# Patient Record
Sex: FEMALE | Race: WHITE | Employment: OTHER | URBAN - NONMETROPOLITAN AREA
[De-identification: names, ages, dates, MRNs, and addresses within clinical notes are randomized per-mention and may not be internally consistent; named-entity substitution may affect disease eponyms.]

---

## 2017-01-11 RX ORDER — HYDROCODONE BITARTRATE AND ACETAMINOPHEN 10; 325 MG/1; MG/1
TABLET ORAL
Qty: 170 TABLET | Refills: 0 | Status: SHIPPED | OUTPATIENT
Start: 2017-01-13 | End: 2017-02-01 | Stop reason: SDUPTHER

## 2017-02-01 ENCOUNTER — HOSPITAL ENCOUNTER (OUTPATIENT)
Dept: PAIN MANAGEMENT | Age: 72
Discharge: HOME | End: 2017-02-01
Payer: MEDICARE

## 2017-02-01 VITALS
OXYGEN SATURATION: 93 % | TEMPERATURE: 97.6 F | HEIGHT: 60 IN | DIASTOLIC BLOOD PRESSURE: 93 MMHG | SYSTOLIC BLOOD PRESSURE: 185 MMHG | HEART RATE: 50 BPM | BODY MASS INDEX: 24.74 KG/M2 | RESPIRATION RATE: 16 BRPM | WEIGHT: 126 LBS

## 2017-02-01 DIAGNOSIS — M70.61 TROCHANTERIC BURSITIS OF BOTH HIPS: ICD-10-CM

## 2017-02-01 DIAGNOSIS — M70.62 TROCHANTERIC BURSITIS OF BOTH HIPS: ICD-10-CM

## 2017-02-01 DIAGNOSIS — M25.551 BILATERAL HIP PAIN: ICD-10-CM

## 2017-02-01 DIAGNOSIS — M25.552 BILATERAL HIP PAIN: ICD-10-CM

## 2017-02-01 DIAGNOSIS — G89.29 CHRONIC MIDLINE LOW BACK PAIN, WITH SCIATICA PRESENCE UNSPECIFIED: Primary | ICD-10-CM

## 2017-02-01 DIAGNOSIS — M54.5 CHRONIC MIDLINE LOW BACK PAIN, WITH SCIATICA PRESENCE UNSPECIFIED: Primary | ICD-10-CM

## 2017-02-01 PROCEDURE — G0463 HOSPITAL OUTPT CLINIC VISIT: HCPCS

## 2017-02-01 PROCEDURE — 99213 OFFICE O/P EST LOW 20 MIN: CPT

## 2017-02-01 RX ORDER — HYDROCODONE BITARTRATE AND ACETAMINOPHEN 10; 325 MG/1; MG/1
TABLET ORAL
Qty: 170 TABLET | Refills: 0 | Status: SHIPPED | OUTPATIENT
Start: 2017-02-11 | End: 2017-03-07 | Stop reason: SDUPTHER

## 2017-02-01 RX ORDER — OMEPRAZOLE 40 MG/1
1 CAPSULE, DELAYED RELEASE ORAL DAILY
COMMUNITY
Start: 2016-11-09

## 2017-02-01 ASSESSMENT — PAIN DESCRIPTION - PROGRESSION: CLINICAL_PROGRESSION: GRADUALLY WORSENING

## 2017-02-01 ASSESSMENT — PAIN DESCRIPTION - PAIN TYPE: TYPE: CHRONIC PAIN

## 2017-02-01 ASSESSMENT — PAIN DESCRIPTION - DESCRIPTORS: DESCRIPTORS: ACHING;THROBBING

## 2017-02-01 ASSESSMENT — PAIN DESCRIPTION - ONSET: ONSET: ON-GOING

## 2017-02-01 ASSESSMENT — PAIN DESCRIPTION - ORIENTATION: ORIENTATION: RIGHT;LEFT;LOWER

## 2017-02-01 ASSESSMENT — PAIN DESCRIPTION - FREQUENCY: FREQUENCY: INTERMITTENT

## 2017-02-01 ASSESSMENT — PAIN SCALES - GENERAL: PAINLEVEL_OUTOF10: 10

## 2017-02-01 ASSESSMENT — ACTIVITIES OF DAILY LIVING (ADL): EFFECT OF PAIN ON DAILY ACTIVITIES: LIMITS ACTIVITIES

## 2017-02-01 ASSESSMENT — PAIN DESCRIPTION - LOCATION: LOCATION: BACK;HIP

## 2017-02-22 DIAGNOSIS — E78.2 MIXED HYPERLIPIDEMIA: Primary | ICD-10-CM

## 2017-02-22 RX ORDER — ATORVASTATIN CALCIUM 40 MG/1
TABLET, FILM COATED ORAL
Qty: 90 TABLET | Refills: 3 | Status: SHIPPED | OUTPATIENT
Start: 2017-02-22 | End: 2017-11-20 | Stop reason: ALTCHOICE

## 2017-02-23 ENCOUNTER — TELEPHONE (OUTPATIENT)
Dept: CARDIOLOGY | Age: 72
End: 2017-02-23

## 2017-02-23 DIAGNOSIS — E78.2 MIXED HYPERLIPIDEMIA: ICD-10-CM

## 2017-02-23 LAB
ALBUMIN SERPL-MCNC: 4 G/DL (ref 3.5–5.2)
ALP BLD-CCNC: 67 U/L (ref 35–104)
ALT SERPL-CCNC: 7 U/L (ref 5–33)
AST SERPL-CCNC: 16 U/L (ref 5–32)
BILIRUB SERPL-MCNC: 0.3 MG/DL (ref 0.2–1.2)
BILIRUBIN DIRECT: 0.1 MG/DL (ref 0–0.3)
BILIRUBIN, INDIRECT: 0.2 MG/DL (ref 0.1–1)
CHOLESTEROL, TOTAL: 188 MG/DL (ref 160–199)
HDLC SERPL-MCNC: 66 MG/DL (ref 65–121)
LDL CHOLESTEROL CALCULATED: 103 MG/DL
TOTAL PROTEIN: 6.4 G/DL (ref 6.6–8.7)
TRIGL SERPL-MCNC: 93 MG/DL (ref 150–199)

## 2017-03-07 RX ORDER — HYDROCODONE BITARTRATE AND ACETAMINOPHEN 10; 325 MG/1; MG/1
TABLET ORAL
Qty: 170 TABLET | Refills: 0 | Status: SHIPPED | OUTPATIENT
Start: 2017-03-14 | End: 2017-03-16 | Stop reason: SDUPTHER

## 2017-03-16 ENCOUNTER — HOSPITAL ENCOUNTER (OUTPATIENT)
Dept: PAIN MANAGEMENT | Age: 72
Discharge: HOME | End: 2017-03-16
Payer: MEDICARE

## 2017-03-16 VITALS
RESPIRATION RATE: 20 BRPM | HEART RATE: 56 BPM | BODY MASS INDEX: 24.35 KG/M2 | TEMPERATURE: 99.3 F | HEIGHT: 60 IN | SYSTOLIC BLOOD PRESSURE: 177 MMHG | OXYGEN SATURATION: 92 % | WEIGHT: 124 LBS | DIASTOLIC BLOOD PRESSURE: 80 MMHG

## 2017-03-16 DIAGNOSIS — M70.62 TROCHANTERIC BURSITIS OF BOTH HIPS: ICD-10-CM

## 2017-03-16 DIAGNOSIS — M70.61 TROCHANTERIC BURSITIS OF BOTH HIPS: ICD-10-CM

## 2017-03-16 DIAGNOSIS — M25.552 BILATERAL HIP PAIN: ICD-10-CM

## 2017-03-16 DIAGNOSIS — M25.551 BILATERAL HIP PAIN: ICD-10-CM

## 2017-03-16 PROCEDURE — 6360000002 HC RX W HCPCS

## 2017-03-16 PROCEDURE — 3209999900 FLUORO FOR SURGICAL PROCEDURES

## 2017-03-16 PROCEDURE — 99152 MOD SED SAME PHYS/QHP 5/>YRS: CPT

## 2017-03-16 PROCEDURE — 2500000003 HC RX 250 WO HCPCS

## 2017-03-16 PROCEDURE — 20610 DRAIN/INJ JOINT/BURSA W/O US: CPT

## 2017-03-16 RX ORDER — BUPIVACAINE HYDROCHLORIDE 5 MG/ML
INJECTION, SOLUTION EPIDURAL; INTRACAUDAL
Status: COMPLETED | OUTPATIENT
Start: 2017-03-16 | End: 2017-03-16

## 2017-03-16 RX ORDER — TRIAMCINOLONE ACETONIDE 40 MG/ML
INJECTION, SUSPENSION INTRA-ARTICULAR; INTRAMUSCULAR
Status: COMPLETED | OUTPATIENT
Start: 2017-03-16 | End: 2017-03-16

## 2017-03-16 RX ORDER — OXYCODONE AND ACETAMINOPHEN 10; 325 MG/1; MG/1
1 TABLET ORAL EVERY 4 HOURS PRN
Qty: 170 TABLET | Refills: 0 | Status: SHIPPED | OUTPATIENT
Start: 2017-04-13 | End: 2017-05-23 | Stop reason: ALTCHOICE

## 2017-03-16 RX ORDER — HYDROCODONE BITARTRATE AND ACETAMINOPHEN 10; 325 MG/1; MG/1
TABLET ORAL
Qty: 170 TABLET | Refills: 0 | Status: SHIPPED | OUTPATIENT
Start: 2017-04-13 | End: 2017-03-16 | Stop reason: DRUGHIGH

## 2017-03-16 RX ORDER — MIDAZOLAM HYDROCHLORIDE 1 MG/ML
INJECTION INTRAMUSCULAR; INTRAVENOUS
Status: COMPLETED | OUTPATIENT
Start: 2017-03-16 | End: 2017-03-16

## 2017-03-16 RX ADMIN — BUPIVACAINE HYDROCHLORIDE 9 ML: 5 INJECTION, SOLUTION EPIDURAL; INTRACAUDAL at 15:52

## 2017-03-16 RX ADMIN — TRIAMCINOLONE ACETONIDE 40 MG: 40 INJECTION, SUSPENSION INTRA-ARTICULAR; INTRAMUSCULAR at 15:53

## 2017-03-16 RX ADMIN — TRIAMCINOLONE ACETONIDE 20 MG: 40 INJECTION, SUSPENSION INTRA-ARTICULAR; INTRAMUSCULAR at 15:53

## 2017-03-16 RX ADMIN — MIDAZOLAM HYDROCHLORIDE 2 MG: 1 INJECTION INTRAMUSCULAR; INTRAVENOUS at 15:49

## 2017-03-16 RX ADMIN — BUPIVACAINE HYDROCHLORIDE 4.5 ML: 5 INJECTION, SOLUTION EPIDURAL; INTRACAUDAL at 15:53

## 2017-03-16 ASSESSMENT — PAIN DESCRIPTION - FREQUENCY: FREQUENCY: CONTINUOUS

## 2017-03-16 ASSESSMENT — PAIN DESCRIPTION - PAIN TYPE: TYPE: CHRONIC PAIN

## 2017-03-16 ASSESSMENT — PAIN DESCRIPTION - DESCRIPTORS: DESCRIPTORS: ACHING;THROBBING

## 2017-03-16 ASSESSMENT — PAIN DESCRIPTION - PROGRESSION: CLINICAL_PROGRESSION: GRADUALLY WORSENING

## 2017-03-16 ASSESSMENT — PAIN SCALES - GENERAL: PAINLEVEL_OUTOF10: 8

## 2017-03-16 ASSESSMENT — PAIN DESCRIPTION - LOCATION: LOCATION: BACK;HIP;SHOULDER

## 2017-03-16 ASSESSMENT — PAIN DESCRIPTION - ONSET: ONSET: ON-GOING

## 2017-03-16 ASSESSMENT — PAIN DESCRIPTION - ORIENTATION: ORIENTATION: RIGHT;LOWER

## 2017-05-09 ENCOUNTER — TELEPHONE (OUTPATIENT)
Dept: CARDIOLOGY | Age: 72
End: 2017-05-09

## 2017-05-11 RX ORDER — HYDROCODONE BITARTRATE AND ACETAMINOPHEN 10; 325 MG/1; MG/1
TABLET ORAL
Qty: 170 TABLET | Refills: 0 | Status: SHIPPED | OUTPATIENT
Start: 2017-05-13 | End: 2017-05-23 | Stop reason: SDUPTHER

## 2017-05-23 ENCOUNTER — HOSPITAL ENCOUNTER (OUTPATIENT)
Dept: PAIN MANAGEMENT | Age: 72
Discharge: HOME | End: 2017-05-23
Payer: MEDICARE

## 2017-05-23 VITALS
TEMPERATURE: 98.5 F | RESPIRATION RATE: 16 BRPM | HEART RATE: 53 BPM | BODY MASS INDEX: 24.35 KG/M2 | HEIGHT: 60 IN | OXYGEN SATURATION: 92 % | DIASTOLIC BLOOD PRESSURE: 86 MMHG | WEIGHT: 124 LBS | SYSTOLIC BLOOD PRESSURE: 170 MMHG

## 2017-05-23 DIAGNOSIS — G89.29 CHRONIC RIGHT SHOULDER PAIN: ICD-10-CM

## 2017-05-23 DIAGNOSIS — M25.511 CHRONIC RIGHT SHOULDER PAIN: ICD-10-CM

## 2017-05-23 DIAGNOSIS — M54.41 ACUTE RIGHT-SIDED LOW BACK PAIN WITH RIGHT-SIDED SCIATICA: Primary | ICD-10-CM

## 2017-05-23 DIAGNOSIS — M70.62 TROCHANTERIC BURSITIS OF BOTH HIPS: ICD-10-CM

## 2017-05-23 DIAGNOSIS — M25.552 BILATERAL HIP PAIN: ICD-10-CM

## 2017-05-23 DIAGNOSIS — M70.61 TROCHANTERIC BURSITIS OF BOTH HIPS: ICD-10-CM

## 2017-05-23 DIAGNOSIS — M25.551 BILATERAL HIP PAIN: ICD-10-CM

## 2017-05-23 PROCEDURE — 99214 OFFICE O/P EST MOD 30 MIN: CPT

## 2017-05-23 PROCEDURE — 80307 DRUG TEST PRSMV CHEM ANLYZR: CPT

## 2017-05-23 PROCEDURE — G0463 HOSPITAL OUTPT CLINIC VISIT: HCPCS

## 2017-05-23 RX ORDER — HYDROCODONE BITARTRATE AND ACETAMINOPHEN 10; 325 MG/1; MG/1
TABLET ORAL
Qty: 170 TABLET | Refills: 0 | Status: SHIPPED | OUTPATIENT
Start: 2017-06-12 | End: 2017-07-10 | Stop reason: SDUPTHER

## 2017-05-23 ASSESSMENT — PAIN SCALES - GENERAL: PAINLEVEL_OUTOF10: 9

## 2017-05-23 ASSESSMENT — PAIN DESCRIPTION - PAIN TYPE: TYPE: CHRONIC PAIN

## 2017-05-23 ASSESSMENT — ACTIVITIES OF DAILY LIVING (ADL): EFFECT OF PAIN ON DAILY ACTIVITIES: LIMITS ACTIVITIES

## 2017-05-23 ASSESSMENT — PAIN DESCRIPTION - FREQUENCY: FREQUENCY: CONTINUOUS

## 2017-05-23 ASSESSMENT — PAIN DESCRIPTION - ONSET: ONSET: ON-GOING

## 2017-05-23 ASSESSMENT — PAIN DESCRIPTION - ORIENTATION: ORIENTATION: LOWER;RIGHT

## 2017-05-23 ASSESSMENT — PAIN DESCRIPTION - PROGRESSION: CLINICAL_PROGRESSION: GRADUALLY WORSENING

## 2017-05-23 ASSESSMENT — PAIN DESCRIPTION - LOCATION: LOCATION: BACK;SHOULDER

## 2017-05-31 ENCOUNTER — HOSPITAL ENCOUNTER (OUTPATIENT)
Dept: GENERAL RADIOLOGY | Age: 72
Discharge: HOME | End: 2017-05-31
Payer: MEDICARE

## 2017-05-31 DIAGNOSIS — M25.511 CHRONIC RIGHT SHOULDER PAIN: ICD-10-CM

## 2017-05-31 DIAGNOSIS — M70.62 TROCHANTERIC BURSITIS OF BOTH HIPS: ICD-10-CM

## 2017-05-31 DIAGNOSIS — G89.29 CHRONIC RIGHT SHOULDER PAIN: ICD-10-CM

## 2017-05-31 DIAGNOSIS — M70.61 TROCHANTERIC BURSITIS OF BOTH HIPS: ICD-10-CM

## 2017-05-31 DIAGNOSIS — M54.41 ACUTE RIGHT-SIDED LOW BACK PAIN WITH RIGHT-SIDED SCIATICA: ICD-10-CM

## 2017-05-31 LAB
AMPHETAMINES, URINE: NEGATIVE NG/ML
BARBITURATES, URINE: NEGATIVE NG/ML
BENZODIAZEPINES, URINE: NEGATIVE NG/ML
CANNABINOIDS, URINE: NEGATIVE NG/ML
COCAINE METABOLITE, URINE: NEGATIVE NG/ML
CODEINE, URINE: NEGATIVE
CREATININE, URINE: 73.8 MG/DL (ref 20–300)
ETHANOL U, QUAN: NEGATIVE %
FENTANYL URINE: NEGATIVE PG/ML
HYDROCODONE, UR CONF: 2910 NG/ML
HYDROCODONE, URINE: POSITIVE
HYDROMORPHONE, URINE: NEGATIVE
MEPERIDINE, UR: NEGATIVE NG/ML
METHADONE SCREEN, URINE: NEGATIVE NG/ML
MORPHINE URINE: NEGATIVE
OPIATES, URINE: ABNORMAL NG/ML
OPIATES, URINE: POSITIVE NG/ML
OXYCODONE/OXYMORPHONE, UR: NEGATIVE NG/ML
PH, URINE: 5.9 (ref 4.5–8.9)
PHENCYCLIDINE, URINE: NEGATIVE NG/ML
PROPOXYPHENE, URINE: NEGATIVE NG/ML

## 2017-05-31 PROCEDURE — 73030 X-RAY EXAM OF SHOULDER: CPT

## 2017-05-31 PROCEDURE — 73521 X-RAY EXAM HIPS BI 2 VIEWS: CPT

## 2017-05-31 PROCEDURE — 72120 X-RAY BEND ONLY L-S SPINE: CPT

## 2017-06-13 ENCOUNTER — OFFICE VISIT (OUTPATIENT)
Dept: CARDIOLOGY | Age: 72
End: 2017-06-13
Payer: MEDICARE

## 2017-06-13 VITALS
HEART RATE: 55 BPM | SYSTOLIC BLOOD PRESSURE: 130 MMHG | WEIGHT: 124.38 LBS | HEIGHT: 60 IN | BODY MASS INDEX: 24.42 KG/M2 | DIASTOLIC BLOOD PRESSURE: 76 MMHG

## 2017-06-13 DIAGNOSIS — I42.1 HYPERTROPHIC OBSTRUCTIVE CARDIOMYOPATHY (HOCM) (HCC): ICD-10-CM

## 2017-06-13 DIAGNOSIS — I25.10 CORONARY ARTERY DISEASE INVOLVING NATIVE CORONARY ARTERY OF NATIVE HEART, ANGINA PRESENCE UNSPECIFIED: ICD-10-CM

## 2017-06-13 DIAGNOSIS — Z95.5 S/P CORONARY ARTERY STENT PLACEMENT: ICD-10-CM

## 2017-06-13 DIAGNOSIS — I10 ESSENTIAL HYPERTENSION: ICD-10-CM

## 2017-06-13 DIAGNOSIS — R07.9 CHEST PAIN, UNSPECIFIED TYPE: Primary | ICD-10-CM

## 2017-06-13 DIAGNOSIS — I42.1 IHSS (IDIOPATHIC HYPERTROPHIC SUBAORTIC STENOSIS) (HCC): ICD-10-CM

## 2017-06-13 PROCEDURE — 99214 OFFICE O/P EST MOD 30 MIN: CPT | Performed by: NURSE PRACTITIONER

## 2017-06-23 ENCOUNTER — HOSPITAL ENCOUNTER (OUTPATIENT)
Dept: NUCLEAR MEDICINE | Age: 72
Discharge: HOME | End: 2017-06-23
Payer: MEDICARE

## 2017-06-23 ENCOUNTER — HOSPITAL ENCOUNTER (OUTPATIENT)
Dept: NUCLEAR MEDICINE | Age: 72
End: 2017-06-23
Payer: MEDICARE

## 2017-06-23 ENCOUNTER — HOSPITAL ENCOUNTER (OUTPATIENT)
Dept: NON INVASIVE DIAGNOSTICS | Age: 72
Discharge: HOME | End: 2017-06-23
Payer: MEDICARE

## 2017-06-23 DIAGNOSIS — Z95.5 S/P CORONARY ARTERY STENT PLACEMENT: ICD-10-CM

## 2017-06-23 DIAGNOSIS — R07.9 CHEST PAIN, UNSPECIFIED TYPE: ICD-10-CM

## 2017-06-23 DIAGNOSIS — I25.10 CORONARY ARTERY DISEASE INVOLVING NATIVE CORONARY ARTERY OF NATIVE HEART, ANGINA PRESENCE UNSPECIFIED: ICD-10-CM

## 2017-06-23 PROCEDURE — A9500 TC99M SESTAMIBI: HCPCS | Performed by: NURSE PRACTITIONER

## 2017-06-23 PROCEDURE — 3430000000 HC RX DIAGNOSTIC RADIOPHARMACEUTICAL: Performed by: NURSE PRACTITIONER

## 2017-06-23 PROCEDURE — 6360000002 HC RX W HCPCS: Performed by: NURSE PRACTITIONER

## 2017-06-23 PROCEDURE — 78452 HT MUSCLE IMAGE SPECT MULT: CPT

## 2017-06-23 PROCEDURE — 93005 ELECTROCARDIOGRAM TRACING: CPT

## 2017-06-23 PROCEDURE — 93017 CV STRESS TEST TRACING ONLY: CPT

## 2017-06-23 RX ADMIN — TETRAKIS(2-METHOXYISOBUTYLISOCYANIDE)COPPER(I) TETRAFLUOROBORATE 10 MILLICURIE: 1 INJECTION, POWDER, LYOPHILIZED, FOR SOLUTION INTRAVENOUS at 13:24

## 2017-06-23 RX ADMIN — TETRAKIS(2-METHOXYISOBUTYLISOCYANIDE)COPPER(I) TETRAFLUOROBORATE 30 MILLICURIE: 1 INJECTION, POWDER, LYOPHILIZED, FOR SOLUTION INTRAVENOUS at 13:23

## 2017-06-23 RX ADMIN — REGADENOSON 0.4 MG: 0.08 INJECTION, SOLUTION INTRAVENOUS at 13:24

## 2017-07-10 ENCOUNTER — HOSPITAL ENCOUNTER (OUTPATIENT)
Dept: PAIN MANAGEMENT | Age: 72
Discharge: HOME | End: 2017-07-10
Payer: MEDICARE

## 2017-07-10 VITALS
SYSTOLIC BLOOD PRESSURE: 185 MMHG | HEIGHT: 60 IN | TEMPERATURE: 98.1 F | WEIGHT: 128 LBS | OXYGEN SATURATION: 97 % | BODY MASS INDEX: 25.13 KG/M2 | DIASTOLIC BLOOD PRESSURE: 87 MMHG | HEART RATE: 53 BPM | RESPIRATION RATE: 16 BRPM

## 2017-07-10 DIAGNOSIS — M70.61 TROCHANTERIC BURSITIS OF BOTH HIPS: ICD-10-CM

## 2017-07-10 DIAGNOSIS — M25.552 BILATERAL HIP PAIN: ICD-10-CM

## 2017-07-10 DIAGNOSIS — M25.551 BILATERAL HIP PAIN: ICD-10-CM

## 2017-07-10 DIAGNOSIS — M70.62 TROCHANTERIC BURSITIS OF BOTH HIPS: ICD-10-CM

## 2017-07-10 PROCEDURE — 2500000003 HC RX 250 WO HCPCS

## 2017-07-10 PROCEDURE — 3209999900 FLUORO FOR SURGICAL PROCEDURES

## 2017-07-10 PROCEDURE — 20610 DRAIN/INJ JOINT/BURSA W/O US: CPT

## 2017-07-10 PROCEDURE — 6360000002 HC RX W HCPCS

## 2017-07-10 PROCEDURE — 99152 MOD SED SAME PHYS/QHP 5/>YRS: CPT

## 2017-07-10 RX ORDER — HYDROCODONE BITARTRATE AND ACETAMINOPHEN 10; 325 MG/1; MG/1
TABLET ORAL
Qty: 170 TABLET | Refills: 0 | Status: SHIPPED | OUTPATIENT
Start: 2017-07-12 | End: 2017-08-08 | Stop reason: SDUPTHER

## 2017-07-10 RX ORDER — BUPIVACAINE HYDROCHLORIDE 5 MG/ML
INJECTION, SOLUTION EPIDURAL; INTRACAUDAL
Status: COMPLETED | OUTPATIENT
Start: 2017-07-10 | End: 2017-07-10

## 2017-07-10 RX ORDER — TRIAMCINOLONE ACETONIDE 40 MG/ML
INJECTION, SUSPENSION INTRA-ARTICULAR; INTRAMUSCULAR
Status: COMPLETED | OUTPATIENT
Start: 2017-07-10 | End: 2017-07-10

## 2017-07-10 RX ORDER — MIDAZOLAM HYDROCHLORIDE 1 MG/ML
INJECTION INTRAMUSCULAR; INTRAVENOUS
Status: COMPLETED | OUTPATIENT
Start: 2017-07-10 | End: 2017-07-10

## 2017-07-10 RX ADMIN — BUPIVACAINE HYDROCHLORIDE 4.5 ML: 5 INJECTION, SOLUTION EPIDURAL; INTRACAUDAL at 14:10

## 2017-07-10 RX ADMIN — BUPIVACAINE HYDROCHLORIDE 9 ML: 5 INJECTION, SOLUTION EPIDURAL; INTRACAUDAL at 14:07

## 2017-07-10 RX ADMIN — TRIAMCINOLONE ACETONIDE 20 MG: 40 INJECTION, SUSPENSION INTRA-ARTICULAR; INTRAMUSCULAR at 14:10

## 2017-07-10 RX ADMIN — TRIAMCINOLONE ACETONIDE 40 MG: 40 INJECTION, SUSPENSION INTRA-ARTICULAR; INTRAMUSCULAR at 14:08

## 2017-07-10 RX ADMIN — MIDAZOLAM HYDROCHLORIDE 2 MG: 1 INJECTION INTRAMUSCULAR; INTRAVENOUS at 14:01

## 2017-07-10 ASSESSMENT — PAIN DESCRIPTION - ORIENTATION: ORIENTATION: RIGHT;LEFT;LOWER

## 2017-07-10 ASSESSMENT — PAIN DESCRIPTION - PAIN TYPE: TYPE: CHRONIC PAIN

## 2017-07-10 ASSESSMENT — PAIN SCALES - GENERAL: PAINLEVEL_OUTOF10: 7

## 2017-07-10 ASSESSMENT — PAIN DESCRIPTION - PROGRESSION: CLINICAL_PROGRESSION: NOT CHANGED

## 2017-07-10 ASSESSMENT — PAIN DESCRIPTION - FREQUENCY: FREQUENCY: CONTINUOUS

## 2017-07-10 ASSESSMENT — PAIN DESCRIPTION - ONSET: ONSET: ON-GOING

## 2017-07-10 ASSESSMENT — PAIN DESCRIPTION - LOCATION: LOCATION: BACK;HIP;SHOULDER

## 2017-07-29 DIAGNOSIS — E78.00 PURE HYPERCHOLESTEROLEMIA: ICD-10-CM

## 2017-07-31 RX ORDER — ROSUVASTATIN CALCIUM 10 MG/1
TABLET, COATED ORAL
Qty: 90 TABLET | Refills: 3 | Status: SHIPPED | OUTPATIENT
Start: 2017-07-31 | End: 2018-07-26 | Stop reason: SDUPTHER

## 2017-08-08 RX ORDER — HYDROCODONE BITARTRATE AND ACETAMINOPHEN 10; 325 MG/1; MG/1
TABLET ORAL
Qty: 160 TABLET | Refills: 0 | Status: SHIPPED | OUTPATIENT
Start: 2017-08-11 | End: 2017-09-11 | Stop reason: SDUPTHER

## 2017-08-30 ENCOUNTER — TELEPHONE (OUTPATIENT)
Dept: CARDIOLOGY | Age: 72
End: 2017-08-30

## 2017-09-05 ENCOUNTER — TELEPHONE (OUTPATIENT)
Dept: CARDIOLOGY | Age: 72
End: 2017-09-05

## 2017-09-06 ENCOUNTER — TELEPHONE (OUTPATIENT)
Dept: PAIN MANAGEMENT | Age: 72
End: 2017-09-06

## 2017-09-11 RX ORDER — HYDROCODONE BITARTRATE AND ACETAMINOPHEN 10; 325 MG/1; MG/1
TABLET ORAL
Qty: 160 TABLET | Refills: 0 | Status: SHIPPED | OUTPATIENT
Start: 2017-09-11 | End: 2017-09-12 | Stop reason: SDUPTHER

## 2017-09-12 ENCOUNTER — HOSPITAL ENCOUNTER (OUTPATIENT)
Dept: PAIN MANAGEMENT | Age: 72
Discharge: HOME | End: 2017-09-12
Payer: MEDICARE

## 2017-09-12 VITALS
HEART RATE: 59 BPM | SYSTOLIC BLOOD PRESSURE: 158 MMHG | DIASTOLIC BLOOD PRESSURE: 73 MMHG | TEMPERATURE: 98 F | HEIGHT: 60 IN | RESPIRATION RATE: 18 BRPM | BODY MASS INDEX: 24.74 KG/M2 | WEIGHT: 126 LBS | OXYGEN SATURATION: 95 %

## 2017-09-12 DIAGNOSIS — G89.29 CHRONIC MIDLINE LOW BACK PAIN WITH BILATERAL SCIATICA: Primary | ICD-10-CM

## 2017-09-12 DIAGNOSIS — M70.61 TROCHANTERIC BURSITIS OF BOTH HIPS: ICD-10-CM

## 2017-09-12 DIAGNOSIS — M54.41 CHRONIC MIDLINE LOW BACK PAIN WITH BILATERAL SCIATICA: Primary | ICD-10-CM

## 2017-09-12 DIAGNOSIS — M70.62 TROCHANTERIC BURSITIS OF BOTH HIPS: ICD-10-CM

## 2017-09-12 DIAGNOSIS — M54.42 CHRONIC MIDLINE LOW BACK PAIN WITH BILATERAL SCIATICA: Primary | ICD-10-CM

## 2017-09-12 PROCEDURE — 99213 OFFICE O/P EST LOW 20 MIN: CPT

## 2017-09-12 ASSESSMENT — PAIN DESCRIPTION - ONSET: ONSET: ON-GOING

## 2017-09-12 ASSESSMENT — ACTIVITIES OF DAILY LIVING (ADL): EFFECT OF PAIN ON DAILY ACTIVITIES: LIMITS ACTIVITIES

## 2017-09-12 ASSESSMENT — PAIN DESCRIPTION - LOCATION: LOCATION: BACK;HIP;SHOULDER

## 2017-09-12 ASSESSMENT — PAIN SCALES - GENERAL: PAINLEVEL_OUTOF10: 7

## 2017-09-12 ASSESSMENT — PAIN DESCRIPTION - ORIENTATION: ORIENTATION: RIGHT;LOWER;LEFT

## 2017-09-12 ASSESSMENT — PAIN DESCRIPTION - PAIN TYPE: TYPE: CHRONIC PAIN

## 2017-09-12 ASSESSMENT — PAIN DESCRIPTION - PROGRESSION: CLINICAL_PROGRESSION: NOT CHANGED

## 2017-09-12 ASSESSMENT — PAIN DESCRIPTION - FREQUENCY: FREQUENCY: CONTINUOUS

## 2017-09-13 RX ORDER — HYDROCODONE BITARTRATE AND ACETAMINOPHEN 10; 325 MG/1; MG/1
TABLET ORAL
Qty: 160 TABLET | Refills: 0 | Status: SHIPPED | OUTPATIENT
Start: 2017-10-11 | End: 2017-11-08 | Stop reason: SDUPTHER

## 2017-09-27 ENCOUNTER — HOSPITAL ENCOUNTER (OUTPATIENT)
Dept: MRI IMAGING | Age: 72
Discharge: HOME | End: 2017-09-27
Payer: MEDICARE

## 2017-09-27 DIAGNOSIS — M54.42 CHRONIC MIDLINE LOW BACK PAIN WITH BILATERAL SCIATICA: ICD-10-CM

## 2017-09-27 DIAGNOSIS — M54.41 CHRONIC MIDLINE LOW BACK PAIN WITH BILATERAL SCIATICA: ICD-10-CM

## 2017-09-27 DIAGNOSIS — G89.29 CHRONIC MIDLINE LOW BACK PAIN WITH BILATERAL SCIATICA: ICD-10-CM

## 2017-09-27 PROCEDURE — 72148 MRI LUMBAR SPINE W/O DYE: CPT

## 2017-10-17 ENCOUNTER — HOSPITAL ENCOUNTER (OUTPATIENT)
Dept: PAIN MANAGEMENT | Age: 72
Discharge: HOME | End: 2017-10-17
Payer: MEDICARE

## 2017-10-17 VITALS
WEIGHT: 126 LBS | OXYGEN SATURATION: 98 % | TEMPERATURE: 98.2 F | RESPIRATION RATE: 16 BRPM | DIASTOLIC BLOOD PRESSURE: 78 MMHG | SYSTOLIC BLOOD PRESSURE: 161 MMHG | HEART RATE: 54 BPM | HEIGHT: 60 IN | BODY MASS INDEX: 24.74 KG/M2

## 2017-10-17 DIAGNOSIS — M47.816 LUMBAR FACET ARTHROPATHY: Chronic | ICD-10-CM

## 2017-10-17 DIAGNOSIS — M54.59 LUMBAR FACET JOINT PAIN: ICD-10-CM

## 2017-10-17 PROCEDURE — 20610 DRAIN/INJ JOINT/BURSA W/O US: CPT

## 2017-10-17 PROCEDURE — 99152 MOD SED SAME PHYS/QHP 5/>YRS: CPT

## 2017-10-17 PROCEDURE — G0260 INJ FOR SACROILIAC JT ANESTH: HCPCS

## 2017-10-17 PROCEDURE — 64493 INJ PARAVERT F JNT L/S 1 LEV: CPT

## 2017-10-17 PROCEDURE — 6360000002 HC RX W HCPCS

## 2017-10-17 PROCEDURE — 3209999900 FLUORO FOR SURGICAL PROCEDURES

## 2017-10-17 PROCEDURE — 64494 INJ PARAVERT F JNT L/S 2 LEV: CPT

## 2017-10-17 PROCEDURE — 2500000003 HC RX 250 WO HCPCS

## 2017-10-17 RX ORDER — BUPIVACAINE HYDROCHLORIDE 5 MG/ML
INJECTION, SOLUTION EPIDURAL; INTRACAUDAL
Status: COMPLETED | OUTPATIENT
Start: 2017-10-17 | End: 2017-10-17

## 2017-10-17 RX ORDER — TRIAMCINOLONE ACETONIDE 40 MG/ML
INJECTION, SUSPENSION INTRA-ARTICULAR; INTRAMUSCULAR
Status: COMPLETED | OUTPATIENT
Start: 2017-10-17 | End: 2017-10-17

## 2017-10-17 RX ORDER — MIDAZOLAM HYDROCHLORIDE 1 MG/ML
INJECTION INTRAMUSCULAR; INTRAVENOUS
Status: COMPLETED | OUTPATIENT
Start: 2017-10-17 | End: 2017-10-17

## 2017-10-17 RX ADMIN — TRIAMCINOLONE ACETONIDE 20 MG: 40 INJECTION, SUSPENSION INTRA-ARTICULAR; INTRAMUSCULAR at 14:38

## 2017-10-17 RX ADMIN — BUPIVACAINE HYDROCHLORIDE 4.5 ML: 5 INJECTION, SOLUTION EPIDURAL; INTRACAUDAL at 14:37

## 2017-10-17 RX ADMIN — BUPIVACAINE HYDROCHLORIDE 0.5 ML: 5 INJECTION, SOLUTION EPIDURAL; INTRACAUDAL at 14:35

## 2017-10-17 RX ADMIN — MIDAZOLAM HYDROCHLORIDE 2 MG: 1 INJECTION INTRAMUSCULAR; INTRAVENOUS at 14:33

## 2017-10-17 RX ADMIN — TRIAMCINOLONE ACETONIDE 2 MG: 40 INJECTION, SUSPENSION INTRA-ARTICULAR; INTRAMUSCULAR at 14:36

## 2017-10-17 RX ADMIN — BUPIVACAINE HYDROCHLORIDE 4.5 ML: 5 INJECTION, SOLUTION EPIDURAL; INTRACAUDAL at 14:39

## 2017-10-17 RX ADMIN — TRIAMCINOLONE ACETONIDE 20 MG: 40 INJECTION, SUSPENSION INTRA-ARTICULAR; INTRAMUSCULAR at 14:40

## 2017-10-17 ASSESSMENT — ACTIVITIES OF DAILY LIVING (ADL): EFFECT OF PAIN ON DAILY ACTIVITIES: LIMITS ACTIVITY

## 2017-10-17 ASSESSMENT — PAIN - FUNCTIONAL ASSESSMENT
PAIN_FUNCTIONAL_ASSESSMENT: 0-10
PAIN_FUNCTIONAL_ASSESSMENT: 0-10

## 2017-10-17 ASSESSMENT — PAIN DESCRIPTION - DESCRIPTORS: DESCRIPTORS: THROBBING;SHARP

## 2017-10-17 NOTE — PROGRESS NOTES
Patient Name: Maite Anderson  : 1945  MRN: [id number]    PRE-SEDATION ASSESSMENT    Procedure:  [unfilled]  I have examined the patient's status immediately prior to the procedure. BRIEF H&P    HPI/Changes/Indicators/Diagnosis  There are no active hospital problems to display for this patient. Medications:  Prior to Admission medications    Medication Sig Start Date End Date Taking? Authorizing Provider   diclofenac sodium (VOLTAREN) 1 % GEL Apply 4 g topically 4 times daily 10/17/17 11/16/17  Tonia Mcneil MD   HYDROcodone-acetaminophen (NORCO)  MG per tablet 1 tablet every 4-6 hours PRN for pain. May fill 10/11/17. . 10/11/17   REESE Patel   rosuvastatin (CRESTOR) 10 MG tablet TAKE 1 TABLET DAILY 17   REESE Adam   diclofenac sodium (VOLTAREN) 1 % GEL Apply 4 g topically 4 times daily as needed for Pain 17   REESE Patel   atorvastatin (LIPITOR) 40 MG tablet TAKE 1 TABLET NIGHTLY 17   REESE Adam   omeprazole (PRILOSEC) 40 MG delayed release capsule Take 1 capsule by mouth daily 16   Historical Provider, MD   metoprolol succinate (TOPROL XL) 100 MG extended release tablet Take 1 tablet by mouth 2 times daily 16   REESE Pereyra   butalbital-acetaminophen-caffeine (FIORICET, ESGIC) -65 MG per tablet  16   Historical Provider, MD   traZODone (DESYREL) 50 MG tablet Take 1 tablet by mouth nightly  Patient taking differently: Take 50 mg by mouth nightly 1/2 tablet nightly 16   Bc Quispe MD   Omega-3 Fatty Acids (OMEGA 3 500) 500 MG CAPS Take 500 mg by mouth daily    Historical Provider, MD   tiZANidine (ZANAFLEX) 4 MG tablet Take 4 mg by mouth nightly    Historical Provider, MD   nitroGLYCERIN (NITROSTAT) 0.4 MG SL tablet Place 0.4 mg under the tongue every 5 minutes as needed for Chest pain    Historical Provider, MD   aspirin 81 MG chewable tablet Take 81 mg by mouth daily    Historical Provider, MD RN

## 2017-10-17 NOTE — PROCEDURES
MD                [] Over 50% of today's appointment was given to discussion, evaluation and counseling. EXAM  AIRWAY ADEQUATE  LUNGS CLEAR AT AUSCULTATION  HEART RRR        COMMENTS:  Patient complains of low back pain. Patient does not have plans to proceed with surgery in relation to her medical history. Patient states that she had staph infection in her face 2 weeks ago that has resolved. Discussed that proceeding with the Lumbar Facets will allow for the patient to identify source of pain. Discussed that if successful with 2 series of lumbar facet injections will proceed with Radiofrequency Lesioning of the Lumbar Facets that can provide 75% relief that can lasts up to 6 months. Discussed the risk and benefits of procedure with patient. Will proceed today with Lumbar Facet injections. PLAN:  [] Will return to office in 3 month(s) for [x] Planned Procedure [] Office Visit  [] Prescriptions were given today    [] No prescriptions needed today  [] Patient is to call with any questions or concerns which may arise prior to the next office visit. [] Lumbar Facet #2  [] Radiofrequency Lesioning of the Lumbar Facets  []  []   []       [] Over 50% of today's appointment was given to discussion, evaluation and counseling.

## 2017-10-17 NOTE — PROCEDURES
/DATE: 10/17/2017    REASON FOR VISIT: Lumbar Degenerative Disc Disease, Lumbar Facet Syndrome  PROCEDURE: Lumbar Facet Injection. [] Moderate Sedation     DESCRIPTION OF PROCEDURE:    After obtaining informed consent, the patient was taken to the procedure room, positioned prone, and sterilely prepped. The fluoroscope was positioned and a 22-gauge needle was passed with fluoroscopic guidance to the median nerve branches of the L5-S1  lumbar facet on the [x] Left. The, 0.5ml of 0.5% Marcaine with 2mg of Kenalog was injected. This was repeated at L4-5  on the [x] Left . There were no complications. [x] The fluoroscope was repositioned to the  [x] Right side and the procedure was repeated identically at L5-S1, L4-5. There were no complications. DIAGNOSES:  [x] Lumbar Degenerative Disc Disease     [x] *Lumbar Facet Syndrome  [] Lumbar Spondylosis    [] Other       REASON FOR VISIT: Bilateral Trochanteric Bursitis  PROCEDURE: Steroid Joint Injection    [] Moderate Sedation      [x] Fluoroscopy Used  [] Ultrasound guidance    DESCRIPTION OF PROCEDURE:    After obtaining informed consent, 5 ml of 0.5% Marcaine and 20 mg of Kenalog was injected using sterile technique in the [x] Left [x] Right Greater trochanteric Bursa. .    [x]  In addition, 5 ml of 0.5% Marcaine and 20 mg of Kenalog was injected using sterile technique into the [] Left [x] Right Shoulder . There were no complications.     DIAGNOSES:  [x] Osteoarthritis   [] Left [x] Right [x] Bilateral  []  Ankle [] Wrist [] Knee [] Shoulder [] Hip [] Other    [x] Trochanteric Bursitis  [] Intercostal Neuralgia    [] Other       Nursing Admission Record   Current Issues / Falls / ER Visits:  No   Percentage of Pain Relief after Last Procedure:  50 %    How long lasted:  5 weeks   Radiology exams received during the last 12 months: No  MRI exams received in the past 2 years:  Yes  Physical therapy during the last 6 months: No  Labs during the last 12 months: Yes        EXAM  AIRWAY ADEQUATE  LUNGS CLEAR AT AUSCULTATION  HEART RRR        COMMENTS:  Patient complains of low back pain. Patient feels that she gained 50% relief that has lasted her 5 weeks from last injections. Patient does not have plans to proceed with surgery in relation to her medical history. Patient states that she had staph infection in her face 2 weeks ago that has resolved. Discussed that proceeding with the Lumbar Facets will allow for the patient to identify source of pain. Discussed that if successful with 2 series of lumbar facet injections will proceed with Radiofrequency Lesioning of the Lumbar Facets that can provide 75% relief that can lasts up to 6 months. Discussed the risk and benefits of procedure with patient. Will proceed today with Lumbar Facet, Bilateral Trochanteric Bursa and Right Shoulder injections. PLAN:  [] Will return to office in 3 month(s) for [x] Planned Procedure [] Office Visit  [] Prescriptions were given today    [] No prescriptions needed today  [] Patient is to call with any questions or concerns which may arise prior to the next office visit. [] Lumbar Facet #2  [] Radiofrequency Lesioning of the Lumbar Facets  []Right Shoulder  []   []       [] Over 50% of today's appointment was given to discussion, evaluation and counseling.

## 2017-11-09 RX ORDER — HYDROCODONE BITARTRATE AND ACETAMINOPHEN 10; 325 MG/1; MG/1
TABLET ORAL
Qty: 160 TABLET | Refills: 0 | Status: SHIPPED | OUTPATIENT
Start: 2017-11-10 | End: 2017-12-04 | Stop reason: SDUPTHER

## 2017-11-20 ENCOUNTER — OFFICE VISIT (OUTPATIENT)
Dept: CARDIOLOGY | Age: 72
End: 2017-11-20
Payer: MEDICARE

## 2017-11-20 VITALS
HEART RATE: 48 BPM | BODY MASS INDEX: 23.75 KG/M2 | WEIGHT: 121 LBS | DIASTOLIC BLOOD PRESSURE: 70 MMHG | HEIGHT: 60 IN | SYSTOLIC BLOOD PRESSURE: 124 MMHG

## 2017-11-20 DIAGNOSIS — R00.1 BRADYCARDIA: ICD-10-CM

## 2017-11-20 DIAGNOSIS — E78.49 OTHER HYPERLIPIDEMIA: ICD-10-CM

## 2017-11-20 DIAGNOSIS — E78.5 HYPERLIPIDEMIA, UNSPECIFIED HYPERLIPIDEMIA TYPE: ICD-10-CM

## 2017-11-20 DIAGNOSIS — I10 ESSENTIAL HYPERTENSION: ICD-10-CM

## 2017-11-20 DIAGNOSIS — R53.83 OTHER FATIGUE: ICD-10-CM

## 2017-11-20 DIAGNOSIS — I34.0 SEVERE MITRAL REGURGITATION: Primary | ICD-10-CM

## 2017-11-20 PROBLEM — R07.9 CHEST PAIN: Status: RESOLVED | Noted: 2017-06-13 | Resolved: 2017-11-20

## 2017-11-20 PROBLEM — Z95.5 S/P CORONARY ARTERY STENT PLACEMENT: Status: RESOLVED | Noted: 2017-06-13 | Resolved: 2017-11-20

## 2017-11-20 PROCEDURE — 93000 ELECTROCARDIOGRAM COMPLETE: CPT | Performed by: NURSE PRACTITIONER

## 2017-11-20 PROCEDURE — G8484 FLU IMMUNIZE NO ADMIN: HCPCS | Performed by: NURSE PRACTITIONER

## 2017-11-20 PROCEDURE — 3017F COLORECTAL CA SCREEN DOC REV: CPT | Performed by: NURSE PRACTITIONER

## 2017-11-20 PROCEDURE — 1123F ACP DISCUSS/DSCN MKR DOCD: CPT | Performed by: NURSE PRACTITIONER

## 2017-11-20 PROCEDURE — 1036F TOBACCO NON-USER: CPT | Performed by: NURSE PRACTITIONER

## 2017-11-20 PROCEDURE — 4040F PNEUMOC VAC/ADMIN/RCVD: CPT | Performed by: NURSE PRACTITIONER

## 2017-11-20 PROCEDURE — G8420 CALC BMI NORM PARAMETERS: HCPCS | Performed by: NURSE PRACTITIONER

## 2017-11-20 PROCEDURE — G8400 PT W/DXA NO RESULTS DOC: HCPCS | Performed by: NURSE PRACTITIONER

## 2017-11-20 PROCEDURE — 3014F SCREEN MAMMO DOC REV: CPT | Performed by: NURSE PRACTITIONER

## 2017-11-20 PROCEDURE — 1090F PRES/ABSN URINE INCON ASSESS: CPT | Performed by: NURSE PRACTITIONER

## 2017-11-20 PROCEDURE — G8427 DOCREV CUR MEDS BY ELIG CLIN: HCPCS | Performed by: NURSE PRACTITIONER

## 2017-11-20 PROCEDURE — 99213 OFFICE O/P EST LOW 20 MIN: CPT | Performed by: NURSE PRACTITIONER

## 2017-11-20 PROCEDURE — G8598 ASA/ANTIPLAT THER USED: HCPCS | Performed by: NURSE PRACTITIONER

## 2017-11-20 RX ORDER — METOPROLOL SUCCINATE 100 MG/1
100 TABLET, EXTENDED RELEASE ORAL 2 TIMES DAILY
Qty: 180 TABLET | Refills: 3 | Status: SHIPPED
Start: 2017-11-20 | End: 2017-12-27 | Stop reason: DRUGHIGH

## 2017-11-20 NOTE — PATIENT INSTRUCTIONS
Angina usually lasts for about 2-10 minutes. It is often relieved with rest. Angina can be triggered by:   Exercise or exertion   Emotional stress   Cold weather   A large meal   Chest pain may indicate more serious unstable angina or a heart attack if: It is unrelieved by rest or nitroglycerin   Severe angina   Angina that begins at rest (with no activity)   Angina that lasts more than 15 minutes   Accompanying symptoms may include:   Shortness of breath   Sweating   Nausea   Weakness   Immediate medical attention is needed for unstable angina. CAD in women may cause less classic chest pain. It is likely to start with shortness of breath and fatigue. Diagnosis   If you go to the emergency room with chest pain, some tests will be done right away. The tests will attempt to see if you are having angina or a heart attack. If you have a stable pattern of angina, other tests may be done to determine the severity of your disease. The doctor will ask about your symptoms and medical history. A physical exam will be done.    Tests may include:   Blood tests to look for certain substances in the blood called troponins which help the doctor determine if you are having a heart attack   Electrocardiogram (ECG, EKG) records the heart's activity by measuring electrical currents through the heart muscle, and can reveal evidence of past heart attacks, acute heart attacks, and heart rhythm problems   Echocardiogram uses high-frequency sound waves (ultrasound) to examine the size, shape, and motion of the heart, giving information about the structure and function of the heart   Exercise stress test records the heart's electrical activity during increased physical activity   Nuclear stress test the heart is observed while exercising and radioactive material highlights impaired blood flow to help locate problem areas   Coronary calcium scoring a type of x-ray called a CAT scan that uses a computer to look for the presence of blood pressure increases long-term health risks. One high reading does not mean you have hypertension. · Your blood pressure should be taken when you are relaxed. It is also important to sit for about 10 minutes before being tested. · Things that can increase your blood pressure are:  Injury, Illness, Stress, Caffeine, and some medicines (like decongestants). · High blood pressure does not usually need emergency treatment. HOME CARE  · Lifestyle and medicine changes may be needed, including:   · Weight loss. · Exercise. · Limit the use of salt. · Stop smoking. · If using decongestants or birth control pills, talk to your doctor. These medicines might make blood pressure higher. · Do not use street drugs. · Females should not drink more than 1 alcoholic drink per day. Males should not drink more than 2 alcoholic drinks per day. · Take your blood pressure medicine. You will need to take it every day. If you do not get treated, there are risks, including:   · Heart disease. · Stroke. · Kidney failure. · See your doctor as told. GET HELP RIGHT AWAY IF:  · You get a very bad headache. · You get blurred or changing vision. · You feel confused. · You feel weak, numb, or faint. · You get chest or belly (abdominal) pain. · You throw up (vomit). · You cannot breathe very well. If you have a blood pressure reading with a top number of 180 or higher, you need to see your doctor right away. This is especially true if you are having any of the problems listed above. Hyperlipidemia   (Dyslipidemia; High Triglycerides; Triglycerides, High)     Definition   Hyperlipidemia is a high level of fats in the blood. These fats, called lipids, include cholesterol and triglycerides. There are five types of hyperlipidemia. The type depends on which lipid in the blood is high.    Causes   Causes may include:   A family history of hyperlipidemia   A diet high in total fat, saturated fat, any form of cardiovascular disease (eg, coronary heart disease , peripheral vascular disease , previous heart attack , previous stroke )   · People with risk factors for cardiovascular disease, such as high blood pressure , high cholesterol , or diabetes   · Anyone who wants to lower their risk of developing cardiovascular disease   Sodium   Sodium is a mineral found in many foods. In general, most people consume much more sodium than they need. Diets high in sodium can increase blood pressure and lead to edema (water retention). On a heart-healthy diet, you should consume no more than 2,300 mg (milligrams) of sodium per dayabout the amount in one teaspoon of table salt. The foods highest in sodium include table salt (about 50% sodium), processed foods, convenience foods, and preserved foods. Cholesterol   Cholesterol is a fat-like, waxy substance in your blood. Our bodies make some cholesterol. It is also found in animal products, with the highest amounts in fatty meat, egg yolks, whole milk, cheese, shellfish, and organ meats. On a heart-healthy diet, you should limit your cholesterol intake to less than 200 mg per day. It is normal and important to have some cholesterol in your bloodstream. But too much cholesterol can cause plaque to build up within your arteries, which can eventually lead to a heart attack or stroke. The two types of cholesterol that are most commonly referred to are:   · Low-density lipoprotein (LDL) cholesterol Also known as bad cholesterol, this is the cholesterol that tends to build up along your arteries. Bad cholesterol levels are increased by eating fats that are saturated or hydrogenated. Optimal level of this cholesterol is less than 100. Over 130 starts to get risky for heart disease.    · High-density lipoprotein (HDL) cholesterol Also known as good cholesterol, this type of cholesterol actually carries cholesterol away from your arteries and may, therefore, help lower your risk of having a heart attack. You want this level to be high (ideally greater than 60). It is a risk to have a level less than 40. You can raise this good cholesterol by eating olive oil, canola oil, avocados, or nuts. Exercise raises this level, too. Fat   Fat is calorie dense and packs a lot of calories into a small amount of food. Even though fats should be limited due to their high calorie content, not all fats are bad. In fact, some fats are quite healthful. Fat can be broken down into four main types. · The good-for-you fats are:   ¨ Monounsaturated fat found in oils such as olive and canola, avocados, and nuts and natural nut butters; can decrease cholesterol levels, while keeping levels of HDL cholesterol high   ¨ Polyunsaturated fat found in oils such as safflower, sunflower, soybean, corn, and sesame; can decrease total cholesterol and LDL cholesterol   ¨ Omega-3 fatty acids particularly those found in fatty fish (such as salmon, trout, tuna, mackerel, herring, and sardines); can decrease risk of arrhythmias, decrease triglyceride levels, and slightly lower blood pressure   · The fats that you want to limit are:   ¨ Saturated fat found in animal products, many fast foods, and a few vegetables; increases total blood cholesterol, including LDL levels   § Animal fats that are saturated include: butter, lard, whole-milk dairy products, meat fat, and poultry skin   § Vegetable fats that are saturated include: hydrogenated shortening, palm oil, coconut oil, cocoa butter   ¨ Hydrogenated or trans fat found in margarine and vegetable shortening, most shelf stable snack foods, and fried foods; increases LDL and decreases HDL     It is generally recommended that you limit your total fat for the day to less than 30% of your total calories. If you follow an 1800-calorie heart healthy diet, for example, this would mean 60 grams of fat or less per day.    Saturated fat and trans fat in your diet raises your blood cholesterol the most, much more than dietary cholesterol does. For this reason, on a heart-healthy diet, less than 7% of your calories should come from saturated fat and ideally 0% from trans fat. On an 1800-calorie diet, this translates into less than 14 grams of saturated fat per day, leaving 46 grams of fat to come from mono- and polyunsaturated fats.    Food Choices on a Heart Healthy Diet   Food Category   Foods Recommended   Foods to Avoid   Grains   Breads and rolls without salted tops Most dry and cooked cereals Unsalted crackers and breadsticks Low-sodium or homemade breadcrumbs or stuffing All rice and pastas   Breads, rolls, and crackers with salted tops High-fat baked goods (eg, muffins, donuts, pastries) Quick breads, self-rising flour, and biscuit mixes Regular bread crumbs Instant hot cereals Commercially prepared rice, pasta, or stuffing mixes   Vegetables   Most fresh, frozen, and low-sodium canned vegetables Low-sodium and salt-free vegetable juices Canned vegetables if unsalted or rinsed   Regular canned vegetables and juices, including sauerkraut and pickled vegetables Frozen vegetables with sauces Commercially prepared potato and vegetable mixes   Fruits   Most fresh, frozen, and canned fruits All fruit juices   Fruits processed with salt or sodium   Milk   Nonfat or low-fat (1%) milk Nonfat or low-fat yogurt Cottage cheese, low-fat ricotta, cheeses labeled as low-fat and low-sodium   Whole milk Reduced-fat (2%) milk Malted and chocolate milk Full fat yogurt Most cheeses (unless low-fat and low salt) Buttermilk (no more than 1 cup per week)   Meats and Beans   Lean cuts of fresh or frozen beef, veal, lamb, or pork (look for the word loin) Fresh or frozen poultry without the skin Fresh or frozen fish and some shellfish Egg whites and egg substitutes (Limit whole eggs to three per week) Tofu Nuts or seeds (unsalted, dry-roasted), low-sodium peanut butter Dried peas, beans, and lentils   Any smoked,

## 2017-11-20 NOTE — PROGRESS NOTES
Dear Dr. Florentino Núñez,    Thank you for allowing me to participate in the care of Ms. Edson Espinoza. She presents today at the [address] [place] [address] in the [facility] [place]. As you know, Ms. Raúl Tom is a 71 y.o. female with history of hypertension, hyperlipidemia, hypertrophic obstructive cardiomyopathy, mitral regurgitation and CAD  who presents with the chief complaint of follow-up for CAD and mitral rgurgitation. Since last seen, Ms. Raúl Tom has noticed a lot more fatigue. She has no energy to really do anything. She starting noticing this after her Toprol XL was increased. She still continues to have sharp pains that last a second in her chest but these happen very sporadically and she may go months without having one. She had a negative Lexiscan in June. She has been compliant with her medications. She is due to have a lipids checked. She otherwise denies chest pain, SOA, FRANCO, PND, orthopnea, syncope or near syncope. She has no other complaints. Review of Systems   Constitutional: Positive for fatigue. Negative for fever, chills, diaphoresis, activity change, appetite change, fatigue and unexpected weight change. Eyes: Negative for photophobia, pain, redness and visual disturbance. Respiratory: Negative for apnea, cough, chest tightness, shortness of breath, wheezing and stridor. Cardiovascular: Sharp prickly chest pain. Negative for palpitations and leg swelling. Gastrointestinal: Negative for abdominal distention. Genitourinary: Negative for dysuria, urgency and frequency. Musculoskeletal: Negative for myalgias, arthralgias and gait problem. Skin: Negative for color change, pallor, rash and wound. Neurological: Negative for dizziness, tremors, speech difficulty, weakness and numbness. Hematological: Does not bruise/bleed easily. Psychiatric/Behavioral: Negative.         Past Medical History:   Diagnosis Date    Anxiety     Arthritis     Bilateral hip pain 2015    Chest pain     Coronary artery disease involving native coronary artery of native heart 2017    Depression     Fibromyalgia     Hyperlipidemia     Hypertension     Hypertrophic obstructive cardiomyopathy (HOCM) (HCC)     hypothyroid     IHSS (idiopathic hypertrophic subaortic stenosis) (HCC)     Mitral regurgitation     severe on cath 2015 followed at this time per     S/P coronary artery stent placement 2017    Severe mitral regurgitation 11/18/15    TIA (transient ischemic attack)        Past Surgical History:   Procedure Laterality Date    CARPAL TUNNEL RELEASE      CORONARY ANGIOPLASTY WITH STENT PLACEMENT  stent x 2    2015    EYE SURGERY      EYE SURGERY Right 2017    HERNIA REPAIR      HIATAL HERNIA REPAIR      KNEE SURGERY      bilateral    THYROIDECTOMY      TUBAL LIGATION         Family History   Problem Relation Age of Onset    Other Brother      MI,  age 32    Other Father      MI         Social History     Social History    Marital status:      Spouse name: N/A    Number of children: N/A    Years of education: N/A     Occupational History    Not on file.      Social History Main Topics    Smoking status: Never Smoker    Smokeless tobacco: Never Used    Alcohol use 0.0 oz/week      Comment: glass of wine, per pm    Drug use: No    Sexual activity: Not on file     Other Topics Concern    Not on file     Social History Narrative    No narrative on file       Allergies   Allergen Reactions    Abilify [Aripiprazole]     Codeine     Levaquin [Levofloxacin In D5w]     Lyrica [Pregabalin]     Morphine     Savella [Milnacipran]          Current Outpatient Prescriptions:     metoprolol succinate (TOPROL XL) 100 MG extended release tablet, Take 1 tablet by mouth 2 times daily Take 1 tablet in am and 1/2 tablet at night, Disp: 180 tablet, Rfl: 3    HYDROcodone-acetaminophen well-nourished in no acute distress. Head: Normocephalic and atraumatic. Neck:  Neck supple without JVD present. Cardiovascular: Normal rate, Normal rhythm, normal heart sounds. 2/6 murmur ascultated. No gallop and no friction rub. No carotid bruits. No peripheral edema. Pulmonary/Chest:  Lungs clear to auscultation bilaterally without evidence of respiratory distress. She without wheezes. She without rales or ronchi. Musculoskeletal: Normal range of motion. Gait is normal.   Neurological: She is alert and oriented to person, place, and time. Skin: Skin is warm and dry without rash or pallor. Psychiatric: She has a normal mood and affect. Her behavior is normal. Thought content normal.     Lab Results   Component Value Date    CREATININE 0.6 11/21/2015    CREATININE 0.7 11/20/2015    CREATININE 0.7 11/18/2015    CREATININE 0.8 02/15/2011    HGB 11.7 11/21/2015    HGB 11.4 11/20/2015    HGB 13.5 11/18/2015       ECG 11/20/17  Marked Sinus Bradycardia, Poor R wave progression, HR 48    TTE- 12/14/2016  1. Concentric left ventricular hypertrophy with normal left ventricular  systolic function and an ejection fraction of 71%. 2.  Left ventricular diastolic dysfunction. 3.  Left atrial enlargement. 4.  Mild aortic and pulmonic insufficiency. 5.  Moderate mitral and tricuspid regurgitation. 6.  The right ventricular systolic pressure is 36 mmHg.     Lexiscan- 6/23/17  . Analysis of stress and rest images reveals no evidence of ischemia   or infarction.    2. Analysis of gated images reveals that the left ventricular ejection   fraction is 73 percent at rest.              Assessment, Recommendations, & Plan:  71 y.o. female with CAD, HTN, HLD, Moderate Mitral Regurgitation, Bradycardia, & Fatigue    CAD- On ASA, statin, & BB, No changes    HTN- Controlled, No changes    Moderate Mitral Regurgitation Repeat TTE    HLD- On Crestor, Will order a Lipid Panel and check Liver function Bradycardia/Fatigue- Will decrease Toprol to 1/2 tablet at night and she will continues to take 1 tablet in morning. We will see if this helps bring her heart rate up and see if it helps her energy. She is going to keep a log of her BP and heart rate at home and bring it to her F/U appt. Disposition - RTC in 1 month or sooner if needed      Please do not hesitate to contact me for any questions or concerns.     Sincerely yours,    REESE Pineda

## 2017-11-28 ENCOUNTER — HOSPITAL ENCOUNTER (OUTPATIENT)
Dept: NON INVASIVE DIAGNOSTICS | Age: 72
Discharge: HOME | End: 2017-11-28
Payer: MEDICARE

## 2017-11-28 DIAGNOSIS — I34.0 SEVERE MITRAL REGURGITATION: ICD-10-CM

## 2017-11-28 DIAGNOSIS — E78.5 HYPERLIPIDEMIA, UNSPECIFIED HYPERLIPIDEMIA TYPE: ICD-10-CM

## 2017-11-28 LAB
ALT SERPL-CCNC: 10 U/L (ref 5–33)
AST SERPL-CCNC: 18 U/L (ref 5–32)
CHOLESTEROL, TOTAL: 144 MG/DL (ref 160–199)
HDLC SERPL-MCNC: 68 MG/DL (ref 65–121)
LDL CHOLESTEROL CALCULATED: 61 MG/DL
LV EF: 58 %
LVEF MODALITY: NORMAL
TRIGL SERPL-MCNC: 74 MG/DL (ref 0–149)

## 2017-11-28 PROCEDURE — 93306 TTE W/DOPPLER COMPLETE: CPT

## 2017-12-04 RX ORDER — HYDROCODONE BITARTRATE AND ACETAMINOPHEN 10; 325 MG/1; MG/1
TABLET ORAL
Qty: 160 TABLET | Refills: 0 | Status: SHIPPED | OUTPATIENT
Start: 2017-12-10 | End: 2017-12-19 | Stop reason: SDUPTHER

## 2017-12-12 ENCOUNTER — TELEPHONE (OUTPATIENT)
Dept: CARDIOLOGY | Age: 72
End: 2017-12-12

## 2017-12-12 NOTE — TELEPHONE ENCOUNTER
Patient reports last night she got up to go to the bathroom and states she started walking go light headed and had syncopal episode.  helped her up and took BP which was 71/43 HR 50. Patient stayed up a couple hours drank some fluids, ate a little sodium to get BP up to 92/47 HR 51 prior to going back to bed. Patient BP today 163/68 HR 55 and feels \"sluggish\", which she states she has been feeling for months. Patient states BP has been running 160-170's/60/70's average. Reviewed medication with patient, she is taking Toprol 100 mg 1 tablet in morning and 1/2 tab at night as advised at last visit with irene parrish. Patient states she is also taking lisinopril 10 mg daily. I didn't see lisinopril in her med list, states PCP prescribed awhile back. Patient has appointment with Adelita Mcclure 12/20.   Please advise on meds and if you would like appointment moved up

## 2017-12-12 NOTE — TELEPHONE ENCOUNTER
It could be she had an orthostatic hypotensive episode however with the syncopal episode she should have come to the ER. I reviewed her last EKG and did not see any blocks. She just had her BB cut in half for the evening dose. May have to decrease her am dose however she is supposed to be keeping a log of BP and HR for [employer]. Can she get that to us? If she was dehydrated it could also account for the low BP in the setting of generally high BP readings. Would like to see the trend before making any adjustments. Did she hit her head when she passed out? How long was she out? Did she loose control of bowel or bladder?

## 2017-12-13 ENCOUNTER — OFFICE VISIT (OUTPATIENT)
Dept: CARDIOLOGY | Age: 72
End: 2017-12-13
Payer: MEDICARE

## 2017-12-13 VITALS
WEIGHT: 120 LBS | DIASTOLIC BLOOD PRESSURE: 72 MMHG | BODY MASS INDEX: 23.56 KG/M2 | HEIGHT: 60 IN | SYSTOLIC BLOOD PRESSURE: 112 MMHG | HEART RATE: 60 BPM

## 2017-12-13 DIAGNOSIS — R55 SYNCOPE, UNSPECIFIED SYNCOPE TYPE: ICD-10-CM

## 2017-12-13 DIAGNOSIS — R53.83 OTHER FATIGUE: ICD-10-CM

## 2017-12-13 DIAGNOSIS — I25.10 CORONARY ARTERY DISEASE INVOLVING NATIVE CORONARY ARTERY OF NATIVE HEART, ANGINA PRESENCE UNSPECIFIED: ICD-10-CM

## 2017-12-13 DIAGNOSIS — E78.49 OTHER HYPERLIPIDEMIA: ICD-10-CM

## 2017-12-13 DIAGNOSIS — R55 SYNCOPE, NEAR: ICD-10-CM

## 2017-12-13 DIAGNOSIS — I10 ESSENTIAL HYPERTENSION: Primary | ICD-10-CM

## 2017-12-13 DIAGNOSIS — I34.0 MITRAL VALVE INSUFFICIENCY, UNSPECIFIED ETIOLOGY: ICD-10-CM

## 2017-12-13 DIAGNOSIS — R00.1 BRADYCARDIA: ICD-10-CM

## 2017-12-13 LAB
ANION GAP SERPL CALCULATED.3IONS-SCNC: 13 MMOL/L (ref 7–19)
BASOPHILS ABSOLUTE: 0 K/UL (ref 0–0.2)
BASOPHILS RELATIVE PERCENT: 0.2 % (ref 0–1)
BUN BLDV-MCNC: 10 MG/DL (ref 8–23)
CALCIUM SERPL-MCNC: 8.6 MG/DL (ref 8.8–10.2)
CHLORIDE BLD-SCNC: 107 MMOL/L (ref 98–111)
CO2: 22 MMOL/L (ref 22–29)
CREAT SERPL-MCNC: 0.8 MG/DL (ref 0.5–0.9)
EOSINOPHILS ABSOLUTE: 0.1 K/UL (ref 0–0.6)
EOSINOPHILS RELATIVE PERCENT: 1.6 % (ref 0–5)
GFR NON-AFRICAN AMERICAN: >60
GLUCOSE BLD-MCNC: 98 MG/DL (ref 74–109)
HCT VFR BLD CALC: 38.4 % (ref 37–47)
HEMOGLOBIN: 12.9 G/DL (ref 12–16)
LYMPHOCYTES ABSOLUTE: 2.6 K/UL (ref 1.1–4.5)
LYMPHOCYTES RELATIVE PERCENT: 33 % (ref 20–40)
MCH RBC QN AUTO: 33 PG (ref 27–31)
MCHC RBC AUTO-ENTMCNC: 33.6 G/DL (ref 33–37)
MCV RBC AUTO: 98.2 FL (ref 81–99)
MONOCYTES ABSOLUTE: 0.7 K/UL (ref 0–0.9)
MONOCYTES RELATIVE PERCENT: 8.7 % (ref 0–10)
NEUTROPHILS ABSOLUTE: 4.5 K/UL (ref 1.5–7.5)
NEUTROPHILS RELATIVE PERCENT: 56.3 % (ref 50–65)
PDW BLD-RTO: 12.9 % (ref 11.5–14.5)
PLATELET # BLD: 196 K/UL (ref 130–400)
PMV BLD AUTO: 10.3 FL (ref 9.4–12.3)
POTASSIUM SERPL-SCNC: 4.1 MMOL/L (ref 3.5–5)
RBC # BLD: 3.91 M/UL (ref 4.2–5.4)
SODIUM BLD-SCNC: 142 MMOL/L (ref 136–145)
WBC # BLD: 8 K/UL (ref 4.8–10.8)

## 2017-12-13 PROCEDURE — 4040F PNEUMOC VAC/ADMIN/RCVD: CPT | Performed by: NURSE PRACTITIONER

## 2017-12-13 PROCEDURE — G8598 ASA/ANTIPLAT THER USED: HCPCS | Performed by: NURSE PRACTITIONER

## 2017-12-13 PROCEDURE — G8400 PT W/DXA NO RESULTS DOC: HCPCS | Performed by: NURSE PRACTITIONER

## 2017-12-13 PROCEDURE — 3014F SCREEN MAMMO DOC REV: CPT | Performed by: NURSE PRACTITIONER

## 2017-12-13 PROCEDURE — 1123F ACP DISCUSS/DSCN MKR DOCD: CPT | Performed by: NURSE PRACTITIONER

## 2017-12-13 PROCEDURE — 1036F TOBACCO NON-USER: CPT | Performed by: NURSE PRACTITIONER

## 2017-12-13 PROCEDURE — 3017F COLORECTAL CA SCREEN DOC REV: CPT | Performed by: NURSE PRACTITIONER

## 2017-12-13 PROCEDURE — 99213 OFFICE O/P EST LOW 20 MIN: CPT | Performed by: NURSE PRACTITIONER

## 2017-12-13 PROCEDURE — G8420 CALC BMI NORM PARAMETERS: HCPCS | Performed by: NURSE PRACTITIONER

## 2017-12-13 PROCEDURE — 93000 ELECTROCARDIOGRAM COMPLETE: CPT | Performed by: NURSE PRACTITIONER

## 2017-12-13 PROCEDURE — 1090F PRES/ABSN URINE INCON ASSESS: CPT | Performed by: NURSE PRACTITIONER

## 2017-12-13 PROCEDURE — G8427 DOCREV CUR MEDS BY ELIG CLIN: HCPCS | Performed by: NURSE PRACTITIONER

## 2017-12-13 PROCEDURE — G8484 FLU IMMUNIZE NO ADMIN: HCPCS | Performed by: NURSE PRACTITIONER

## 2017-12-13 RX ORDER — NITROGLYCERIN 0.4 MG/1
0.4 TABLET SUBLINGUAL EVERY 5 MIN PRN
Qty: 25 TABLET | Refills: 3 | Status: SHIPPED | OUTPATIENT
Start: 2017-12-13

## 2017-12-13 RX ORDER — LISINOPRIL 10 MG/1
10 TABLET ORAL DAILY
COMMUNITY
End: 2017-12-27 | Stop reason: SINTOL

## 2017-12-13 NOTE — PATIENT INSTRUCTIONS
14-day monitor (ZIO Patch)      ZIO Patch  The ZIO® Patch is a new form of ambulatory cardiac monitoring described as a wearable patch. The Madhuri Muss is unique compared to traditional Holter monitors as the monitoring device has no leads, no wires and no batteries. The Madhuri Muss weighs just a few ounces that is a peel and stick device that is worn for an extended monitoring period of up to 14 days. Even though the device is worn for a longer duration than a Holter monitor, the ZIO Patch is said to be preferred by nearly 80% of patients as compared to a traditional 24 Holter monitor. Please allow 8 to 10 days for results, once you have mailed off your device. Features of the ZIO Patch:  Arguably the smallest ambulatory cardiac monitor   Light weight   No lead wires   Single channel ECG recording   No batteries   Superior patient compliance with a water resistant   Up to 14 days of continuous ECG recording          Patient Education        Vasovagal Syncope: Care Instructions  Your Care Instructions    Vasovagal syncope (say \"ttu-[id number]-UQH-gul XCUJ-gxy-iku\")is sudden dizziness or fainting that can be set off by things such as pain, stress, fear, or trauma. You may sweat or feel lightheaded, sick to your stomach, or tingly. The problem causes the heart rate to slow and the blood vessels to widen, or dilate, for a short time. When this happens, blood pools in the lower body, and less blood goes to the brain. You can usually get relief by lying down with your legs raised (elevated). This helps more blood to flow to your brain and may help relieve symptoms like feeling dizzy. Some doctors may recommend a technique that involves tensing your fists and arms. This type of fainting is often easy to predict. For example, it happens to some people when they see blood or have to get a shot. They may feel symptoms before they faint. An episode of vasovagal syncope usually responds well to self-care.  Other treatment often

## 2017-12-13 NOTE — PROGRESS NOTES
times daily , Disp: , Rfl:     Cholecalciferol (VITAMIN D3) 2000 UNITS CAPS, Take 2,000 Units by mouth daily , Disp: , Rfl:     ferrous sulfate 325 (65 FE) MG tablet, Take 325 mg by mouth every other day , Disp: , Rfl:     levothyroxine (SYNTHROID) 112 MCG tablet, Take 112 mcg by mouth Daily, Disp: , Rfl:     PE:  Vitals:    12/13/17 1417   BP: 112/72   Pulse: 60       Estimated body mass index is 23.44 kg/m² as calculated from the following:    Height as of this encounter: 5' (1.524 m). Weight as of this encounter: 120 lb (54.4 kg). Constitutional: She is oriented to person, place, and time. She appears well-developed and well-nourished in no acute distress. Head: Normocephalic and atraumatic. Neck:  Neck supple without JVD present. Cardiovascular: Normal rate, Normal rhythm, normal heart sounds. 2/6 murmur ascultated. No gallop and no friction rub. No carotid bruits. No peripheral edema. Pulmonary/Chest:  Lungs clear to auscultation bilaterally without evidence of respiratory distress. She without wheezes. She without rales or ronchi. Musculoskeletal: Normal range of motion. Gait is normal.   Neurological: She is alert and oriented to person, place, and time. Skin: Skin is warm and dry without rash or pallor. Psychiatric: She has a normal mood and affect. Her behavior is normal. Thought content normal.     Lab Results   Component Value Date    CREATININE 0.6 11/21/2015    CREATININE 0.7 11/20/2015    CREATININE 0.7 11/18/2015    CREATININE 0.8 02/15/2011    HGB 11.7 11/21/2015    HGB 11.4 11/20/2015    HGB 13.5 11/18/2015       ECG 12/13/17  Sinus Rhythm, Occasional PVC, HR 61     Lexiscan- 6/23/17  . Analysis of stress and rest images reveals no evidence of ischemia   or infarction.    2. Analysis of gated images reveals that the left ventricular ejection   fraction is 73 percent at rest.         Echo-11/28/17    Summary   Mitral annular calcification is present.   Calcification of the mitral valve noted.   moderate-severe mitral regurgitation is present.   Mild aortic valve sclerosis with normal cusp separation.   Aortic valve appears to be tricuspid.   moderate aortic regurgitation is noted.  Kulwant Huge dilated left atrium.   Normal left ventricular size with preserved LV function and an estimated   ejection fraction of approximately 55-60%. Assessment, Recommendations, & Plan:  71 y.o. female with CAD, HTN, HLD, Moderate Mitral Regurgitation, Bradycardia, Fatigue, & Syncope    CAD- On ASA, statin, & BB, No changes    HTN- Well controlled today, Her BP readings at home are 627-438 range systolic. She is going to continue to keep a BP log at home and take her BP cuff to her doctor's appointment next week to compare and make sure it is accurate before increasing BP medication. Mitral regurgitation- Moderate to severe on echo in November. Dr. Oscar Nelson reviewed and said it showed no much change. She is going to follow-up with Dr. Oscar Nelson in 6 months with a repeat echo before. HLD- On Crestor, No changes    Bradycardia/Fatigue- Heart rate is increased. At home, it has been running in the upper 50's. Today it was 61. She is still having fatigue but it has improved since decreasing the nighttime dosage of lopressor. She is finding herself doing just a little more. No changes at this time. Syncope- This sounds more like orthostatic hypotension. She has had episodes like this in the past few years when she gets up in the middle of the night and gets weak and passes out. She has a history of anemia. I am going to check a CBC and BMP and order a ziopatch to check for arrhythmia or severe bradycardia. I have encouraged her when she gets up at night to sit up and sit there a few minutes before jumping out of bed. Disposition - RTC in 1 month or sooner if needed      Please do not hesitate to contact me for any questions or concerns.     Sincerely yours,    REESE Bingham

## 2017-12-13 NOTE — TELEPHONE ENCOUNTER
Patient states that she has been recording her BP and HR readings. Patient states she was only out for seconds, did not hit her head and did not loose control of bowel or bladder. Patient states she had been drinking fluids prior to episode, however did mention after episode she had diarrhea for a few hours. Patient requested to move appointment, as she will be in town today. Patient will bring in a copy of her readings with her.

## 2017-12-19 ENCOUNTER — HOSPITAL ENCOUNTER (OUTPATIENT)
Dept: PAIN MANAGEMENT | Age: 72
Discharge: HOME | End: 2017-12-19
Payer: MEDICARE

## 2017-12-19 VITALS
RESPIRATION RATE: 18 BRPM | HEART RATE: 64 BPM | SYSTOLIC BLOOD PRESSURE: 181 MMHG | OXYGEN SATURATION: 97 % | BODY MASS INDEX: 23.56 KG/M2 | HEIGHT: 60 IN | TEMPERATURE: 97.5 F | DIASTOLIC BLOOD PRESSURE: 91 MMHG | WEIGHT: 120 LBS

## 2017-12-19 DIAGNOSIS — M25.551 BILATERAL HIP PAIN: ICD-10-CM

## 2017-12-19 DIAGNOSIS — M47.816 LUMBAR FACET ARTHROPATHY: ICD-10-CM

## 2017-12-19 DIAGNOSIS — M70.61 TROCHANTERIC BURSITIS OF BOTH HIPS: ICD-10-CM

## 2017-12-19 DIAGNOSIS — M70.62 TROCHANTERIC BURSITIS OF BOTH HIPS: ICD-10-CM

## 2017-12-19 DIAGNOSIS — M25.552 BILATERAL HIP PAIN: ICD-10-CM

## 2017-12-19 PROCEDURE — 99213 OFFICE O/P EST LOW 20 MIN: CPT

## 2017-12-19 RX ORDER — HYDROCODONE BITARTRATE AND ACETAMINOPHEN 10; 325 MG/1; MG/1
TABLET ORAL
Qty: 160 TABLET | Refills: 0 | Status: SHIPPED | OUTPATIENT
Start: 2018-01-09 | End: 2018-02-02 | Stop reason: SDUPTHER

## 2017-12-19 ASSESSMENT — PAIN DESCRIPTION - PROGRESSION: CLINICAL_PROGRESSION: NOT CHANGED

## 2017-12-19 ASSESSMENT — PAIN DESCRIPTION - FREQUENCY: FREQUENCY: CONTINUOUS

## 2017-12-19 ASSESSMENT — PAIN DESCRIPTION - PAIN TYPE: TYPE: CHRONIC PAIN

## 2017-12-19 ASSESSMENT — PAIN DESCRIPTION - ORIENTATION: ORIENTATION: RIGHT;LEFT;LOWER

## 2017-12-19 ASSESSMENT — PAIN DESCRIPTION - DESCRIPTORS: DESCRIPTORS: ACHING;THROBBING;SHARP

## 2017-12-19 ASSESSMENT — PAIN DESCRIPTION - LOCATION: LOCATION: BACK;HIP;SHOULDER

## 2017-12-19 ASSESSMENT — PAIN DESCRIPTION - ONSET: ONSET: ON-GOING

## 2017-12-19 ASSESSMENT — PAIN SCALES - GENERAL: PAINLEVEL_OUTOF10: 6

## 2017-12-19 ASSESSMENT — ACTIVITIES OF DAILY LIVING (ADL): EFFECT OF PAIN ON DAILY ACTIVITIES: LIMITS ACTIVITIES

## 2017-12-19 NOTE — PROGRESS NOTES
Reyna Sifuentes/Patricio  Patient Pain Assessment  Progress Note       Chief Complaint   Patient presents with    Lower Back Pain    Hip Pain     bilateral     Shoulder Pain     right     Pain Assessment  Pain Assessment: 0-10  Pain Level: 6  Pain Type: Chronic pain  Pain Location: Back, Hip, Shoulder  Pain Orientation: Right, Left, Lower  Pain Radiating Towards: right shoulder; low back pain and bilateral hip  Pain Descriptors: Aching, Throbbing, Sharp  Pain Frequency: Continuous  Pain Onset: On-going  Clinical Progression: Not changed  Effect of Pain on Daily Activities: limits activities       [x]  Issues of Concern:    Reports she had a syncope episode at home and \"passed out\" due to low blood pressure. Cardiologist having patient wear heart monitor and has adjusted some medication doses for heart medication and blood pressure medication.       [x]  Reports current medication is helping, but continues to have on-going pain    [x]  Reports current pain medication increases ability to do activities of daily living     [x]  Current narcotic medications    [x]  Discussed possible medication side effects, risk of tolerance and/or dependence, alternative treatments    [x]  Encouraged to set goals of decreasing daily narcotic intake    [x]  Discussed effects of long term narcotic use      [x]  Injection options discussed and scheduled for next visit     []Yes [x]No  Current medication side effects, comment if applicable:      []Yes [x]No   Acute bladder or bowel changes    Previous Procedure / Percentage of pain control / Imaging / PT History:  Percentage of Pain Relief after Last Procedure:  60 %    How long lasted:  2 months     Radiology exams received during the last 12 months: Right shoulder, Lumbar spine and bilateral hip xrays  When May 2017                                              [facility]  Imaging on chart: Yes    MRI exams received in the past 2 years: Lumbar MRI in September 2017  Physical therapy during the last 6 months: No    BMI: Body mass index is 23.44 kg/m². WNL    Activity:   [x] Exercise discussed as beneficial to pain reduction, encouraged stretching exercises and to set daily goals    Tobacco use:    [x] Never    Social History     Social History    Marital status:      Spouse name: N/A    Number of children: N/A    Years of education: N/A     Social History Main Topics    Smoking status: Never Smoker    Smokeless tobacco: Never Used    Alcohol use 0.0 oz/week      Comment: glass of wine, per pm    Drug use: No    Sexual activity: Not Asked     Other Topics Concern    None     Social History Narrative    None                                                            Past Medical History:       Diagnosis Date    Anxiety     Arthritis     Bilateral hip pain 12/29/2015    Chest pain     Coronary artery disease involving native coronary artery of native heart 6/13/2017    Depression     Fibromyalgia     Hyperlipidemia     Hypertension     [facility]     hypothyroid     IHSS (idiopathic hypertrophic subaortic stenosis) ([facility]     Mitral regurgitation     severe on cath 11/2015 followed at this time per     S/P coronary artery stent placement 6/13/2017    Severe mitral regurgitation 11/18/15    TIA (transient ischemic attack)      Surgical History:  Past Surgical History:   Procedure Laterality Date    CARPAL TUNNEL RELEASE      CORONARY ANGIOPLASTY WITH STENT PLACEMENT  stent x 2    nov 20, 2015    EYE SURGERY      EYE SURGERY Right 09/09/2017    HERNIA REPAIR      HIATAL HERNIA REPAIR      KNEE SURGERY      bilateral    THYROIDECTOMY      TUBAL LIGATION       Family History:  family history includes Other in her brother and father. Allergies:  Abilify [aripiprazole]; Codeine; Levaquin [levofloxacin in d5w]; Lyrica [pregabalin];  Morphine; and Savella [milnacipran]     Medications:  Current CANNANBINURI Negative 05/23/2017    COCAINEMETUR Negative 05/23/2017    OPIAU Positive * (Norco per med list) 05/23/2017    OXYCOOXYMO Negative 05/23/2017    PHENCYCU Negative 05/23/2017    LABMETH Negative 05/23/2017    PPXUR Negative 05/23/2017    MEPERIDU Negative 05/23/2017    FENTU Negative 05/23/2017    ETHUQN Negative 05/23/2017          Vitals:  /91   Pulse 64   Temp 97.5 °F (36.4 °C) (Temporal)   Resp 18   Ht 5' (1.524 m)   Wt 120 lb (54.4 kg)   SpO2 97%   BMI 23.44 kg/m²      [x] Discussed using home B/P monitor/diary and to contact PCP if remains elevated, reports she checks at home too     Physical Exam:  General appearance: no acute distress  Head: NCAT, EOMI  Skin: Warm, Dry   Musculoskeletal: ambulatory per self, steady gait  Neurologic: alert and oriented X 3, speech clear  Mood and affect: appropriate, no SI or HI    Assessment:    *     Lumbar DDD  *     Lumbar Facet Syndrome  *     Degenerative joint disease, Right shoulder  *     Trochanteric Bursitis, Bilateral    Plan:   [x]  Patient is to call with any questions or concerns which may arise prior to the next office visit    [x]  Continue current medications per our office, see medication tab, ARRON reviewed   []  Add   []  Imaging order given to patient   []  PT order given to patient   [x]  Procedure scheduled for next visit, see encounter details (Bilateral Lumbar Facet injections L4-5, L5-S1, Right Shoulder injection and Bilateral Trochanteric Bursa injections with sedation)   []  UDS done today   []  UDS next visit   []  . .. Controlled Substance Monitoring: Discussed with patient possible medication side effects, risk of tolerance and/or dependence, and alternative treatments. Discussed the effects of long term narcotic use. Patient encouraged to set daily goals of exercising and decreasing daily narcotic intake.       Electronically signed by REESE George

## 2017-12-26 NOTE — TELEPHONE ENCOUNTER
Called patient,  States she had problems with blood pressure all weekend. Patient states it was elevated to 200/100 and had to take clonidine to get it down. Advised patient to come in for appointment to assess and review medication. Understanding voiced.

## 2017-12-26 NOTE — TELEPHONE ENCOUNTER
Returned call to patient and advised to keep f/u tomorrow and bring in BP log with all symptoms and times BP was taken in comparison with time meds was taken. Patient states the low BP of 70/40 was a few days ago, only happened twice. Patient states it has been high all weekend. Patient states she took a clonidine that child grandson used to take. Patient states she had called ED. Advised we don't advised patient take any medication unless prescribed to her. Patient has recently taken BP it is currently 204/92 HR 62, patient took lisinopril and toprol this morning. Patient states she is very worried and would like to know if you suggest anything for BP.

## 2017-12-26 NOTE — TELEPHONE ENCOUNTER
1. Please keep appointment tomorrow  2. Please bring in BP log and note when it was taken in relation to taking her BP medications  3. Please also note symptoms related to either high bp or low bp. In other words if she has down her BP was 70/40 yesterday am- what time was this reading taken ie before medications or 2 hours after and was she having any symptoms. Also note which medications were taken   4. Please bring in all prescription and over the counter medications that she is currently taking. This is the best way to get an accurate medication list.   5. Whose Clonidine did she take this morning when she called in to report that it had been high all weekend? Clonidine can cause some rebound hypertension. 6. Has she rechecked her BP since taking the Lisinopril 10 mg or Toprol 100 mg today? 7. If she continues to stay elevated >190/ >90 through the night, she can always come to the ER if needed or she is symptomatic. 8. Please take BP prior to taking any medications tonight. If she SBP is 100 or < or DBP is <70- please do not take the Toprol. She is also on multiple medications that can lower the blood pressure such as her pain medication Norco, Zanaflex, and trazodone.

## 2017-12-26 NOTE — TELEPHONE ENCOUNTER
Patient called to report BP is 203/69 and doesn't feel well. Patient denies chest pain, SOA. Patient has taken lisinopril 10 mg and toprol 100 mg today. Patient states she has had blood pressure elevate like this over the weekend and when called in over weekend, was advised to take a clonidine. Patient does not have clonidine prescription for herself. Patient does have appointment to be seen tomorrow, however would like to know what you advise for today. Patient daughter had called Friday to report BP bottomed out through the night. Looks like NP had advised patient decrease dosage of meds on friday, however when blood pressure was discussed this morning, appointment was made and advised to wait for further instruction.

## 2017-12-27 ENCOUNTER — OFFICE VISIT (OUTPATIENT)
Dept: CARDIOLOGY | Age: 72
End: 2017-12-27
Payer: MEDICARE

## 2017-12-27 VITALS
DIASTOLIC BLOOD PRESSURE: 70 MMHG | HEIGHT: 60 IN | SYSTOLIC BLOOD PRESSURE: 144 MMHG | WEIGHT: 120 LBS | BODY MASS INDEX: 23.56 KG/M2

## 2017-12-27 DIAGNOSIS — I42.1 HYPERTROPHIC OBSTRUCTIVE CARDIOMYOPATHY (HOCM) (HCC): ICD-10-CM

## 2017-12-27 DIAGNOSIS — I25.10 CORONARY ARTERY DISEASE INVOLVING NATIVE CORONARY ARTERY OF NATIVE HEART WITHOUT ANGINA PECTORIS: ICD-10-CM

## 2017-12-27 DIAGNOSIS — R00.1 BRADYCARDIA: ICD-10-CM

## 2017-12-27 DIAGNOSIS — I10 ESSENTIAL HYPERTENSION: Primary | ICD-10-CM

## 2017-12-27 DIAGNOSIS — I34.0 SEVERE MITRAL REGURGITATION: ICD-10-CM

## 2017-12-27 PROCEDURE — 1123F ACP DISCUSS/DSCN MKR DOCD: CPT | Performed by: NURSE PRACTITIONER

## 2017-12-27 PROCEDURE — 4040F PNEUMOC VAC/ADMIN/RCVD: CPT | Performed by: NURSE PRACTITIONER

## 2017-12-27 PROCEDURE — 1090F PRES/ABSN URINE INCON ASSESS: CPT | Performed by: NURSE PRACTITIONER

## 2017-12-27 PROCEDURE — G8598 ASA/ANTIPLAT THER USED: HCPCS | Performed by: NURSE PRACTITIONER

## 2017-12-27 PROCEDURE — 99214 OFFICE O/P EST MOD 30 MIN: CPT | Performed by: NURSE PRACTITIONER

## 2017-12-27 PROCEDURE — G8484 FLU IMMUNIZE NO ADMIN: HCPCS | Performed by: NURSE PRACTITIONER

## 2017-12-27 PROCEDURE — G8427 DOCREV CUR MEDS BY ELIG CLIN: HCPCS | Performed by: NURSE PRACTITIONER

## 2017-12-27 PROCEDURE — G8420 CALC BMI NORM PARAMETERS: HCPCS | Performed by: NURSE PRACTITIONER

## 2017-12-27 PROCEDURE — G8400 PT W/DXA NO RESULTS DOC: HCPCS | Performed by: NURSE PRACTITIONER

## 2017-12-27 PROCEDURE — 93000 ELECTROCARDIOGRAM COMPLETE: CPT | Performed by: NURSE PRACTITIONER

## 2017-12-27 PROCEDURE — 1036F TOBACCO NON-USER: CPT | Performed by: NURSE PRACTITIONER

## 2017-12-27 PROCEDURE — 3014F SCREEN MAMMO DOC REV: CPT | Performed by: NURSE PRACTITIONER

## 2017-12-27 PROCEDURE — 3017F COLORECTAL CA SCREEN DOC REV: CPT | Performed by: NURSE PRACTITIONER

## 2017-12-27 RX ORDER — METOPROLOL SUCCINATE 100 MG/1
100 TABLET, EXTENDED RELEASE ORAL DAILY
Qty: 180 TABLET | Refills: 3 | Status: SHIPPED
Start: 2017-12-27 | End: 2018-03-05 | Stop reason: DRUGHIGH

## 2017-12-27 RX ORDER — TOPIRAMATE 50 MG/1
50 TABLET, FILM COATED ORAL
COMMUNITY

## 2017-12-27 RX ORDER — AMLODIPINE BESYLATE 5 MG/1
5 TABLET ORAL DAILY
Qty: 30 TABLET | Refills: 3 | Status: SHIPPED | OUTPATIENT
Start: 2017-12-27 | End: 2018-01-10 | Stop reason: ALTCHOICE

## 2017-12-27 NOTE — PROGRESS NOTES
numbness. +confusion  Hematological: Does not bruise/bleed easily. Psychiatric/Behavioral: + Anxiety, depression       Past Medical History:   Diagnosis Date    Anxiety     Arthritis     Bilateral hip pain 2015    Chest pain     Coronary artery disease involving native coronary artery of native heart 2017    Depression     Fibromyalgia     Hyperlipidemia     Hypertension     [facility]M) [facility]     hypothyroid     IHSS (idiopathic hypertrophic subaortic stenosis) ([facility])     S/P coronary artery stent placement 2017    Severe mitral regurgitation 11/18/15    TIA (transient ischemic attack)        Past Surgical History:   Procedure Laterality Date    CARPAL TUNNEL RELEASE      CORONARY ANGIOPLASTY WITH STENT PLACEMENT  stent x 2    2015    EYE SURGERY      EYE SURGERY Right 2017    HERNIA REPAIR      HIATAL HERNIA REPAIR      KNEE SURGERY      bilateral    THYROIDECTOMY      TUBAL LIGATION         Family History   Problem Relation Age of Onset    Other Brother      MI,  age 32    Other Father      MI         Social History     Social History    Marital status:      Spouse name: N/A    Number of children: N/A    Years of education: N/A     Occupational History    Not on file.      Social History Main Topics    Smoking status: Never Smoker    Smokeless tobacco: Never Used    Alcohol use 0.0 oz/week      Comment: glass of wine, per pm    Drug use: No    Sexual activity: Not on file     Other Topics Concern    Not on file     Social History Narrative    No narrative on file       Allergies   Allergen Reactions    Abilify [Aripiprazole]     Codeine     Levaquin [Levofloxacin In D5w]     Lyrica [Pregabalin]     Morphine     Savella [Milnacipran]          Current Outpatient Prescriptions:     topiramate (TOPAMAX) 50 MG tablet, Take 50 mg by mouth 1/2 tablet at bedtime, Disp: , Rfl:     metoprolol

## 2017-12-27 NOTE — PATIENT INSTRUCTIONS
Discontinue Lisinopril related to side effects of chronic cough and fatigue  Take Metoprolol 100mg daily in the am  Keep a blood pressure and symptom log  Take Blood pressure readings 2 hours     Low Sodium Diet (2,000 Milligram): Care Instructions  Your Care Instructions    Too much sodium causes your body to hold on to extra water. This can raise your blood pressure and force your heart and kidneys to work harder. In very serious cases, this could cause you to be put in the hospital. It might even be life-threatening. By limiting sodium, you will feel better and lower your risk of serious problems. The most common source of sodium is salt. People get most of the salt in their diet from canned, prepared, and packaged foods. Fast food and restaurant meals also are very high in sodium. Your doctor will probably limit your sodium to less than 2,000 milligrams (mg) a day. This limit counts all the sodium in prepared and packaged foods and any salt you add to your food. Follow-up care is a key part of your treatment and safety. Be sure to make and go to all appointments, and call your doctor if you are having problems. It's also a good idea to know your test results and keep a list of the medicines you take. How can you care for yourself at home? Read food labels  · Read labels on cans and food packages. The labels tell you how much sodium is in each serving. Make sure that you look at the serving size. If you eat more than the serving size, you have eaten more sodium. · Food labels also tell you the Percent Daily Value for sodium. Choose products with low Percent Daily Values for sodium. · Be aware that sodium can come in forms other than salt, including monosodium glutamate (MSG), sodium citrate, and sodium bicarbonate (baking soda). MSG is often added to Asian food. When you eat out, you can sometimes ask for food without MSG or added salt.   Buy low-sodium foods  · Buy foods that are labeled \"unsalted\" (no salt

## 2018-01-10 ENCOUNTER — OFFICE VISIT (OUTPATIENT)
Dept: CARDIOLOGY | Age: 73
End: 2018-01-10
Payer: MEDICARE

## 2018-01-10 VITALS
DIASTOLIC BLOOD PRESSURE: 74 MMHG | HEIGHT: 60 IN | HEART RATE: 50 BPM | WEIGHT: 121 LBS | BODY MASS INDEX: 23.75 KG/M2 | SYSTOLIC BLOOD PRESSURE: 132 MMHG

## 2018-01-10 DIAGNOSIS — I42.1 IHSS (IDIOPATHIC HYPERTROPHIC SUBAORTIC STENOSIS) (HCC): ICD-10-CM

## 2018-01-10 DIAGNOSIS — I25.10 CORONARY ARTERY DISEASE INVOLVING NATIVE CORONARY ARTERY OF NATIVE HEART WITHOUT ANGINA PECTORIS: ICD-10-CM

## 2018-01-10 DIAGNOSIS — I10 ESSENTIAL HYPERTENSION: Primary | ICD-10-CM

## 2018-01-10 DIAGNOSIS — I34.0 MODERATE MITRAL REGURGITATION: ICD-10-CM

## 2018-01-10 PROCEDURE — 1036F TOBACCO NON-USER: CPT | Performed by: NURSE PRACTITIONER

## 2018-01-10 PROCEDURE — G8598 ASA/ANTIPLAT THER USED: HCPCS | Performed by: NURSE PRACTITIONER

## 2018-01-10 PROCEDURE — G8484 FLU IMMUNIZE NO ADMIN: HCPCS | Performed by: NURSE PRACTITIONER

## 2018-01-10 PROCEDURE — 1090F PRES/ABSN URINE INCON ASSESS: CPT | Performed by: NURSE PRACTITIONER

## 2018-01-10 PROCEDURE — G8427 DOCREV CUR MEDS BY ELIG CLIN: HCPCS | Performed by: NURSE PRACTITIONER

## 2018-01-10 PROCEDURE — 4040F PNEUMOC VAC/ADMIN/RCVD: CPT | Performed by: NURSE PRACTITIONER

## 2018-01-10 PROCEDURE — G8420 CALC BMI NORM PARAMETERS: HCPCS | Performed by: NURSE PRACTITIONER

## 2018-01-10 PROCEDURE — 1123F ACP DISCUSS/DSCN MKR DOCD: CPT | Performed by: NURSE PRACTITIONER

## 2018-01-10 PROCEDURE — 99213 OFFICE O/P EST LOW 20 MIN: CPT | Performed by: NURSE PRACTITIONER

## 2018-01-10 PROCEDURE — G8400 PT W/DXA NO RESULTS DOC: HCPCS | Performed by: NURSE PRACTITIONER

## 2018-01-10 PROCEDURE — 3017F COLORECTAL CA SCREEN DOC REV: CPT | Performed by: NURSE PRACTITIONER

## 2018-01-10 PROCEDURE — 93000 ELECTROCARDIOGRAM COMPLETE: CPT | Performed by: NURSE PRACTITIONER

## 2018-01-10 PROCEDURE — 3014F SCREEN MAMMO DOC REV: CPT | Performed by: NURSE PRACTITIONER

## 2018-01-10 NOTE — PATIENT INSTRUCTIONS
Keep blood pressure log  Take blood pressure 2 hours after taking medications  Resume taking the Toprol 100 mg in the morning and 50 mg nightly

## 2018-01-10 NOTE — PROGRESS NOTES
55-60%.     Signature      ----------------------------------------------------------------   Electronically signed by Laura Ricardo MD(Interpreting   physician) on 11/29/2017 09:44 AM    Zio heart monitor 1/8/2018  Predominate underlying rhythm sinus. Avg rate of 62, range rate of . 1 run VT 18 sec with max rate of 169. 21 runs of SVT fastest 8 beats with max rate of 182, longest 17 beats, average rate of 119. Ventricular bigeminy and trigeminy present. Assessment    1. Essential hypertension    2. Moderate mitral regurgitation    3. IHSS (idiopathic hypertrophic subaortic stenosis) ([facility] [address]    4. Coronary artery disease involving native coronary artery of native heart without angina pectoris          Plan:    She was taken off of the lisinopril initiated per PCP. She was advised to discontinue the Norvasc and return to Metoprolol 100 mg qam and 50 mg qhs. She will continue to keep a BP log. She is advised to contact her PCP and/or Neurologist in regards to her insomnia and recommendations for a muscle relaxer as she discontinued her Zanaflex after her daughter read about interactions with the BB. Disposition - RTC in 1 months with Dr. Valles Leader   or sooner if needed    Please do not hesitate to contact me for any questions or concerns.     Sincerely yours,    REESE Garcia

## 2018-01-29 DIAGNOSIS — R55 SYNCOPE, UNSPECIFIED SYNCOPE TYPE: ICD-10-CM

## 2018-02-02 RX ORDER — HYDROCODONE BITARTRATE AND ACETAMINOPHEN 10; 325 MG/1; MG/1
TABLET ORAL
Qty: 160 TABLET | Refills: 0 | Status: SHIPPED | OUTPATIENT
Start: 2018-02-08 | End: 2018-02-28 | Stop reason: SDUPTHER

## 2018-02-06 ENCOUNTER — HOSPITAL ENCOUNTER (OUTPATIENT)
Dept: PAIN MANAGEMENT | Age: 73
Discharge: HOME | End: 2018-02-06
Payer: MEDICARE

## 2018-02-06 VITALS
SYSTOLIC BLOOD PRESSURE: 150 MMHG | HEART RATE: 61 BPM | OXYGEN SATURATION: 98 % | DIASTOLIC BLOOD PRESSURE: 58 MMHG | RESPIRATION RATE: 20 BRPM

## 2018-02-06 DIAGNOSIS — M54.59 LUMBAR FACET JOINT PAIN: ICD-10-CM

## 2018-02-06 DIAGNOSIS — M47.816 LUMBAR FACET ARTHROPATHY: ICD-10-CM

## 2018-02-06 PROCEDURE — 3209999900 FLUORO FOR SURGICAL PROCEDURES

## 2018-02-06 PROCEDURE — 6360000002 HC RX W HCPCS

## 2018-02-06 PROCEDURE — 2500000003 HC RX 250 WO HCPCS

## 2018-02-06 PROCEDURE — 20610 DRAIN/INJ JOINT/BURSA W/O US: CPT

## 2018-02-06 PROCEDURE — 99152 MOD SED SAME PHYS/QHP 5/>YRS: CPT

## 2018-02-06 PROCEDURE — 64493 INJ PARAVERT F JNT L/S 1 LEV: CPT

## 2018-02-06 PROCEDURE — 64494 INJ PARAVERT F JNT L/S 2 LEV: CPT

## 2018-02-06 RX ORDER — TRIAMCINOLONE ACETONIDE 40 MG/ML
INJECTION, SUSPENSION INTRA-ARTICULAR; INTRAMUSCULAR
Status: COMPLETED | OUTPATIENT
Start: 2018-02-06 | End: 2018-02-06

## 2018-02-06 RX ORDER — MIDAZOLAM HYDROCHLORIDE 1 MG/ML
INJECTION INTRAMUSCULAR; INTRAVENOUS
Status: COMPLETED | OUTPATIENT
Start: 2018-02-06 | End: 2018-02-06

## 2018-02-06 RX ORDER — BUPIVACAINE HYDROCHLORIDE 5 MG/ML
INJECTION, SOLUTION EPIDURAL; INTRACAUDAL
Status: COMPLETED | OUTPATIENT
Start: 2018-02-06 | End: 2018-02-06

## 2018-02-06 RX ADMIN — TRIAMCINOLONE ACETONIDE 2 MG: 40 INJECTION, SUSPENSION INTRA-ARTICULAR; INTRAMUSCULAR at 13:48

## 2018-02-06 RX ADMIN — MIDAZOLAM HYDROCHLORIDE 2 MG: 1 INJECTION INTRAMUSCULAR; INTRAVENOUS at 13:43

## 2018-02-06 RX ADMIN — BUPIVACAINE HYDROCHLORIDE 9 ML: 5 INJECTION, SOLUTION EPIDURAL; INTRACAUDAL at 13:53

## 2018-02-06 RX ADMIN — BUPIVACAINE HYDROCHLORIDE 4.5 ML: 5 INJECTION, SOLUTION EPIDURAL; INTRACAUDAL at 13:50

## 2018-02-06 RX ADMIN — TRIAMCINOLONE ACETONIDE 40 MG: 40 INJECTION, SUSPENSION INTRA-ARTICULAR; INTRAMUSCULAR at 13:54

## 2018-02-06 RX ADMIN — TRIAMCINOLONE ACETONIDE 20 MG: 40 INJECTION, SUSPENSION INTRA-ARTICULAR; INTRAMUSCULAR at 13:52

## 2018-02-06 RX ADMIN — BUPIVACAINE HYDROCHLORIDE 0.5 ML: 5 INJECTION, SOLUTION EPIDURAL; INTRACAUDAL at 13:47

## 2018-02-06 NOTE — PROGRESS NOTES
Nursing Admission Record  Procedure:  Level of Consciousness: [x]Alert [x]Oriented []Disoriented []Lethargic  Anxiety Level: [x]Calm []Anxious []Depressed []Other  Skin: [x]Warm []Dry []Cool []Moist []Intact []Other  Cardiovascular: [x]Palpitations: []Never []Occasionally []Frequently  Chest Pain: [x]No []Yes  Respiratory:  [x]Unlabored []Labored []Cough ([] Productive []Unproductive)  HCG Required: [x]No []Yes   Results: []Negative []Positive  Knowledge Level:        [x]Patient/Other verbalized understanding of pre-procedure instructions. [x]Assessment of post-op care needs (transportation, responsible caregiver)        [x]Able to discuss health care problems and how to deal with it.   Factors that Affect Teaching:        Language Barrier: [x]No []Yes - why:        Hearing Loss:        [x]No []Yes            Corrective Device:  []Yes []No        Vision Loss:           []No [x]Yes            Corrective Device:  [x]Yes []No        Memory Loss:       [x]No []Yes            []Short Term []Long Term  Motivational Level:  [x]Asks Questions                  []Extremely Anxious       [x]Seems Interested               []Seems Uninterested                  [x]Denies need for Education  Risk for Injury:  [x]Patient oriented to person, place and time  []History of frequent falls/loss of balance  Nutritional:  []Change in appetite   []Weight Gain   []Weight Loss  Functional:  []Requires assistance with ADL'sCurrent Issues / Falls / ER Visits:  No    Percentage of Pain Relief after Last Procedure:  55 %    How long lasted:  7 weeks    Radiology exams received during the last 12 months: Yes       When within the year but unsure                                              [facility]       Imaging on chart: Yes         Imaging records requested: Yes  MRI exams received in the past 2 years:  Yes  Physical therapy during the last 6 months: No        Labs during the last 12 months: Yes    Education Provided:  [x] Review of Carine Spencer  [x] Agreement Review  [] Compliance Issues Discussed    [] Cognitive Behavior Needs [x] Exercise [] Review of Test [] Financial Issues  [] Tobacco/Alcohol Use [x] Teaching [] New Patient [] Picture Obtained    Physician Plan:  [] Outgoing Referral  [] Pharmacy Consult  [] Test Ordered   [] Obtained Test Results / Consult Notes  [] UDS due at next visit, verified per EPIC      [] Suspected Physical Abuse or Suicide Risk assessed - IF YES COMPLETE QUESTIONS BELOW    If any of the following questions are answered yes - contact attending physician for referral:    Has been considering harming self to escape stress, pain problems? [] YES  [] NO  Has a suicide plan? [] YES  [] NO  Has attempted suicide in the past?   [] YES  [] NO  Has a close friend or family member who committed suicide? [] YES  [] NO    Patient Referred To :      Additional Notes:    Assessment Completed by:  Electronically signed by Oren Tsang RN on 2/6/2018 at 12:52 PM

## 2018-02-06 NOTE — PROCEDURES
[address]    There were no complications. DIAGNOSES:  [] Osteoarthritis   [] Left [] Right [x] Bilateral  []  Ankle [] Wrist [] Knee [] Shoulder [] Hip [] Other    [x] Trochanteric Bursitis  [] Intercostal Neuralgia    [] Other             Procedure:  Level of Consciousness: [x]Alert [x]Oriented []Disoriented []Lethargic  Anxiety Level: [x]Calm []Anxious []Depressed []Other  Skin: [x]Warm []Dry []Cool []Moist []Intact []Other  Cardiovascular: [x]Palpitations: []Never []Occasionally []Frequently  Chest Pain: [x]No []Yes  Respiratory:  [x]Unlabored []Labored []Cough ([] Productive []Unproductive)  HCG Required: [x]No []Yes   Results: []Negative []Positive  Knowledge Level:        [x]Patient/Other verbalized understanding of pre-procedure instructions. [x]Assessment of post-op care needs (transportation, responsible caregiver)        [x]Able to discuss health care problems and how to deal with it. Factors that Affect Teaching:        Language Barrier: [x]No []Yes - why:        Hearing Loss:        [x]No []Yes            Corrective Device:  []Yes []No        Vision Loss:           []No [x]Yes            Corrective Device:  [x]Yes []No        Memory Loss:       [x]No []Yes            []Short Term []Long Term  Motivational Level:  [x]Asks Questions                  []Extremely Anxious       [x]Seems Interested               []Seems Uninterested                  [x]Denies need for Education  Risk for Injury:  [x]Patient oriented to person, place and time  []History of frequent falls/loss of balance  Nutritional:  []Change in appetite   []Weight Gain   []Weight Loss  Functional:      Nursing Admission Record  Current Issues / Karoline Barters / ER Visits:  No   Percentage of Pain Relief after Last Procedure:  55 %    How long lasted:  7 weeks   Radiology exams received during the last 12 months: Yes       When within the year but unsure                                              [facility]       Imaging on chart:  Yes

## 2018-02-06 NOTE — PROGRESS NOTES
CAPS Take 2,000 Units by mouth daily     Historical Provider, MD   ferrous sulfate 325 (65 FE) MG tablet Take 325 mg by mouth every other day     Historical Provider, MD   levothyroxine (SYNTHROID) 112 MCG tablet Take 112 mcg by mouth Daily    Historical Provider, MD       Allergies:  is allergic to abilify [aripiprazole]; codeine; levaquin [levofloxacin in d5w]; lyrica [pregabalin]; morphine; and savella [milnacipran]. Vital Signs: There were no vitals filed for this visit. Physical Exam:  Cardiac:                                    [x]WNL                    []Comments:    Pulmonary:                               [x]WNL                    []Comments:    Neuro/Mental Status:               [x]WNL                    []Comments:      Informed Consent:  The risks and benefits of the procedure have been discussed with either the patient or if they cannot consent their representative. Assessment:  Patient examined and appropriate for the planned sedation and procedure. Plan:  Proceed with planned sedation and procedure as above. Mallampati Airway Assessment: Adequate      ASA STATUS:  []1. Normal Healty  []2. Mild Systemice Disease, doesn't limit activity eg HTN, mild DM  [x]3. Severe Systemic Disease, does limit activity eg stable angina, DM with vascular         disease          []4. Severe Systemic Disease constant threat eg CHF, renal failure  []5.  Moribund not expected to survive without procedure          Markel Marina RN

## 2018-02-16 ENCOUNTER — TELEPHONE (OUTPATIENT)
Dept: CARDIOLOGY | Age: 73
End: 2018-02-16

## 2018-02-28 RX ORDER — HYDROCODONE BITARTRATE AND ACETAMINOPHEN 10; 325 MG/1; MG/1
TABLET ORAL
Qty: 160 TABLET | Refills: 0 | Status: SHIPPED | OUTPATIENT
Start: 2018-03-10 | End: 2018-04-09 | Stop reason: SDUPTHER

## 2018-03-05 ENCOUNTER — OFFICE VISIT (OUTPATIENT)
Dept: CARDIOLOGY | Age: 73
End: 2018-03-05
Payer: MEDICARE

## 2018-03-05 VITALS
WEIGHT: 121 LBS | SYSTOLIC BLOOD PRESSURE: 118 MMHG | HEIGHT: 60 IN | BODY MASS INDEX: 23.75 KG/M2 | HEART RATE: 60 BPM | DIASTOLIC BLOOD PRESSURE: 60 MMHG

## 2018-03-05 DIAGNOSIS — I10 ESSENTIAL HYPERTENSION: ICD-10-CM

## 2018-03-05 DIAGNOSIS — I25.10 ARTERIOSCLEROTIC HEART DISEASE: ICD-10-CM

## 2018-03-05 DIAGNOSIS — I34.0 MODERATE MITRAL REGURGITATION: ICD-10-CM

## 2018-03-05 DIAGNOSIS — I42.1 HYPERTROPHIC OBSTRUCTIVE CARDIOMYOPATHY (HOCM) (HCC): Primary | ICD-10-CM

## 2018-03-05 PROCEDURE — G8598 ASA/ANTIPLAT THER USED: HCPCS | Performed by: INTERNAL MEDICINE

## 2018-03-05 PROCEDURE — 1123F ACP DISCUSS/DSCN MKR DOCD: CPT | Performed by: INTERNAL MEDICINE

## 2018-03-05 PROCEDURE — 3014F SCREEN MAMMO DOC REV: CPT | Performed by: INTERNAL MEDICINE

## 2018-03-05 PROCEDURE — G8400 PT W/DXA NO RESULTS DOC: HCPCS | Performed by: INTERNAL MEDICINE

## 2018-03-05 PROCEDURE — 3017F COLORECTAL CA SCREEN DOC REV: CPT | Performed by: INTERNAL MEDICINE

## 2018-03-05 PROCEDURE — G8420 CALC BMI NORM PARAMETERS: HCPCS | Performed by: INTERNAL MEDICINE

## 2018-03-05 PROCEDURE — G8427 DOCREV CUR MEDS BY ELIG CLIN: HCPCS | Performed by: INTERNAL MEDICINE

## 2018-03-05 PROCEDURE — G8484 FLU IMMUNIZE NO ADMIN: HCPCS | Performed by: INTERNAL MEDICINE

## 2018-03-05 PROCEDURE — 1090F PRES/ABSN URINE INCON ASSESS: CPT | Performed by: INTERNAL MEDICINE

## 2018-03-05 PROCEDURE — 4040F PNEUMOC VAC/ADMIN/RCVD: CPT | Performed by: INTERNAL MEDICINE

## 2018-03-05 PROCEDURE — 99213 OFFICE O/P EST LOW 20 MIN: CPT | Performed by: INTERNAL MEDICINE

## 2018-03-05 PROCEDURE — 1036F TOBACCO NON-USER: CPT | Performed by: INTERNAL MEDICINE

## 2018-03-05 RX ORDER — METOPROLOL TARTRATE 100 MG/1
100 TABLET ORAL NIGHTLY
COMMUNITY
End: 2018-09-04 | Stop reason: CLARIF

## 2018-03-13 NOTE — PROGRESS NOTES
[address], [address] [place] [address], [facility]  The following was transcribed by Aaron Espinal M.T. Irving Etienne : 1945, 71 y.o. Female        Office Visit:  3/5/2018    Chief Complaint   Patient presents with    1 Month Follow-Up     no cardiac symptoms. States she is doing much better. The dizziness she was having is no longer. c/o fibromyalgia. Reason for visit:   1. Follow up of hypertrophic obstructive cardiomyopathy   2. Follow up of coronary artery disease       History of Present Illness:   Ms. Irving Etienne has not had any unusual dyspnea or chest discomfort since her last medication adjustment. I also note that she also has valvular heart disease with moderate mitral regurgitation.       Patient Active Problem List   Diagnosis Code    Bilateral hip pain M25.551, M25.552    Essential hypertension I10    Moderate mitral regurgitation I34.0    IHSS (idiopathic hypertrophic subaortic stenosis) ([facility]) I42.1    Hypertrophic obstructive cardiomyopathy (HOCM) ([facility]) I42.1    Trochanteric bursitis of both hips M70.61, M70.62    Trochanteric bursitis of both hips M70.61, M70.62    Trochanteric bursitis of both hips M70.61, M70.62    Trochanteric bursitis of both hips M70.61, M70.62    Coronary artery disease involving native coronary artery of native heart I25.10    Trochanteric bursitis of both hips M70.61, M70.62    Lumbar facet arthropathy M12.88    Other fatigue R53.83    Bradycardia R00.1    Other hyperlipidemia E78.4    Syncope, near R55    Lumbar facet arthropathy M12.88     Past Medical History:   Diagnosis Date    Anxiety     Arthritis     Bilateral hip pain 2015    Chest pain     Coronary artery disease involving native coronary artery of native heart 2017    Depression     Fibromyalgia     Hyperlipidemia     Hypertension     [facility]  melatonin 3 MG TABS tablet Take 3 mg by mouth three times daily       busPIRone (BUSPAR) 15 MG tablet Take 15 mg by mouth 4 times daily       escitalopram (LEXAPRO) 20 MG tablet Take 20 mg by mouth daily       Cholecalciferol (VITAMIN D3) 2000 UNITS CAPS Take 2,000 Units by mouth daily       ferrous sulfate 325 (65 FE) MG tablet Take 325 mg by mouth every other day       levothyroxine (SYNTHROID) 112 MCG tablet Take 112 mcg by mouth Daily       I have reviewed and confirm the Past Medical History, Allergies, and Medications sections above and have updated the computerized patient record. Data:   BP Readings from Last 3 Encounters:   03/05/18 118/60   02/06/18 (!) 150/58   01/10/18 132/74    Pulse Readings from Last 3 Encounters:   03/05/18 60   02/06/18 61   01/10/18 50        Review of Systems - As per HPI, otherwise negative. Ten organ review performed. Physical Exam:  Vital Signs:  /60   Pulse 60   Ht 5' (1.524 m)   Wt 121 lb (54.9 kg)   BMI 23.63 kg/m²   Constitutional:  The patient is a pleasant 71 y.o. female in no acute distress. She appears well-developed and well-nourished. HEAD:  Normocephalic without evidence of old or recent trauma. EYES:  Sclerae clear. Conjunctivae pink. EOMs intact. Pupils equal and round. NOSE:  No nasal discharge or epistaxis. MOUTH:  Teeth, gums and palate normal.   THROAT:  No lesions on lips or buccal mucosa. Tongue protrudes in midline and is well papillated. NECK:  There are no carotid bruits. No noted jugular venous distention. No thyromegaly. CHEST:  Clear bilateral breath sounds without wheezes or rhonchi. RESPIRATORY:  The lungs clear to auscultation bilaterally with normal respiratory effort. CARDIOVASCULAR:  The heart's rhythm is regular with grade 2/6 systolic ejection murmur at the left sternal border. ABDOMEN:  Soft, no tenderness or mass. UPPER EXTREMITY EVALUATION:  Radial pulses palpable bilaterally.

## 2018-04-09 ENCOUNTER — HOSPITAL ENCOUNTER (OUTPATIENT)
Dept: PAIN MANAGEMENT | Age: 73
Discharge: HOME | End: 2018-04-09
Payer: MEDICARE

## 2018-04-09 VITALS
RESPIRATION RATE: 18 BRPM | HEART RATE: 56 BPM | SYSTOLIC BLOOD PRESSURE: 165 MMHG | TEMPERATURE: 96.8 F | WEIGHT: 125 LBS | HEIGHT: 60 IN | BODY MASS INDEX: 24.54 KG/M2 | DIASTOLIC BLOOD PRESSURE: 81 MMHG | OXYGEN SATURATION: 91 %

## 2018-04-09 DIAGNOSIS — M70.61 TROCHANTERIC BURSITIS OF BOTH HIPS: ICD-10-CM

## 2018-04-09 DIAGNOSIS — M70.62 TROCHANTERIC BURSITIS OF BOTH HIPS: ICD-10-CM

## 2018-04-09 DIAGNOSIS — M47.816 LUMBAR FACET ARTHROPATHY: ICD-10-CM

## 2018-04-09 PROCEDURE — 80307 DRUG TEST PRSMV CHEM ANLYZR: CPT

## 2018-04-09 PROCEDURE — 99212 OFFICE O/P EST SF 10 MIN: CPT

## 2018-04-09 RX ORDER — TRAZODONE HYDROCHLORIDE 50 MG/1
1 TABLET ORAL NIGHTLY
COMMUNITY
Start: 2018-01-29

## 2018-04-09 RX ORDER — HYDROCODONE BITARTRATE AND ACETAMINOPHEN 10; 325 MG/1; MG/1
TABLET ORAL
Qty: 160 TABLET | Refills: 0 | Status: SHIPPED | OUTPATIENT
Start: 2018-04-09 | End: 2018-05-01 | Stop reason: SDUPTHER

## 2018-04-09 RX ORDER — LISINOPRIL 10 MG/1
1 TABLET ORAL DAILY
COMMUNITY
Start: 2018-01-24 | End: 2018-04-09

## 2018-04-09 ASSESSMENT — ACTIVITIES OF DAILY LIVING (ADL): EFFECT OF PAIN ON DAILY ACTIVITIES: LIMITS ACTIVITIES

## 2018-04-09 ASSESSMENT — PAIN DESCRIPTION - ONSET: ONSET: ON-GOING

## 2018-04-09 ASSESSMENT — PAIN DESCRIPTION - LOCATION: LOCATION: BACK;HIP;SHOULDER

## 2018-04-09 ASSESSMENT — PAIN SCALES - GENERAL: PAINLEVEL_OUTOF10: 7

## 2018-04-09 ASSESSMENT — PAIN DESCRIPTION - PAIN TYPE: TYPE: CHRONIC PAIN

## 2018-04-09 ASSESSMENT — PAIN DESCRIPTION - ORIENTATION: ORIENTATION: RIGHT;LEFT;LOWER

## 2018-04-09 ASSESSMENT — PAIN DESCRIPTION - FREQUENCY: FREQUENCY: CONTINUOUS

## 2018-04-09 ASSESSMENT — PAIN DESCRIPTION - DESCRIPTORS: DESCRIPTORS: ACHING;CONSTANT;THROBBING;SHARP

## 2018-04-09 ASSESSMENT — PAIN DESCRIPTION - PROGRESSION: CLINICAL_PROGRESSION: NOT CHANGED

## 2018-04-12 ENCOUNTER — TELEPHONE (OUTPATIENT)
Dept: PAIN MANAGEMENT | Age: 73
End: 2018-04-12

## 2018-04-12 LAB
AMPHETAMINES, URINE: NEGATIVE NG/ML
BARBITURATES, URINE: NEGATIVE NG/ML
BENZODIAZEPINES, URINE: NEGATIVE NG/ML
CANNABINOIDS, URINE: NEGATIVE NG/ML
COCAINE METABOLITE, URINE: NEGATIVE NG/ML
CODEINE, URINE: NEGATIVE
CREATININE, URINE: 25.1 MG/DL (ref 20–300)
ETHANOL U, QUAN: NEGATIVE %
FENTANYL URINE: NEGATIVE PG/ML
HYDROCODONE, UR CONF: 1110 NG/ML
HYDROCODONE, URINE: POSITIVE
HYDROMORPHONE, URINE: NEGATIVE
MEPERIDINE, UR: NEGATIVE NG/ML
METHADONE SCREEN, URINE: NEGATIVE NG/ML
MORPHINE URINE: NEGATIVE
OPIATES, URINE: ABNORMAL NG/ML
OPIATES, URINE: POSITIVE NG/ML
OXYCODONE/OXYMORPHONE, UR: NEGATIVE NG/ML
PH, URINE: 5.6 (ref 4.5–8.9)
PHENCYCLIDINE, URINE: NEGATIVE NG/ML
PROPOXYPHENE, URINE: NEGATIVE NG/ML

## 2018-05-01 RX ORDER — HYDROCODONE BITARTRATE AND ACETAMINOPHEN 10; 325 MG/1; MG/1
TABLET ORAL
Qty: 160 TABLET | Refills: 0 | Status: SHIPPED | OUTPATIENT
Start: 2018-05-09 | End: 2018-05-21

## 2018-05-22 ENCOUNTER — HOSPITAL ENCOUNTER (OUTPATIENT)
Dept: PAIN MANAGEMENT | Age: 73
Discharge: HOME | End: 2018-05-22
Payer: MEDICARE

## 2018-05-22 VITALS
HEART RATE: 58 BPM | HEIGHT: 60 IN | DIASTOLIC BLOOD PRESSURE: 82 MMHG | TEMPERATURE: 95.7 F | BODY MASS INDEX: 24.54 KG/M2 | OXYGEN SATURATION: 96 % | SYSTOLIC BLOOD PRESSURE: 191 MMHG | RESPIRATION RATE: 18 BRPM | WEIGHT: 125 LBS

## 2018-05-22 DIAGNOSIS — M47.816 LUMBAR FACET ARTHROPATHY: ICD-10-CM

## 2018-05-22 DIAGNOSIS — M25.511 CHRONIC RIGHT SHOULDER PAIN: ICD-10-CM

## 2018-05-22 DIAGNOSIS — G89.29 CHRONIC RIGHT SHOULDER PAIN: ICD-10-CM

## 2018-05-22 PROCEDURE — 3209999900 FLUORO FOR SURGICAL PROCEDURES

## 2018-05-22 PROCEDURE — 64493 INJ PARAVERT F JNT L/S 1 LEV: CPT

## 2018-05-22 PROCEDURE — 99152 MOD SED SAME PHYS/QHP 5/>YRS: CPT

## 2018-05-22 PROCEDURE — 64494 INJ PARAVERT F JNT L/S 2 LEV: CPT

## 2018-05-22 PROCEDURE — 20610 DRAIN/INJ JOINT/BURSA W/O US: CPT

## 2018-05-22 PROCEDURE — 6360000002 HC RX W HCPCS

## 2018-05-22 RX ORDER — TRIAMCINOLONE ACETONIDE 40 MG/ML
INJECTION, SUSPENSION INTRA-ARTICULAR; INTRAMUSCULAR
Status: COMPLETED | OUTPATIENT
Start: 2018-05-22 | End: 2018-05-22

## 2018-05-22 RX ORDER — BUPIVACAINE HYDROCHLORIDE 5 MG/ML
INJECTION, SOLUTION EPIDURAL; INTRACAUDAL
Status: COMPLETED | OUTPATIENT
Start: 2018-05-22 | End: 2018-05-22

## 2018-05-22 RX ORDER — MIDAZOLAM HYDROCHLORIDE 1 MG/ML
INJECTION INTRAMUSCULAR; INTRAVENOUS
Status: COMPLETED | OUTPATIENT
Start: 2018-05-22 | End: 2018-05-22

## 2018-05-22 RX ADMIN — BUPIVACAINE HYDROCHLORIDE 9 ML: 5 INJECTION, SOLUTION EPIDURAL; INTRACAUDAL at 15:45

## 2018-05-22 RX ADMIN — BUPIVACAINE HYDROCHLORIDE 9 ML: 5 INJECTION, SOLUTION EPIDURAL; INTRACAUDAL at 15:48

## 2018-05-22 RX ADMIN — TRIAMCINOLONE ACETONIDE 40 MG: 40 INJECTION, SUSPENSION INTRA-ARTICULAR; INTRAMUSCULAR at 15:48

## 2018-05-22 RX ADMIN — TRIAMCINOLONE ACETONIDE 8 MG: 40 INJECTION, SUSPENSION INTRA-ARTICULAR; INTRAMUSCULAR at 15:44

## 2018-05-22 RX ADMIN — MIDAZOLAM HYDROCHLORIDE 2 MG: 1 INJECTION INTRAMUSCULAR; INTRAVENOUS at 15:29

## 2018-05-22 RX ADMIN — TRIAMCINOLONE ACETONIDE 40 MG: 40 INJECTION, SUSPENSION INTRA-ARTICULAR; INTRAMUSCULAR at 15:47

## 2018-05-22 RX ADMIN — BUPIVACAINE HYDROCHLORIDE 2 ML: 5 INJECTION, SOLUTION EPIDURAL; INTRACAUDAL at 15:43

## 2018-05-22 ASSESSMENT — PAIN - FUNCTIONAL ASSESSMENT
PAIN_FUNCTIONAL_ASSESSMENT: 0-10
PAIN_FUNCTIONAL_ASSESSMENT: 0-10

## 2018-05-22 ASSESSMENT — PAIN DESCRIPTION - DESCRIPTORS: DESCRIPTORS: ACHING;CONSTANT;THROBBING;SHARP

## 2018-05-23 RX ORDER — METOPROLOL SUCCINATE 100 MG/1
TABLET, EXTENDED RELEASE ORAL
Qty: 180 TABLET | Refills: 3 | Status: SHIPPED | OUTPATIENT
Start: 2018-05-23

## 2018-06-18 ENCOUNTER — HOSPITAL ENCOUNTER (OUTPATIENT)
Dept: NON INVASIVE DIAGNOSTICS | Age: 73
Discharge: HOME | End: 2018-06-18
Payer: MEDICARE

## 2018-06-18 DIAGNOSIS — I34.0 MITRAL VALVE INSUFFICIENCY, UNSPECIFIED ETIOLOGY: ICD-10-CM

## 2018-06-18 LAB
LV EF: 67 %
LVEF MODALITY: NORMAL

## 2018-06-18 PROCEDURE — 93306 TTE W/DOPPLER COMPLETE: CPT

## 2018-07-05 DIAGNOSIS — M47.816 LUMBAR FACET ARTHROPATHY: Primary | ICD-10-CM

## 2018-07-09 ENCOUNTER — TELEPHONE (OUTPATIENT)
Dept: CARDIOLOGY | Age: 73
End: 2018-07-09

## 2018-07-09 NOTE — TELEPHONE ENCOUNTER
Called patient to give echo results per Suzanna LEIGH. She voiced she understood and would call the office with any questions.   REGAN PITT

## 2018-07-10 RX ORDER — HYDROCODONE BITARTRATE AND ACETAMINOPHEN 10; 325 MG/1; MG/1
1 TABLET ORAL EVERY 6 HOURS PRN
Qty: 160 TABLET | Refills: 0 | Status: SHIPPED | OUTPATIENT
Start: 2018-07-10 | End: 2018-07-10 | Stop reason: SDUPTHER

## 2018-07-25 ENCOUNTER — HOSPITAL ENCOUNTER (OUTPATIENT)
Dept: PAIN MANAGEMENT | Age: 73
Discharge: HOME | End: 2018-07-25
Payer: MEDICARE

## 2018-07-25 VITALS
BODY MASS INDEX: 24.15 KG/M2 | TEMPERATURE: 98 F | WEIGHT: 123 LBS | DIASTOLIC BLOOD PRESSURE: 80 MMHG | HEIGHT: 60 IN | HEART RATE: 53 BPM | SYSTOLIC BLOOD PRESSURE: 156 MMHG

## 2018-07-25 DIAGNOSIS — G89.29 CHRONIC RIGHT SHOULDER PAIN: ICD-10-CM

## 2018-07-25 DIAGNOSIS — M25.511 CHRONIC RIGHT SHOULDER PAIN: ICD-10-CM

## 2018-07-25 DIAGNOSIS — M54.16 LUMBAR RADICULOPATHY: Primary | ICD-10-CM

## 2018-07-25 PROCEDURE — 99213 OFFICE O/P EST LOW 20 MIN: CPT | Performed by: NURSE PRACTITIONER

## 2018-07-25 PROCEDURE — 99213 OFFICE O/P EST LOW 20 MIN: CPT

## 2018-07-25 RX ORDER — HYDROCODONE BITARTRATE AND ACETAMINOPHEN 10; 325 MG/1; MG/1
TABLET ORAL
Qty: 160 TABLET | Refills: 0 | Status: SHIPPED | OUTPATIENT
Start: 2018-08-09 | End: 2018-09-08

## 2018-07-25 ASSESSMENT — PAIN DESCRIPTION - FREQUENCY: FREQUENCY: CONTINUOUS

## 2018-07-25 ASSESSMENT — ENCOUNTER SYMPTOMS
BACK PAIN: 1
CONSTIPATION: 0

## 2018-07-25 ASSESSMENT — PAIN DESCRIPTION - PAIN TYPE: TYPE: CHRONIC PAIN

## 2018-07-25 ASSESSMENT — ACTIVITIES OF DAILY LIVING (ADL): EFFECT OF PAIN ON DAILY ACTIVITIES: DAILY CHORES AND ACTIVITIES

## 2018-07-25 ASSESSMENT — PAIN SCALES - GENERAL: PAINLEVEL_OUTOF10: 7

## 2018-07-25 ASSESSMENT — PAIN DESCRIPTION - ORIENTATION: ORIENTATION: RIGHT

## 2018-07-25 ASSESSMENT — PAIN DESCRIPTION - PROGRESSION: CLINICAL_PROGRESSION: NOT CHANGED

## 2018-07-25 ASSESSMENT — PAIN DESCRIPTION - LOCATION: LOCATION: BACK;HIP

## 2018-07-25 NOTE — PROGRESS NOTES
Nursing Admission Record    Current Issues / Falls / ER Visits:  No    Percentage of Pain Relief after Last Procedure:  65 %    How long lasted:  2 years    Radiology exams received during the last 12 months: No    MRI exams received in the past 2 years:  No  Physical therapy during the last 6 months: No  Labs during the last 12 months: Yes    Education Provided:  [] Review of Lorel Landau  [] Agreement Review  [] Compliance Issues Discussed    [] Cognitive Behavior Needs [x] Exercise [] Review of Test [] Financial Issues  [] Tobacco/Alcohol Use [x] Teaching [] New Patient [] Picture Obtained    Physician Plan:  [] Outgoing Referral  [] Pharmacy Consult  [] Test Ordered   [] Obtained Test Results / Consult Notes  [] UDS due at next visit, verified per EPIC      [] Suspected Physical Abuse or Suicide Risk assessed - IF YES COMPLETE QUESTIONS BELOW    If any of the following questions are answered yes - contact attending physician for referral:    Has been considering harming self to escape stress, pain problems? [] YES  [x] NO  Has a suicide plan? [] YES  [x] NO  Has attempted suicide in the past?   [] YES  [x] NO  Has a close friend or family member who committed suicide? [] YES  [x] NO    Patient Referred To :      Additional Notes:    Assessment Completed by:  Electronically signed by Ghislaine Allen RN on 4/38/8097 at 3:59 PM

## 2018-07-26 DIAGNOSIS — E78.00 PURE HYPERCHOLESTEROLEMIA: ICD-10-CM

## 2018-07-26 RX ORDER — ROSUVASTATIN CALCIUM 10 MG/1
TABLET, COATED ORAL
Qty: 90 TABLET | Refills: 3 | Status: SHIPPED | OUTPATIENT
Start: 2018-07-26

## 2018-09-04 ENCOUNTER — OFFICE VISIT (OUTPATIENT)
Dept: CARDIOLOGY | Age: 73
End: 2018-09-04
Payer: MEDICARE

## 2018-09-04 VITALS
DIASTOLIC BLOOD PRESSURE: 78 MMHG | BODY MASS INDEX: 24.74 KG/M2 | SYSTOLIC BLOOD PRESSURE: 122 MMHG | HEART RATE: 54 BPM | HEIGHT: 60 IN | WEIGHT: 126 LBS

## 2018-09-04 DIAGNOSIS — I10 ESSENTIAL HYPERTENSION: ICD-10-CM

## 2018-09-04 DIAGNOSIS — I34.0 MODERATE MITRAL REGURGITATION: Primary | ICD-10-CM

## 2018-09-04 DIAGNOSIS — E78.49 OTHER HYPERLIPIDEMIA: ICD-10-CM

## 2018-09-04 DIAGNOSIS — I42.1 IHSS (IDIOPATHIC HYPERTROPHIC SUBAORTIC STENOSIS) (HCC): ICD-10-CM

## 2018-09-04 DIAGNOSIS — I25.10 CORONARY ARTERY DISEASE INVOLVING NATIVE CORONARY ARTERY OF NATIVE HEART WITHOUT ANGINA PECTORIS: ICD-10-CM

## 2018-09-04 PROCEDURE — 1123F ACP DISCUSS/DSCN MKR DOCD: CPT | Performed by: NURSE PRACTITIONER

## 2018-09-04 PROCEDURE — G8420 CALC BMI NORM PARAMETERS: HCPCS | Performed by: NURSE PRACTITIONER

## 2018-09-04 PROCEDURE — 1101F PT FALLS ASSESS-DOCD LE1/YR: CPT | Performed by: NURSE PRACTITIONER

## 2018-09-04 PROCEDURE — 99213 OFFICE O/P EST LOW 20 MIN: CPT | Performed by: NURSE PRACTITIONER

## 2018-09-04 PROCEDURE — 3017F COLORECTAL CA SCREEN DOC REV: CPT | Performed by: NURSE PRACTITIONER

## 2018-09-04 PROCEDURE — 1090F PRES/ABSN URINE INCON ASSESS: CPT | Performed by: NURSE PRACTITIONER

## 2018-09-04 PROCEDURE — 1036F TOBACCO NON-USER: CPT | Performed by: NURSE PRACTITIONER

## 2018-09-04 PROCEDURE — G8427 DOCREV CUR MEDS BY ELIG CLIN: HCPCS | Performed by: NURSE PRACTITIONER

## 2018-09-04 PROCEDURE — 4040F PNEUMOC VAC/ADMIN/RCVD: CPT | Performed by: NURSE PRACTITIONER

## 2018-09-04 PROCEDURE — G8598 ASA/ANTIPLAT THER USED: HCPCS | Performed by: NURSE PRACTITIONER

## 2018-09-04 PROCEDURE — G8400 PT W/DXA NO RESULTS DOC: HCPCS | Performed by: NURSE PRACTITIONER

## 2018-09-04 NOTE — PROGRESS NOTES
bruise/bleed easily. Psychiatric/Behavioral: Negative. Past Medical History:   Diagnosis Date    Anxiety     Arthritis     Bilateral hip pain 2015    Chest pain     Coronary artery disease involving native coronary artery of native heart 2017    Depression     Fibromyalgia     Hyperlipidemia     Hypertension     Hypertrophic obstructive cardiomyopathy (HOCM) ([facility])     hypothyroid     S/P coronary artery stent placement 2017    Severe mitral regurgitation 2015    Moderate-Severe MR 17.  TIA (transient ischemic attack)        Past Surgical History:   Procedure Laterality Date    CARPAL TUNNEL RELEASE      CORONARY ANGIOPLASTY WITH STENT PLACEMENT  stent x 2    2015    EYE SURGERY      EYE SURGERY Right 2017    HERNIA REPAIR      HIATAL HERNIA REPAIR      KNEE SURGERY      bilateral    THYROIDECTOMY      TUBAL LIGATION         Family History   Problem Relation Age of Onset    Other Brother         MI,  age 30    Other Father         MI         Social History     Social History    Marital status:      Spouse name: N/A    Number of children: N/A    Years of education: N/A     Occupational History    Not on file. Social History Main Topics    Smoking status: Never Smoker    Smokeless tobacco: Never Used    Alcohol use No    Drug use: No    Sexual activity: Not on file     Other Topics Concern    Not on file     Social History Narrative    No narrative on file       Allergies   Allergen Reactions    Abilify [Aripiprazole]     Codeine     Levaquin [Levofloxacin In D5w]     Lyrica [Pregabalin]     Morphine     Savella [Milnacipran]          Current Outpatient Prescriptions:     rosuvastatin (CRESTOR) 10 MG tablet, TAKE 1 TABLET DAILY, Disp: 90 tablet, Rfl: 3    HYDROcodone-acetaminophen (NORCO)  MG per tablet, Take 1 tablet every 4 - 6 hours as needed for pain.  Intended supply: 30 days May Fill August 9th, 2018. Earliest Fill Date: 8/9/18, Disp: 160 tablet, Rfl: 0    metoprolol succinate (TOPROL XL) 100 MG extended release tablet, TAKE 1 TABLET TWICE A DAY (Patient taking differently: taking 100 mg in morning and 50 mg nightly), Disp: 180 tablet, Rfl: 3    diclofenac sodium (VOLTAREN) 1 % GEL, Apply 4 g topically 4 times daily as needed for Pain 3 month follow up, Disp: 15 Tube, Rfl: 0    diclofenac sodium (VOLTAREN) 1 % GEL, Apply 4 g topically 4 times daily as needed for Pain 3 month follow up, Disp: 15 Tube, Rfl: 0    traZODone (DESYREL) 50 MG tablet, Take 1 tablet by mouth nightly, Disp: , Rfl:     topiramate (TOPAMAX) 50 MG tablet, Take 50 mg by mouth 1/2 tablet at bedtime, Disp: , Rfl:     nitroGLYCERIN (NITROSTAT) 0.4 MG SL tablet, Place 1 tablet under the tongue every 5 minutes as needed for Chest pain, Disp: 25 tablet, Rfl: 3    omeprazole (PRILOSEC) 40 MG delayed release capsule, Take 1 capsule by mouth daily, Disp: , Rfl:     Omega-3 Fatty Acids (OMEGA 3 500) 500 MG CAPS, Take 500 mg by mouth daily, Disp: , Rfl:     aspirin 81 MG chewable tablet, Take 81 mg by mouth daily, Disp: , Rfl:     melatonin 3 MG TABS tablet, Take 3 mg by mouth three times daily , Disp: , Rfl:     busPIRone (BUSPAR) 15 MG tablet, Take 15 mg by mouth 4 times daily , Disp: , Rfl:     escitalopram (LEXAPRO) 20 MG tablet, Take 20 mg by mouth daily , Disp: , Rfl:     Cholecalciferol (VITAMIN D3) 2000 UNITS CAPS, Take 2,000 Units by mouth daily , Disp: , Rfl:     ferrous sulfate 325 (65 FE) MG tablet, Take 325 mg by mouth every other day , Disp: , Rfl:     levothyroxine (SYNTHROID) 112 MCG tablet, Take 112 mcg by mouth Daily, Disp: , Rfl:     PE:  Vitals:    09/04/18 1414   BP: 122/78   Pulse:        Estimated body mass index is 24.61 kg/m² as calculated from the following:    Height as of this encounter: 5' (1.524 m). Weight as of this encounter: 126 lb (57.2 kg).     Constitutional: She is oriented to

## 2018-10-29 ENCOUNTER — TELEPHONE (OUTPATIENT)
Dept: PAIN MANAGEMENT | Age: 73
End: 2018-10-29

## 2020-06-02 ENCOUNTER — TRANSCRIBE ORDERS (OUTPATIENT)
Dept: ADMINISTRATIVE | Facility: HOSPITAL | Age: 75
End: 2020-06-02

## 2020-06-02 DIAGNOSIS — Z01.818 PREOP TESTING: Primary | ICD-10-CM

## 2020-06-06 ENCOUNTER — LAB (OUTPATIENT)
Dept: LAB | Facility: HOSPITAL | Age: 75
End: 2020-06-06

## 2020-06-06 PROCEDURE — U0003 INFECTIOUS AGENT DETECTION BY NUCLEIC ACID (DNA OR RNA); SEVERE ACUTE RESPIRATORY SYNDROME CORONAVIRUS 2 (SARS-COV-2) (CORONAVIRUS DISEASE [COVID-19]), AMPLIFIED PROBE TECHNIQUE, MAKING USE OF HIGH THROUGHPUT TECHNOLOGIES AS DESCRIBED BY CMS-2020-01-R: HCPCS | Performed by: ANESTHESIOLOGY

## 2020-06-07 LAB
COVID LABCORP PRIORITY: NORMAL
SARS-COV-2 RNA RESP QL NAA+PROBE: NOT DETECTED

## 2020-07-22 ENCOUNTER — TRANSCRIBE ORDERS (OUTPATIENT)
Dept: ADMINISTRATIVE | Facility: HOSPITAL | Age: 75
End: 2020-07-22

## 2020-07-22 DIAGNOSIS — Z01.818 PRE-OP TESTING: Primary | ICD-10-CM

## 2020-10-30 ENCOUNTER — TRANSCRIBE ORDERS (OUTPATIENT)
Dept: ADMINISTRATIVE | Facility: HOSPITAL | Age: 75
End: 2020-10-30

## 2020-10-30 DIAGNOSIS — Z01.818 PREOP TESTING: Primary | ICD-10-CM

## 2020-10-31 ENCOUNTER — LAB (OUTPATIENT)
Dept: LAB | Facility: HOSPITAL | Age: 75
End: 2020-10-31

## 2020-10-31 PROCEDURE — C9803 HOPD COVID-19 SPEC COLLECT: HCPCS | Performed by: ANESTHESIOLOGY

## 2020-10-31 PROCEDURE — U0003 INFECTIOUS AGENT DETECTION BY NUCLEIC ACID (DNA OR RNA); SEVERE ACUTE RESPIRATORY SYNDROME CORONAVIRUS 2 (SARS-COV-2) (CORONAVIRUS DISEASE [COVID-19]), AMPLIFIED PROBE TECHNIQUE, MAKING USE OF HIGH THROUGHPUT TECHNOLOGIES AS DESCRIBED BY CMS-2020-01-R: HCPCS | Performed by: ANESTHESIOLOGY

## 2020-11-01 LAB
COVID LABCORP PRIORITY: NORMAL
SARS-COV-2 RNA RESP QL NAA+PROBE: NOT DETECTED

## 2020-11-03 PROBLEM — M47.816 LUMBAR FACET ARTHROPATHY: Status: RESOLVED | Noted: 2017-10-17 | Resolved: 2020-11-03

## 2020-11-03 PROBLEM — M47.816 LUMBAR FACET ARTHROPATHY: Status: RESOLVED | Noted: 2018-02-06 | Resolved: 2020-11-03

## 2021-05-24 ENCOUNTER — LAB (OUTPATIENT)
Dept: LAB | Facility: HOSPITAL | Age: 76
End: 2021-05-24

## 2021-05-24 DIAGNOSIS — Z01.812 ENCOUNTER FOR PREPROCEDURE SCREENING LABORATORY TESTING FOR COVID-19: Primary | ICD-10-CM

## 2021-05-24 DIAGNOSIS — Z20.822 ENCOUNTER FOR PREPROCEDURE SCREENING LABORATORY TESTING FOR COVID-19: Primary | ICD-10-CM

## 2021-05-24 LAB — SARS-COV-2 ORF1AB RESP QL NAA+PROBE: NOT DETECTED

## 2021-05-24 PROCEDURE — C9803 HOPD COVID-19 SPEC COLLECT: HCPCS

## 2021-05-24 PROCEDURE — U0004 COV-19 TEST NON-CDC HGH THRU: HCPCS

## 2022-06-13 ENCOUNTER — OFFICE VISIT (OUTPATIENT)
Dept: FAMILY MEDICINE CLINIC | Facility: CLINIC | Age: 77
End: 2022-06-13

## 2022-06-13 VITALS
HEART RATE: 85 BPM | SYSTOLIC BLOOD PRESSURE: 123 MMHG | WEIGHT: 157 LBS | DIASTOLIC BLOOD PRESSURE: 86 MMHG | OXYGEN SATURATION: 96 % | HEIGHT: 58 IN | TEMPERATURE: 98.5 F | BODY MASS INDEX: 32.95 KG/M2

## 2022-06-13 DIAGNOSIS — Z00.00 MEDICARE ANNUAL WELLNESS VISIT, SUBSEQUENT: ICD-10-CM

## 2022-06-13 DIAGNOSIS — F32.A DEPRESSION, UNSPECIFIED DEPRESSION TYPE: ICD-10-CM

## 2022-06-13 DIAGNOSIS — K21.9 GASTROESOPHAGEAL REFLUX DISEASE WITHOUT ESOPHAGITIS: ICD-10-CM

## 2022-06-13 DIAGNOSIS — M79.7 FIBROMYALGIA: ICD-10-CM

## 2022-06-13 DIAGNOSIS — E05.00 GRAVES DISEASE: ICD-10-CM

## 2022-06-13 DIAGNOSIS — E55.9 VITAMIN D DEFICIENCY: ICD-10-CM

## 2022-06-13 DIAGNOSIS — F41.9 ANXIETY: Primary | ICD-10-CM

## 2022-06-13 DIAGNOSIS — I10 PRIMARY HYPERTENSION: ICD-10-CM

## 2022-06-13 DIAGNOSIS — F51.01 PRIMARY INSOMNIA: ICD-10-CM

## 2022-06-13 DIAGNOSIS — E78.49 OTHER HYPERLIPIDEMIA: ICD-10-CM

## 2022-06-13 PROBLEM — I48.0 PAROXYSMAL ATRIAL FIBRILLATION: Status: ACTIVE | Noted: 2019-04-16

## 2022-06-13 PROBLEM — IMO0002 HERNIATED DISC: Status: ACTIVE | Noted: 2022-06-13

## 2022-06-13 PROBLEM — G89.29 CHRONIC RIGHT SHOULDER PAIN: Status: ACTIVE | Noted: 2018-07-25

## 2022-06-13 PROBLEM — G56.01 CARPAL TUNNEL SYNDROME ON RIGHT: Status: ACTIVE | Noted: 2019-04-16

## 2022-06-13 PROBLEM — I42.1 HYPERTROPHIC OBSTRUCTIVE CARDIOMYOPATHY (HOCM) (HCC): Status: ACTIVE | Noted: 2022-06-13

## 2022-06-13 PROBLEM — I25.10 CORONARY ARTERY DISEASE INVOLVING NATIVE CORONARY ARTERY OF NATIVE HEART: Status: ACTIVE | Noted: 2017-06-13

## 2022-06-13 PROBLEM — R55 SYNCOPE, NEAR: Status: ACTIVE | Noted: 2017-12-13

## 2022-06-13 PROBLEM — I34.0 NON-RHEUMATIC MITRAL REGURGITATION: Status: ACTIVE | Noted: 2019-04-16

## 2022-06-13 PROBLEM — E66.9 OBESITY (BMI 30.0-34.9): Status: ACTIVE | Noted: 2022-06-13

## 2022-06-13 PROBLEM — I42.1 IHSS (IDIOPATHIC HYPERTROPHIC SUBAORTIC STENOSIS): Status: ACTIVE | Noted: 2022-06-13

## 2022-06-13 PROBLEM — R53.83 OTHER FATIGUE: Status: ACTIVE | Noted: 2017-11-20

## 2022-06-13 PROBLEM — M25.511 CHRONIC RIGHT SHOULDER PAIN: Status: ACTIVE | Noted: 2018-07-25

## 2022-06-13 PROBLEM — E66.811 OBESITY (BMI 30.0-34.9): Status: ACTIVE | Noted: 2022-06-13

## 2022-06-13 PROBLEM — R00.1 BRADYCARDIA: Status: ACTIVE | Noted: 2017-11-20

## 2022-06-13 PROBLEM — M19.90 OSTEOARTHRITIS: Status: ACTIVE | Noted: 2022-06-13

## 2022-06-13 PROBLEM — K22.70 BARRETT'S ESOPHAGUS: Status: ACTIVE | Noted: 2022-06-13

## 2022-06-13 PROBLEM — M47.816 LUMBAR FACET ARTHROPATHY: Status: ACTIVE | Noted: 2018-05-22

## 2022-06-13 PROCEDURE — G0439 PPPS, SUBSEQ VISIT: HCPCS | Performed by: PEDIATRICS

## 2022-06-13 PROCEDURE — 1170F FXNL STATUS ASSESSED: CPT | Performed by: PEDIATRICS

## 2022-06-13 PROCEDURE — 1160F RVW MEDS BY RX/DR IN RCRD: CPT | Performed by: PEDIATRICS

## 2022-06-13 RX ORDER — OMEPRAZOLE 40 MG/1
40 CAPSULE, DELAYED RELEASE ORAL DAILY
Qty: 90 CAPSULE | Refills: 1 | Status: SHIPPED | OUTPATIENT
Start: 2022-06-13 | End: 2022-11-09

## 2022-06-13 RX ORDER — ESCITALOPRAM OXALATE 20 MG/1
20 TABLET ORAL DAILY
COMMUNITY
Start: 2022-04-12 | End: 2022-06-13 | Stop reason: SDUPTHER

## 2022-06-13 RX ORDER — PHENOL 1.4 %
1 AEROSOL, SPRAY (ML) MUCOUS MEMBRANE DAILY
Qty: 90 TABLET | Refills: 1 | Status: SHIPPED | OUTPATIENT
Start: 2022-06-13

## 2022-06-13 RX ORDER — BUPROPION HYDROCHLORIDE 75 MG/1
75 TABLET ORAL 2 TIMES DAILY
Qty: 90 TABLET | Refills: 1 | Status: SHIPPED | OUTPATIENT
Start: 2022-06-13 | End: 2022-08-05

## 2022-06-13 RX ORDER — ROSUVASTATIN CALCIUM 10 MG/1
10 TABLET, COATED ORAL DAILY
Qty: 90 TABLET | Refills: 1 | Status: SHIPPED | OUTPATIENT
Start: 2022-06-13 | End: 2022-12-06 | Stop reason: SDUPTHER

## 2022-06-13 RX ORDER — TRAZODONE HYDROCHLORIDE 100 MG/1
100 TABLET ORAL
COMMUNITY
Start: 2022-03-17 | End: 2022-06-13 | Stop reason: SDUPTHER

## 2022-06-13 RX ORDER — LEVOTHYROXINE SODIUM 112 UG/1
1 TABLET ORAL DAILY
COMMUNITY
End: 2022-06-13 | Stop reason: SDUPTHER

## 2022-06-13 RX ORDER — METOPROLOL SUCCINATE 100 MG/1
1 TABLET, EXTENDED RELEASE ORAL DAILY
COMMUNITY
End: 2022-06-13 | Stop reason: SDUPTHER

## 2022-06-13 RX ORDER — PHENOL 1.4 %
1 AEROSOL, SPRAY (ML) MUCOUS MEMBRANE DAILY
COMMUNITY
End: 2022-06-13 | Stop reason: SDUPTHER

## 2022-06-13 RX ORDER — HYDROCODONE BITARTRATE AND ACETAMINOPHEN 10; 325 MG/1; MG/1
1 TABLET ORAL DAILY
COMMUNITY
Start: 2022-06-02

## 2022-06-13 RX ORDER — TRAZODONE HYDROCHLORIDE 100 MG/1
100 TABLET ORAL
Qty: 90 TABLET | Refills: 1 | Status: SHIPPED | OUTPATIENT
Start: 2022-06-13 | End: 2022-12-06 | Stop reason: SDUPTHER

## 2022-06-13 RX ORDER — AMLODIPINE BESYLATE 5 MG/1
5 TABLET ORAL DAILY
Qty: 90 TABLET | Refills: 1 | Status: SHIPPED | OUTPATIENT
Start: 2022-06-13 | End: 2022-11-28

## 2022-06-13 RX ORDER — AMLODIPINE BESYLATE 5 MG/1
5 TABLET ORAL DAILY
COMMUNITY
Start: 2022-05-04 | End: 2022-06-13 | Stop reason: SDUPTHER

## 2022-06-13 RX ORDER — TOPIRAMATE 50 MG/1
50 TABLET, FILM COATED ORAL EVERY 12 HOURS SCHEDULED
Qty: 180 TABLET | Refills: 1 | Status: SHIPPED | OUTPATIENT
Start: 2022-06-13 | End: 2022-10-10

## 2022-06-13 RX ORDER — METOPROLOL SUCCINATE 100 MG/1
100 TABLET, EXTENDED RELEASE ORAL DAILY
Qty: 90 TABLET | Refills: 1 | Status: SHIPPED | OUTPATIENT
Start: 2022-06-13 | End: 2022-11-09

## 2022-06-13 RX ORDER — BUSPIRONE HYDROCHLORIDE 10 MG/1
10 TABLET ORAL EVERY 12 HOURS SCHEDULED
Qty: 180 TABLET | Refills: 1 | Status: SHIPPED | OUTPATIENT
Start: 2022-06-13 | End: 2022-06-21 | Stop reason: DRUGHIGH

## 2022-06-13 RX ORDER — ROSUVASTATIN CALCIUM 10 MG/1
10 TABLET, COATED ORAL DAILY
COMMUNITY
Start: 2022-05-31 | End: 2022-06-13 | Stop reason: SDUPTHER

## 2022-06-13 RX ORDER — TOPIRAMATE 50 MG/1
1 TABLET, FILM COATED ORAL EVERY 12 HOURS SCHEDULED
COMMUNITY
End: 2022-06-13 | Stop reason: SDUPTHER

## 2022-06-13 RX ORDER — OMEPRAZOLE 40 MG/1
40 CAPSULE, DELAYED RELEASE ORAL DAILY
COMMUNITY
Start: 2022-04-23 | End: 2022-06-13 | Stop reason: SDUPTHER

## 2022-06-13 RX ORDER — BUPROPION HYDROCHLORIDE 75 MG/1
75 TABLET ORAL 2 TIMES DAILY
COMMUNITY
Start: 2022-05-26 | End: 2022-06-13 | Stop reason: SDUPTHER

## 2022-06-13 RX ORDER — LEVOTHYROXINE SODIUM 112 UG/1
112 TABLET ORAL DAILY
Qty: 90 TABLET | Refills: 1 | Status: SHIPPED | OUTPATIENT
Start: 2022-06-13 | End: 2022-11-28

## 2022-06-13 RX ORDER — BUSPIRONE HYDROCHLORIDE 15 MG/1
15 TABLET ORAL 4 TIMES DAILY
Qty: 360 TABLET | Refills: 1 | Status: SHIPPED | OUTPATIENT
Start: 2022-06-13 | End: 2022-12-06 | Stop reason: SDUPTHER

## 2022-06-13 RX ORDER — ESCITALOPRAM OXALATE 20 MG/1
20 TABLET ORAL DAILY
Qty: 90 TABLET | Refills: 1 | Status: SHIPPED | OUTPATIENT
Start: 2022-06-13 | End: 2022-12-06 | Stop reason: SDUPTHER

## 2022-06-13 NOTE — PROGRESS NOTES
The ABCs of the Annual Wellness Visit  Subsequent Medicare Wellness Visit    Chief Complaint   Patient presents with   • Medicare Wellness-Initial Visit     Patient is here for her Medicare Wellness visit. No concerns today.       Subjective    History of Present Illness:  Nichole Rinaldi is a 76 y.o. female who presents for a Subsequent Medicare Wellness Visit.    The following portions of the patient's history were reviewed and   updated as appropriate: allergies, current medications, past family history, past medical history, past social history, past surgical history and problem list.    Compared to one year ago, the patient feels her physical   health is the same.    Compared to one year ago, the patient feels her mental   health is the same.    Recent Hospitalizations:  She was not admitted to the hospital during the last year.       Current Medical Providers:  Patient Care Team:  Juan A Smith MD as PCP - General (Pediatrics)    Outpatient Medications Prior to Visit   Medication Sig Dispense Refill   • amLODIPine (NORVASC) 5 MG tablet Take 5 mg by mouth Daily.     • buPROPion (WELLBUTRIN) 75 MG tablet Take 75 mg by mouth 2 (Two) Times a Day.     • busPIRone HCl (BUSPAR PO) Take 1 tablet by mouth Every 12 (Twelve) Hours.     • escitalopram (LEXAPRO) 20 MG tablet Take 20 mg by mouth Daily.     • HYDROcodone-acetaminophen (NORCO)  MG per tablet Take 1 tablet by mouth Daily.     • levothyroxine (SYNTHROID, LEVOTHROID) 112 MCG tablet Take 1 tablet by mouth Daily.     • Melatonin 10 MG tablet Take 1 tablet by mouth Daily.     • metoprolol succinate XL (TOPROL-XL) 100 MG 24 hr tablet Take 1 tablet by mouth Daily.     • omeprazole (priLOSEC) 40 MG capsule Take 40 mg by mouth Daily.     • rosuvastatin (CRESTOR) 10 MG tablet Take 10 mg by mouth Daily.     • topiramate (TOPAMAX) 50 MG tablet Take 1 tablet by mouth Every 12 (Twelve) Hours.     • traZODone (DESYREL) 100 MG tablet Take 100 mg by mouth every night at  "bedtime.       No facility-administered medications prior to visit.       Opioid medication/s are on active medication list.  and I have evaluated her active treatment plan and pain score trends (see table).  Vitals:    06/13/22 1438   PainSc: 0-No pain     I have reviewed the chart for potential of high risk medication and harmful drug interactions in the elderly.            Aspirin is not on active medication list.  Aspirin use is not indicated based on review of current medical condition/s. Risk of harm outweighs potential benefits.  .    Patient Active Problem List   Diagnosis   • Osteoarthritis   • Coronary artery disease involving native coronary artery of native heart   • Vazquez's esophagus   • Basedow's disease   • Bilateral hip pain   • Bradycardia   • Carpal tunnel syndrome on right   • Chronic right shoulder pain   • Syncope, near   • Depression   • Herniated disc   • Hypertrophic obstructive cardiomyopathy (HOCM) ([facility])   • IHSS (idiopathic hypertrophic subaortic stenosis) ([facility])   • Lumbar facet arthropathy   • Non-rheumatic mitral regurgitation   • Other fatigue   • Other hyperlipidemia   • Paroxysmal atrial fibrillation ([facility])   • Trochanteric bursitis of both hips   • Fibromyalgia   • Obesity (BMI 30.0-34.9)     Advance Care Planning  Advance Directive is not on file.  ACP discussion was held with the patient during this visit. Patient does not have an advance directive, information provided.          Objective    Vitals:    06/13/22 1438   BP: 123/86   BP Location: Right arm   Patient Position: Sitting   Cuff Size: Adult   Pulse: 85   Temp: 98.5 °F (36.9 °C)   SpO2: 96%   Weight: 71.2 kg (157 lb)   Height: 147.3 cm (58\")   PainSc: 0-No pain     Estimated body mass index is 32.81 kg/m² as calculated from the following:    Height as of this encounter: 147.3 cm (58\").    Weight as of this encounter: 71.2 kg (157 lb).    BMI is >= 30 and <35. (Class 1 Obesity). The following options were offered after " discussion;: exercise counseling/recommendations and nutrition counseling/recommendations      Does the patient have evidence of cognitive impairment? No    Physical Exam  Constitutional:       Appearance: Normal appearance. She is well-developed.   HENT:      Head: Normocephalic and atraumatic.      Right Ear: External ear normal.      Left Ear: External ear normal.      Nose: Nose normal. No nasal tenderness or congestion.      Mouth/Throat:      Lips: Pink. No lesions.      Mouth: Mucous membranes are moist. No oral lesions.      Dentition: Normal dentition.      Pharynx: Oropharynx is clear. No pharyngeal swelling, oropharyngeal exudate or posterior oropharyngeal erythema.   Eyes:      General: Lids are normal. Vision grossly intact. No scleral icterus.        Right eye: No discharge.         Left eye: No discharge.      Extraocular Movements: Extraocular movements intact.      Conjunctiva/sclera: Conjunctivae normal.      Right eye: Right conjunctiva is not injected.      Left eye: Left conjunctiva is not injected.      Pupils: Pupils are equal, round, and reactive to light.   Cardiovascular:      Rate and Rhythm: Normal rate and regular rhythm.      Heart sounds: Normal heart sounds. No murmur heard.    No gallop.   Pulmonary:      Effort: Pulmonary effort is normal.      Breath sounds: Normal breath sounds and air entry. No wheezing, rhonchi or rales.   Musculoskeletal:         General: No tenderness or deformity. Normal range of motion.      Cervical back: Full passive range of motion without pain, normal range of motion and neck supple.      Right lower leg: No edema.      Left lower leg: No edema.   Skin:     General: Skin is warm and dry.      Coloration: Skin is not jaundiced.      Findings: No rash.   Neurological:      Mental Status: She is alert and oriented to person, place, and time.      Cranial Nerves: Cranial nerves are intact.      Sensory: Sensation is intact.      Motor: Motor function is  intact.      Coordination: Coordination is intact.      Gait: Gait is intact.   Psychiatric:         Attention and Perception: Attention normal.         Mood and Affect: Mood and affect normal.         Behavior: Behavior is not hyperactive. Behavior is cooperative.         Thought Content: Thought content normal.         Judgment: Judgment normal.                 HEALTH RISK ASSESSMENT    Smoking Status:  Social History     Tobacco Use   Smoking Status Never Smoker   Smokeless Tobacco Never Used     Alcohol Consumption:  Social History     Substance and Sexual Activity   Alcohol Use Never     Fall Risk Screen:    STEADI Fall Risk Assessment was completed, and patient is at LOW risk for falls.Assessment completed on:6/13/2022    Depression Screening:  PHQ-2/PHQ-9 Depression Screening 6/13/2022   Little Interest or Pleasure in Doing Things 0-->not at all   Feeling Down, Depressed or Hopeless 0-->not at all   PHQ-9: Brief Depression Severity Measure Score 0       Health Habits and Functional and Cognitive Screening:  Functional & Cognitive Status 6/13/2022   Do you have difficulty preparing food and eating? No   Do you have difficulty bathing yourself, getting dressed or grooming yourself? No   Do you have difficulty using the toilet? No   Do you have difficulty moving around from place to place? No   Do you have trouble with steps or getting out of a bed or a chair? No   Current Diet Well Balanced Diet   Dental Exam Up to date   Eye Exam Up to date   Exercise (times per week) 5 times per week   Current Exercises Include Walking   Do you need help using the phone?  No   Are you deaf or do you have serious difficulty hearing?  No   Do you need help with transportation? Yes   Do you need help shopping? No   Do you need help preparing meals?  No   Do you need help with housework?  No   Do you need help with laundry? No   Do you need help taking your medications? No   Do you ever drive or ride in a car without wearing a  seat belt? No   Have you felt unusual stress, anger or loneliness in the last month? No   Who do you live with? Spouse   If you need help, do you have trouble finding someone available to you? No   Have you been bothered in the last four weeks by sexual problems? No   Do you have difficulty concentrating, remembering or making decisions? No       Age-appropriate Screening Schedule:  Refer to the list below for future screening recommendations based on patient's age, sex and/or medical conditions. Orders for these recommended tests are listed in the plan section. The patient has been provided with a written plan.    Health Maintenance   Topic Date Due   • MAMMOGRAM  Never done   • DXA SCAN  Never done   • TDAP/TD VACCINES (1 - Tdap) Never done   • ZOSTER VACCINE (1 of 2) Never done   • LIPID PANEL  Never done   • INFLUENZA VACCINE  08/01/2022              Assessment & Plan   CMS Preventative Services Quick Reference  Risk Factors Identified During Encounter  Polypharmacy  The above risks/problems have been discussed with the patient.  Follow up actions/plans if indicated are seen below in the Assessment/Plan Section.  Pertinent information has been shared with the patient in the After Visit Summary.    There are no diagnoses linked to this encounter.    Follow Up:   No follow-ups on file.     An After Visit Summary and PPPS were made available to the patient.

## 2022-06-17 ENCOUNTER — LAB (OUTPATIENT)
Dept: LAB | Facility: HOSPITAL | Age: 77
End: 2022-06-17

## 2022-06-17 DIAGNOSIS — E05.00 GRAVES DISEASE: ICD-10-CM

## 2022-06-17 DIAGNOSIS — Z00.00 MEDICARE ANNUAL WELLNESS VISIT, SUBSEQUENT: ICD-10-CM

## 2022-06-17 DIAGNOSIS — E55.9 VITAMIN D DEFICIENCY: ICD-10-CM

## 2022-06-17 LAB
ALBUMIN SERPL-MCNC: 3.9 G/DL (ref 3.5–5)
ALBUMIN/GLOB SERPL: 1.3 G/DL (ref 1.1–2.5)
ALP SERPL-CCNC: 91 U/L (ref 24–120)
ALT SERPL W P-5'-P-CCNC: 11 U/L (ref 0–35)
ANION GAP SERPL CALCULATED.3IONS-SCNC: 0 MMOL/L (ref 4–13)
AST SERPL-CCNC: 24 U/L (ref 7–45)
AUTO MIXED CELLS #: 0.7 10*3/MM3 (ref 0.1–2.6)
AUTO MIXED CELLS %: 9.3 % (ref 0.1–24)
BILIRUB SERPL-MCNC: 0.3 MG/DL (ref 0.1–1)
BUN SERPL-MCNC: 10 MG/DL (ref 5–21)
BUN/CREAT SERPL: 12
CALCIUM SPEC-SCNC: 8.7 MG/DL (ref 8.4–10.4)
CHLORIDE SERPL-SCNC: 104 MMOL/L (ref 98–110)
CHOLEST SERPL-MCNC: 124 MG/DL (ref 130–200)
CO2 SERPL-SCNC: 33 MMOL/L (ref 24–31)
CREAT SERPL-MCNC: 0.83 MG/DL (ref 0.5–1.4)
EGFRCR SERPLBLD CKD-EPI 2021: 73.6 ML/MIN/1.73
ERYTHROCYTE [DISTWIDTH] IN BLOOD BY AUTOMATED COUNT: 12.6 % (ref 12.3–15.4)
GLOBULIN UR ELPH-MCNC: 3 GM/DL
GLUCOSE SERPL-MCNC: 94 MG/DL (ref 70–100)
HCT VFR BLD AUTO: 38.6 % (ref 34–46.6)
HDLC SERPL-MCNC: 52 MG/DL
HGB BLD-MCNC: 13.2 G/DL (ref 12–15.9)
LDLC SERPL CALC-MCNC: 50 MG/DL (ref 0–99)
LDLC/HDLC SERPL: 0.92 {RATIO}
LYMPHOCYTES # BLD AUTO: 2.5 10*3/MM3 (ref 0.7–3.1)
LYMPHOCYTES NFR BLD AUTO: 34 % (ref 19.6–45.3)
MCH RBC QN AUTO: 33.3 PG (ref 26.6–33)
MCHC RBC AUTO-ENTMCNC: 34.2 G/DL (ref 31.5–35.7)
MCV RBC AUTO: 97.5 FL (ref 79–97)
NEUTROPHILS NFR BLD AUTO: 4.2 10*3/MM3 (ref 1.7–7)
NEUTROPHILS NFR BLD AUTO: 56.7 % (ref 42.7–76)
PLATELET # BLD AUTO: 203 10*3/MM3 (ref 140–450)
PMV BLD AUTO: 9.2 FL (ref 6–12)
POTASSIUM SERPL-SCNC: 4.7 MMOL/L (ref 3.5–5.3)
PROT SERPL-MCNC: 6.9 G/DL (ref 6.3–8.7)
RBC # BLD AUTO: 3.96 10*6/MM3 (ref 3.77–5.28)
SODIUM SERPL-SCNC: 137 MMOL/L (ref 135–145)
TRIGL SERPL-MCNC: 122 MG/DL (ref 0–149)
VLDLC SERPL-MCNC: 22 MG/DL (ref 5–40)
WBC NRBC COR # BLD: 7.4 10*3/MM3 (ref 3.4–10.8)

## 2022-06-17 PROCEDURE — 85025 COMPLETE CBC W/AUTO DIFF WBC: CPT

## 2022-06-17 PROCEDURE — 36415 COLL VENOUS BLD VENIPUNCTURE: CPT

## 2022-06-17 PROCEDURE — 84443 ASSAY THYROID STIM HORMONE: CPT

## 2022-06-17 PROCEDURE — 82306 VITAMIN D 25 HYDROXY: CPT

## 2022-06-17 PROCEDURE — 80053 COMPREHEN METABOLIC PANEL: CPT

## 2022-06-17 PROCEDURE — 80061 LIPID PANEL: CPT

## 2022-06-18 LAB
25(OH)D3 SERPL-MCNC: 53 NG/ML (ref 30–100)
TSH SERPL DL<=0.05 MIU/L-ACNC: 1.16 UIU/ML (ref 0.27–4.2)

## 2022-06-28 DIAGNOSIS — F41.9 ANXIETY: ICD-10-CM

## 2022-06-28 RX ORDER — BUSPIRONE HYDROCHLORIDE 15 MG/1
15 TABLET ORAL 4 TIMES DAILY
Qty: 360 TABLET | Refills: 1 | Status: CANCELLED | OUTPATIENT
Start: 2022-06-28

## 2022-06-28 NOTE — TELEPHONE ENCOUNTER
Caller: Nichole Rinaldi    Relationship: Self    Best call back number: [phone]    Requested Prescriptions:   Requested Prescriptions     Pending Prescriptions Disp Refills   • busPIRone (BUSPAR) 15 MG tablet 360 tablet 1     Sig: Take 1 tablet by mouth 4 (Four) Times a Day.        Pharmacy where request should be sent: [facility]  ([facility]) - [facility] [address] [phone] [facility] [phone] [facility]     Additional details provided by patient: PATIENT STATES THIS PHARMACY HAS PREVIOUSLY TRIED TO REACH OUT TO HER REGARDING THIS MEDICATION BUT SHE IS UNSURE AS TO WHY.    Does the patient have less than a 3 day supply:  [x] Yes  [] No    Gerda Hirsch Rep   06/28/22 10:50 CDT

## 2022-06-28 NOTE — TELEPHONE ENCOUNTER
I have called [facility], spoke to Pharmacist,Adam. Verified patient is taking Buspar 15mg 1 tablet 4 times daily. He voiced understanding and will get script out to patient. Patient has been notified and voiced understanding.

## 2022-06-29 ENCOUNTER — TELEPHONE (OUTPATIENT)
Dept: FAMILY MEDICINE CLINIC | Facility: CLINIC | Age: 77
End: 2022-06-29

## 2022-06-29 DIAGNOSIS — B37.2 SKIN YEAST INFECTION: Primary | ICD-10-CM

## 2022-06-29 NOTE — TELEPHONE ENCOUNTER
Pt VU and will do urine at next visit. Pt also c/o skin rash that she believes is yeast because that is what she has had before and pt asked if we would send her in nystatin powder. Will ask Dr. Smith if ok to send in Nystatin.

## 2022-06-30 RX ORDER — NYSTATIN 100000 [USP'U]/G
POWDER TOPICAL 3 TIMES DAILY
Qty: 15 G | Refills: 0 | Status: SHIPPED | OUTPATIENT
Start: 2022-06-30

## 2022-07-04 DIAGNOSIS — F32.A DEPRESSION, UNSPECIFIED DEPRESSION TYPE: ICD-10-CM

## 2022-07-05 RX ORDER — BUPROPION HYDROCHLORIDE 75 MG/1
TABLET ORAL
Qty: 90 TABLET | Refills: 7 | OUTPATIENT
Start: 2022-07-05

## 2022-07-30 DIAGNOSIS — F32.A DEPRESSION, UNSPECIFIED DEPRESSION TYPE: ICD-10-CM

## 2022-08-01 RX ORDER — BUPROPION HYDROCHLORIDE 75 MG/1
TABLET ORAL
Qty: 90 TABLET | Refills: 7 | OUTPATIENT
Start: 2022-08-01

## 2022-08-05 DIAGNOSIS — F32.A DEPRESSION, UNSPECIFIED DEPRESSION TYPE: ICD-10-CM

## 2022-08-05 RX ORDER — BUPROPION HYDROCHLORIDE 75 MG/1
TABLET ORAL
Qty: 90 TABLET | Refills: 7 | Status: SHIPPED | OUTPATIENT
Start: 2022-08-05 | End: 2022-12-06 | Stop reason: SDUPTHER

## 2022-10-09 DIAGNOSIS — M79.7 FIBROMYALGIA: ICD-10-CM

## 2022-10-10 RX ORDER — TOPIRAMATE 50 MG/1
TABLET, FILM COATED ORAL
Qty: 180 TABLET | Refills: 3 | Status: SHIPPED | OUTPATIENT
Start: 2022-10-10 | End: 2022-12-06 | Stop reason: SDUPTHER

## 2022-10-18 ENCOUNTER — TELEMEDICINE (OUTPATIENT)
Dept: FAMILY MEDICINE CLINIC | Facility: CLINIC | Age: 77
End: 2022-10-18

## 2022-10-18 ENCOUNTER — TELEPHONE (OUTPATIENT)
Dept: FAMILY MEDICINE CLINIC | Facility: CLINIC | Age: 77
End: 2022-10-18

## 2022-10-18 DIAGNOSIS — U07.1 COVID-19 VIRUS DETECTED: Primary | ICD-10-CM

## 2022-10-18 NOTE — TELEPHONE ENCOUNTER
Caller: HALINA MUNOZ    Relationship to patient: Emergency Contact    Best call back number: [phone]    Date of exposure:  UNKNOWN    Date of positive COVID19 test:  10/18/22- POSITIVE AT HOME TEST    Date if possible COVID19 exposure: UNKNOWN    COVID19 symptoms: COUGH, FEVER, HEADACHE, BODY ACHES, FATIGUE, LOSS OF APPETITE    Date of initial quarantine: YES, 10/18/22    Additional information or concerns: THE PATIENT'S EMERGENCY CONTACT STATES THAT SHE WOULD LIKE TO REQUEST A COVID MEDICATION FOR THE PATIENT.  THE PATIENT'S EMERGENCY CONTACT STATES THAT SHE WOULD LIKE TO KNOW IF IT IS OK TO GIVE THE PATIENT IBPUROFEN AS WELL.  THE EMERGENCY CONTACT STATES TO PLEASE CALL HER BACK.    What is the patients preferred pharmacy:  [facility]  [place], [place]  PHUEH-[phone]  FAX- [phone]

## 2022-10-28 ENCOUNTER — TELEPHONE (OUTPATIENT)
Dept: FAMILY MEDICINE CLINIC | Facility: CLINIC | Age: 77
End: 2022-10-28

## 2022-10-31 DIAGNOSIS — F41.9 ANXIETY: ICD-10-CM

## 2022-11-01 RX ORDER — ESCITALOPRAM OXALATE 20 MG/1
20 TABLET ORAL DAILY
Qty: 90 TABLET | Refills: 3 | OUTPATIENT
Start: 2022-11-01

## 2022-11-01 NOTE — TELEPHONE ENCOUNTER
Last sent 6/13/22 90 day supply with one refill. Refill requested by pharmacy too soon. HUB TO READ.

## 2022-11-01 NOTE — TELEPHONE ENCOUNTER
Syd Bravo, LPN 17 hours ago (3:03 PM)     VS  Called Sari back, no answer, lm to call the office back with concern.

## 2022-11-08 DIAGNOSIS — K21.9 GASTROESOPHAGEAL REFLUX DISEASE WITHOUT ESOPHAGITIS: ICD-10-CM

## 2022-11-08 DIAGNOSIS — I10 PRIMARY HYPERTENSION: ICD-10-CM

## 2022-11-09 RX ORDER — OMEPRAZOLE 40 MG/1
40 CAPSULE, DELAYED RELEASE ORAL DAILY
Qty: 90 CAPSULE | Refills: 3 | Status: SHIPPED | OUTPATIENT
Start: 2022-11-09 | End: 2022-12-06 | Stop reason: SDUPTHER

## 2022-11-09 RX ORDER — METOPROLOL SUCCINATE 100 MG/1
100 TABLET, EXTENDED RELEASE ORAL DAILY
Qty: 90 TABLET | Refills: 3 | Status: SHIPPED | OUTPATIENT
Start: 2022-11-09 | End: 2022-12-06 | Stop reason: SDUPTHER

## 2022-11-15 ENCOUNTER — CLINICAL SUPPORT (OUTPATIENT)
Dept: FAMILY MEDICINE CLINIC | Facility: CLINIC | Age: 77
End: 2022-11-15
Payer: MEDICARE

## 2022-11-15 DIAGNOSIS — R11.2 NAUSEA AND VOMITING, UNSPECIFIED VOMITING TYPE: Primary | ICD-10-CM

## 2022-11-15 PROCEDURE — 90662 IIV NO PRSV INCREASED AG IM: CPT | Performed by: PEDIATRICS

## 2022-11-15 PROCEDURE — G0008 ADMIN INFLUENZA VIRUS VAC: HCPCS | Performed by: PEDIATRICS

## 2022-11-15 RX ORDER — ONDANSETRON 4 MG/1
4 TABLET, ORALLY DISINTEGRATING ORAL EVERY 8 HOURS PRN
Qty: 30 TABLET | Refills: 0 | Status: SHIPPED | OUTPATIENT
Start: 2022-11-15

## 2022-11-23 DIAGNOSIS — I10 PRIMARY HYPERTENSION: ICD-10-CM

## 2022-11-23 DIAGNOSIS — E05.00 GRAVES DISEASE: ICD-10-CM

## 2022-11-28 RX ORDER — AMLODIPINE BESYLATE 5 MG/1
5 TABLET ORAL DAILY
Qty: 90 TABLET | Refills: 1 | Status: SHIPPED | OUTPATIENT
Start: 2022-11-28 | End: 2022-12-06 | Stop reason: SDUPTHER

## 2022-11-28 RX ORDER — LEVOTHYROXINE SODIUM 112 UG/1
112 TABLET ORAL DAILY
Qty: 90 TABLET | Refills: 1 | Status: SHIPPED | OUTPATIENT
Start: 2022-11-28 | End: 2022-12-06 | Stop reason: SDUPTHER

## 2022-11-30 DIAGNOSIS — F41.9 ANXIETY: ICD-10-CM

## 2022-12-02 RX ORDER — BUSPIRONE HYDROCHLORIDE 15 MG/1
TABLET ORAL
Qty: 360 TABLET | Refills: 1 | OUTPATIENT
Start: 2022-12-02

## 2022-12-02 RX ORDER — ESCITALOPRAM OXALATE 20 MG/1
20 TABLET ORAL DAILY
Qty: 90 TABLET | Refills: 0 | OUTPATIENT
Start: 2022-12-02

## 2022-12-06 ENCOUNTER — OFFICE VISIT (OUTPATIENT)
Dept: FAMILY MEDICINE CLINIC | Facility: CLINIC | Age: 77
End: 2022-12-06

## 2022-12-06 VITALS
RESPIRATION RATE: 20 BRPM | HEIGHT: 58 IN | TEMPERATURE: 97.8 F | HEART RATE: 72 BPM | BODY MASS INDEX: 32.95 KG/M2 | SYSTOLIC BLOOD PRESSURE: 163 MMHG | DIASTOLIC BLOOD PRESSURE: 93 MMHG | WEIGHT: 157 LBS

## 2022-12-06 DIAGNOSIS — F41.9 ANXIETY: ICD-10-CM

## 2022-12-06 DIAGNOSIS — K21.9 GASTROESOPHAGEAL REFLUX DISEASE WITHOUT ESOPHAGITIS: ICD-10-CM

## 2022-12-06 DIAGNOSIS — F51.01 PRIMARY INSOMNIA: ICD-10-CM

## 2022-12-06 DIAGNOSIS — E78.49 OTHER HYPERLIPIDEMIA: ICD-10-CM

## 2022-12-06 DIAGNOSIS — J01.90 ACUTE SINUSITIS, RECURRENCE NOT SPECIFIED, UNSPECIFIED LOCATION: Primary | ICD-10-CM

## 2022-12-06 DIAGNOSIS — E05.00 GRAVES DISEASE: ICD-10-CM

## 2022-12-06 DIAGNOSIS — F32.A DEPRESSION, UNSPECIFIED DEPRESSION TYPE: ICD-10-CM

## 2022-12-06 DIAGNOSIS — M79.7 FIBROMYALGIA: ICD-10-CM

## 2022-12-06 DIAGNOSIS — I10 PRIMARY HYPERTENSION: ICD-10-CM

## 2022-12-06 PROCEDURE — 96372 THER/PROPH/DIAG INJ SC/IM: CPT | Performed by: NURSE PRACTITIONER

## 2022-12-06 PROCEDURE — 99214 OFFICE O/P EST MOD 30 MIN: CPT | Performed by: NURSE PRACTITIONER

## 2022-12-06 RX ORDER — METOPROLOL SUCCINATE 100 MG/1
100 TABLET, EXTENDED RELEASE ORAL DAILY
Qty: 90 TABLET | Refills: 3 | Status: SHIPPED | OUTPATIENT
Start: 2022-12-06

## 2022-12-06 RX ORDER — BUSPIRONE HYDROCHLORIDE 15 MG/1
15 TABLET ORAL 4 TIMES DAILY
Qty: 360 TABLET | Refills: 3 | Status: SHIPPED | OUTPATIENT
Start: 2022-12-06

## 2022-12-06 RX ORDER — LEVOTHYROXINE SODIUM 112 UG/1
112 TABLET ORAL DAILY
Qty: 90 TABLET | Refills: 1 | Status: SHIPPED | OUTPATIENT
Start: 2022-12-06

## 2022-12-06 RX ORDER — ESCITALOPRAM OXALATE 20 MG/1
20 TABLET ORAL DAILY
Qty: 90 TABLET | Refills: 3 | Status: SHIPPED | OUTPATIENT
Start: 2022-12-06

## 2022-12-06 RX ORDER — AMLODIPINE BESYLATE 5 MG/1
5 TABLET ORAL DAILY
Qty: 90 TABLET | Refills: 1 | Status: SHIPPED | OUTPATIENT
Start: 2022-12-06

## 2022-12-06 RX ORDER — ESCITALOPRAM OXALATE 20 MG/1
20 TABLET ORAL DAILY
Qty: 90 TABLET | Refills: 1 | Status: SHIPPED | OUTPATIENT
Start: 2022-12-06 | End: 2022-12-06 | Stop reason: SDUPTHER

## 2022-12-06 RX ORDER — CEFUROXIME AXETIL 500 MG/1
500 TABLET ORAL 2 TIMES DAILY
Qty: 20 TABLET | Refills: 0 | Status: SHIPPED | OUTPATIENT
Start: 2022-12-06 | End: 2022-12-06 | Stop reason: SDUPTHER

## 2022-12-06 RX ORDER — BUPROPION HYDROCHLORIDE 75 MG/1
75 TABLET ORAL 2 TIMES DAILY
Qty: 180 TABLET | Refills: 3 | Status: SHIPPED | OUTPATIENT
Start: 2022-12-06 | End: 2023-03-06

## 2022-12-06 RX ORDER — ROSUVASTATIN CALCIUM 10 MG/1
10 TABLET, COATED ORAL DAILY
Qty: 90 TABLET | Refills: 1 | Status: SHIPPED | OUTPATIENT
Start: 2022-12-06

## 2022-12-06 RX ORDER — TOPIRAMATE 50 MG/1
50 TABLET, FILM COATED ORAL EVERY 12 HOURS
Qty: 180 TABLET | Refills: 3 | Status: SHIPPED | OUTPATIENT
Start: 2022-12-06

## 2022-12-06 RX ORDER — OMEPRAZOLE 40 MG/1
40 CAPSULE, DELAYED RELEASE ORAL DAILY
Qty: 90 CAPSULE | Refills: 3 | Status: SHIPPED | OUTPATIENT
Start: 2022-12-06

## 2022-12-06 RX ORDER — DEXAMETHASONE SODIUM PHOSPHATE 4 MG/ML
8 INJECTION, SOLUTION INTRA-ARTICULAR; INTRALESIONAL; INTRAMUSCULAR; INTRAVENOUS; SOFT TISSUE ONCE
Status: COMPLETED | OUTPATIENT
Start: 2022-12-06 | End: 2022-12-06

## 2022-12-06 RX ORDER — TRAZODONE HYDROCHLORIDE 100 MG/1
100 TABLET ORAL
Qty: 90 TABLET | Refills: 1 | Status: SHIPPED | OUTPATIENT
Start: 2022-12-06

## 2022-12-06 RX ORDER — BUSPIRONE HYDROCHLORIDE 15 MG/1
15 TABLET ORAL 4 TIMES DAILY
Qty: 360 TABLET | Refills: 1 | Status: SHIPPED | OUTPATIENT
Start: 2022-12-06 | End: 2022-12-06 | Stop reason: SDUPTHER

## 2022-12-06 RX ORDER — CEFUROXIME AXETIL 500 MG/1
500 TABLET ORAL 2 TIMES DAILY
Qty: 20 TABLET | Refills: 0 | Status: SHIPPED | OUTPATIENT
Start: 2022-12-06

## 2022-12-06 RX ADMIN — DEXAMETHASONE SODIUM PHOSPHATE 8 MG: 4 INJECTION, SOLUTION INTRA-ARTICULAR; INTRALESIONAL; INTRAMUSCULAR; INTRAVENOUS; SOFT TISSUE at 17:13

## 2022-12-06 NOTE — PROGRESS NOTES
After obtaining consent, and per orders of ABHAY Cunningham, injection of dex8 dorsogluteal L given by Emily Escalona LPN. Patient instructed to remain in clinic for 20 minutes afterwards, and to report any adverse reaction to me immediately. Pt tolerated well with no adverse reactions.

## 2022-12-06 NOTE — PROGRESS NOTES
"Chief Complaint  Nasal Congestion, Cough, and Sore Throat    Subjective    History of Present Illness      Patient presents to [facility] for   History of Present Illness  Sore throat, congestion, sore throat, hoarse voce, and slight cough after having covid 5 weeks ago       Review of Systems   All other systems reviewed and are negative.      I have reviewed and agree with the HPI and ROS information as above.  Meme Tan, APRN     Objective   Vital Signs:   /93   Pulse 72   Temp 97.8 °F (36.6 °C)   Resp 20   Ht 147.3 cm (58\")   Wt 71.2 kg (157 lb)   BMI 32.81 kg/m²     BMI is >= 30 and <35. (Class 1 Obesity). The following options were offered after discussion;: weight loss educational material (shared in after visit summary), exercise counseling/recommendations and nutrition counseling/recommendations      Physical Exam  Vitals and nursing note reviewed.   Constitutional:       Appearance: Normal appearance. She is obese.   HENT:      Head: Normocephalic and atraumatic.      Right Ear: Tympanic membrane is erythematous and bulging.      Left Ear: Tympanic membrane is erythematous and bulging.      Nose: Congestion present.      Mouth/Throat:      Pharynx: Posterior oropharyngeal erythema present.   Cardiovascular:      Rate and Rhythm: Normal rate and regular rhythm.      Pulses: Normal pulses.      Heart sounds: Normal heart sounds.   Pulmonary:      Effort: Pulmonary effort is normal.   Musculoskeletal:         General: Normal range of motion.      Cervical back: Normal range of motion and neck supple.   Skin:     General: Skin is warm and dry.   Neurological:      General: No focal deficit present.      Mental Status: She is alert and oriented to person, place, and time.   Psychiatric:         Mood and Affect: Mood normal.         Behavior: Behavior normal.          SANDER-7:      PHQ-2 Depression Screening  Little interest or pleasure in doing things? 0-->not " at all   Feeling down, depressed, or hopeless? 0-->not at all   PHQ-2 Total Score 0     PHQ-9 Depression Screening  Little interest or pleasure in doing things? 0-->not at all   Feeling down, depressed, or hopeless? 0-->not at all   Trouble falling or staying asleep, or sleeping too much?     Feeling tired or having little energy?     Poor appetite or overeating?     Feeling bad about yourself - or that you are a failure or have let yourself or your family down?     Trouble concentrating on things, such as reading the newspaper or watching television?     Moving or speaking so slowly that other people could have noticed? Or the opposite - being so fidgety or restless that you have been moving around a lot more than usual?     Thoughts that you would be better off dead, or of hurting yourself in some way?     PHQ-9 Total Score 0   If you checked off any problems, how difficult have these problems made it for you to do your work, take care of things at home, or get along with other people?        Result Review  Data Reviewed:                   Assessment and Plan      Diagnoses and all orders for this visit:    1. Acute sinusitis, recurrence not specified, unspecified location (Primary)  -     dexamethasone (DECADRON) injection 8 mg  -     Discontinue: cefuroxime (CEFTIN) 500 MG tablet; Take 1 tablet by mouth 2 (Two) Times a Day.  Dispense: 20 tablet; Refill: 0  -     cefuroxime (CEFTIN) 500 MG tablet; Take 1 tablet by mouth 2 (Two) Times a Day.  Dispense: 20 tablet; Refill: 0    2. Anxiety  -     busPIRone (BUSPAR) 15 MG tablet; Take 1 tablet by mouth 4 (Four) Times a Day.  Dispense: 360 tablet; Refill: 3  -     escitalopram (LEXAPRO) 20 MG tablet; Take 1 tablet by mouth Daily.  Dispense: 90 tablet; Refill: 3    3. Primary hypertension  -     amLODIPine (NORVASC) 5 MG tablet; Take 1 tablet by mouth Daily.  Dispense: 90 tablet; Refill: 1  -     metoprolol succinate XL (TOPROL-XL) 100 MG 24 hr tablet; Take 1 tablet by  mouth Daily.  Dispense: 90 tablet; Refill: 3    4. Depression, unspecified depression type  -     buPROPion (WELLBUTRIN) 75 MG tablet; Take 1 tablet by mouth 2 (Two) Times a Day for 90 days.  Dispense: 180 tablet; Refill: 3    5. Graves disease  -     levothyroxine (SYNTHROID, LEVOTHROID) 112 MCG tablet; Take 1 tablet by mouth Daily.  Dispense: 90 tablet; Refill: 1    6. Primary insomnia  -     traZODone (DESYREL) 100 MG tablet; Take 1 tablet by mouth every night at bedtime.  Dispense: 90 tablet; Refill: 1    7. Fibromyalgia  -     topiramate (TOPAMAX) 50 MG tablet; Take 1 tablet by mouth Every 12 (Twelve) Hours.  Dispense: 180 tablet; Refill: 3    8. Other hyperlipidemia  -     rosuvastatin (CRESTOR) 10 MG tablet; Take 1 tablet by mouth Daily.  Dispense: 90 tablet; Refill: 1    9. Gastroesophageal reflux disease without esophagitis  -     omeprazole (priLOSEC) 40 MG capsule; Take 1 capsule by mouth Daily.  Dispense: 90 capsule; Refill: 3    Patient is c/o a sinus infection. Denies fever.   Needing med refills today.         Follow Up   Return in about 3 months (around 3/6/2023) for Recheck.  Patient was given instructions and counseling regarding her condition or for health maintenance advice. Please see specific information pulled into the AVS if appropriate.

## 2023-03-17 ENCOUNTER — TELEPHONE (OUTPATIENT)
Dept: FAMILY MEDICINE CLINIC | Facility: CLINIC | Age: 78
End: 2023-03-17
Payer: MEDICARE

## 2023-03-17 NOTE — TELEPHONE ENCOUNTER
Contacted pt's daughter back, on hippa. Verified pt name and . Advised would need to be seen for below. Stated [facility].

## 2023-03-17 NOTE — TELEPHONE ENCOUNTER
Caller: HALINA MUNOZ    Relationship: Emergency Contact    Best call back number: [phone]    What medication are you requesting: MEDICATION     What are your current symptoms: COUGH, NOT FEELING GREAT     How long have you been experiencing symptoms: COUPLE OF DAYS     Have you had these symptoms before:    [x] Yes  [] No    Have you been treated for these symptoms before:   [x] Yes  [] No    If a prescription is needed, what is your preferred pharmacy and phone number: [id number] - [address] AT [address] - [phone] [facility] [phone]

## 2023-05-04 DIAGNOSIS — E05.00 GRAVES DISEASE: ICD-10-CM

## 2023-05-04 DIAGNOSIS — I10 PRIMARY HYPERTENSION: ICD-10-CM

## 2023-05-04 RX ORDER — LEVOTHYROXINE SODIUM 112 UG/1
112 TABLET ORAL DAILY
Qty: 90 TABLET | Refills: 0 | Status: SHIPPED | OUTPATIENT
Start: 2023-05-04

## 2023-05-04 RX ORDER — AMLODIPINE BESYLATE 5 MG/1
5 TABLET ORAL DAILY
Qty: 90 TABLET | Refills: 0 | Status: SHIPPED | OUTPATIENT
Start: 2023-05-04

## 2023-05-04 NOTE — TELEPHONE ENCOUNTER
Caller: HALINA MUNOZ    Relationship: Emergency Contact    Best call back number: [phone]    Requested Prescriptions:   Requested Prescriptions     Pending Prescriptions Disp Refills   • amLODIPine (NORVASC) 5 MG tablet 90 tablet 1     Sig: Take 1 tablet by mouth Daily.   • levothyroxine (SYNTHROID, LEVOTHROID) 112 MCG tablet 90 tablet 1     Sig: Take 1 tablet by mouth Daily.        Pharmacy where request should be sent: [facility] - [facility] [address]  [address] [phone] [facility] [phone]      Last office visit with prescribing clinician: 6/13/2022   Last telemedicine visit with prescribing clinician: Visit date not found   Next office visit with prescribing clinician: Visit date not found     Additional details provided by patient: PATIENT'S DAUGHTER IS UNSURE HOW MANY DAYS OF MEDICATION PATIENT HAS LEFT.     Does the patient have less than a 3 day supply:  [] Yes  [] No    Would you like a call back once the refill request has been completed: [] Yes [] No    If the office needs to give you a call back, can they leave a voicemail: [] Yes [] No    Gerda Ray Rep   05/04/23 13:22 CDT       
Dr. Donnie Velazqueztein

## 2023-06-14 DIAGNOSIS — E78.49 OTHER HYPERLIPIDEMIA: ICD-10-CM

## 2023-06-14 RX ORDER — ROSUVASTATIN CALCIUM 10 MG/1
10 TABLET, COATED ORAL DAILY
Qty: 90 TABLET | Refills: 3 | OUTPATIENT
Start: 2023-06-14

## 2023-06-14 NOTE — TELEPHONE ENCOUNTER
Last seen 12/6/22, was instructed to follow up in 3 months. Needs in office visit for refills. Hub to read.

## 2023-06-15 DIAGNOSIS — E78.49 OTHER HYPERLIPIDEMIA: ICD-10-CM

## 2023-06-15 RX ORDER — ROSUVASTATIN CALCIUM 10 MG/1
10 TABLET, COATED ORAL DAILY
Qty: 90 TABLET | Refills: 3 | OUTPATIENT
Start: 2023-06-15

## 2023-06-15 NOTE — TELEPHONE ENCOUNTER
Pt was last seen on 12-6-22 and was okayed for a 3 month f/u.  Pt did not have 3 month f/u.  Pt must have appt before medication refill.  HUB MAY READ.

## 2023-07-20 ENCOUNTER — HOSPITAL ENCOUNTER (OUTPATIENT)
Facility: HOSPITAL | Age: 78
Setting detail: OBSERVATION
Discharge: HOME | End: 2023-07-22
Attending: INTERNAL MEDICINE | Admitting: FAMILY MEDICINE
Payer: MEDICARE

## 2023-07-20 ENCOUNTER — APPOINTMENT (OUTPATIENT)
Dept: CT IMAGING | Facility: HOSPITAL | Age: 78
End: 2023-07-20
Payer: MEDICARE

## 2023-07-20 DIAGNOSIS — R41.82 ALTERED MENTAL STATUS, UNSPECIFIED ALTERED MENTAL STATUS TYPE: Primary | ICD-10-CM

## 2023-07-20 DIAGNOSIS — R41.0 DELIRIUM: ICD-10-CM

## 2023-07-20 PROBLEM — G45.9 TIA (TRANSIENT ISCHEMIC ATTACK): Status: ACTIVE | Noted: 2023-07-20

## 2023-07-20 LAB
ALBUMIN SERPL-MCNC: 4.4 G/DL (ref 3.5–5.2)
ALBUMIN/GLOB SERPL: 1.7 G/DL
ALP SERPL-CCNC: 114 U/L (ref 39–117)
ALT SERPL W P-5'-P-CCNC: 9 U/L (ref 1–33)
AMORPH URATE CRY URNS QL MICRO: ABNORMAL /HPF
ANION GAP SERPL CALCULATED.3IONS-SCNC: 11 MMOL/L (ref 5–15)
AST SERPL-CCNC: 18 U/L (ref 1–32)
BACTERIA UR QL AUTO: ABNORMAL /HPF
BASOPHILS # BLD AUTO: 0.02 10*3/MM3 (ref 0–0.2)
BASOPHILS NFR BLD AUTO: 0.2 % (ref 0–1.5)
BILIRUB SERPL-MCNC: 0.3 MG/DL (ref 0–1.2)
BILIRUB UR QL STRIP: NEGATIVE
BUN SERPL-MCNC: 11 MG/DL (ref 8–23)
BUN/CREAT SERPL: 15.5 (ref 7–25)
CALCIUM SPEC-SCNC: 9.6 MG/DL (ref 8.6–10.5)
CHLORIDE SERPL-SCNC: 103 MMOL/L (ref 98–107)
CLARITY UR: ABNORMAL
CO2 SERPL-SCNC: 23 MMOL/L (ref 22–29)
COLOR UR: YELLOW
CREAT SERPL-MCNC: 0.71 MG/DL (ref 0.57–1)
D-LACTATE SERPL-SCNC: 1.4 MMOL/L (ref 0.5–2)
DEPRECATED RDW RBC AUTO: 45.5 FL (ref 37–54)
EGFRCR SERPLBLD CKD-EPI 2021: 88.2 ML/MIN/1.73
EOSINOPHIL # BLD AUTO: 0.08 10*3/MM3 (ref 0–0.4)
EOSINOPHIL NFR BLD AUTO: 0.7 % (ref 0.3–6.2)
ERYTHROCYTE [DISTWIDTH] IN BLOOD BY AUTOMATED COUNT: 12.7 % (ref 12.3–15.4)
GEN 5 2HR TROPONIN T REFLEX: 12 NG/L
GLOBULIN UR ELPH-MCNC: 2.6 GM/DL
GLUCOSE BLDC GLUCOMTR-MCNC: 123 MG/DL (ref 70–130)
GLUCOSE SERPL-MCNC: 137 MG/DL (ref 65–99)
GLUCOSE UR STRIP-MCNC: NEGATIVE MG/DL
HCT VFR BLD AUTO: 42.2 % (ref 34–46.6)
HGB BLD-MCNC: 13.7 G/DL (ref 12–15.9)
HGB UR QL STRIP.AUTO: NEGATIVE
HOLD SPECIMEN: NORMAL
HOLD SPECIMEN: NORMAL
HYALINE CASTS UR QL AUTO: ABNORMAL /LPF
IMM GRANULOCYTES # BLD AUTO: 0.04 10*3/MM3 (ref 0–0.05)
IMM GRANULOCYTES NFR BLD AUTO: 0.4 % (ref 0–0.5)
KETONES UR QL STRIP: ABNORMAL
LEUKOCYTE ESTERASE UR QL STRIP.AUTO: ABNORMAL
LYMPHOCYTES # BLD AUTO: 1.76 10*3/MM3 (ref 0.7–3.1)
LYMPHOCYTES NFR BLD AUTO: 16.3 % (ref 19.6–45.3)
MCH RBC QN AUTO: 32 PG (ref 26.6–33)
MCHC RBC AUTO-ENTMCNC: 32.5 G/DL (ref 31.5–35.7)
MCV RBC AUTO: 98.6 FL (ref 79–97)
MONOCYTES # BLD AUTO: 0.9 10*3/MM3 (ref 0.1–0.9)
MONOCYTES NFR BLD AUTO: 8.4 % (ref 5–12)
NEUTROPHILS NFR BLD AUTO: 7.97 10*3/MM3 (ref 1.7–7)
NEUTROPHILS NFR BLD AUTO: 74 % (ref 42.7–76)
NITRITE UR QL STRIP: NEGATIVE
NRBC BLD AUTO-RTO: 0 /100 WBC (ref 0–0.2)
PH UR STRIP.AUTO: 7.5 [PH] (ref 5–8)
PLATELET # BLD AUTO: 249 10*3/MM3 (ref 140–450)
PMV BLD AUTO: 10.1 FL (ref 6–12)
POTASSIUM SERPL-SCNC: 3.8 MMOL/L (ref 3.5–5.2)
PROT SERPL-MCNC: 7 G/DL (ref 6–8.5)
PROT UR QL STRIP: NEGATIVE
RBC # BLD AUTO: 4.28 10*6/MM3 (ref 3.77–5.28)
RBC # UR STRIP: ABNORMAL /HPF
REF LAB TEST METHOD: ABNORMAL
SODIUM SERPL-SCNC: 137 MMOL/L (ref 136–145)
SP GR UR STRIP: 1.01 (ref 1–1.03)
SQUAMOUS #/AREA URNS HPF: ABNORMAL /HPF
TROPONIN T DELTA: 2 NG/L
TROPONIN T SERPL HS-MCNC: 10 NG/L
UROBILINOGEN UR QL STRIP: ABNORMAL
WBC # UR STRIP: ABNORMAL /HPF
WBC NRBC COR # BLD: 10.77 10*3/MM3 (ref 3.4–10.8)
WHOLE BLOOD HOLD COAG: NORMAL
WHOLE BLOOD HOLD SPECIMEN: NORMAL

## 2023-07-20 PROCEDURE — 36415 COLL VENOUS BLD VENIPUNCTURE: CPT

## 2023-07-20 PROCEDURE — 70498 CT ANGIOGRAPHY NECK: CPT

## 2023-07-20 PROCEDURE — 82948 REAGENT STRIP/BLOOD GLUCOSE: CPT

## 2023-07-20 PROCEDURE — 25510000001 IOPAMIDOL PER 1 ML: Performed by: STUDENT IN AN ORGANIZED HEALTH CARE EDUCATION/TRAINING PROGRAM

## 2023-07-20 PROCEDURE — 99285 EMERGENCY DEPT VISIT HI MDM: CPT

## 2023-07-20 PROCEDURE — 25010000002 ONDANSETRON PER 1 MG: Performed by: STUDENT IN AN ORGANIZED HEALTH CARE EDUCATION/TRAINING PROGRAM

## 2023-07-20 PROCEDURE — 96374 THER/PROPH/DIAG INJ IV PUSH: CPT

## 2023-07-20 PROCEDURE — P9612 CATHETERIZE FOR URINE SPEC: HCPCS

## 2023-07-20 PROCEDURE — 80053 COMPREHEN METABOLIC PANEL: CPT | Performed by: INTERNAL MEDICINE

## 2023-07-20 PROCEDURE — 70496 CT ANGIOGRAPHY HEAD: CPT

## 2023-07-20 PROCEDURE — 70450 CT HEAD/BRAIN W/O DYE: CPT

## 2023-07-20 PROCEDURE — 85025 COMPLETE CBC W/AUTO DIFF WBC: CPT | Performed by: INTERNAL MEDICINE

## 2023-07-20 PROCEDURE — 83605 ASSAY OF LACTIC ACID: CPT | Performed by: INTERNAL MEDICINE

## 2023-07-20 PROCEDURE — 84484 ASSAY OF TROPONIN QUANT: CPT | Performed by: INTERNAL MEDICINE

## 2023-07-20 PROCEDURE — 81001 URINALYSIS AUTO W/SCOPE: CPT | Performed by: INTERNAL MEDICINE

## 2023-07-20 RX ORDER — ASPIRIN 81 MG/1
81 TABLET, CHEWABLE ORAL DAILY
Status: DISCONTINUED | OUTPATIENT
Start: 2023-07-21 | End: 2023-07-22 | Stop reason: HOSPADM

## 2023-07-20 RX ORDER — ASPIRIN 300 MG/1
300 SUPPOSITORY RECTAL DAILY
Status: DISCONTINUED | OUTPATIENT
Start: 2023-07-21 | End: 2023-07-21

## 2023-07-20 RX ORDER — ONDANSETRON 2 MG/ML
4 INJECTION INTRAMUSCULAR; INTRAVENOUS ONCE
Status: COMPLETED | OUTPATIENT
Start: 2023-07-20 | End: 2023-07-20

## 2023-07-20 RX ORDER — SODIUM CHLORIDE 9 MG/ML
40 INJECTION, SOLUTION INTRAVENOUS AS NEEDED
Status: DISCONTINUED | OUTPATIENT
Start: 2023-07-20 | End: 2023-07-22 | Stop reason: HOSPADM

## 2023-07-20 RX ORDER — ONDANSETRON 4 MG/1
4 TABLET, FILM COATED ORAL EVERY 6 HOURS PRN
Status: DISCONTINUED | OUTPATIENT
Start: 2023-07-20 | End: 2023-07-22 | Stop reason: HOSPADM

## 2023-07-20 RX ORDER — ACETAMINOPHEN 650 MG/1
650 SUPPOSITORY RECTAL EVERY 4 HOURS PRN
Status: DISCONTINUED | OUTPATIENT
Start: 2023-07-20 | End: 2023-07-22 | Stop reason: HOSPADM

## 2023-07-20 RX ORDER — SODIUM CHLORIDE 0.9 % (FLUSH) 0.9 %
10 SYRINGE (ML) INJECTION EVERY 12 HOURS SCHEDULED
Status: DISCONTINUED | OUTPATIENT
Start: 2023-07-21 | End: 2023-07-22 | Stop reason: HOSPADM

## 2023-07-20 RX ORDER — BISACODYL 5 MG/1
5 TABLET, DELAYED RELEASE ORAL DAILY PRN
Status: DISCONTINUED | OUTPATIENT
Start: 2023-07-20 | End: 2023-07-22 | Stop reason: HOSPADM

## 2023-07-20 RX ORDER — ONDANSETRON 2 MG/ML
4 INJECTION INTRAMUSCULAR; INTRAVENOUS EVERY 6 HOURS PRN
Status: DISCONTINUED | OUTPATIENT
Start: 2023-07-20 | End: 2023-07-22 | Stop reason: HOSPADM

## 2023-07-20 RX ORDER — SODIUM CHLORIDE 0.9 % (FLUSH) 0.9 %
10 SYRINGE (ML) INJECTION AS NEEDED
Status: DISCONTINUED | OUTPATIENT
Start: 2023-07-20 | End: 2023-07-22 | Stop reason: HOSPADM

## 2023-07-20 RX ORDER — BISACODYL 10 MG
10 SUPPOSITORY, RECTAL RECTAL DAILY PRN
Status: DISCONTINUED | OUTPATIENT
Start: 2023-07-20 | End: 2023-07-22 | Stop reason: HOSPADM

## 2023-07-20 RX ORDER — ACETAMINOPHEN 325 MG/1
650 TABLET ORAL EVERY 4 HOURS PRN
Status: DISCONTINUED | OUTPATIENT
Start: 2023-07-20 | End: 2023-07-22 | Stop reason: HOSPADM

## 2023-07-20 RX ORDER — AMOXICILLIN 250 MG
1 CAPSULE ORAL NIGHTLY PRN
Status: DISCONTINUED | OUTPATIENT
Start: 2023-07-20 | End: 2023-07-22 | Stop reason: HOSPADM

## 2023-07-20 RX ORDER — POLYETHYLENE GLYCOL 3350 17 G/17G
17 POWDER, FOR SOLUTION ORAL DAILY PRN
Status: DISCONTINUED | OUTPATIENT
Start: 2023-07-20 | End: 2023-07-22 | Stop reason: HOSPADM

## 2023-07-20 RX ORDER — ATORVASTATIN CALCIUM 40 MG/1
80 TABLET, FILM COATED ORAL NIGHTLY
Status: DISCONTINUED | OUTPATIENT
Start: 2023-07-21 | End: 2023-07-22 | Stop reason: HOSPADM

## 2023-07-20 RX ADMIN — ONDANSETRON 4 MG: 2 INJECTION INTRAMUSCULAR; INTRAVENOUS at 23:10

## 2023-07-20 RX ADMIN — IOPAMIDOL 100 ML: 755 INJECTION, SOLUTION INTRAVENOUS at 23:41

## 2023-07-20 NOTE — Clinical Note
Level of Care: Remote Telemetry [26]   Diagnosis: Confusion [193061]   Admitting Physician: JUAN DOUGLAS [[id number]]   Attending Physician: JUAN DOUGLAS [[id number]]   Isolate for COVID?: No [0]   Certification: I Certify That Inpatient Hospital Services Are Medically Necessary For Greater Than 2 Midnights

## 2023-07-21 ENCOUNTER — APPOINTMENT (OUTPATIENT)
Dept: CARDIOLOGY | Facility: HOSPITAL | Age: 78
End: 2023-07-21
Payer: MEDICARE

## 2023-07-21 ENCOUNTER — APPOINTMENT (OUTPATIENT)
Dept: MRI IMAGING | Facility: HOSPITAL | Age: 78
End: 2023-07-21
Payer: MEDICARE

## 2023-07-21 PROBLEM — G92.8 TOXIC METABOLIC ENCEPHALOPATHY: Status: ACTIVE | Noted: 2023-07-21

## 2023-07-21 LAB
BH CV ECHO MEAS - AO MAX PG: 24.6 MMHG
BH CV ECHO MEAS - AO MEAN PG: 8.5 MMHG
BH CV ECHO MEAS - AO ROOT DIAM: 2.8 CM
BH CV ECHO MEAS - AO V2 MAX: 248 CM/SEC
BH CV ECHO MEAS - AO V2 VTI: 43.7 CM
BH CV ECHO MEAS - AVA(I,D): 2.26 CM2
BH CV ECHO MEAS - EDV(CUBED): 75.1 ML
BH CV ECHO MEAS - EDV(MOD-SP4): 58 ML
BH CV ECHO MEAS - EF(MOD-SP4): 84.4 %
BH CV ECHO MEAS - ESV(CUBED): 15.6 ML
BH CV ECHO MEAS - ESV(MOD-SP4): 9 ML
BH CV ECHO MEAS - FS: 40.7 %
BH CV ECHO MEAS - IVS/LVPW: 1.24 CM
BH CV ECHO MEAS - IVSD: 1.42 CM
BH CV ECHO MEAS - LA DIMENSION: 3.5 CM
BH CV ECHO MEAS - LV DIASTOLIC VOL/BSA (35-75): 36.5 CM2
BH CV ECHO MEAS - LV MASS(C)D: 197.9 GRAMS
BH CV ECHO MEAS - LV MAX PG: 10.1 MMHG
BH CV ECHO MEAS - LV MEAN PG: 5 MMHG
BH CV ECHO MEAS - LV SYSTOLIC VOL/BSA (12-30): 5.7 CM2
BH CV ECHO MEAS - LV V1 MAX: 159 CM/SEC
BH CV ECHO MEAS - LV V1 VTI: 34.8 CM
BH CV ECHO MEAS - LVIDD: 4.2 CM
BH CV ECHO MEAS - LVIDS: 2.5 CM
BH CV ECHO MEAS - LVOT AREA: 2.8 CM2
BH CV ECHO MEAS - LVOT DIAM: 1.9 CM
BH CV ECHO MEAS - LVPWD: 1.15 CM
BH CV ECHO MEAS - MR MAX PG: 18.3 MMHG
BH CV ECHO MEAS - MR MAX VEL: 214 CM/SEC
BH CV ECHO MEAS - MV A MAX VEL: 144 CM/SEC
BH CV ECHO MEAS - MV DEC TIME: 0.41 MSEC
BH CV ECHO MEAS - MV E MAX VEL: 71.7 CM/SEC
BH CV ECHO MEAS - MV E/A: 0.5
BH CV ECHO MEAS - SI(MOD-SP4): 30.8 ML/M2
BH CV ECHO MEAS - SV(LVOT): 98.7 ML
BH CV ECHO MEAS - SV(MOD-SP4): 49 ML
BH CV ECHO MEAS - TR MAX PG: 24 MMHG
BH CV ECHO MEAS - TR MAX VEL: 245 CM/SEC
BH CV ECHO SHUNT ASSESSMENT PERFORMED (HIDDEN SCRIPTING): 1
CHOLEST SERPL-MCNC: 132 MG/DL (ref 0–200)
GLUCOSE BLDC GLUCOMTR-MCNC: 106 MG/DL (ref 70–130)
HBA1C MFR BLD: 5.3 % (ref 4.8–5.6)
HDLC SERPL-MCNC: 58 MG/DL (ref 40–60)
HOLD SPECIMEN: NORMAL
LDLC SERPL CALC-MCNC: 60 MG/DL (ref 0–100)
LDLC/HDLC SERPL: 1.04 {RATIO}
LEFT ATRIUM VOLUME INDEX: 32.1 ML/M2
LEFT ATRIUM VOLUME: 51.1 ML
TRIGL SERPL-MCNC: 67 MG/DL (ref 0–150)
VLDLC SERPL-MCNC: 14 MG/DL (ref 5–40)

## 2023-07-21 PROCEDURE — 25010000002 ONDANSETRON PER 1 MG: Performed by: NURSE PRACTITIONER

## 2023-07-21 PROCEDURE — 70551 MRI BRAIN STEM W/O DYE: CPT

## 2023-07-21 PROCEDURE — 96376 TX/PRO/DX INJ SAME DRUG ADON: CPT

## 2023-07-21 PROCEDURE — G0378 HOSPITAL OBSERVATION PER HR: HCPCS

## 2023-07-21 PROCEDURE — 93306 TTE W/DOPPLER COMPLETE: CPT

## 2023-07-21 PROCEDURE — 80061 LIPID PANEL: CPT | Performed by: NURSE PRACTITIONER

## 2023-07-21 PROCEDURE — 25510000001 PERFLUTREN 6.52 MG/ML SUSPENSION: Performed by: FAMILY MEDICINE

## 2023-07-21 PROCEDURE — 83036 HEMOGLOBIN GLYCOSYLATED A1C: CPT | Performed by: NURSE PRACTITIONER

## 2023-07-21 PROCEDURE — 97165 OT EVAL LOW COMPLEX 30 MIN: CPT | Performed by: OCCUPATIONAL THERAPIST

## 2023-07-21 PROCEDURE — 93306 TTE W/DOPPLER COMPLETE: CPT | Performed by: HOSPITALIST

## 2023-07-21 PROCEDURE — 99204 OFFICE O/P NEW MOD 45 MIN: CPT | Performed by: STUDENT IN AN ORGANIZED HEALTH CARE EDUCATION/TRAINING PROGRAM

## 2023-07-21 PROCEDURE — 82948 REAGENT STRIP/BLOOD GLUCOSE: CPT

## 2023-07-21 RX ORDER — TRAZODONE HYDROCHLORIDE 100 MG/1
100 TABLET ORAL NIGHTLY
COMMUNITY
End: 2023-07-27 | Stop reason: SDUPTHER

## 2023-07-21 RX ORDER — LEVOTHYROXINE SODIUM 112 UG/1
112 TABLET ORAL
Status: DISCONTINUED | OUTPATIENT
Start: 2023-07-21 | End: 2023-07-22 | Stop reason: HOSPADM

## 2023-07-21 RX ORDER — HYDROCODONE BITARTRATE AND ACETAMINOPHEN 10; 325 MG/1; MG/1
1 TABLET ORAL EVERY 4 HOURS PRN
Status: DISCONTINUED | OUTPATIENT
Start: 2023-07-21 | End: 2023-07-22 | Stop reason: HOSPADM

## 2023-07-21 RX ORDER — ESCITALOPRAM OXALATE 10 MG/1
20 TABLET ORAL DAILY
Status: DISCONTINUED | OUTPATIENT
Start: 2023-07-21 | End: 2023-07-22 | Stop reason: HOSPADM

## 2023-07-21 RX ORDER — PANTOPRAZOLE SODIUM 40 MG/1
40 TABLET, DELAYED RELEASE ORAL
Status: DISCONTINUED | OUTPATIENT
Start: 2023-07-22 | End: 2023-07-22 | Stop reason: HOSPADM

## 2023-07-21 RX ORDER — NALOXONE HYDROCHLORIDE 4 MG/.1ML
1 SPRAY NASAL AS NEEDED
COMMUNITY

## 2023-07-21 RX ORDER — BUPROPION HYDROCHLORIDE 75 MG/1
75 TABLET ORAL 2 TIMES DAILY
COMMUNITY
End: 2023-07-27 | Stop reason: SDUPTHER

## 2023-07-21 RX ADMIN — Medication 10 ML: at 22:58

## 2023-07-21 RX ADMIN — ONDANSETRON 4 MG: 2 INJECTION INTRAMUSCULAR; INTRAVENOUS at 01:39

## 2023-07-21 RX ADMIN — ONDANSETRON 4 MG: 2 INJECTION INTRAMUSCULAR; INTRAVENOUS at 10:23

## 2023-07-21 RX ADMIN — PERFLUTREN 1.17 MG: 6.52 INJECTION, SUSPENSION INTRAVENOUS at 08:32

## 2023-07-21 RX ADMIN — ESCITSLOPRAM 20 MG: 10 TABLET ORAL at 22:58

## 2023-07-21 RX ADMIN — BUSPIRONE HYDROCHLORIDE 15 MG: 5 TABLET ORAL at 22:58

## 2023-07-21 RX ADMIN — ATORVASTATIN CALCIUM 80 MG: 40 TABLET ORAL at 01:56

## 2023-07-21 RX ADMIN — ASPIRIN 81 MG: 81 TABLET, CHEWABLE ORAL at 10:23

## 2023-07-21 RX ADMIN — ACETAMINOPHEN 650 MG: 325 TABLET, FILM COATED ORAL at 17:06

## 2023-07-21 RX ADMIN — ACETAMINOPHEN 650 MG: 325 TABLET, FILM COATED ORAL at 05:40

## 2023-07-21 RX ADMIN — ATORVASTATIN CALCIUM 80 MG: 40 TABLET ORAL at 23:19

## 2023-07-21 RX ADMIN — ONDANSETRON 4 MG: 2 INJECTION INTRAMUSCULAR; INTRAVENOUS at 17:07

## 2023-07-21 RX ADMIN — ACETAMINOPHEN 650 MG: 325 TABLET, FILM COATED ORAL at 10:24

## 2023-07-21 RX ADMIN — Medication 10 ML: at 05:09

## 2023-07-21 NOTE — H&P
[facility] Medicine Services  HISTORY AND PHYSICAL    Date of Admission: 7/20/2023  Primary Care Physician: Juan A Smith MD    Subjective   Primary Historian: Patient and daughter    Chief Complaint: Altered mental status    History of Present Illness  Nichole Rnialdi is a 77-year-old female with a past medical history of anxiety/depression, Graves' disease, hypertensions, Vazquez's esophagus.  She presented to [facility] emergency department at daughter's request with concerns for stroke.  Last known well proximately 6 PM this evening.  Daughter Sari at bedside states her father called her stating her mother was extremely nauseated with dry heaves and was feeling extremely unwell.  When daughter arrived mother was not acting like herself.  She was having trouble answering questions as if she could not remember the answers.  She has had no difficulty with speech or swallowing.  She has no headache or vision changes.  She has no unilateral weaknesses.  Daughter states memory has improved since arrival and she is hoping we can find a cause. Work-up has been without acute findings.patient has chronic pain syndrome and states she is in pain but it is at baseline.  She has no other complaints.  She will be admitted for TIA work-up.    Review of Systems   Otherwise complete ROS reviewed and negative except as mentioned in the HPI.    Past Medical History:   Past Medical History:   Diagnosis Date   • Anxiety    • Arthrosis    • Vazquez's esophagus 2000   • Depression    • Graves disease 1970   • Hypertension      Past Surgical History:  Past Surgical History:   Procedure Laterality Date   • CARPAL TUNNEL RELEASE Bilateral 2010   • ESSURE TUBAL LIGATION Bilateral 1977   • EYE SURGERY     • HERNIA REPAIR  1992   • JOINT REPLACEMENT     • REPLACEMENT TOTAL KNEE BILATERAL Bilateral 1998   • TENDON REPAIR Right 2019   • THYROIDECTOMY  1970     Social History:  reports that  she has never smoked. She has never used smokeless tobacco. She reports current drug use. Drug: Hydrocodone. She reports that she does not drink alcohol.    Family History: family history includes No Known Problems in her father and mother.       Allergies:  Allergies   Allergen Reactions   • Aripiprazole Unknown - High Severity   • Codeine Dizziness     Other reaction(s): dizziness     • Morphine Other (See Comments)       Medications:  Prior to Admission medications    Medication Sig Start Date End Date Taking? Authorizing Provider   amLODIPine (NORVASC) 5 MG tablet Take 1 tablet by mouth Daily. 5/4/23   Juan A Smith MD   buPROPion (WELLBUTRIN) 75 MG tablet Take 1 tablet by mouth 2 (Two) Times a Day for 90 days. 12/6/22 3/6/23  Meme Tan APRN   busPIRone (BUSPAR) 15 MG tablet Take 1 tablet by mouth 4 (Four) Times a Day. 12/6/22   Meme Tan APRN   cefuroxime (CEFTIN) 500 MG tablet Take 1 tablet by mouth 2 (Two) Times a Day. 12/6/22   Meme Tan APRN   escitalopram (LEXAPRO) 20 MG tablet Take 1 tablet by mouth Daily. 12/6/22   Meme Tan APRN   HYDROcodone-acetaminophen (NORCO)  MG per tablet Take 1 tablet by mouth Daily. 6/2/22   ProviderYoel MD   levothyroxine (SYNTHROID, LEVOTHROID) 112 MCG tablet Take 1 tablet by mouth Daily. 5/4/23   Juan A Smith MD   Melatonin 10 MG tablet Take 1 tablet by mouth Daily. 6/13/22   Juan A Smith MD   metoprolol succinate XL (TOPROL-XL) 100 MG 24 hr tablet Take 1 tablet by mouth Daily. 12/6/22   Meme Tan APRN   nystatin (MYCOSTATIN) 244802 UNIT/GM powder Apply  topically to the appropriate area as directed 3 (Three) Times a Day. 6/30/22   Juan A Smith MD   omeprazole (priLOSEC) 40 MG capsule Take 1 capsule by mouth Daily. 12/6/22   Meme Tan APRN   ondansetron ODT (Zofran ODT) 4 MG disintegrating tablet Place 1 tablet on the tongue Every 8 (Eight) Hours As Needed for Nausea or  "Vomiting. 11/15/22   Juan A Smith MD   rosuvastatin (CRESTOR) 10 MG tablet Take 1 tablet by mouth Daily. 12/6/22   Meme Tan APRN   topiramate (TOPAMAX) 50 MG tablet Take 1 tablet by mouth Every 12 (Twelve) Hours. 12/6/22   Meme Tan APRN   traZODone (DESYREL) 100 MG tablet TAKE 1 TABLET BY MOUTH EVERY  NIGHT AT BEDTIME 6/22/23   Meme Tan APRN     I have utilized all available immediate resources to obtain, update, or review the patient's current medications (including all prescriptions, over-the-counter products, herbals, cannabis/cannabidiol products, and vitamin/mineral/dietary (nutritional) supplements).    Objective     Vital Signs: /64   Pulse 61   Temp 97.2 °F (36.2 °C) (Temporal)   Resp 18   Ht 147.3 cm (58\")   Wt 65.8 kg (145 lb)   SpO2 93%   BMI 30.31 kg/m²   Physical Exam  Vitals reviewed.   Constitutional:       Appearance: Normal appearance.      Comments: Frail   HENT:      Head: Normocephalic and atraumatic.      Mouth/Throat:      Mouth: Mucous membranes are dry.      Pharynx: Oropharynx is clear.      Comments: Dentures in place  Eyes:      Extraocular Movements: Extraocular movements intact.      Conjunctiva/sclera: Conjunctivae normal.   Cardiovascular:      Rate and Rhythm: Normal rate and regular rhythm.   Pulmonary:      Effort: Pulmonary effort is normal.      Breath sounds: Normal breath sounds.   Abdominal:      General: There is no distension.      Palpations: Abdomen is soft.   Musculoskeletal:         General: Normal range of motion.      Cervical back: Normal range of motion and neck supple.      Right lower leg: No edema.      Left lower leg: No edema.   Skin:     General: Skin is warm and dry.   Neurological:      Mental Status: She is alert and oriented to person, place, and time.      Motor: No weakness.      Comments: Patient initially unsure of the month however she finally remembered it was July initially calling at June.  All " other question she answered appropriately.   Psychiatric:         Mood and Affect: Mood normal.         Behavior: Behavior normal.      Results Reviewed:  Lab Results (last 24 hours)       Procedure Component Value Units Date/Time    High Sensitivity Troponin T 2Hr [[id number]]  (Abnormal) Collected: 07/20/23 2309    Specimen: Blood Updated: 07/20/23 2336     HS Troponin T 12 ng/L      Troponin T Delta 2 ng/L     Comprehensive Metabolic Panel [[id number]]  (Abnormal) Collected: 07/20/23 2014    Specimen: Blood Updated: 07/20/23 2045     Glucose 137 mg/dL      BUN 11 mg/dL      Creatinine 0.71 mg/dL      Sodium 137 mmol/L      Potassium 3.8 mmol/L      Chloride 103 mmol/L      CO2 23.0 mmol/L      Calcium 9.6 mg/dL      Total Protein 7.0 g/dL      Albumin 4.4 g/dL      ALT (SGPT) 9 U/L      AST (SGOT) 18 U/L      Alkaline Phosphatase 114 U/L      Total Bilirubin 0.3 mg/dL      Globulin 2.6 gm/dL      A/G Ratio 1.7 g/dL      BUN/Creatinine Ratio 15.5     Anion Gap 11.0 mmol/L      eGFR 88.2 mL/min/1.73     High Sensitivity Troponin T [[id number]]  (Abnormal) Collected: 07/20/23 2014    Specimen: Blood Updated: 07/20/23 2042     HS Troponin T 10 ng/L     Lactic Acid, Plasma [[id number]]  (Normal) Collected: 07/20/23 2014    Specimen: Blood Updated: 07/20/23 2042     Lactate 1.4 mmol/L     Urinalysis With Culture If Indicated - Straight Cath [[id number]]  (Abnormal) Collected: 07/20/23 2019    Specimen: Urine from Straight Cath Updated: 07/20/23 2039     Color, UA Yellow     Appearance, UA Turbid     pH, UA 7.5     Specific Gravity, UA 1.013     Glucose, UA Negative     Ketones, UA Trace     Bilirubin, UA Negative     Blood, UA Negative     Protein, UA Negative     Leuk Esterase, UA Small (1+)     Nitrite, UA Negative     Urobilinogen, UA 0.2 E.U./dL    Narrative:      In absence of clinical symptoms, the presence of pyuria, bacteria, and/or nitrites on the urinalysis result does not correlate with infection.    Urinalysis,  Microscopic Only - Straight Cath [[id number]]  (Abnormal) Collected: 07/20/23 2019    Specimen: Urine from Straight Cath Updated: 07/20/23 2039     RBC, UA 0-2 /HPF      WBC, UA 0-2 /HPF      Comment: Urine culture not indicated.        Bacteria, UA None Seen /HPF      Squamous Epithelial Cells, UA 0-2 /HPF      Hyaline Casts, UA 0-2 /LPF      Amorphous Crystals, UA Moderate/2+ /HPF      Methodology Manual Light Microscopy    CBC Auto Differential [[id number]]  (Abnormal) Collected: 07/20/23 2014    Specimen: Blood Updated: 07/20/23 2025     WBC 10.77 10*3/mm3      RBC 4.28 10*6/mm3      Hemoglobin 13.7 g/dL      Hematocrit 42.2 %      MCV 98.6 fL      MCH 32.0 pg      MCHC 32.5 g/dL      RDW 12.7 %      RDW-SD 45.5 fl      MPV 10.1 fL      Platelets 249 10*3/mm3      Neutrophil % 74.0 %      Lymphocyte % 16.3 %      Monocyte % 8.4 %      Eosinophil % 0.7 %      Basophil % 0.2 %      Immature Grans % 0.4 %      Neutrophils, Absolute 7.97 10*3/mm3      Lymphocytes, Absolute 1.76 10*3/mm3      Monocytes, Absolute 0.90 10*3/mm3      Eosinophils, Absolute 0.08 10*3/mm3      Basophils, Absolute 0.02 10*3/mm3      Immature Grans, Absolute 0.04 10*3/mm3      nRBC 0.0 /100 WBC     POC Glucose Once [[id number]]  (Normal) Collected: 07/20/23 2000    Specimen: Blood Updated: 07/20/23 2011     Glucose 123 mg/dL      Comment: : [id number] Yessy LeMeter ID: [id number]             Imaging Results (Last 24 Hours)       Procedure Component Value Units Date/Time    CT Angiogram Head [[id number]] Resulted: 07/20/23 2331     Updated: 07/20/23 2344    CT Angiogram Neck [[id number]] Resulted: 07/20/23 2331     Updated: 07/20/23 2344    CT Head Without Contrast [[id number]] Collected: 07/20/23 2057     Updated: 07/20/23 2104    Narrative:      EXAM/TECHNIQUE: CT head without contrast     INDICATION: Confusion     COMPARISON: None     DLP: 539 mGy cm. Automated exposure control was also utilized to  decrease patient radiation dose.      FINDINGS:     No evidence of intracranial hemorrhage. Gray-white differentiation is  maintained. No midline shift or mass effect. Lateral ventricles are  nondilated. Basilar cisterns are patent. No acute orbital finding.  Mastoid air cells are clear. Visualized paranasal sinuses are clear. No  acute osseous finding.       Impression:         No acute intracranial findings.  This report was finalized on 07/20/2023 21:01 by Dr. Sergey Green MD.             Assessment / Plan   Assessment:   Active Hospital Problems    Diagnosis    • **Confusion    • TIA (transient ischemic attack)    • Vazquez's esophagus    • Primary hypertension    • Chronic pain        Treatment Plan  1.  The patient will be admitted to Dr. Hdez's service here at [facility].   2.  Follow TIA/stroke protocol  3.  Home medications reviewed and restarted as appropriate  4.  DVT prophylaxis with SCDs  5.  Labs in a.m.  6.  MRI of the brain without contrast and echocardiogram in a.m.  7.  Consult neurology in a.m.    Medical Decision Making  Number and Complexity of problems: 4  Differential Diagnosis: None    Conditions and Status        Condition is unchanged.     [facility] Data  External documents reviewed: No  Cardiac tracing (EKG, telemetry) interpretation: Reviewed  Radiology interpretation: Reviewed  Labs reviewed: Yes  Any tests that were considered but not ordered: No     Decision rules/scores evaluated (example [id number], Wells, etc): No     Discussed with: Patient, daughter, Dr. Lentz and Dr. Hdez     Care Planning  Shared decision making: Patient, daughter, Dr. Lentz and Dr. Hdez  Code status and discussions: Full    Disposition  Social Determinants of Health that impact treatment or disposition: None  Estimated length of stay is 1-2 days.     I confirmed that the patient's advanced care plan is present, code status is documented, and a surrogate decision maker is listed in the patient's medical record.     The patient's  surrogate decision maker is daughter.     The patient was seen and examined by me on 7/20/2023 at 11:45 PM.    Electronically signed by ABHAY Ferrell, 07/21/23, 00:32 CDT.    This visit was performed by both a physician and an APC. I performed all aspects of the MDM as documented.    Observation status.     Admitted for a TIA workup at the request of Dr. Amado in the ER. Simple confusion with no focality. Improving per Araceli.     Electronically signed by Juan A Hdez DO, 7/21/2023, 00:33 CDT.

## 2023-07-21 NOTE — CASE MANAGEMENT/SOCIAL WORK
Discharge Planning Assessment  [facility]     Patient Name: Nichole Rinaldi  MRN: [id number]  Today's Date: 7/21/2023    Admit Date: 7/20/2023        Discharge Needs Assessment       Row Name 07/21/23 1525       Living Environment    People in Home spouse    Current Living Arrangements home    Potentially Unsafe Housing Conditions none    Primary Care Provided by self    Provides Primary Care For no one    Family Caregiver if Needed spouse;child(sirena), adult    Quality of Family Relationships helpful;involved;supportive    Able to Return to Prior Arrangements yes       Resource/Environmental Concerns    Resource/Environmental Concerns none       Transition Planning    Patient/Family Anticipates Transition to home with family    Patient/Family Anticipated Services at Transition none    Transportation Anticipated family or friend will provide       Discharge Needs Assessment    Readmission Within the Last 30 Days no previous admission in last 30 days    Equipment Currently Used at Home none    Concerns to be Addressed no discharge needs identified    Anticipated Changes Related to Illness none    Equipment Needed After Discharge none    Discharge Coordination/Progress Pt lives at home alone and plans to return home at d/c. OT has evaluated her and signed off. Pt is independent. She does have assistance from her daughter if needed. Pt has rx coverage for her meds. No needs identified at this time.                   Discharge Plan    No documentation.                 Continued Care and Services - Admitted Since 7/20/2023    Coordination has not been started for this encounter.          Demographic Summary    No documentation.                  Functional Status    No documentation.                  Psychosocial    No documentation.                  Abuse/Neglect    No documentation.                  Legal    No documentation.                  Substance Abuse    No documentation.                  Patient Forms    No  documentation.                     DAWN Dennis

## 2023-07-21 NOTE — ED NOTES
Patient taking Norco 10 from her purse at this time due to headache.   Along with buspar 10mg from her purse for anxiety

## 2023-07-21 NOTE — THERAPY DISCHARGE NOTE
Patient Name: Nichole Rinaldi  : 1945    MRN: [id number]                              Today's Date: 2023       Admit Date: 2023    Visit Dx:     ICD-10-CM ICD-9-CM   1. Altered mental status, unspecified altered mental status type  R41.82 780.97   2. Delirium  R41.0 780.09     Patient Active Problem List   Diagnosis    Osteoarthritis    Coronary artery disease involving native coronary artery of native heart    Vazquez's esophagus    Graves disease    Bilateral hip pain    Bradycardia    Carpal tunnel syndrome on right    Chronic pain    Syncope, near    Depression    Herniated disc    Hypertrophic obstructive cardiomyopathy (HOCM)    IHSS (idiopathic hypertrophic subaortic stenosis)    Lumbar facet arthropathy    Non-rheumatic mitral regurgitation    Other fatigue    Other hyperlipidemia    Paroxysmal atrial fibrillation    Trochanteric bursitis of both hips    Fibromyalgia    Obesity (BMI 30.0-34.9)    Anxiety    Primary hypertension    Primary insomnia    Gastroesophageal reflux disease without esophagitis    Vitamin D deficiency    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     Past Medical History:   Diagnosis Date    Anxiety     Arthrosis     Vazquez's esophagus 2000    Depression     Fibromyalgia     Graves disease 1970    Hypertension      Past Surgical History:   Procedure Laterality Date    CARPAL TUNNEL RELEASE Bilateral     ESSURE TUBAL LIGATION Bilateral     EYE SURGERY      HERNIA REPAIR  1992    JOINT REPLACEMENT      REPLACEMENT TOTAL KNEE BILATERAL Bilateral 1998    TENDON REPAIR Right 2019    THYROIDECTOMY  1970      General Information       Row Name 23 1305          OT Time and Intention    Document Type evaluation  PMH: anxiety/depression, Graves' disease, hypertensions, Vazquez's esophagus. Dx: Confusion,TIA, Primary hypertension, Chronic pain, 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.  -JJ (r) RH (t) JJ (c)     Mode of Treatment  occupational therapy  -JJ (r) RH (t) JJ (c)       Row Name 07/21/23 1305          General Information    Patient Profile Reviewed yes  -JJ (r) RH (t) JJ (c)     Prior Level of Function independent:;ADL's;driving;cooking;all household mobility;community mobility;transfer  -JJ (r) RH (t) JJ (c)     Existing Precautions/Restrictions fall  -JJ (r) RH (t) JJ (c)     Barriers to Rehab medically complex  -JJ (r) RH (t) JJ (c)       Row Name 07/21/23 1305          Occupational Profile    Environmental Supports and Barriers (Occupational Profile) sleeps in recliner; tub shower with grab bars  -JJ (r) RH (t) JJ (c)       Row Name 07/21/23 1305          Living Environment    People in Home spouse  -JJ (r) RH (t) JJ (c)       Row Name 07/21/23 1305          Home Main Entrance    Number of Stairs, Main Entrance three  -JJ (r) RH (t) JJ (c)     Stair Railings, Main Entrance railing on right side (ascending)  -JJ (r) RH (t) JJ (c)       Row Name 07/21/23 1305          Stairs Within Home, Primary    Number of Stairs, Within Home, Primary none  -JJ (r) RH (t) JJ (c)     Stair Railings, Within Home, Primary none  -JJ (r) RH (t) JJ (c)       Row Name 07/21/23 1305          Cognition    Orientation Status (Cognition) oriented x 4  -JJ (r) RH (t) JJ (c)       Row Name 07/21/23 1305          Safety Issues, Functional Mobility    Impairments Affecting Function (Mobility) endurance/activity tolerance  -JJ (r) RH (t) JJ (c)               User Key  (r) = Recorded By, (t) = Taken By, (c) = Cosigned By      Initials Name Provider Type    Jazmin Proctor, OTR/L, CSRS Occupational Therapist    RH Karyna Miner, OT Student OT Student                     Mobility/ADL's       Row Name 07/21/23 1305          Bed Mobility    Bed Mobility supine-sit  -JJ (r) RH (t) JJ (c)     Supine-Sit [place] (Bed Mobility) independent  -DURGA (r) LACIE (t) DURGA (c)     Assistive Device (Bed Mobility) bed rails;head of bed elevated  -DURGA (ruthann) LACIE (t) DURGA (c)        Row Name 07/21/23 1305          Transfers    Transfers sit-stand transfer;stand-sit transfer;bed-chair transfer  -JJ (r) RH (t) JJ (c)       Row Name 07/21/23 1305          Bed-Chair Transfer    Bed-Chair [place] (Transfers) supervision  -JJ (r) RH (t) JJ (c)       Row Name 07/21/23 1305          Sit-Stand Transfer    Sit-Stand [place] (Transfers) independent  -JJ (r) RH (t) JJ (c)       Row Name 07/21/23 1305          Stand-Sit Transfer    Stand-Sit [place] (Transfers) independent  -JJ (r) RH (t) JJ (c)       Row Name 07/21/23 1305          Functional Mobility    Functional Mobility- Ind. Level supervision required;contact guard assist  -JJ (r) RH (t) JJ (c)     Functional Mobility- Comment Bed-3c Nurses station-Chair  -JJ (r) RH (t) JJ (c)       Row Name 07/21/23 1305          Activities of Daily Living    BADL Assessment/Intervention lower body dressing  -JJ (r) RH (t) JJ (c)       Row Name 07/21/23 1305          Lower Body Dressing Assessment/Training    [place] Level (Lower Body Dressing) don;shoes/slippers;independent  -JJ (r) RH (t) JJ (c)     Position (Lower Body Dressing) edge of bed sitting  -JJ (r) RH (t) JJ (c)               User Key  (r) = Recorded By, (t) = Taken By, (c) = Cosigned By      Initials Name Provider Type    Jazmin Proctor, OTR/L, CSRS Occupational Therapist    RH Karyna Miner, OT Student OT Student                   Obj/Interventions       Row Name 07/21/23 1305          Sensory Assessment (Somatosensory)    Sensory Assessment (Somatosensory) UE sensation intact  -JJ (r) RH (t) JJ (c)       Row Name 07/21/23 1305          Vision Assessment/Intervention    Visual Impairment/Limitations WFL  -JJ (r) RH (t) JJ (c)       Row Name 07/21/23 1305          Range of Motion Comprehensive    General Range of Motion bilateral upper extremity ROM WNL  -JJ (r) RH (t) JJ (c)       Row Name 07/21/23 1305          Strength Comprehensive (MMT)    General Manual Muscle Testing  "(MMT) Assessment upper extremity strength deficits identified  -JJ (r) RH (t) JJ (c)     Comment, General Manual Muscle Testing (MMT) Assessment B shoulders 4/5, B bicep/tricep 4/5, B  stength 4+/5  -JJ (r) RH (t) JJ (c)       Row Name 07/21/23 7226          Balance    Balance Assessment sitting static balance;sitting dynamic balance;sit to stand dynamic balance;standing static balance;standing dynamic balance  -JJ (r) RH (t) JJ (c)     Static Sitting Balance independent  -JJ (r) RH (t) JJ (c)     Dynamic Sitting Balance independent  -JJ (r) RH (t) JJ (c)     Position, Sitting Balance unsupported;sitting edge of bed  -JJ (r) RH (t) JJ (c)     Sit to Stand Dynamic Balance independent  -JJ (r) RH (t) JJ (c)     Static Standing Balance standby assist  -JJ (r) RH (t) JJ (c)     Dynamic Standing Balance supervision;contact guard  -JJ (r) RH (t) JJ (c)               User Key  (r) = Recorded By, (t) = Taken By, (c) = Cosigned By      Initials Name Provider Type    Jazmin Proctor, OTR/L, CSRS Occupational Therapist    RH Karyna Miner, OT Student OT Student                   Goals/Plan    No documentation.                  Clinical Impression       Row Name 07/21/23 3743          Pain Assessment    Pretreatment Pain Rating 6/10  -JJ (r) RH (t) JJ (c)     Pre/Posttreatment Pain Comment Entire body. Pt reports this is d/t firbromyalgia.  -JJ (r) RH (t) JJ (c)     Pain Intervention(s) Repositioned;Ambulation/increased activity  -JJ (r) RH (t) JJ (c)       Row Name 07/21/23 7968          Plan of Care Review    Plan of Care Reviewed With patient;daughter  -JJ (r) RH (t) JJ (c)     Progress no change  -JJ (r) RH (t) JJ (c)     Outcome Evaluation OT eval completed. Pt A&Ox4, reports 6/10 pain all over her body. Pt states \"this is normal pain for me. I have fibromyalgia and arthritis all over.\" Pt reports PLOF as independent for all mobility, ADLs, driving and cooking. Pt reports her daughter does the grocery shopping " and cleans. Today pt independently performed bed mobility, sit-stand, and stand-sit. Pt donned B shoes independently sitting EOB. Pt performed fxl mobility with CGA in the hallway. Pt performed bed-chair transfer with supervision. Pt is at baseline function for ADLs and mobility. Recommend d/c home with assist. OT to sign off.  -JJ (r) RH (t) JJ (c)       Row Name 07/21/23 1301          Therapy Assessment/Plan (OT)    Therapy Frequency (OT) evaluation only  -JJ (r) RH (t) JJ (c)       Row Name 07/21/23 1300          Vital Signs    O2 Delivery Pre Treatment room air  -JJ (r) RH (t) JJ (c)     O2 Delivery Intra Treatment room air  -JJ (r) RH (t) JJ (c)     O2 Delivery Post Treatment room air  -JJ (r) RH (t) JJ (c)     Pre Patient Position Supine  -JJ (r) RH (t) JJ (c)     Intra Patient Position Standing  -JJ (r) RH (t) JJ (c)     Post Patient Position Sitting  -JJ (r) RH (t) JJ (c)       Row Name 07/21/23 1300          Positioning and Restraints    Pre-Treatment Position in bed  -JJ (r) RH (t) JJ (c)     Post Treatment Position chair  -JJ (r) RH (t) JJ (c)     In Chair notified nsg;reclined;call light within reach;encouraged to call for assist;with family/caregiver;legs elevated  -JJ (r) RH (t) JJ (c)               User Key  (r) = Recorded By, (t) = Taken By, (c) = Cosigned By      Initials Name Provider Type    Jazmin Proctor, CORAR/L, CSRS Occupational Therapist    Karyna Stanford, OT Student OT Student                   Outcome Measures       Row Name 07/21/23 2623          How much help from another is currently needed...    Putting on and taking off regular lower body clothing? 4  -JJ (r) RH (t) JJ (c)     Bathing (including washing, rinsing, and drying) 3  -JJ (r) RH (t) JJ (c)     Toileting (which includes using toilet bed pan or urinal) 4  -JJ (r) RH (t) JJ (c)     Putting on and taking off regular upper body clothing 4  -JJ (r) RH (t) JJ (c)     Taking care of personal grooming (such as brushing  teeth) 4  -JJ (r) RH (t) JJ (c)     Eating meals 4  -JJ (r) RH (t) JJ (c)     AM-PAC 6 Clicks Score (OT) 23  -JJ (r) RH (t)       Row Name 07/21/23 1305          Functional Assessment    Outcome Measure Options AM-PAC 6 Clicks Daily Activity (OT)  -JJ (r) RH (t) JJ (c)               User Key  (r) = Recorded By, (t) = Taken By, (c) = Cosigned By      Initials Name Provider Type    J Jazmin Colon, OTR/L, CSRS Occupational Therapist     Karyna Miner, OT Student OT Student                    Occupational Therapy Education       Title: PT OT SLP Therapies (In Progress)       Topic: Occupational Therapy (In Progress)       Point: ADL training (Done)       Description:   Instruct learner(s) on proper safety adaptation and remediation techniques during self care or transfers.   Instruct in proper use of assistive devices.                  Learning Progress Summary             Patient Acceptance, E, VU by  at 7/21/2023 1305    Comment: Educated on POC.   Family Acceptance, E, VU by  at 7/21/2023 1305    Comment: Educated on POC.                         Point: Home exercise program (Not Started)       Description:   Instruct learner(s) on appropriate technique for monitoring, assisting and/or progressing therapeutic exercises/activities.                  Learner Progress:  Not documented in this visit.              Point: Precautions (Not Started)       Description:   Instruct learner(s) on prescribed precautions during self-care and functional transfers.                  Learner Progress:  Not documented in this visit.              Point: Body mechanics (Not Started)       Description:   Instruct learner(s) on proper positioning and spine alignment during self-care, functional mobility activities and/or exercises.                  Learner Progress:  Not documented in this visit.                              User Key       Initials Effective Dates Name Provider Type Discipline     05/03/23 -  Karyna Miner  "G, OT Student OT Student OT                  OT Recommendation and Plan  Therapy Frequency (OT): evaluation only  Plan of Care Review  Plan of Care Reviewed With: patient, daughter  Progress: no change  Outcome Evaluation: OT stephani completed. Pt A&Ox4, reports 6/10 pain all over her body. Pt states \"this is normal pain for me. I have fibromyalgia and arthritis all over.\" Pt reports PLOF as independent for all mobility, ADLs, driving and cooking. Pt reports her daughter does the grocery shopping and cleans. Today pt independently performed bed mobility, sit-stand, and stand-sit. Pt donned B shoes independently sitting EOB. Pt performed fxl mobility with CGA in the hallway. Pt performed bed-chair transfer with supervision. Pt is at baseline function for ADLs and mobility. Recommend d/c home with assist. OT to sign off.     Time Calculation:         Time Calculation- OT       Row Name 07/21/23 1305             Time Calculation- OT    OT Start Time 1325  [id number]  -JJ (r) RH (t) JJ (c)      OT Stop Time 1355  -JJ (r) RH (t) JJ (c)      OT Time Calculation (min) 30 min  -JJ (r) RH (t)      Total Timed Code Minutes- OT 40 minute(s)  -JJ (r) RH (t) JJ (c)      OT Received On 07/21/23  -JJ (r) RH (t) JJ (c)                User Key  (r) = Recorded By, (t) = Taken By, (c) = Cosigned By      Initials Name Provider Type    Jazmin Proctor, OTDESHAWN/L, CSRS Occupational Therapist    Karyna Stanford, OT Student OT Student                           Karyna Miner, OT Student  7/21/2023  "

## 2023-07-21 NOTE — CONSULTS
Stroke Consult Note    Patient Name: Nichole Rinaldi   MRN: [id number]  Age: 77 y.o.  Sex: female  : 1945    Primary Care Physician: Juan A Smith MD  Referring Physician:  Juan A Hdez DO      Chief Complaint/Reason for Consultation: confusion    Subjective .  HPI: This is 77-year-old with history of anxiety depression Graves' disease hypertension was brought to the emergency department for strokelike symptoms per the daughter.  Patient had an episode of confusion nausea and speech difficulty which lasted for 6 hours.  H/o palpations.  Not on antithrombotics       Last Known Normal Date/Time:  2 days ago EST     Review of Systems   Constitutional: No fatigue  HENT: Negative for nosebleeds and rhinorrhea.    Eyes: Negative for redness.   Respiratory: Negative for cough.    Gastrointestinal: Negative for anal bleeding.   Endocrine: Negative for polydipsia.   Genitourinary: Negative for enuresis and urgency.   Musculoskeletal: Negative for joint swelling.   Neurological: Negative for tremors.   Psychiatric/Behavioral: Negative for hallucinations.      Temp:  [97.2 °F (36.2 °C)-98.3 °F (36.8 °C)] 98.2 °F (36.8 °C)  Heart Rate:  [56-73] 62  Resp:  [16-18] 16  BP: (108-147)/(50-88) 118/59        NEURO( Exam is performed with help of hospital staff at bed side and observed by me via audio-video interface)    MENTAL STATUS: AAOx3, memory intact, fund of knowledge appropriate    LANG/SPEECH: Naming and repetition intact, fluent, follows 3-step commands    CRANIAL NERVES:    Pupils equal and reactive, EOMI intact, no gaze deviation, no nystagmus  No facial droop, cough and gag intact, shoulder shrug intact, tongue midline     MOTOR:  Moves all extremities equally    SENSORY: Normal to light touch all throughout     COORDINATION: Normal finger to nose and heel to shin, no tremor, no dysmetria    STATION: Not assessed due to patient condition    GAIT: Not assessed due to patient condition     Acute Stroke  Data    Thrombolytic Inclusion / Exclusion Criteria    Time: 13:16 CDT  Person Administering Scale: Mickey Kinney MD    Inclusion Criteria  [x]   18 years of age or greater   []   Onset of symptoms < 4.5 hours before beginning treatment (stroke onset = time patient was last seen well or without symptoms).   []   Diagnosis of acute ischemic stroke causing measurable disabling deficit (Complete Hemianopia, Any Aphasia, Visual or Sensory Extinction, Any weakness limiting sustained effort against gravity)   []   Any remaining deficit considered potentially disabling in view of patient and practitioner   Exclusion criteria (Do not proceed with thrombolytic if any are checked under exclusion criteria)  [x]   Onset unknown or GREATER than 4.5 hours   []   ICH on CT/MRI   []   CT demonstrates hypodensity representing acute or subacute infarct   []   Significant head trauma or prior stroke in the previous 3 months   []   Symptoms suggestive of subarachnoid hemorrhage   []   History of un-ruptured intracranial aneurysm GREATER than 10 mm   []   Recent intracranial or intraspinal surgery within the last 3 months   []   Arterial puncture at a non-compressible site in the previous 7 days   []   Active internal bleeding   []   Acute bleeding tendency   []   Platelet count LESS than 100,000 for known hematological diseases such as leukemia, thrombocytopenia or chronic cirrhosis   []   Current use of anticoagulant with INR GREATER than 1.7 or PT GREATER than 15 seconds, aPTT GREATER than 40 seconds   []   Heparin received within 48 hours, resulting in abnormally elevated aPTT GREATER than upper limit of normal   []   Current use of direct thrombin inhibitors or direct factor Xa inhibitors in the past 48 hours   []   Elevated blood pressure refractory to treatment (systolic GREATER than 185 mm/Hg or diastolic  GREATER than 110 mm/Hg   []   Suspected infective endocarditis and aortic arch dissection   []   Current use of  therapeutic treatment dose of low-molecular-weight heparin (LMWH) within the previous 24 hours   []   Structural GI malignancy or bleed   Relative exclusion for all patients  []   Only minor non-disabling symptoms   []   Pregnancy   []   Seizure at onset with postictal residual neurological impairments   []   Major surgery or previous trauma within past 14 days   []   History of previous spontaneous ICH, intracranial neoplasm, or AV malformation   []   Postpartum (within previous 14 days)   []   Recent GI or urinary tract hemorrhage (within previous 21 days)   []   Recent acute MI (within previous 3 months)   []   History of un-ruptured intracranial aneurysm LESS than 10 mm   []   History of ruptured intracranial aneurysm   []   Blood glucose LESS than 50 mg/dL (2.7 mmol/L)   []   Dural puncture within the last 7 days   []   Known GREATER than 10 cerebral microbleeds   Additional exclusions for patients with symptoms onset between 3 and 4.5 hours.  []   Age > 80.   []   On any anticoagulants regardless of INR  >>> Warfarin (Coumadin), Heparin, Enoxaparin (Lovenox), fondaparinux (Arixtra), bivalirudin (Angiomax), Argatroban, dabigatran (Pradaxa), rivaroxaban (Xarelto), or apixaban (Eliquis)   []   Severe stroke (NIHSS > 25).   []   History of BOTH diabetes and previous ischemic stroke.   []   The risks and benefits have been discussed with the patient or family related to the administration of IV thrombolytic for stroke symptoms.   []   I have discussed and reviewed the patient's case and imaging with the attending prior to IV thrombolytic.    Time thrombolytic administered       Past Medical History:   Diagnosis Date    Anxiety     Arthrosis     Vazquez's esophagus 2000    Depression     Fibromyalgia     Graves disease 1970    Hypertension      Past Surgical History:   Procedure Laterality Date    CARPAL TUNNEL RELEASE Bilateral 2010    ESSURE TUBAL LIGATION Bilateral 1977    EYE SURGERY      HERNIA REPAIR  1992     JOINT REPLACEMENT      REPLACEMENT TOTAL KNEE BILATERAL Bilateral 1998    TENDON REPAIR Right 2019    THYROIDECTOMY  1970     Family History   Problem Relation Age of Onset    No Known Problems Mother     No Known Problems Father      Social History     Socioeconomic History    Marital status:    Tobacco Use    Smoking status: Never    Smokeless tobacco: Never   Vaping Use    Vaping Use: Never used   Substance and Sexual Activity    Alcohol use: Never    Drug use: Yes     Types: Hydrocodone    Sexual activity: Yes     Allergies   Allergen Reactions    Aripiprazole Unknown - High Severity    Codeine Dizziness     Other reaction(s): dizziness      Morphine Other (See Comments)     Prior to Admission medications    Medication Sig Start Date End Date Taking? Authorizing Provider   amLODIPine (NORVASC) 5 MG tablet Take 1 tablet by mouth Daily. 5/4/23   Juan A Smith MD   buPROPion (WELLBUTRIN) 75 MG tablet Take 1 tablet by mouth 2 (Two) Times a Day for 90 days. 12/6/22 3/6/23  Meme Tan APRN   busPIRone (BUSPAR) 15 MG tablet Take 1 tablet by mouth 4 (Four) Times a Day. 12/6/22   Meme Tan APRN   escitalopram (LEXAPRO) 20 MG tablet Take 1 tablet by mouth Daily. 12/6/22   Meme Tan APRN   HYDROcodone-acetaminophen (NORCO)  MG per tablet Take 1 tablet by mouth Every 4 (Four) Hours As Needed. 6/2/22   ProviderYoel MD   levothyroxine (SYNTHROID, LEVOTHROID) 112 MCG tablet Take 1 tablet by mouth Daily. 5/4/23   Juan A Smith MD   Melatonin 10 MG tablet Take 1 tablet by mouth Daily. 6/13/22   Juan A Smith MD   metoprolol succinate XL (TOPROL-XL) 100 MG 24 hr tablet Take 1 tablet by mouth Daily. 12/6/22   Meme Tan APRN   omeprazole (priLOSEC) 40 MG capsule Take 1 capsule by mouth Daily. 12/6/22   Meme Tan APRN   ondansetron ODT (Zofran ODT) 4 MG disintegrating tablet Place 1 tablet on the tongue Every 8 (Eight) Hours As Needed for Nausea  or Vomiting. 11/15/22   Juan A Smith MD   rosuvastatin (CRESTOR) 10 MG tablet Take 1 tablet by mouth Daily. 22   Meme Tan APRN   topiramate (TOPAMAX) 50 MG tablet Take 1 tablet by mouth Every 12 (Twelve) Hours. 22   Meme Tan APRN   traZODone (DESYREL) 100 MG tablet TAKE 1 TABLET BY MOUTH EVERY  NIGHT AT BEDTIME 23   Meme Tan APRN       Hospital Meds:  Scheduled- aspirin, 81 mg, Oral, Daily   Or  aspirin, 300 mg, Rectal, Daily  atorvastatin, 80 mg, Oral, Nightly  sodium chloride, 10 mL, Intravenous, Q12H      Infusions-     PRNs-   acetaminophen **OR** acetaminophen    senna-docusate sodium **AND** polyethylene glycol **AND** bisacodyl **AND** bisacodyl    ondansetron **OR** ondansetron    sodium chloride    sodium chloride    Functional Status Prior to Current Stroke/Tony Score: 0    NIH Stroke Scale  Time: 13:16 CDT  Person Administering Scale: Mickey Kinney MD  1a  Level of consciousness: 0=alert; keenly responsive   1b. LOC questions:  0=Performs both tasks correctly   1c. LOC commands: 0=Performs both tasks correctly   2.  Best Gaze: 0=normal   3.  Visual: 0=No visual loss   4. Facial Palsy: 0=Normal symmetric movement   5a.  Motor left arm: 0=No drift, limb holds 90 (or 45) degrees for full 10 seconds   5b.  Motor right arm: 0=No drift, limb holds 90 (or 45) degrees for full 10 seconds   6a. motor left le=No drift, limb holds 90 (or 45) degrees for full 10 seconds   6b  Motor right le=No drift, limb holds 90 (or 45) degrees for full 10 seconds   7. Limb Ataxia: 0=Absent   8.  Sensory: 0=Normal; no sensory loss   9. Best Language:  0=No aphasia, normal   10. Dysarthria: 0=Normal   11. Extinction and Inattention: 0=No abnormality    Total:   0      Results Reviewed:  I have personally reviewed current lab, radiology, and data and agree with results.    Results for orders placed during the hospital encounter of 23    Adult  Transthoracic Echo Complete W/ Cont if Necessary Per Protocol (With Agitated Saline)    Interpretation Summary    Technically difficult study.    Left ventricular systolic function is hyperdynamic (EF > 70%).    Left ventricular wall thickness is consistent with mild to moderate sigmoid-septal hypertrophy.    Slightly increased (nonobstructive) intracavitary and LVOT gradient (peak 18 mmHg) likely related to hyperdynamic LV function and LV sigmoid septal hypertrophy.    Right ventricle is not well visualized but appears normal in size with normal systolic function.    The left atrial cavity is mildly dilated.    Saline study was inconcluside; see details in report.    Valves are not well visualized, but there is no hemodynamically significant (more than mild) valve disease appreciated.    Mitral annular calcification is present.            Assessment/Plan:       Bihemispheric, cerebellar strokes likely etiology small vessel disease versus cardio-embolism  -CTAs open, no significant atherosclerosis.  Naive to antiplatelet, continue aspirin 81 daily,  -Transthoracic echo showed mild dilation of left atrium.  -Event monitor at discharge.  -PT OT speech can continue to work with the patient.  -Cardioembolic work-up pending              #2 Hypertension-normalize blood pressure goals  #4 Hyperlipidemia- LDL goal less than < 70. Start high intensity statin      Tele-neurology will continue to follow the patient.        Mickey Kinney MD  July 21, 2023  13:16 CDT    Verbal consent taken.  Patient agreeable to be seen via telemedicine.    This was an audio and video enabled telemedicine encounter.

## 2023-07-21 NOTE — ED NOTES
Nursing report ED to floor  Nichole Rinaldi  77 y.o.  female    HPI:   Chief Complaint   Patient presents with    Altered Mental Status       Admitting doctor:   Arnaud Ruiz DO    Consulting provider(s):  Consults       Date and Time Order Name Status Description    7/21/2023 12:31 AM Inpatient Neurology Consult Stroke               Admitting diagnosis:   The primary encounter diagnosis was Altered mental status, unspecified altered mental status type. A diagnosis of Delirium was also pertinent to this visit.    Code status:   Current Code Status       Date Active Code Status Order ID Comments User Context       7/20/2023 2353 CPR (Attempt to Resuscitate) [id number]  Araceli Newton, [facility]        Question Answer    Code Status (Patient has no pulse and is not breathing) CPR (Attempt to Resuscitate)    Medical Interventions (Patient has pulse or is breathing) Full Support    Level Of Support Discussed With Patient     Next of Kin (If No Surrogate)    Release to patient Routine Release                    Allergies:   Aripiprazole, Codeine, and Morphine    Intake and Output  No intake or output data in the 24 hours ending 07/21/23 1113    Weight:       07/20/23 1954   Weight: 65.8 kg (145 lb)       Most recent vitals:   Vitals:    07/21/23 0401 07/21/23 0402 07/21/23 0431 07/21/23 0902   BP: 117/50  108/50    Pulse: 70  71    Resp:       Temp:    98.3 °F (36.8 °C)   TempSrc:    Oral   SpO2:  92% 92%    Weight:       Height:         Oxygen Therapy: .    Active LDAs/IV Access:   Lines, Drains & Airways       Active LDAs       Name Placement date Placement time Site Days    Peripheral IV 07/20/23 Right Antecubital 07/20/23  --  Antecubital  1                    Labs (abnormal labs have a star):   Labs Reviewed   COMPREHENSIVE METABOLIC PANEL - Abnormal; Notable for the following components:       Result Value    Glucose 137 (*)     All other components within normal limits    Narrative:     GFR Normal  >60  Chronic Kidney Disease <60  Kidney Failure <15    The GFR formula is only valid for adults with stable renal function between ages 18 and 70.   TROPONIN - Abnormal; Notable for the following components:    HS Troponin T 10 (*)     All other components within normal limits    Narrative:     High Sensitive Troponin T Reference Range:  <10.0 ng/L- Negative Female for AMI  <15.0 ng/L- Negative Male for AMI  >=10 - Abnormal Female indicating possible myocardial injury.  >=15 - Abnormal Male indicating possible myocardial injury.   Clinicians would have to utilize clinical acumen, EKG, Troponin, and serial changes to determine if it is an Acute Myocardial Infarction or myocardial injury due to an underlying chronic condition.        URINALYSIS W/ CULTURE IF INDICATED - Abnormal; Notable for the following components:    Appearance, UA Turbid (*)     Ketones, UA Trace (*)     Leuk Esterase, UA Small (1+) (*)     All other components within normal limits    Narrative:     In absence of clinical symptoms, the presence of pyuria, bacteria, and/or nitrites on the urinalysis result does not correlate with infection.   CBC WITH AUTO DIFFERENTIAL - Abnormal; Notable for the following components:    MCV 98.6 (*)     Lymphocyte % 16.3 (*)     Neutrophils, Absolute 7.97 (*)     All other components within normal limits   URINALYSIS, MICROSCOPIC ONLY - Abnormal; Notable for the following components:    RBC, UA 0-2 (*)     WBC, UA 0-2 (*)     All other components within normal limits   HIGH SENSITIVITIY TROPONIN T 2HR - Abnormal; Notable for the following components:    HS Troponin T 12 (*)     All other components within normal limits    Narrative:     High Sensitive Troponin T Reference Range:  <10.0 ng/L- Negative Female for AMI  <15.0 ng/L- Negative Male for AMI  >=10 - Abnormal Female indicating possible myocardial injury.  >=15 - Abnormal Male indicating possible myocardial injury.   Clinicians would have to utilize clinical  acumen, EKG, Troponin, and serial changes to determine if it is an Acute Myocardial Infarction or myocardial injury due to an underlying chronic condition.        LACTIC ACID, PLASMA - Normal   HEMOGLOBIN A1C - Normal    Narrative:     Hemoglobin A1C Ranges:    Increased Risk for Diabetes  5.7% to 6.4%  Diabetes                     >= 6.5%  Diabetic Goal                < 7.0%   POCT GLUCOSE FINGERSTICK - Normal   POCT GLUCOSE FINGERSTICK - Normal   RAINBOW DRAW    Narrative:     The following orders were created for panel order [facility] Draw.  Procedure                               Abnormality         Status                     ---------                               -----------         ------                     Green Top (Gel)[[id number]]                                  Final result               Lavender Top[[id number]]                                     Final result               Red Top[[id number]]                                          Final result               Gray Top[[id number]]                                         Final result               Light Blue Top[[id number]]                                   Final result                 Please view results for these tests on the individual orders.   LIPID PANEL    Narrative:     Cholesterol Reference Ranges  (U.S. Department of Health and Human Services ATP III Classifications)    Desirable          <200 mg/dL  Borderline High    200-239 mg/dL  High Risk          >240 mg/dL      Triglyceride Reference Ranges  (U.S. Department of Health and Human Services ATP III Classifications)    Normal           <150 mg/dL  Borderline High  150-199 mg/dL  High             200-499 mg/dL  Very High        >500 mg/dL    HDL Reference Ranges  (U.S. Department of Health and Human Services ATP III Classifications)    Low     <40 mg/dl (major risk factor for CHD)  High    >60 mg/dl ('negative' risk factor for CHD)        LDL Reference Ranges  (U.S. Department of Health and Human  Services ATP III Classifications)    Optimal          <100 mg/dL  Near Optimal     100-129 mg/dL  Borderline High  130-159 mg/dL  High             160-189 mg/dL  Very High        >189 mg/dL   GREEN TOP   LAVENDER TOP   RED TOP   GRAY TOP   LIGHT BLUE TOP   CBC AND DIFFERENTIAL    Narrative:     The following orders were created for panel order CBC & Differential.  Procedure                               Abnormality         Status                     ---------                               -----------         ------                     CBC Auto Differential[[id number]]        Abnormal            Final result                 Please view results for these tests on the individual orders.       Meds given in ED:   Medications   sodium chloride 0.9 % flush 10 mL (has no administration in time range)   sodium chloride 0.9 % flush 10 mL (10 mL Intravenous Given 7/21/23 0509)   sodium chloride 0.9 % infusion 40 mL (has no administration in time range)   sennosides-docusate (PERICOLACE) 8.6-50 MG per tablet 1 tablet (has no administration in time range)     And   polyethylene glycol (MIRALAX) packet 17 g (has no administration in time range)     And   bisacodyl (DULCOLAX) EC tablet 5 mg (has no administration in time range)     And   bisacodyl (DULCOLAX) suppository 10 mg (has no administration in time range)   atorvastatin (LIPITOR) tablet 80 mg (80 mg Oral Given 7/21/23 0156)   aspirin chewable tablet 81 mg (81 mg Oral Given 7/21/23 1023)     Or   aspirin suppository 300 mg ( Rectal Not Given:  See Alt 7/21/23 1023)   acetaminophen (TYLENOL) tablet 650 mg (650 mg Oral Given 7/21/23 1024)     Or   acetaminophen (TYLENOL) suppository 650 mg ( Rectal Not Given:  See Alt 7/21/23 1024)   ondansetron (ZOFRAN) tablet 4 mg ( Oral Not Given:  See Alt 7/21/23 1023)     Or   ondansetron (ZOFRAN) injection 4 mg (4 mg Intravenous Given 7/21/23 1023)   ondansetron (ZOFRAN) injection 4 mg (4 mg Intravenous Given 7/20/23 2310)   iopamidol  (ISOVUE-370) 76 % injection 100 mL (100 mL Intravenous Given 7/20/23 2341)   perflutren (DEFINITY) lipid microspheres injection 1.1736 mg (1.1736 mg Intravenous Given 7/21/23 0832)           NIH Stroke Scale:  Interval: 2 hrs posttreatment  0 @ 1110    Isolation/Infection(s):  No active isolations   No active infections     COVID Testing N/A  Collected .  Resulted .    Nursing report ED to floor:  Mental status: .alert/oriented  Ambulatory status: .stand by assist  Precautions: .none    ED nurse phone extentsion- ..  1001

## 2023-07-21 NOTE — PLAN OF CARE
Goal Outcome Evaluation:  Plan of Care Reviewed With: patient        Progress: no change  Outcome Evaluation: iid patent. alert and oriented. up ad jd. no neuro deficits. tolerating diet. family at bedside. no acute distress noted. cont to monitor.

## 2023-07-21 NOTE — PLAN OF CARE
"Goal Outcome Evaluation:  Plan of Care Reviewed With: patient, daughter        Progress: no change  Outcome Evaluation: OT eval completed. Pt A&Ox4, reports 6/10 pain all over her body. Pt states \"this is normal pain for me. I have fibromyalgia and arthritis all over.\" Pt reports PLOF as independent for all mobility, ADLs, driving and cooking. Pt reports her daughter does the grocery shopping and cleans. Today pt independently performed bed mobility, sit-stand, and stand-sit. Pt donned B shoes independently sitting EOB. Pt performed fxl mobility with CGA in the hallway. Pt performed bed-chair transfer with supervision. Pt is at baseline function for ADLs and mobility. Recommend d/c home with assist. OT to sign off.      Anticipated Discharge Disposition (OT): home with assist  "

## 2023-07-21 NOTE — ED PROVIDER NOTES
Subjective   History of Present Illness  77-year-old female who presents the emergency department with confusion.  Her daughter is concerned about stroke.  Her last known well is unknown.  She tells me that she does not have any complaints currently.  She notes that she might be confused.  She has no chest pain.  She had no shortness of breath.  She has no acute bowel or kidney dysfunction.  She has no acute lower extremity edema.  She is unable to tell me the year, location, or her daughter's date of birth.  She is able to recognize her daughter at bedside.  She has no abnormal speech pattern.  Her sentences are intact.  She can follow directions without difficulty.    Review of Systems   Constitutional:  Negative for chills and fever.   HENT:  Negative for congestion and rhinorrhea.    Respiratory:  Negative for cough and shortness of breath.    Cardiovascular:  Negative for leg swelling.   Gastrointestinal:  Negative for abdominal pain, constipation, diarrhea and nausea.   Genitourinary:  Negative for dysuria and frequency.   Psychiatric/Behavioral:  Positive for confusion.      Past Medical History:   Diagnosis Date    Anxiety     Arthrosis     Vazquez's esophagus 2000    Depression     Graves disease 1970    Hypertension        Allergies   Allergen Reactions    Aripiprazole Unknown - High Severity    Codeine Dizziness     Other reaction(s): dizziness      Morphine Other (See Comments)       Past Surgical History:   Procedure Laterality Date    CARPAL TUNNEL RELEASE Bilateral 2010    ESSURE TUBAL LIGATION Bilateral 1977    EYE SURGERY      HERNIA REPAIR  1992    JOINT REPLACEMENT      REPLACEMENT TOTAL KNEE BILATERAL Bilateral 1998    TENDON REPAIR Right 2019    THYROIDECTOMY  1970       No family history on file.    Social History     Socioeconomic History    Marital status:    Tobacco Use    Smoking status: Never    Smokeless tobacco: Never   Vaping Use    Vaping Use: Never used   Substance and Sexual  Activity    Alcohol use: Never    Drug use: Yes     Types: Hydrocodone    Sexual activity: Yes           Objective   Physical Exam  Vitals reviewed.   Constitutional:       Appearance: She is well-developed.   HENT:      Head: Normocephalic and atraumatic.      Nose: Nose normal.   Eyes:      Conjunctiva/sclera: Conjunctivae normal.   Cardiovascular:      Rate and Rhythm: Normal rate and regular rhythm.      Heart sounds: Normal heart sounds.   Pulmonary:      Effort: Pulmonary effort is normal.      Breath sounds: Normal breath sounds.   Abdominal:      General: Bowel sounds are normal.      Palpations: Abdomen is soft.   Musculoskeletal:         General: No tenderness.      Cervical back: Normal range of motion and neck supple.   Skin:     General: Skin is warm and dry.   Neurological:      Mental Status: She is alert. She is confused.      Cranial Nerves: No cranial nerve deficit, dysarthria or facial asymmetry.      Sensory: No sensory deficit.      Motor: No weakness.      Coordination: Coordination normal.      Comments: It does not know her location.  She does not know the year.  And she does not know her daughter's date of birth.   Psychiatric:         Mood and Affect: Mood normal.         Speech: Speech normal.       Procedures           ED Course                                           Medical Decision Making  Amount and/or Complexity of Data Reviewed  Labs: ordered.  Radiology: ordered.  ECG/medicine tests: ordered.        Final diagnoses:   None       ED Disposition  ED Disposition       None            No follow-up provider specified.       Medication List      No changes were made to your prescriptions during this visit.            Nikhil Amado DO  07/20/23 2029

## 2023-07-21 NOTE — PROGRESS NOTES
[facility] Medicine Services  INPATIENT PROGRESS NOTE    Patient Name: Nichole Rinaldi  Date of Admission: 7/20/2023  Today's Date: 07/21/23  Length of Stay: 1  Primary Care Physician: Juan A Smith MD    Subjective   Chief Complaint: Change in mental status  HPI     The patient appears to be at her typical cognitive state this morning.  She is noted to be on several oral medications which can induce altered mental status and cognitive changes.  Specifically, the patient has been taking opiates for pain.  The patient was held n.p.o. since being admitted to the emergency department however she took Norco 10 from her purse because of headache as well as Buspar 10 from her purse for anxiety.  She has no focal neurologic deficit and I am suspicious that the patient's cognitive change is a toxic metabolic encephalopathy secondary to medication.  MRI scan of the brain and echocardiogram have been ordered.  If the patient's MRI scan of the brain is negative, she can likely be discharged given the improvement in her overall status.  Neurology consultation was ordered at admission and is pending.  Repeat labs this morning show a troponin with insignificant elevation at 12, lipid panel within normal limits and hemoglobin A1c 5.3%.  CTA of the head shows no intracranial vessel occlusion.  CTA of the neck shows no flow-limiting stenosis.  Noncontrast CT of the head shows no acute intracranial findings.  She has been cleared for a diet by speech therapy.    Review of Systems   All pertinent negatives and positives are as above. All other systems have been reviewed and are negative unless otherwise stated.     Objective    Temp:  [97.2 °F (36.2 °C)-98.3 °F (36.8 °C)] 98.3 °F (36.8 °C)  Heart Rate:  [56-73] 71  Resp:  [18] 18  BP: (108-147)/(50-88) 108/50  Physical Exam  Constitutional:       Appearance: Normal appearance. She is normal weight.   HENT:      Head: Normocephalic and atraumatic.       Right Ear: External ear normal.      Left Ear: External ear normal.      Nose: Nose normal.      Mouth/Throat:      Mouth: Mucous membranes are moist.      Pharynx: Oropharynx is clear.   Eyes:      General: No scleral icterus.     Conjunctiva/sclera: Conjunctivae normal.   Cardiovascular:      Rate and Rhythm: Normal rate and regular rhythm.      Pulses: Normal pulses.   Pulmonary:      Effort: Pulmonary effort is normal. No respiratory distress.      Breath sounds: Normal breath sounds.   Abdominal:      General: Abdomen is flat.      Palpations: Abdomen is soft. There is no mass.   Musculoskeletal:         General: Normal range of motion.      Right lower leg: No edema.      Left lower leg: No edema.   Skin:     General: Skin is warm and dry.      Coloration: Skin is not pale.   Neurological:      General: No focal deficit present.      Mental Status: She is alert and oriented to person, place, and time. Mental status is at baseline.   Psychiatric:         Mood and Affect: Mood normal.         Judgment: Judgment normal.         Results Review:  I have reviewed the labs, radiology results, and diagnostic studies.    Laboratory Data:   Results from last 7 days   Lab Units 07/20/23 2014   WBC 10*3/mm3 10.77   HEMOGLOBIN g/dL 13.7   HEMATOCRIT % 42.2   PLATELETS 10*3/mm3 249        Results from last 7 days   Lab Units 07/20/23 2014   SODIUM mmol/L 137   POTASSIUM mmol/L 3.8   CHLORIDE mmol/L 103   CO2 mmol/L 23.0   BUN mg/dL 11   CREATININE mg/dL 0.71   CALCIUM mg/dL 9.6   BILIRUBIN mg/dL 0.3   ALK PHOS U/L 114   ALT (SGPT) U/L 9   AST (SGOT) U/L 18   GLUCOSE mg/dL 137*       Culture Data:   No results found for: BLOODCX, URINECX, WOUNDCX, MRSACX, RESPCX, STOOLCX    Radiology Data:   Imaging Results (Last 24 Hours)       Procedure Component Value Units Date/Time    MRI Brain Without Contrast [[id number]] Resulted: 07/21/23 1113     Updated: 07/21/23 1113    CT Angiogram Head [[id number]] Collected: 07/21/23 0703      Updated: 07/21/23 0726    Narrative:      EXAM: CT ANGIOGRAM NECK-, CT ANGIOGRAM HEAD- - 7/20/2023 11:30 PM CDT     HISTORY: Neuro deficit, acute, stroke suspected       COMPARISON: CT scan dated 7/20/2023.      DOSE LENGTH PRODUCT: 327 mGy cm. Automated exposure control was also  utilized to decrease patient radiation dose.     TECHNIQUE: CTA head and neck performed with intravenous contrast. 3D  postprocessing, including MIPs, performed and images saved to PACS.  Grading of carotid stenosis performed with NASCET criteria.     FINDINGS:      There is a typical three-vessel branching pattern off the aortic arch  without significant ostial narrowing. Common carotid arteries are patent  and without flow-limiting stenosis. There is a normal course and caliber  of the internal and external carotid arteries without evidence of a  flow-limiting stenosis. Minimal atheromatous plaque in the carotid  bulbs. The vertebral arteries originate from the subclavian arteries  without significant ostial narrowing.  No evidence of vertebral artery  dissection or pseudoaneurysm. Lung apices are clear. Facet arthropathy.  Nonspecific mild left level 2B cervical adenopathy with 2 adjacent lymph  nodes measuring 1.1 and 1.0 cm in greatest axial diameters (axial images  #55 and 49).     Distal ICAs are patent and without flow-limiting stenosis. No  intracranial large vessel occlusion. Anterior and middle cerebral  arteries are patent and without flow-limiting stenosis. Intracranial  vertebral arteries and basilar artery are normal in caliber. Posterior  cerebral arteries are patent and normal in caliber. No visualized  aneurysm or vascular malformation. No intracranial hemorrhage or mass  effect. Orbital contents are unremarkable. Paranasal sinuses and  mastoids are clear.         Impression:         1. No arterial occlusion or flow-limiting stenosis in the neck. Minimal  atheromatous plaque in the carotid bulbs.  2. No intracranial  large vessel occlusion or flow-limiting stenosis.   3. These findings are in agreement with the critical and emergent  findings from the [facility] preliminary report.  4. There are 2 minimally enlarged left level 2B cervical lymph nodes,  just above normal reference range. This is nonspecific but favored to be  benign.  This report was finalized on 07/21/2023 07:23 by Dr Kody Champagne, .    CT Angiogram Neck [[id number]] Collected: 07/21/23 0703     Updated: 07/21/23 0726    Narrative:      EXAM: CT ANGIOGRAM NECK-, CT ANGIOGRAM HEAD- - 7/20/2023 11:30 PM CDT     HISTORY: Neuro deficit, acute, stroke suspected       COMPARISON: CT scan dated 7/20/2023.      DOSE LENGTH PRODUCT: 327 mGy cm. Automated exposure control was also  utilized to decrease patient radiation dose.     TECHNIQUE: CTA head and neck performed with intravenous contrast. 3D  postprocessing, including MIPs, performed and images saved to PACS.  Grading of carotid stenosis performed with NASCET criteria.     FINDINGS:      There is a typical three-vessel branching pattern off the aortic arch  without significant ostial narrowing. Common carotid arteries are patent  and without flow-limiting stenosis. There is a normal course and caliber  of the internal and external carotid arteries without evidence of a  flow-limiting stenosis. Minimal atheromatous plaque in the carotid  bulbs. The vertebral arteries originate from the subclavian arteries  without significant ostial narrowing.  No evidence of vertebral artery  dissection or pseudoaneurysm. Lung apices are clear. Facet arthropathy.  Nonspecific mild left level 2B cervical adenopathy with 2 adjacent lymph  nodes measuring 1.1 and 1.0 cm in greatest axial diameters (axial images  #55 and 49).     Distal ICAs are patent and without flow-limiting stenosis. No  intracranial large vessel occlusion. Anterior and middle cerebral  arteries are patent and without flow-limiting stenosis. Intracranial  vertebral  arteries and basilar artery are normal in caliber. Posterior  cerebral arteries are patent and normal in caliber. No visualized  aneurysm or vascular malformation. No intracranial hemorrhage or mass  effect. Orbital contents are unremarkable. Paranasal sinuses and  mastoids are clear.         Impression:         1. No arterial occlusion or flow-limiting stenosis in the neck. Minimal  atheromatous plaque in the carotid bulbs.  2. No intracranial large vessel occlusion or flow-limiting stenosis.   3. These findings are in agreement with the critical and emergent  findings from the [facility] preliminary report.  4. There are 2 minimally enlarged left level 2B cervical lymph nodes,  just above normal reference range. This is nonspecific but favored to be  benign.  This report was finalized on 07/21/2023 07:23 by Dr Kody Champagne, .    CT Head Without Contrast [[id number]] Collected: 07/20/23 2057     Updated: 07/20/23 2104    Narrative:      EXAM/TECHNIQUE: CT head without contrast     INDICATION: Confusion     COMPARISON: None     DLP: 539 mGy cm. Automated exposure control was also utilized to  decrease patient radiation dose.     FINDINGS:     No evidence of intracranial hemorrhage. Gray-white differentiation is  maintained. No midline shift or mass effect. Lateral ventricles are  nondilated. Basilar cisterns are patent. No acute orbital finding.  Mastoid air cells are clear. Visualized paranasal sinuses are clear. No  acute osseous finding.       Impression:         No acute intracranial findings.  This report was finalized on 07/20/2023 21:01 by Dr. Sergey Green MD.            I have reviewed the patient's current medications.     Assessment/Plan   Assessment  Active Hospital Problems    Diagnosis     **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     Vazquez's esophagus     Primary hypertension     Chronic pain        Treatment Plan  MRI of the brain and echocardiogram pending  Neurology consultation  pending  Probable discharge soon if MRI is negative  TIA is doubtful as there was no focal neurologic deficits existent at presentation or currently    Medical Decision Making  Number and Complexity of problems:   1) toxic metabolic encephalopathy likely induced by hydrocodone, acute, moderate complexity  2) pulm hypertension, chronic, moderate complexity  3) chronic pain syndrome with chronic use of opiates, chronic, high complexity  4) Vazquez's esophagus, chronic, moderate complexity    Differential Diagnosis: None others considered    Conditions and Status        Condition is improving.     [facility] Data  External documents reviewed: Care Everywhere documentation  Cardiac tracing (EKG, telemetry) interpretation: See HPI  Radiology interpretation: See HPI  Labs reviewed: See HPI  Any tests that were considered but not ordered: None     Decision rules/scores evaluated (example [id number], Wells, etc): None     Discussed with: The patient and her daughter     Care Planning  Shared decision making: The patient and her daughter  Code status and discussions: Full code    Disposition  Social Determinants of Health that impact treatment or disposition: None  I expect the patient to be discharged to home in 1 days.     Electronically signed by Arnaud Ruiz DO, 07/21/23, 11:21 CDT.

## 2023-07-21 NOTE — ED NOTES
Patient is able to answer all questions asked at this time. Daughter reports that patient has been having episodes of confusion since ride to ER from home. Patient last known well time is not known. Patient lives at home with  who has hx of dementia.  called daughter at 1800 due to patient requesting him to. Daughter states that at 1733 patient responded to group text message. Patient has no recollection of event.

## 2023-07-22 VITALS
SYSTOLIC BLOOD PRESSURE: 129 MMHG | HEART RATE: 60 BPM | BODY MASS INDEX: 30.44 KG/M2 | TEMPERATURE: 98.2 F | DIASTOLIC BLOOD PRESSURE: 51 MMHG | HEIGHT: 58 IN | RESPIRATION RATE: 16 BRPM | OXYGEN SATURATION: 94 % | WEIGHT: 145 LBS

## 2023-07-22 PROBLEM — I63.9 CEREBELLAR STROKE: Status: ACTIVE | Noted: 2023-07-22

## 2023-07-22 PROCEDURE — 99214 OFFICE O/P EST MOD 30 MIN: CPT | Performed by: PHYSICIAN ASSISTANT

## 2023-07-22 PROCEDURE — G0378 HOSPITAL OBSERVATION PER HR: HCPCS

## 2023-07-22 RX ORDER — ASPIRIN 81 MG/1
81 TABLET, CHEWABLE ORAL DAILY
Qty: 100 TABLET | Refills: 99 | Status: SHIPPED | OUTPATIENT
Start: 2023-07-23

## 2023-07-22 RX ORDER — LANOLIN ALCOHOL/MO/W.PET/CERES
3 CREAM (GRAM) TOPICAL NIGHTLY PRN
Status: DISCONTINUED | OUTPATIENT
Start: 2023-07-22 | End: 2023-07-22 | Stop reason: HOSPADM

## 2023-07-22 RX ORDER — ATORVASTATIN CALCIUM 80 MG/1
80 TABLET, FILM COATED ORAL NIGHTLY
Qty: 90 TABLET | Refills: 0 | Status: SHIPPED | OUTPATIENT
Start: 2023-07-22 | End: 2023-07-27

## 2023-07-22 RX ADMIN — ESCITSLOPRAM 20 MG: 10 TABLET ORAL at 09:31

## 2023-07-22 RX ADMIN — PANTOPRAZOLE SODIUM 40 MG: 40 TABLET, DELAYED RELEASE ORAL at 06:55

## 2023-07-22 RX ADMIN — HYDROCODONE BITARTRATE AND ACETAMINOPHEN 1 TABLET: 10; 325 TABLET ORAL at 01:31

## 2023-07-22 RX ADMIN — ASPIRIN 81 MG: 81 TABLET, CHEWABLE ORAL at 09:31

## 2023-07-22 RX ADMIN — HYDROCODONE BITARTRATE AND ACETAMINOPHEN 1 TABLET: 10; 325 TABLET ORAL at 09:35

## 2023-07-22 RX ADMIN — ACETAMINOPHEN 650 MG: 325 TABLET, FILM COATED ORAL at 09:30

## 2023-07-22 RX ADMIN — Medication 10 ML: at 09:31

## 2023-07-22 RX ADMIN — BUSPIRONE HYDROCHLORIDE 15 MG: 5 TABLET ORAL at 09:31

## 2023-07-22 RX ADMIN — LEVOTHYROXINE SODIUM 112 MCG: 112 TABLET ORAL at 06:55

## 2023-07-22 RX ADMIN — BUSPIRONE HYDROCHLORIDE 15 MG: 5 TABLET ORAL at 12:51

## 2023-07-22 NOTE — DISCHARGE SUMMARY
[facility] Medicine Services  DISCHARGE SUMMARY       Date of Admission: 7/20/2023  Date of Discharge:  7/22/2023  Primary Care Physician: Juan A Smith MD    Discharge Diagnoses:  Active Hospital Problems    Diagnosis     **Cerebellar stroke     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     Vazquez's esophagus     Primary hypertension     Chronic pain          Presenting Problem/History of Present Illness:  Confusion [R41.0]     Chief Complaint on Day of Discharge:   No complaint    History of Present Illness on Day of Discharge:   The patient is feeling well today with no subjective complaints.  She has been able to ambulate independently in her room and to the bathroom without difficulty.  Neurology has seen and evaluated the patient and agrees that she is stable for discharge home.  MRI scan of the brain shows evidence of bilateral cerebellar strokes.  The patient will continue on aspirin 81 mg daily as well as high intensity statin.  She is to follow-up with outpatient neurology in 1 month for review of Zio patch results and to discuss further treatment for headaches.    Hospital Course  Nichole Rinaldi is a 77-year-old female with a past medical history of anxiety/depression, Graves' disease, hypertensions, Vazquez's esophagus.  She presented to [facility] emergency department at daughter's request with concerns for stroke.  Last known well proximately 6 PM this evening.  Daughter Sari at bedside states her father called her stating her mother was extremely nauseated with dry heaves and was feeling extremely unwell.  When daughter arrived mother was not acting like herself.  She was having trouble answering questions as if she could not remember the answers.  She has had no difficulty with speech or swallowing.  She has no headache or vision changes.  She has no unilateral weaknesses.  Daughter states memory has improved since arrival and she  is hoping we can find a cause. Work-up has been without acute findings.patient has chronic pain syndrome and states she is in pain but it is at baseline.  She has no other complaints.  She will be admitted for TIA work-up.   Treatment Plan  1.  The patient will be admitted to Dr. Hdez's service here at [facility].   2.  Follow TIA/stroke protocol  3.  Home medications reviewed and restarted as appropriate  4.  DVT prophylaxis with SCDs  5.  Labs in a.m.  6.  MRI of the brain without contrast and echocardiogram in a.m.  7.  Consult neurology in a.m.    On the morning after admission, the patient was at her typical cognitive state.  She was noted to be on several medications which could induce altered mental status and cognitive change, specifically opiates for pain.  There is no evidence of focal neurologic deficit at the time of initial evaluation.  Neurology neurology evaluated the patient and reviewed the MRI scan noting small bilateral cerebellar CVAs.  CTA both the head and neck showed no intracranial lesion no flow-limiting stenosis in the neck.  Neurology has seen and evaluated the patient again today noting that she has no limitations in function.  She is stable for discharge home.  Zio patch has been placed and she will follow-up with neurology in 1 month for discussion of the results.  She is to follow-up with her PCP next week.    Consults:   Neurology:  Assessment/Plan:        Bihemispheric, cerebellar strokes likely etiology small vessel disease versus cardio-embolism  -CTAs open, no significant atherosclerosis.  Naive to antiplatelet, continue aspirin 81 daily,  -Transthoracic echo showed mild dilation of left atrium.  -Event monitor at discharge.  -PT OT speech can continue to work with the patient.  -Cardioembolic work-up pending               #2 Hypertension-normalize blood pressure goals  #4 Hyperlipidemia- LDL goal less than < 70. Start high intensity statin        Tele-neurology  "will continue to follow the patient.           Mickey Kinney MD      Result Review    Result Review:  I have personally reviewed the results from the time of this admission to 7/22/2023 12:37 CDT and agree with these findings:  []  Laboratory  []  Microbiology  []  Radiology  []  EKG/Telemetry   []  Cardiology/Vascular   []  Pathology  []  Old records  []  Other:    Condition on Discharge:    Stable and improved    Physical Exam on Discharge:  /51 (BP Location: Left arm, Patient Position: Sitting)   Pulse 60   Temp 98.2 °F (36.8 °C) (Oral)   Resp 16   Ht 147.3 cm (58\")   Wt 65.8 kg (145 lb)   SpO2 94%   BMI 30.31 kg/m²   Physical Exam     Constitutional:       Appearance: Normal appearance. She is normal weight.   HENT:      Head: Normocephalic and atraumatic.      Right Ear: External ear normal.      Left Ear: External ear normal.      Nose: Nose normal.      Mouth/Throat:      Mouth: Mucous membranes are moist.      Pharynx: Oropharynx is clear.   Eyes:      General: No scleral icterus.     Conjunctiva/sclera: Conjunctivae normal.   Cardiovascular:      Rate and Rhythm: Normal rate and regular rhythm.      Pulses: Normal pulses.   Pulmonary:      Effort: Pulmonary effort is normal. No respiratory distress.      Breath sounds: Normal breath sounds.   Abdominal:      General: Abdomen is flat.      Palpations: Abdomen is soft. There is no mass.   Musculoskeletal:         General: Normal range of motion.      Right lower leg: No edema.      Left lower leg: No edema.   Skin:     General: Skin is warm and dry.      Coloration: Skin is not pale.   Neurological:      General: No focal deficit present.      Mental Status: She is alert and oriented to person, place, and time. Mental status is at baseline.   Psychiatric:         Mood and Affect: Mood normal.         Judgment: Judgment normal.       Discharge Disposition:  Home or Self Care    Discharge Medications:     Discharge Medications        New " Medications        Instructions Start Date   aspirin 81 MG chewable tablet   81 mg, Oral, Daily   Start Date: July 23, 2023     atorvastatin 80 MG tablet  Commonly known as: LIPITOR   80 mg, Oral, Nightly             Continue These Medications        Instructions Start Date   amLODIPine 5 MG tablet  Commonly known as: NORVASC   5 mg, Oral, Daily      buPROPion 75 MG tablet  Commonly known as: WELLBUTRIN   75 mg, Oral, 2 Times Daily      busPIRone 15 MG tablet  Commonly known as: BUSPAR   15 mg, Oral, 4 Times Daily      escitalopram 20 MG tablet  Commonly known as: LEXAPRO   20 mg, Oral, Daily      HYDROcodone-acetaminophen  MG per tablet  Commonly known as: NORCO   1 tablet, Oral, Every 4 Hours PRN      levothyroxine 112 MCG tablet  Commonly known as: SYNTHROID, LEVOTHROID   112 mcg, Oral, Daily      Melatonin 10 MG tablet   10 mg, Oral, Daily      metoprolol succinate  MG 24 hr tablet  Commonly known as: TOPROL-XL   100 mg, Oral, Daily      naloxone 4 MG/0.1ML nasal spray  Commonly known as: NARCAN   1 spray, Nasal, As Needed, CALL 911. SPR CONTENTS OF ONE SPRAYER (0.1ML) INTO ONE NOSTRIL. REPEAT IN 2-3 MIN IF SYMPTOMS OF OPIOID EMERGENCY PERSIST, ALTERNATE NOSTRILS      omeprazole 40 MG capsule  Commonly known as: priLOSEC   40 mg, Oral, Daily      topiramate 50 MG tablet  Commonly known as: TOPAMAX   50 mg, Oral, Every 12 Hours      traZODone 100 MG tablet  Commonly known as: DESYREL   100 mg, Oral, Nightly             Stop These Medications      rosuvastatin 10 MG tablet  Commonly known as: CRESTOR              Discharge Diet:   Diet Instructions       Diet: Regular/House Diet; Regular Texture (IDDSI 7); Thin (IDDSI 0)      Discharge Diet: Regular/House Diet    Texture: Regular Texture (IDDSI 7)    Fluid Consistency: Thin (IDDSI 0)            Discharge Care Plan / Instructions:   Discharge home    Activity at Discharge:   Activity Instructions       Activity as Tolerated              Follow-up  Appointments:  Follow-up with PCP next week  Follow-up with neurology in 1 month    Electronically signed by Arnaud Ruiz DO, 07/22/23, 12:37 CDT.    Time: Discharge over 30 min    Part of this note may be an electronic transcription/translation of spoken language to printed text using the Dragon Dictation system.

## 2023-07-22 NOTE — PLAN OF CARE
Goal Outcome Evaluation:  Plan of Care Reviewed With: patient        Progress: improving  Outcome Evaluation: Medicated x1 for c/o pain, voiding, up ad jd, neuros intact, A&O, reg diet, IID, daughter at bedside.

## 2023-07-22 NOTE — PROGRESS NOTES
Neurology Progress Note      Chief Complaint:    Bilateral cerebellar infarcts    Subjective     Subjective:  Patient is feeling well without any complaints today.  She has been ambulating independently to the restroom and about her room without difficulty.      Past Medical History:   Diagnosis Date    Anxiety     Arthrosis     Vazquez's esophagus 2000    Depression     Fibromyalgia     Graves disease 1970    Hypertension      Past Surgical History:   Procedure Laterality Date    CARPAL TUNNEL RELEASE Bilateral 2010    ESSURE TUBAL LIGATION Bilateral 1977    EYE SURGERY      HERNIA REPAIR  1992    JOINT REPLACEMENT      REPLACEMENT TOTAL KNEE BILATERAL Bilateral 1998    TENDON REPAIR Right 2019    THYROIDECTOMY  1970     Family History   Problem Relation Age of Onset    No Known Problems Mother     No Known Problems Father      Social History     Tobacco Use    Smoking status: Never    Smokeless tobacco: Never   Vaping Use    Vaping Use: Never used   Substance Use Topics    Alcohol use: Never    Drug use: Yes     Types: Hydrocodone       Medications:  Current Facility-Administered Medications   Medication Dose Route Frequency Provider Last Rate Last Admin    acetaminophen (TYLENOL) tablet 650 mg  650 mg Oral Q4H PRN Araceli Newton APRN   650 mg at 07/22/23 0930    Or    acetaminophen (TYLENOL) suppository 650 mg  650 mg Rectal Q4H PRN Araceli Newton APRN        aspirin chewable tablet 81 mg  81 mg Oral Daily Araceli Newton APRN   81 mg at 07/22/23 0931    atorvastatin (LIPITOR) tablet 80 mg  80 mg Oral Nightly Araceli Newton APRN   80 mg at 07/21/23 2319    sennosides-docusate (PERICOLACE) 8.6-50 MG per tablet 1 tablet  1 tablet Oral Nightly PRN Araceli Newton APRN        And    polyethylene glycol (MIRALAX) packet 17 g  17 g Oral Daily PRN Araceli Nweton APRN        And    bisacodyl (DULCOLAX) EC tablet 5 mg  5 mg Oral Daily PRN Araceli Newton APRN        And    bisacodyl (DULCOLAX)  suppository 10 mg  10 mg Rectal Daily PRN Araceli Newton, APRLAKEISHA        busPIRone (BUSPAR) tablet 15 mg  15 mg Oral 4x Daily Araceli Newton APRN   15 mg at 07/22/23 0931    escitalopram (LEXAPRO) tablet 20 mg  20 mg Oral Daily Araceli Newton, APRN   20 mg at 07/22/23 0931    HYDROcodone-acetaminophen (NORCO)  MG per tablet 1 tablet  1 tablet Oral Q4H PRN Araceli Newton, APRN   1 tablet at 07/22/23 0935    levothyroxine (SYNTHROID, LEVOTHROID) tablet 112 mcg  112 mcg Oral Q AM Araceli Newton, APRN   112 mcg at 07/22/23 0655    melatonin tablet 3 mg  3 mg Oral Nightly PRN Nikhil Amado,         ondansetron (ZOFRAN) tablet 4 mg  4 mg Oral Q6H PRN Araceli Newton APRN        Or    ondansetron (ZOFRAN) injection 4 mg  4 mg Intravenous Q6H PRN Araceli Newton, APRN   4 mg at 07/21/23 1707    pantoprazole (PROTONIX) EC tablet 40 mg  40 mg Oral Q AM Araceli Newton, APRN   40 mg at 07/22/23 0655    sodium chloride 0.9 % flush 10 mL  10 mL Intravenous Q12H Araceli Newton, APRN   10 mL at 07/22/23 0931    sodium chloride 0.9 % flush 10 mL  10 mL Intravenous PRN Araceli Newton, APRN   10 mL at 07/21/23 0509    sodium chloride 0.9 % infusion 40 mL  40 mL Intravenous PRN Araceli Newton, APRN           Allergies:    Aripiprazole, Codeine, and Morphine      Objective      Vital Signs  Temp:  [97.5 °F (36.4 °C)-98.2 °F (36.8 °C)] 98.2 °F (36.8 °C)  Heart Rate:  [60-72] 60  Resp:  [16] 16  BP: (116-141)/(44-62) 129/51    Physical Exam:    General Exam:  Head:  Normocephalic, atraumatic  HEENT:  Neck supple  CVS:  Regular rate and rhythm.    Lungs:  Clear.  No audible wheezing.  Abdomen:  Non-tender, Non-distended  Extremities:  No signs of peripheral edema  Skin:  No rashes      Neurologic Exam:  Mental Status:    -Awake. Alert. Oriented to person, place & time.  -No word finding difficulties.  -No aphasia.  -No dysarthria.  -Follows simple commands.     CN II:  Full visual fields with  confrontation.  Pupils equally reactive to light.  CN III, IV, VI:  Extraocular muscles function intact with no nystagmus.  CN V:  Facial sensory is symmetric.  CN VII:  Facial motor symmetric.  CN VIII:  Gross hearing intact bilaterally.  CN IX/X:  Palate elevates symmetrically.  CN XI:  Shoulder shrug symmetric.  CN XII:  Tongue is midline on protrusion.     Motor: (strength out of 5:  1= minimal movement, 2 = movement in plane of gravity, 3 = movement against gravity, 4 = movement against some resistance, 5 = full strength)     -Right Upper Ext: Proximal: 5 Distal: 5  -Left Upper Ext: Proximal: 5 Distal: 5    -Right Lower Ext: Proximal: 5 Distal: 5  -Left Lower Ext: Proximal: 5 Distal: 5       Deep Tendon Reflexes:  -Right              Biceps: 2+         Triceps: 2+      Brachioradialis: 2+              Patella: 2+       Ankle: 2+           -Left              Biceps: 2+         Triceps: 2+      Brachioradialis: 2+              Patella: 2+       Ankle: 2+             Tone (Modified Syed Scale):  No appreciable increase in tone or rigidity noted.     Sensory:  -Intact to light touch, pinprick BUE (C5-T1) and BLE (L2-S1).     Coordination:  -Finger to nose intact BUEs  -Heel to shin intact BLEs  -No ataxia     Gait  -No signs of ataxia  -ambulates unassisted       Results Review:    I reviewed the patient's new clinical results and findings.    Lab Results (last 24 hours)       ** No results found for the last 24 hours. **            Imaging Results (Last 24 Hours)       Procedure Component Value Units Date/Time    MRI Brain Without Contrast [[id number]] Collected: 07/21/23 1154     Updated: 07/21/23 1226    Narrative:      MRI BRAIN WO CONTRAST- 7/21/2023 11:13 AM CDT     History: Stroke, follow up; R41.82-Altered mental status, unspecified;  R41.0-Disorientation, unspecified     Technique: Multisequence, multiplanar MRI of the brain without contrast.     Comparison: None     Findings:      There is a tiny acute  subcortical infarct in the left occipital lobe.  There are additional tiny infarcts in both cerebellar hemispheres. No  intra-axial or extra-axial hemorrhage. No intracranial mass lesion or  mass effect. The ventricles, cortical sulci and basal cisterns are  symmetric and age appropriate. Posterior fossa structures are  unremarkable. Pituitary gland and sella are unremarkable. The major  intracranial flow-voids are preserved. Orbital contents are  unremarkable. The paranasal sinuses are clear. Mastoid air cells are  clear. Normal bone marrow signal.        Impression:      Impression:     1. There are several tiny foci of acute ischemia, left occipital lobe as  well as both cerebellar hemispheres.     This report was finalized on 07/21/2023 12:23 by Dr Kody Champagne, .            Assessment/Plan     Hospital Problem List      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    Vazquez's esophagus    Chronic pain    Primary hypertension      Impression    Bilateral cerebellar strokes   Chronic headache-occipital neuralgia, cervical myofascial pain    Plan    Patient's LDL is 60.  Patient's hemoglobin A1c is 5.3%  Patient takes Excedrin Migraine fairly regularly due to chronic headaches, but does not otherwise take aspirin on a daily basis consistently.  Continue aspirin 81 mg daily as monotherapy antiplatelet  Patient does have mild dilatation of the left atrium on echocardiogram giving rise to suspicion for underlying atrial dysrhythmia.  We will discharge on 14-day Zio patch  Follow-up with outpatient neurology in 1 month for follow-up of her strokes, review of the Zio patch results and to further discuss headache treatment as she likely has a component of medication overuse headache.  Neurology will sign off.  Follow-up in 1 month.      Today, I personally examined the patient and obtained history along with the assistance of Dr. Mickey Kinney, neurologist, via telemedicine.      Vishnu Jenkins,  KARL  07/22/23  10:55 CDT

## 2023-07-26 ENCOUNTER — READMISSION MANAGEMENT (OUTPATIENT)
Dept: CALL CENTER | Facility: HOSPITAL | Age: 78
End: 2023-07-26
Payer: MEDICARE

## 2023-07-26 NOTE — OUTREACH NOTE
Stroke Week 1 Survey      Flowsheet Row Responses   [facility] patient discharged from? [place]   Does the patient have one of the following disease processes/diagnoses(primary or secondary)? Stroke   Week 1 attempt successful? Yes   Call start time 1118   Call end time 1132   Discharge diagnosis stroke   Meds reviewed with patient/caregiver? Yes   Is the patient having any side effects they believe may be caused by any medication additions or changes? No   Does the patient have all medications ordered at discharge? Yes   Is the patient taking all medications as directed (includes completed medication regime)? No   What is preventing the patient from taking all medications as directed? --  [pt reports she can not swallow Lipitor, she is continuing to take her crestor until f/u with PCP tomorrow. will try to get it in chewable form]   Does the patient have a primary care provider?  Yes   Does the patient have an appointment with their PCP within 7 days of discharge? Yes   Comments regarding PCP 7/27/23   Has the patient kept scheduled appointments due by today? N/A   Has home health visited the patient within 72 hours of discharge? N/A   What DME was ordered? Pt reports she was supposed to come home with a heart monitor and did not. Sent a CM note   DME comments Pt uses a cane prn for balance   Psychosocial issues? No   Does the patient require any assistance with activities of daily living such as eating, bathing, dressing, walking, etc.? No   Does the patient have any residual symptoms from stroke/TIA? No   Did the patient receive a copy of their discharge instructions? Yes   Nursing interventions Reviewed instructions with patient   What is the patient's perception of their health status since discharge? Improving  [weak]   Nursing interventions Nurse provided patient education   Is the patient/caregiver able to teach back the risk factors for a stroke? Carotid or other artery disease, History of TIAs   Is  the patient/caregiver able to teach back the hierarchy of who to call/visit for symptoms/problems? PCP, Specialist, Home health nurse, Urgent Care, ED, 911 Yes   Is the patient able to teach back FAST for Stroke? B alance: Watch for sudden loss of balance, E yes: Check for vision loss, F ace: Look for an uneven smile, A rm: Check if one arm is weak, S peech: Listen for slurred speech, T quinn: Call 9-1-1 right away   Week 1 call completed? Yes   Is the patient interested in additional calls from an ambulatory ?  NOTE:  applies to high risk patients requiring additional follow-up. No   Would this patient benefit from a Referral to [facility] Social Work? No   Call end time [id number]            Angella HUGHES - Registered Nurse

## 2023-07-27 ENCOUNTER — LAB (OUTPATIENT)
Dept: LAB | Facility: HOSPITAL | Age: 78
End: 2023-07-27
Payer: MEDICARE

## 2023-07-27 ENCOUNTER — HOSPITAL ENCOUNTER (OUTPATIENT)
Dept: GENERAL RADIOLOGY | Facility: HOSPITAL | Age: 78
Discharge: HOME | End: 2023-07-27
Payer: MEDICARE

## 2023-07-27 ENCOUNTER — OFFICE VISIT (OUTPATIENT)
Dept: FAMILY MEDICINE CLINIC | Facility: CLINIC | Age: 78
End: 2023-07-27
Payer: MEDICARE

## 2023-07-27 VITALS
HEIGHT: 58 IN | SYSTOLIC BLOOD PRESSURE: 103 MMHG | WEIGHT: 142 LBS | RESPIRATION RATE: 20 BRPM | OXYGEN SATURATION: 96 % | DIASTOLIC BLOOD PRESSURE: 66 MMHG | TEMPERATURE: 97.3 F | HEART RATE: 64 BPM | BODY MASS INDEX: 29.81 KG/M2
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
## 2023-07-27 DIAGNOSIS — I63.449 CEREBROVASCULAR ACCIDENT (CVA) DUE TO EMBOLISM OF CEREBELLAR ARTERY, UNSPECIFIED BLOOD VESSEL LATERALITY: Primary | ICD-10-CM

## 2023-07-27 DIAGNOSIS — R00.2 PALPITATIONS: ICD-10-CM

## 2023-07-27 DIAGNOSIS — E55.9 VITAMIN D DEFICIENCY: ICD-10-CM

## 2023-07-27 DIAGNOSIS — E78.49 OTHER HYPERLIPIDEMIA: ICD-10-CM

## 2023-07-27 DIAGNOSIS — R53.83 OTHER FATIGUE: ICD-10-CM

## 2023-07-27 DIAGNOSIS — I34.0 NON-RHEUMATIC MITRAL REGURGITATION: ICD-10-CM

## 2023-07-27 DIAGNOSIS — I48.0 PAROXYSMAL ATRIAL FIBRILLATION: ICD-10-CM

## 2023-07-27 DIAGNOSIS — E05.00 GRAVES DISEASE: ICD-10-CM

## 2023-07-27 DIAGNOSIS — K21.9 GASTROESOPHAGEAL REFLUX DISEASE WITHOUT ESOPHAGITIS: ICD-10-CM

## 2023-07-27 DIAGNOSIS — F41.9 ANXIETY: ICD-10-CM

## 2023-07-27 DIAGNOSIS — F51.01 PRIMARY INSOMNIA: ICD-10-CM

## 2023-07-27 DIAGNOSIS — I10 PRIMARY HYPERTENSION: ICD-10-CM

## 2023-07-27 DIAGNOSIS — I42.1 HYPERTROPHIC OBSTRUCTIVE CARDIOMYOPATHY (HOCM): ICD-10-CM

## 2023-07-27 LAB
ALBUMIN SERPL-MCNC: 4.1 G/DL (ref 3.5–5)
ALBUMIN/GLOB SERPL: 1.3 G/DL (ref 1.1–2.5)
ALP SERPL-CCNC: 106 U/L (ref 24–120)
ALT SERPL W P-5'-P-CCNC: 18 U/L (ref 0–35)
ANION GAP SERPL CALCULATED.3IONS-SCNC: 10 MMOL/L (ref 4–13)
AST SERPL-CCNC: 28 U/L (ref 7–45)
AUTO MIXED CELLS #: 1 10*3/MM3 (ref 0.1–2.6)
AUTO MIXED CELLS %: 12 % (ref 0.1–24)
BACTERIA UR QL AUTO: ABNORMAL /HPF
BILIRUB SERPL-MCNC: 0.2 MG/DL (ref 0.1–1)
BILIRUB UR QL STRIP: NEGATIVE
BUN SERPL-MCNC: 12 MG/DL (ref 5–21)
BUN/CREAT SERPL: 16
CALCIUM SPEC-SCNC: 9.2 MG/DL (ref 8.4–10.4)
CHLORIDE SERPL-SCNC: 101 MMOL/L (ref 98–110)
CHOLEST SERPL-MCNC: 129 MG/DL (ref 130–200)
CLARITY UR: CLEAR
CO2 SERPL-SCNC: 28 MMOL/L (ref 24–31)
COLOR UR: YELLOW
CREAT SERPL-MCNC: 0.75 MG/DL (ref 0.5–1.4)
EGFRCR SERPLBLD CKD-EPI 2021: 82.6 ML/MIN/1.73
ERYTHROCYTE [DISTWIDTH] IN BLOOD BY AUTOMATED COUNT: 12.6 % (ref 12.3–15.4)
GLOBULIN UR ELPH-MCNC: 3.2 GM/DL
GLUCOSE SERPL-MCNC: 96 MG/DL (ref 70–100)
GLUCOSE UR STRIP-MCNC: NEGATIVE MG/DL
HCT VFR BLD AUTO: 39.5 % (ref 34–46.6)
HDLC SERPL-MCNC: 53 MG/DL
HGB BLD-MCNC: 13.4 G/DL (ref 12–15.9)
HGB UR QL STRIP.AUTO: NEGATIVE
HYALINE CASTS UR QL AUTO: ABNORMAL /LPF
KETONES UR QL STRIP: NEGATIVE
LDLC SERPL CALC-MCNC: 52 MG/DL (ref 0–99)
LDLC/HDLC SERPL: 0.9 {RATIO}
LEUKOCYTE ESTERASE UR QL STRIP.AUTO: ABNORMAL
LYMPHOCYTES # BLD AUTO: 2.7 10*3/MM3 (ref 0.7–3.1)
LYMPHOCYTES NFR BLD AUTO: 31.1 % (ref 19.6–45.3)
MCH RBC QN AUTO: 33.4 PG (ref 26.6–33)
MCHC RBC AUTO-ENTMCNC: 33.9 G/DL (ref 31.5–35.7)
MCV RBC AUTO: 98.5 FL (ref 79–97)
NEUTROPHILS NFR BLD AUTO: 4.9 10*3/MM3 (ref 1.7–7)
NEUTROPHILS NFR BLD AUTO: 56.9 % (ref 42.7–76)
NITRITE UR QL STRIP: NEGATIVE
PH UR STRIP.AUTO: 7 [PH] (ref 5–8)
PLATELET # BLD AUTO: 234 10*3/MM3 (ref 140–450)
PMV BLD AUTO: 10.1 FL (ref 6–12)
POTASSIUM SERPL-SCNC: 4.3 MMOL/L (ref 3.5–5.3)
PROT SERPL-MCNC: 7.3 G/DL (ref 6.3–8.7)
PROT UR QL STRIP: NEGATIVE
RBC # BLD AUTO: 4.01 10*6/MM3 (ref 3.77–5.28)
RBC # UR STRIP: ABNORMAL /HPF
REF LAB TEST METHOD: ABNORMAL
SODIUM SERPL-SCNC: 139 MMOL/L (ref 135–145)
SP GR UR STRIP: <=1.005 (ref 1–1.03)
SQUAMOUS #/AREA URNS HPF: ABNORMAL /HPF
TRIGL SERPL-MCNC: 141 MG/DL (ref 0–149)
UROBILINOGEN UR QL STRIP: ABNORMAL
VLDLC SERPL-MCNC: 24 MG/DL (ref 5–40)
WBC # UR STRIP: ABNORMAL /HPF
WBC NRBC COR # BLD: 8.6 10*3/MM3 (ref 3.4–10.8)

## 2023-07-27 PROCEDURE — 87086 URINE CULTURE/COLONY COUNT: CPT

## 2023-07-27 PROCEDURE — 72220 X-RAY EXAM SACRUM TAILBONE: CPT

## 2023-07-27 PROCEDURE — 80061 LIPID PANEL: CPT

## 2023-07-27 PROCEDURE — 36415 COLL VENOUS BLD VENIPUNCTURE: CPT

## 2023-07-27 PROCEDURE — 82607 VITAMIN B-12: CPT

## 2023-07-27 PROCEDURE — 81001 URINALYSIS AUTO W/SCOPE: CPT

## 2023-07-27 PROCEDURE — 82306 VITAMIN D 25 HYDROXY: CPT

## 2023-07-27 PROCEDURE — 84144 ASSAY OF PROGESTERONE: CPT

## 2023-07-27 PROCEDURE — 82670 ASSAY OF TOTAL ESTRADIOL: CPT

## 2023-07-27 PROCEDURE — 80053 COMPREHEN METABOLIC PANEL: CPT

## 2023-07-27 PROCEDURE — 87186 SC STD MICRODIL/AGAR DIL: CPT

## 2023-07-27 PROCEDURE — 85025 COMPLETE CBC W/AUTO DIFF WBC: CPT

## 2023-07-27 PROCEDURE — 84403 ASSAY OF TOTAL TESTOSTERONE: CPT

## 2023-07-27 PROCEDURE — 84443 ASSAY THYROID STIM HORMONE: CPT

## 2023-07-27 PROCEDURE — 87077 CULTURE AEROBIC IDENTIFY: CPT

## 2023-07-27 PROCEDURE — 82626 DEHYDROEPIANDROSTERONE: CPT

## 2023-07-27 RX ORDER — ESCITALOPRAM OXALATE 20 MG/1
20 TABLET ORAL DAILY
Qty: 90 TABLET | Refills: 3 | Status: SHIPPED | OUTPATIENT
Start: 2023-07-27

## 2023-07-27 RX ORDER — METOPROLOL SUCCINATE 100 MG/1
100 TABLET, EXTENDED RELEASE ORAL DAILY
Qty: 90 TABLET | Refills: 3 | Status: SHIPPED | OUTPATIENT
Start: 2023-07-27

## 2023-07-27 RX ORDER — LEVOTHYROXINE SODIUM 112 UG/1
112 TABLET ORAL DAILY
Qty: 90 TABLET | Refills: 1 | Status: SHIPPED | OUTPATIENT
Start: 2023-07-27

## 2023-07-27 RX ORDER — TRAZODONE HYDROCHLORIDE 100 MG/1
100 TABLET ORAL NIGHTLY
Qty: 90 TABLET | Refills: 1 | Status: SHIPPED | OUTPATIENT
Start: 2023-07-27

## 2023-07-27 RX ORDER — BUSPIRONE HYDROCHLORIDE 15 MG/1
15 TABLET ORAL 4 TIMES DAILY
Qty: 360 TABLET | Refills: 3 | Status: SHIPPED | OUTPATIENT
Start: 2023-07-27

## 2023-07-27 RX ORDER — ONDANSETRON 4 MG/1
4 TABLET, ORALLY DISINTEGRATING ORAL EVERY 8 HOURS PRN
Status: CANCELLED | OUTPATIENT
Start: 2023-07-27

## 2023-07-27 RX ORDER — ROSUVASTATIN CALCIUM 10 MG/1
10 TABLET, COATED ORAL DAILY
Qty: 90 TABLET | Refills: 1 | Status: SHIPPED | OUTPATIENT
Start: 2023-07-27

## 2023-07-27 RX ORDER — AMLODIPINE BESYLATE 5 MG/1
5 TABLET ORAL DAILY
Qty: 90 TABLET | Refills: 1 | Status: SHIPPED | OUTPATIENT
Start: 2023-07-27

## 2023-07-27 RX ORDER — ONDANSETRON 4 MG/1
4 TABLET, ORALLY DISINTEGRATING ORAL EVERY 8 HOURS PRN
Qty: 9 TABLET | Refills: 5 | Status: SHIPPED | OUTPATIENT
Start: 2023-07-27 | End: 2023-07-27 | Stop reason: SDUPTHER

## 2023-07-27 RX ORDER — OMEPRAZOLE 40 MG/1
40 CAPSULE, DELAYED RELEASE ORAL DAILY
Qty: 90 CAPSULE | Refills: 3 | Status: SHIPPED | OUTPATIENT
Start: 2023-07-27

## 2023-07-27 RX ORDER — ONDANSETRON 4 MG/1
4 TABLET, ORALLY DISINTEGRATING ORAL EVERY 8 HOURS PRN
Qty: 90 TABLET | Refills: 5 | Status: SHIPPED | OUTPATIENT
Start: 2023-07-27

## 2023-07-27 RX ORDER — BUPROPION HYDROCHLORIDE 75 MG/1
75 TABLET ORAL 2 TIMES DAILY
Qty: 180 TABLET | Refills: 1 | Status: SHIPPED | OUTPATIENT
Start: 2023-07-27 | End: 2023-10-25

## 2023-07-27 NOTE — PROGRESS NOTES
Transitional Care Follow Up Visit  Subjective     Nichole Rinaldi is a 77 y.o. female who presents for a transitional care management visit.    Within 48 business hours after discharge our office contacted her via telephone to coordinate her care and needs.      I reviewed and discussed the details of that call along with the discharge summary, hospital problems, inpatient lab results, inpatient diagnostic studies, and consultation reports with Nichole.     Current outpatient and discharge medications have been reconciled for the patient.  Reviewed by: ABHAY Gold           No data to display              Risk for Readmission (LACE) Score: 4 (7/22/2023  5:01 AM)      History of Present Illness  Pt is here today for a hospital f/u.  Pt is also requesting refills for medications.  Pt reports hospital told her to stop taking trazodone and prescribe her Lipitor.  Pt states she was taking Crestor and preferred that medication.  Pt is also having trouble sleeping since stopping her Melatonin and Trazodone.  Requesting refills   Course During Hospital Stay:  copied from discharge noted    Hospital Course  Nichole Rinaldi is a 77-year-old female with a past medical history of anxiety/depression, Graves' disease, hypertensions, Vazquez's esophagus.  She presented to [facility] emergency department at daughter's request with concerns for stroke.  Last known well proximately 6 PM this evening.  Daughter Sari at bedside states her father called her stating her mother was extremely nauseated with dry heaves and was feeling extremely unwell.  When daughter arrived mother was not acting like herself.  She was having trouble answering questions as if she could not remember the answers.  She has had no difficulty with speech or swallowing.  She has no headache or vision changes.  She has no unilateral weaknesses.  Daughter states memory has improved since arrival and she is hoping we can find a cause.  Work-up has been without acute findings.patient has chronic pain syndrome and states she is in pain but it is at baseline.  She has no other complaints.  She will be admitted for TIA work-up.   Treatment Plan  1.  The patient will be admitted to Dr. Hdez's service here at [facility].   2.  Follow TIA/stroke protocol  3.  Home medications reviewed and restarted as appropriate  4.  DVT prophylaxis with SCDs  5.  Labs in a.m.  6.  MRI of the brain without contrast and echocardiogram in a.m.  7.  Consult neurology in a.m.     On the morning after admission, the patient was at her typical cognitive state.  She was noted to be on several medications which could induce altered mental status and cognitive change, specifically opiates for pain.  There is no evidence of focal neurologic deficit at the time of initial evaluation.  Neurology neurology evaluated the patient and reviewed the MRI scan noting small bilateral cerebellar CVAs.  CTA both the head and neck showed no intracranial lesion no flow-limiting stenosis in the neck.  Neurology has seen and evaluated the patient again today noting that she has no limitations in function.  She is stable for discharge home.  Zio patch has been placed and she will follow-up with neurology in 1 month for discussion of the results.  She is to follow-up with her PCP next week.       The following portions of the patient's history were reviewed and updated as appropriate: allergies, current medications, past family history, past medical history, past social history, past surgical history, and problem list.    Review of Systems    SANDER: Over the last two weeks, how often have you been bothered by the following problems?  Feeling nervous, anxious or on edge: Several days  Not being able to stop or control worrying: Not at all  Worrying too much about different things: Not at all  Trouble Relaxing: Not at all  Being so restless that it is hard to sit still: Not at  all  Becoming easily annoyed or irritable: Not at all  Feeling afraid as if something awful might happen: Not at all  SANDER 7 Total Score: 1  If you checked any problems, how difficult have these problems made it for you to do your work, take care of things at home, or get along with other people: Not difficult at all  PHQ-2 Total Score:    PHQ-9 Total Score:      Objective   Physical Exam  Vitals and nursing note reviewed.   Constitutional:       Appearance: Normal appearance. She is well-developed.   HENT:      Head: Normocephalic and atraumatic.      Right Ear: Tympanic membrane, ear canal and external ear normal.      Left Ear: Tympanic membrane, ear canal and external ear normal.      Nose: Nose normal. No septal deviation, nasal tenderness or congestion.      Mouth/Throat:      Lips: Pink. No lesions.      Mouth: Mucous membranes are moist. No oral lesions.      Dentition: Normal dentition.      Pharynx: Oropharynx is clear. No pharyngeal swelling, oropharyngeal exudate or posterior oropharyngeal erythema.   Eyes:      General: Lids are normal. Vision grossly intact. No scleral icterus.        Right eye: No discharge.         Left eye: No discharge.      Extraocular Movements: Extraocular movements intact.      Conjunctiva/sclera: Conjunctivae normal.      Right eye: Right conjunctiva is not injected.      Left eye: Left conjunctiva is not injected.      Pupils: Pupils are equal, round, and reactive to light.   Neck:      Thyroid: No thyroid mass.      Trachea: Trachea normal.   Cardiovascular:      Rate and Rhythm: Normal rate and regular rhythm.      Heart sounds: Normal heart sounds. Murmur heard.     No gallop.   Pulmonary:      Effort: Pulmonary effort is normal.      Breath sounds: Normal breath sounds and air entry. No wheezing, rhonchi or rales.   Musculoskeletal:         General: No tenderness or deformity. Normal range of motion.      Cervical back: Full passive range of motion without pain, normal  range of motion and neck supple.      Thoracic back: Normal.      Right lower leg: No edema.      Left lower leg: No edema.   Skin:     General: Skin is warm and dry.      Coloration: Skin is not jaundiced.      Findings: No rash.   Neurological:      Mental Status: She is alert and oriented to person, place, and time.      Sensory: Sensation is intact.      Motor: Motor function is intact.      Coordination: Coordination is intact.      Gait: Gait is intact.      Deep Tendon Reflexes: Reflexes are normal and symmetric.   Psychiatric:         Mood and Affect: Mood and affect normal.         Behavior: Behavior normal.         Judgment: Judgment normal.       Assessment & Plan   Problem List Items Addressed This Visit          Cardiac and Vasculature    Hypertrophic obstructive cardiomyopathy (HOCM)    Relevant Medications    metoprolol succinate XL (TOPROL-XL) 100 MG 24 hr tablet    amLODIPine (NORVASC) 5 MG tablet    Other Relevant Orders    Ambulatory Referral to Cardiology    Non-rheumatic mitral regurgitation    Overview     Formatting of this note might be different from the original.  severe on cath 11/2015 followed at this time per   Per Echo 11/28/17 moderate per Dr. Aburto  Last Assessment & Plan:   Formatting of this note might be different from the original.  Continue to follow clinically.  No overt heart failure.  Last echocardiogram with modest regurgitation.         Relevant Medications    metoprolol succinate XL (TOPROL-XL) 100 MG 24 hr tablet    amLODIPine (NORVASC) 5 MG tablet    Other Relevant Orders    Ambulatory Referral to Cardiology    Other hyperlipidemia    Relevant Medications    rosuvastatin (Crestor) 10 MG tablet    Other Relevant Orders    Lipid Panel    Paroxysmal atrial fibrillation    Overview     Last Assessment & Plan:   Formatting of this note might be different from the original.  No evidence of recurrence on recent 30 day event monitor. Continue clinical monitoring  for recurrence.  Not on an anticoagulation regimen at this time.         Relevant Medications    metoprolol succinate XL (TOPROL-XL) 100 MG 24 hr tablet    amLODIPine (NORVASC) 5 MG tablet    Other Relevant Orders    Ambulatory Referral to Cardiology    Primary hypertension    Relevant Medications    metoprolol succinate XL (TOPROL-XL) 100 MG 24 hr tablet    amLODIPine (NORVASC) 5 MG tablet       Endocrine and Metabolic    Graves disease    Relevant Medications    metoprolol succinate XL (TOPROL-XL) 100 MG 24 hr tablet    levothyroxine (SYNTHROID, LEVOTHROID) 112 MCG tablet    Vitamin D deficiency    Relevant Orders    Vitamin D,25-Hydroxy       Gastrointestinal Abdominal     Gastroesophageal reflux disease without esophagitis    Relevant Medications    omeprazole (priLOSEC) 40 MG capsule       Mental Health    Anxiety    Relevant Medications    escitalopram (LEXAPRO) 20 MG tablet    busPIRone (BUSPAR) 15 MG tablet    buPROPion (WELLBUTRIN) 75 MG tablet       Sleep    Primary insomnia    Relevant Medications    traZODone (DESYREL) 100 MG tablet       Symptoms and Signs    Other fatigue    Relevant Orders    CBC Auto Differential (Completed)    Comprehensive Metabolic Panel    Lipid Panel    TSH    Urinalysis With Culture If Indicated - Urine, Clean Catch (Completed)    Vitamin D,25-Hydroxy    Vitamin B12    Progesterone    Testosterone    Estradiol    DHEA     Other Visit Diagnoses       Cerebrovascular accident (CVA) due to embolism of cerebellar artery, unspecified blood vessel laterality    -  Primary    Relevant Orders    Ambulatory Referral to Neurology    Ambulatory Referral to Cardiology    Palpitations        Relevant Orders    Holter Monitor - 72 Hour Up To 15 Days    Ambulatory Referral to Cardiology    Fall, initial encounter        Relevant Orders    XR Sacrum & Coccyx (In Office)           BMI is >= 25 and <30. (Overweight) The following options were offered after discussion;: weight loss educational  material (shared in after visit summary), exercise counseling/recommendations, and nutrition counseling/recommendations      Patient is here today for a hospital follow up. She was admitted with possible metabolic encephalopathy possibly related to hydrocodone. But her MRI ended up showing embolic strokes. From what I read, it looks like they think she threw a clot from the heart due to her cardiac history of afib. They started her on ASA at this time. I reviewed ekgs and the echo. There was no afib that I could see. There was possible interior infarct. Patient does have a significant  history of cardiomyopathy and stent placements that was completed in [place]. She has not ever established care with cardiology here. The d/c note also stated that they placed a zio patch on her prior to discharge, but she states that is not true. Will order at this time. Patient was also told to follow up with neurology in 1 month, but this too was not scheduled.   Daughter is with patient today and she states that the patient also fell a couple weeks back and landed on her buttocks. She has been sore ever since and it is getting worse. Wishes to have an xray at this time.   Daughter would also like hormone levels checked for fatigue.   Plan:  Continue prilosec 40 mg, toprol 100mg, asa 81 mg, wellbutrin 75 mg, buspar 15 mg, lexapro 20 mg, and synthroid 112 mg  They told her to stop the trazodone in the hospital because it could cause confusion. But she is wanting to restart that at this time.   Patient was also changed from crestor 10mg to lipitor 80mg. She states that she cannot swallow the big pill. I told her I would change her back, but to discuss this with [facility] when she sees them.   Will refer to cardiology due to her significant history  Will also set patient up with a holter monitor since it was missed on discharge  Will refer to neurology for stroke   Will get labs today  Xray of coccyx today, encouraged donut use to  relieve pressure

## 2023-07-27 NOTE — PROGRESS NOTES
There is no fracture seen.   It is probably bruised from not only falling but being in a hospital bed.   Still use the donut cushion we discussed fore pressure relief

## 2023-07-28 ENCOUNTER — TELEPHONE (OUTPATIENT)
Dept: FAMILY MEDICINE CLINIC | Facility: CLINIC | Age: 78
End: 2023-07-28
Payer: MEDICARE

## 2023-07-28 DIAGNOSIS — A49.9 URINARY TRACT BACTERIAL INFECTIONS: Primary | ICD-10-CM

## 2023-07-28 DIAGNOSIS — N39.0 URINARY TRACT BACTERIAL INFECTIONS: Primary | ICD-10-CM

## 2023-07-28 LAB
25(OH)D3 SERPL-MCNC: 59.5 NG/ML (ref 30–100)
ESTRADIOL SERPL HS-MCNC: <5 PG/ML
PROGEST SERPL-MCNC: 0.17 NG/ML
TESTOST SERPL-MCNC: <2.5 NG/DL (ref 2.9–40.8)
TSH SERPL DL<=0.05 MIU/L-ACNC: 1.49 UIU/ML (ref 0.27–4.2)
VIT B12 BLD-MCNC: 527 PG/ML (ref 211–946)

## 2023-07-28 RX ORDER — NITROFURANTOIN 25; 75 MG/1; MG/1
100 CAPSULE ORAL 2 TIMES DAILY
Qty: 14 CAPSULE | Refills: 0 | Status: SHIPPED | OUTPATIENT
Start: 2023-07-28

## 2023-07-28 NOTE — PROGRESS NOTES
Testosterone is super low. Please send hormones to [facility] for eval  Cholesterol is good  Urine has 1+ bacteria. It could be contaminate, but I dont want her to go the weekend with out treatment just in case. I will send in an abx at this time  CMP good  Vitamin d is good  Vitamin b is good  TSH normal

## 2023-07-28 NOTE — TELEPHONE ENCOUNTER
Called patient and informed of Testosterone is super low. Please send hormones to [facility] for shwetha HINES. Patient agrees. Faxed.   Cholesterol is good  Urine has 1+ bacteria. It could be contaminate, but I dont want her to go the weekend with out treatment just in case. I will send in an abx at this time.  CMP good  Vitamin d is good  Vitamin b is good  TSH normal. DONNY.

## 2023-07-28 NOTE — TELEPHONE ENCOUNTER
----- Message from ABHAY Gold sent at 7/28/2023  8:06 AM CDT -----  Testosterone is super low. Please send hormones to [facility] for eval  Cholesterol is good  Urine has 1+ bacteria. It could be contaminate, but I dont want her to go the weekend with out treatment just in case. I will send in an abx at this time  CMP good  Vitamin d is good  Vitamin b is good  TSH normal

## 2023-07-30 LAB — BACTERIA SPEC AEROBE CULT: ABNORMAL

## 2023-07-31 LAB — DHEA SERPL-MCNC: <20 NG/DL (ref 21–402)

## 2023-08-07 ENCOUNTER — HOSPITAL ENCOUNTER (OUTPATIENT)
Dept: CARDIOLOGY | Facility: HOSPITAL | Age: 78
Discharge: HOME | End: 2023-08-07
Admitting: NURSE PRACTITIONER
Payer: MEDICARE

## 2023-08-07 DIAGNOSIS — R00.2 PALPITATIONS: ICD-10-CM

## 2023-08-07 PROCEDURE — 93246 EXT ECG>7D<15D RECORDING: CPT

## 2023-08-08 ENCOUNTER — TELEPHONE (OUTPATIENT)
Dept: FAMILY MEDICINE CLINIC | Facility: CLINIC | Age: 78
End: 2023-08-08
Payer: MEDICARE

## 2023-08-08 NOTE — TELEPHONE ENCOUNTER
Contacted pt, verified name and , informed of Enterococcus noted in urine. She was on macrobid. This shows good coverage. Pt vu of above and thanked staff.

## 2023-08-08 NOTE — TELEPHONE ENCOUNTER
----- Message from ABHAY Gold sent at 8/8/2023  9:36 AM CDT -----  Enterococcus noted in urine. She was on macrobid. This shows good coverage

## 2023-08-15 ENCOUNTER — TELEPHONE (OUTPATIENT)
Dept: FAMILY MEDICINE CLINIC | Facility: CLINIC | Age: 78
End: 2023-08-15
Payer: MEDICARE

## 2023-08-15 NOTE — TELEPHONE ENCOUNTER
Abimael from [employer] stopped by the  regarding a compound for Nichole. Abimael requested that we fax over hormone-related labs. Apparently [facility] is supposed to put together a compound for Nichole but they're unsure which one.

## 2023-09-05 ENCOUNTER — TELEPHONE (OUTPATIENT)
Dept: FAMILY MEDICINE CLINIC | Facility: CLINIC | Age: 78
End: 2023-09-05
Payer: MEDICARE

## 2023-09-05 NOTE — PROGRESS NOTES
Multiple runs of SVT- she should have an appointment with cardiology coming up. Just make sure she keeps this appointment

## 2023-09-05 NOTE — TELEPHONE ENCOUNTER
Called pt and confirmed . Relayed results and recommendations. Pt became tearful and asked what SVT means. Explained that it is an abnormally fast heart rhythm that is managed by cardiology. Reassured that the rhythm is common and that Dr. Fox would have answers for her at her apt today. Pt stated she was planning to reschedule but that she would go ahead and attend instead.     ----- Message from ABHAY Gold sent at 2023  8:52 AM CDT -----  Multiple runs of SVT- she should have an appointment with cardiology coming up. Just make sure she keeps this appointment

## 2023-09-07 ENCOUNTER — OFFICE VISIT (OUTPATIENT)
Dept: CARDIOLOGY | Facility: CLINIC | Age: 78
End: 2023-09-07
Payer: MEDICARE

## 2023-09-07 VITALS
HEIGHT: 58 IN | DIASTOLIC BLOOD PRESSURE: 70 MMHG | BODY MASS INDEX: 30.23 KG/M2 | SYSTOLIC BLOOD PRESSURE: 120 MMHG | OXYGEN SATURATION: 94 % | HEART RATE: 57 BPM | WEIGHT: 144 LBS

## 2023-09-07 DIAGNOSIS — I51.7 LEFT VENTRICULAR HYPERTROPHY: ICD-10-CM

## 2023-09-07 DIAGNOSIS — I47.1 PAROXYSMAL SVT (SUPRAVENTRICULAR TACHYCARDIA): ICD-10-CM

## 2023-09-07 DIAGNOSIS — I10 PRIMARY HYPERTENSION: ICD-10-CM

## 2023-09-07 DIAGNOSIS — I34.0 NON-RHEUMATIC MITRAL REGURGITATION: ICD-10-CM

## 2023-09-07 DIAGNOSIS — I48.0 PAROXYSMAL ATRIAL FIBRILLATION: Primary | ICD-10-CM

## 2023-09-07 DIAGNOSIS — E78.49 OTHER HYPERLIPIDEMIA: ICD-10-CM

## 2023-09-07 PROBLEM — I42.1 HYPERTROPHIC OBSTRUCTIVE CARDIOMYOPATHY (HOCM): Status: RESOLVED | Noted: 2022-06-13 | Resolved: 2023-09-07

## 2023-09-07 PROBLEM — I42.1 IHSS (IDIOPATHIC HYPERTROPHIC SUBAORTIC STENOSIS): Status: RESOLVED | Noted: 2022-06-13 | Resolved: 2023-09-07

## 2023-09-07 PROBLEM — I47.10 PAROXYSMAL SVT (SUPRAVENTRICULAR TACHYCARDIA): Status: ACTIVE | Noted: 2023-09-07

## 2023-09-07 PROBLEM — I25.10 CORONARY ARTERY DISEASE INVOLVING NATIVE CORONARY ARTERY OF NATIVE HEART: Status: RESOLVED | Noted: 2017-06-13 | Resolved: 2023-09-07

## 2023-09-07 RX ORDER — METOPROLOL SUCCINATE 50 MG/1
50 TABLET, EXTENDED RELEASE ORAL DAILY
Qty: 30 TABLET | Refills: 11 | Status: SHIPPED | OUTPATIENT
Start: 2023-09-07

## 2023-09-07 NOTE — PROGRESS NOTES
Reason for Visit: Palpitations.    HPI:  Nichole Rinaldi is a 77 y.o. female is being seen for consultation today at the request of ABHAY Gold.  She was recently admitted from 7/20/2023 through 7/22/2023 with toxic metabolic encephalopathy secondary to hydrocodone and a cerebellar stroke.  She was diagnosed with bihemispheric cerebellar strokes likely related to small vessel disease versus cardioembolism.    She previously followed with cardiology at [place] and was last seen on 10/22/2019.  Their note indicates evidence of paroxysmal atrial fibrillation with no evidence of recurrence on 30-day monitor and moderate mitral regurgitation.     She reports feeling anxious recently.  She is the caregiver for her  and is under a lot of stress.  She has intermittent palpitations, extreme fatigue, no energy.  Some days she has difficulty getting off the couch.      Previous Cardiac Testing and Procedures:  -Nuclear stress (6/24/2017) negative for ischemia, EF 73%  -Echo (11/28/2017) EF 55 to 60%, mild LA enlargement, moderate AI, moderate to severe MR  -Echo (6/18/2018) normal LV size and function, mild LVH, moderate LA enlargement, mild RA enlargement, mild to moderate MR and TR, mild PI, normal RVSP  -Echo (7/21/2023) EF > 70%, mild to moderate sigmoid septal hypertrophy, LVOT gradient 18 mmHg, normal RV size and function, mild LA enlargement, trace to mild MR and TR  -Holter monitor (6/7/2023) rare PACs and PVCs, multiple runs of SVT, symptoms correlated with sinus rhythm, PACs, and SVT    Lab data:  -Lipid panel (7/27/2023) total cholesterol 129, HDL 53, LDL 52, triglycerides 141  -BMP (7/27/2023) creatinine 0.75, potassium 4.3, sodium 139    Patient Active Problem List   Diagnosis    Osteoarthritis    Vazquez's esophagus    Graves disease    Bilateral hip pain    Bradycardia    Carpal tunnel syndrome on right    Chronic pain    Syncope, near    Depression    Herniated disc    Lumbar facet  arthropathy    Non-rheumatic mitral regurgitation    Other fatigue    Other hyperlipidemia    Paroxysmal atrial fibrillation    Trochanteric bursitis of both hips    Fibromyalgia    Obesity (BMI 30.0-34.9)    Anxiety    Primary hypertension    Primary insomnia    Gastroesophageal reflux disease without esophagitis    Vitamin D deficiency    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    Cerebellar stroke    Paroxysmal SVT (supraventricular tachycardia)       Social History     Tobacco Use    Smoking status: Never    Smokeless tobacco: Never   Vaping Use    Vaping Use: Never used   Substance Use Topics    Alcohol use: Never    Drug use: Yes     Types: Hydrocodone       Family History   Problem Relation Age of Onset    No Known Problems Mother     No Known Problems Father        The following portions of the patient's history were reviewed and updated as appropriate: allergies, current medications, past family history, past medical history, past social history, past surgical history, and problem list.      Current Outpatient Medications:     amLODIPine (NORVASC) 5 MG tablet, Take 1 tablet by mouth Daily., Disp: 90 tablet, Rfl: 1    buPROPion (WELLBUTRIN) 75 MG tablet, Take 1 tablet by mouth 2 (Two) Times a Day for 90 days., Disp: 180 tablet, Rfl: 1    busPIRone (BUSPAR) 15 MG tablet, Take 1 tablet by mouth 4 (Four) Times a Day., Disp: 360 tablet, Rfl: 3    escitalopram (LEXAPRO) 20 MG tablet, Take 1 tablet by mouth Daily., Disp: 90 tablet, Rfl: 3    HYDROcodone-acetaminophen (NORCO)  MG per tablet, Take 1 tablet by mouth Every 4 (Four) Hours As Needed., Disp: , Rfl:     levothyroxine (SYNTHROID, LEVOTHROID) 112 MCG tablet, Take 1 tablet by mouth Daily., Disp: 90 tablet, Rfl: 1    metoprolol succinate XL (TOPROL-XL) 50 MG 24 hr tablet, Take 1 tablet by mouth Daily., Disp: 30 tablet, Rfl: 11    rosuvastatin (Crestor) 10 MG tablet, Take 1 tablet by mouth Daily., Disp: 90 tablet, Rfl: 1    traZODone  "(DESYREL) 100 MG tablet, Take 1 tablet by mouth Every Night., Disp: 90 tablet, Rfl: 1    apixaban (ELIQUIS) 5 MG tablet tablet, Take 1 tablet by mouth 2 (Two) Times a Day., Disp: 60 tablet, Rfl: 11    naloxone (NARCAN) 4 MG/0.1ML nasal spray, 1 spray into the nostril(s) as directed by provider As Needed (opiod overdose). CALL 911. SPR CONTENTS OF ONE SPRAYER (0.1ML) INTO ONE NOSTRIL. REPEAT IN 2-3 MIN IF SYMPTOMS OF OPIOID EMERGENCY PERSIST, ALTERNATE NOSTRILS (Patient not taking: Reported on 9/7/2023), Disp: , Rfl:     ondansetron ODT (ZOFRAN-ODT) 4 MG disintegrating tablet, Place 1 tablet on the tongue Every 8 (Eight) Hours As Needed for Nausea or Vomiting. (Patient not taking: Reported on 9/7/2023), Disp: 90 tablet, Rfl: 5    Review of Systems   Constitutional: Positive for malaise/fatigue. Negative for chills and fever.   Cardiovascular:  Positive for irregular heartbeat and palpitations. Negative for chest pain and paroxysmal nocturnal dyspnea.   Respiratory:  Positive for shortness of breath. Negative for cough.    Skin:  Negative for rash.   Musculoskeletal:  Positive for muscle weakness.   Gastrointestinal:  Negative for abdominal pain and heartburn.   Neurological:  Negative for dizziness and numbness.   Psychiatric/Behavioral:  The patient is nervous/anxious.      Objective   /70 (BP Location: Left arm, Patient Position: Sitting, Cuff Size: Adult)   Pulse 57   Ht 147.3 cm (58\")   Wt 65.3 kg (144 lb)   SpO2 94%   BMI 30.10 kg/m²   Constitutional:       Appearance: Well-developed.   HENT:      Head: Normocephalic and atraumatic.   Pulmonary:      Effort: Pulmonary effort is normal.      Breath sounds: Normal breath sounds.   Cardiovascular:      Bradycardia present. Regular rhythm.   Edema:     Peripheral edema absent.   Skin:     General: Skin is warm and dry.   Neurological:      Mental Status: Alert and oriented to person, place, and time.       ECG 12 Lead    Date/Time: 9/7/2023 2:36 " PM  Performed by: Obie Fox MD  Authorized by: Obie Fox MD   Comparison: compared with previous ECG from 7/20/2023  Comparison to previous ECG: Significant changes have occurred  Rhythm: sinus bradycardia  Q waves: V1 and V2        CHADS-VASc Risk Assessment              6 Total Score    1 Hypertension    2 Age >/= 75    2 PRIOR STROKE/TIA/THROMBO    1 Sex: Female        Criteria that do not apply:    CHF    DM    Vascular Disease    Age 65-74               ICD-10-CM ICD-9-CM   1. Paroxysmal atrial fibrillation  I48.0 427.31   2. Paroxysmal SVT (supraventricular tachycardia)  I47.1 427.0   3. Non-rheumatic mitral regurgitation  I34.0 424.0   4. Left ventricular hypertrophy  I51.7 429.3   5. Primary hypertension  I10 401.9   6. Other hyperlipidemia  E78.49 272.4         Assessment/Plan:  1.  Paroxysmal atrial fibrillation: Previously followed with cardiology at [place], but has not been seen since 2019.  Given her recent stroke and [id number] of 6, anticoagulation is strongly recommended.  Discontinue aspirin and start Eliquis.  Decrease metoprolol down to 50 mg due to fatigue and bradycardia.    2.  Paroxysmal SVT: Multivessel runs of nonsustained SVT seen on Holter monitor from 6/7/2023.  Most of these appear short and relatively benign, but some did correlate with symptoms.  Metoprolol decreased down to 50 mg due to fatigue and bradycardia.  These become more bothersome, antiarrhythmic therapy versus referral to EP could be considered.    3.  Mitral regurgitation: Echo from 7/21/2023 was reviewed and showed mild MR.  Prior echo from 6/18/2018 showed mild to moderate MR.  Continue to monitor.    4.  Left ventricular hypertrophy:  Mild to moderate sigmoid septal hypertrophy noted on echo from 7/21/2023.  Ventricular function was hyperdynamic during the echo which appeared to cause a very mild LVOT gradient of 18 mmHg.  These findings are not consistent with hokum.  No evidence of hokum was  reported on prior echoes either.    5.  Essential hypertension: Blood pressure is well controlled today on current medications.    6.  Hyperlipidemia: Lipid panel from 7/27/2023 showed good control.  Continue rosuvastatin.

## 2023-09-07 NOTE — LETTER
September 7, 2023     Juan A Smith MD  [address]  [address]  [place] [address]    Patient: Nichole Rinaldi   YOB: 1945   Date of Visit: 9/7/2023       Dear Juan A Smith MD,    Thank you for referring Nichole Rinaldi to me for evaluation. Below is a copy of my consult note.    If you have questions, please do not hesitate to call me. I look forward to following Nichole along with you.         Sincerely,        Obie Fox MD        CC: ABHAY Gold      Reason for Visit: Palpitations.    HPI:  Nichole Rinaldi is a 77 y.o. female is being seen for consultation today at the request of ABHAY Gold.  She was recently admitted from 7/20/2023 through 7/22/2023 with toxic metabolic encephalopathy secondary to hydrocodone and a cerebellar stroke.  She was diagnosed with bihemispheric cerebellar strokes likely related to small vessel disease versus cardioembolism.    She previously followed with cardiology at [place] and was last seen on 10/22/2019.  Their note indicates evidence of paroxysmal atrial fibrillation with no evidence of recurrence on 30-day monitor and moderate mitral regurgitation.     She reports feeling anxious recently.  She is the caregiver for her  and is under a lot of stress.  She has intermittent palpitations, extreme fatigue, no energy.  Some days she has difficulty getting off the couch.      Previous Cardiac Testing and Procedures:  -Nuclear stress (6/24/2017) negative for ischemia, EF 73%  -Echo (11/28/2017) EF 55 to 60%, mild LA enlargement, moderate AI, moderate to severe MR  -Echo (6/18/2018) normal LV size and function, mild LVH, moderate LA enlargement, mild RA enlargement, mild to moderate MR and TR, mild PI, normal RVSP  -Echo (7/21/2023) EF > 70%, mild to moderate sigmoid septal hypertrophy, LVOT gradient 18 mmHg, normal RV size and function, mild LA enlargement, trace to mild MR and TR  -Holter monitor (6/7/2023) rare PACs and  PVCs, multiple runs of SVT, symptoms correlated with sinus rhythm, PACs, and SVT    Lab data:  -Lipid panel (7/27/2023) total cholesterol 129, HDL 53, LDL 52, triglycerides 141  -BMP (7/27/2023) creatinine 0.75, potassium 4.3, sodium 139    Patient Active Problem List   Diagnosis   • Osteoarthritis   • Vazquez's esophagus   • Graves disease   • Bilateral hip pain   • Bradycardia   • Carpal tunnel syndrome on right   • Chronic pain   • Syncope, near   • Depression   • Herniated disc   • Lumbar facet arthropathy   • Non-rheumatic mitral regurgitation   • Other fatigue   • Other hyperlipidemia   • Paroxysmal atrial fibrillation   • Trochanteric bursitis of both hips   • Fibromyalgia   • Obesity (BMI 30.0-34.9)   • Anxiety   • Primary hypertension   • Primary insomnia   • Gastroesophageal reflux disease without esophagitis   • Vitamin D deficiency   • 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   • Cerebellar stroke   • Paroxysmal SVT (supraventricular tachycardia)       Social History     Tobacco Use   • Smoking status: Never   • Smokeless tobacco: Never   Vaping Use   • Vaping Use: Never used   Substance Use Topics   • Alcohol use: Never   • Drug use: Yes     Types: Hydrocodone       Family History   Problem Relation Age of Onset   • No Known Problems Mother    • No Known Problems Father        The following portions of the patient's history were reviewed and updated as appropriate: allergies, current medications, past family history, past medical history, past social history, past surgical history, and problem list.      Current Outpatient Medications:   •  amLODIPine (NORVASC) 5 MG tablet, Take 1 tablet by mouth Daily., Disp: 90 tablet, Rfl: 1  •  buPROPion (WELLBUTRIN) 75 MG tablet, Take 1 tablet by mouth 2 (Two) Times a Day for 90 days., Disp: 180 tablet, Rfl: 1  •  busPIRone (BUSPAR) 15 MG tablet, Take 1 tablet by mouth 4 (Four) Times a Day., Disp: 360 tablet, Rfl: 3  •  escitalopram (LEXAPRO) 20 MG  tablet, Take 1 tablet by mouth Daily., Disp: 90 tablet, Rfl: 3  •  HYDROcodone-acetaminophen (NORCO)  MG per tablet, Take 1 tablet by mouth Every 4 (Four) Hours As Needed., Disp: , Rfl:   •  levothyroxine (SYNTHROID, LEVOTHROID) 112 MCG tablet, Take 1 tablet by mouth Daily., Disp: 90 tablet, Rfl: 1  •  metoprolol succinate XL (TOPROL-XL) 50 MG 24 hr tablet, Take 1 tablet by mouth Daily., Disp: 30 tablet, Rfl: 11  •  rosuvastatin (Crestor) 10 MG tablet, Take 1 tablet by mouth Daily., Disp: 90 tablet, Rfl: 1  •  traZODone (DESYREL) 100 MG tablet, Take 1 tablet by mouth Every Night., Disp: 90 tablet, Rfl: 1  •  apixaban (ELIQUIS) 5 MG tablet tablet, Take 1 tablet by mouth 2 (Two) Times a Day., Disp: 60 tablet, Rfl: 11  •  naloxone (NARCAN) 4 MG/0.1ML nasal spray, 1 spray into the nostril(s) as directed by provider As Needed (opiod overdose). CALL 911. SPR CONTENTS OF ONE SPRAYER (0.1ML) INTO ONE NOSTRIL. REPEAT IN 2-3 MIN IF SYMPTOMS OF OPIOID EMERGENCY PERSIST, ALTERNATE NOSTRILS (Patient not taking: Reported on 9/7/2023), Disp: , Rfl:   •  ondansetron ODT (ZOFRAN-ODT) 4 MG disintegrating tablet, Place 1 tablet on the tongue Every 8 (Eight) Hours As Needed for Nausea or Vomiting. (Patient not taking: Reported on 9/7/2023), Disp: 90 tablet, Rfl: 5    Review of Systems   Constitutional: Positive for malaise/fatigue. Negative for chills and fever.   Cardiovascular:  Positive for irregular heartbeat and palpitations. Negative for chest pain and paroxysmal nocturnal dyspnea.   Respiratory:  Positive for shortness of breath. Negative for cough.    Skin:  Negative for rash.   Musculoskeletal:  Positive for muscle weakness.   Gastrointestinal:  Negative for abdominal pain and heartburn.   Neurological:  Negative for dizziness and numbness.   Psychiatric/Behavioral:  The patient is nervous/anxious.      Objective  /70 (BP Location: Left arm, Patient Position: Sitting, Cuff Size: Adult)   Pulse 57   Ht 147.3 cm  "(58\")   Wt 65.3 kg (144 lb)   SpO2 94%   BMI 30.10 kg/m²   Constitutional:       Appearance: Well-developed.   HENT:      Head: Normocephalic and atraumatic.   Pulmonary:      Effort: Pulmonary effort is normal.      Breath sounds: Normal breath sounds.   Cardiovascular:      Bradycardia present. Regular rhythm.   Edema:     Peripheral edema absent.   Skin:     General: Skin is warm and dry.   Neurological:      Mental Status: Alert and oriented to person, place, and time.       ECG 12 Lead    Date/Time: 9/7/2023 2:36 PM  Performed by: Obie Fox MD  Authorized by: Obie Fox MD   Comparison: compared with previous ECG from 7/20/2023  Comparison to previous ECG: Significant changes have occurred  Rhythm: sinus bradycardia  Q waves: V1 and V2        CHADS-VASc Risk Assessment              6 Total Score    1 Hypertension    2 Age >/= 75    2 PRIOR STROKE/TIA/THROMBO    1 Sex: Female        Criteria that do not apply:    CHF    DM    Vascular Disease    Age 65-74               ICD-10-CM ICD-9-CM   1. Paroxysmal atrial fibrillation  I48.0 427.31   2. Paroxysmal SVT (supraventricular tachycardia)  I47.1 427.0   3. Non-rheumatic mitral regurgitation  I34.0 424.0   4. Left ventricular hypertrophy  I51.7 429.3   5. Primary hypertension  I10 401.9   6. Other hyperlipidemia  E78.49 272.4         Assessment/Plan:  1.  Paroxysmal atrial fibrillation: Previously followed with cardiology at [place], but has not been seen since 2019.  Given her recent stroke and [id number] of 6, anticoagulation is strongly recommended.  Discontinue aspirin and start Eliquis.  Decrease metoprolol down to 50 mg due to fatigue and bradycardia.    2.  Paroxysmal SVT: Multivessel runs of nonsustained SVT seen on Holter monitor from 6/7/2023.  Most of these appear short and relatively benign, but some did correlate with symptoms.  Metoprolol decreased down to 50 mg due to fatigue and bradycardia.  These become more bothersome, " antiarrhythmic therapy versus referral to EP could be considered.    3.  Mitral regurgitation: Echo from 7/21/2023 was reviewed and showed mild MR.  Prior echo from 6/18/2018 showed mild to moderate MR.  Continue to monitor.    4.  Left ventricular hypertrophy:  Mild to moderate sigmoid septal hypertrophy noted on echo from 7/21/2023.  Ventricular function was hyperdynamic during the echo which appeared to cause a very mild LVOT gradient of 18 mmHg.  These findings are not consistent with hokum.  No evidence of hokum was reported on prior echoes either.    5.  Essential hypertension: Blood pressure is well controlled today on current medications.    6.  Hyperlipidemia: Lipid panel from 7/27/2023 showed good control.  Continue rosuvastatin.

## 2023-09-08 DIAGNOSIS — R00.1 BRADYCARDIA: Primary | ICD-10-CM

## 2023-09-30 DIAGNOSIS — I10 PRIMARY HYPERTENSION: ICD-10-CM

## 2023-09-30 DIAGNOSIS — E05.00 GRAVES DISEASE: ICD-10-CM

## 2023-09-30 DIAGNOSIS — E78.49 OTHER HYPERLIPIDEMIA: ICD-10-CM

## 2023-10-02 ENCOUNTER — TELEPHONE (OUTPATIENT)
Dept: FAMILY MEDICINE CLINIC | Facility: CLINIC | Age: 78
End: 2023-10-02
Payer: MEDICARE

## 2023-10-02 RX ORDER — AMLODIPINE BESYLATE 5 MG/1
5 TABLET ORAL DAILY
Qty: 100 TABLET | Refills: 2 | OUTPATIENT
Start: 2023-10-02

## 2023-10-02 RX ORDER — ROSUVASTATIN CALCIUM 10 MG/1
10 TABLET, COATED ORAL DAILY
Qty: 100 TABLET | Refills: 2 | OUTPATIENT
Start: 2023-10-02

## 2023-10-02 RX ORDER — LEVOTHYROXINE SODIUM 112 UG/1
112 TABLET ORAL DAILY
Qty: 100 TABLET | Refills: 2 | OUTPATIENT
Start: 2023-10-02

## 2023-10-04 ENCOUNTER — HOSPITAL ENCOUNTER (OUTPATIENT)
Facility: HOSPITAL | Age: 78
Setting detail: OBSERVATION
Discharge: HOME | End: 2023-10-07
Attending: EMERGENCY MEDICINE | Admitting: HOSPITALIST
Payer: MEDICARE

## 2023-10-04 ENCOUNTER — APPOINTMENT (OUTPATIENT)
Dept: CT IMAGING | Facility: HOSPITAL | Age: 78
End: 2023-10-04
Payer: MEDICARE

## 2023-10-04 ENCOUNTER — APPOINTMENT (OUTPATIENT)
Dept: GENERAL RADIOLOGY | Facility: HOSPITAL | Age: 78
End: 2023-10-04
Payer: MEDICARE

## 2023-10-04 ENCOUNTER — APPOINTMENT (OUTPATIENT)
Dept: CARDIOLOGY | Facility: HOSPITAL | Age: 78
End: 2023-10-04
Payer: MEDICARE

## 2023-10-04 DIAGNOSIS — R91.1 LUNG NODULE: ICD-10-CM

## 2023-10-04 DIAGNOSIS — R04.2 HEMOPTYSIS: Primary | ICD-10-CM

## 2023-10-04 PROBLEM — J20.9 ACUTE BRONCHITIS: Status: ACTIVE | Noted: 2023-10-04

## 2023-10-04 PROBLEM — N28.89 KIDNEY MASS: Status: ACTIVE | Noted: 2023-10-04

## 2023-10-04 LAB
ABO GROUP BLD: NORMAL
ANION GAP SERPL CALCULATED.3IONS-SCNC: 10 MMOL/L (ref 5–15)
BASOPHILS # BLD AUTO: 0 10*3/MM3 (ref 0–0.2)
BASOPHILS # BLD AUTO: 0.03 10*3/MM3 (ref 0–0.2)
BASOPHILS NFR BLD AUTO: 0 % (ref 0–1.5)
BASOPHILS NFR BLD AUTO: 0.4 % (ref 0–1.5)
BH CV ECHO MEAS - AO MAX PG: 17.8 MMHG
BH CV ECHO MEAS - AO MEAN PG: 11 MMHG
BH CV ECHO MEAS - AO ROOT DIAM: 3.1 CM
BH CV ECHO MEAS - AO V2 MAX: 211 CM/SEC
BH CV ECHO MEAS - AO V2 VTI: 51.4 CM
BH CV ECHO MEAS - EDV(CUBED): 79.5 ML
BH CV ECHO MEAS - EDV(MOD-SP2): 31.7 ML
BH CV ECHO MEAS - EDV(MOD-SP4): 37.3 ML
BH CV ECHO MEAS - EF(MOD-BP): 71.3 %
BH CV ECHO MEAS - EF(MOD-SP2): 65.6 %
BH CV ECHO MEAS - EF(MOD-SP4): 77.7 %
BH CV ECHO MEAS - ESV(CUBED): 27 ML
BH CV ECHO MEAS - ESV(MOD-SP2): 10.9 ML
BH CV ECHO MEAS - ESV(MOD-SP4): 8.3 ML
BH CV ECHO MEAS - FS: 30.2 %
BH CV ECHO MEAS - IVS/LVPW: 1.17 CM
BH CV ECHO MEAS - IVSD: 1.4 CM
BH CV ECHO MEAS - LA DIMENSION: 4.2 CM
BH CV ECHO MEAS - LAT PEAK E' VEL: 7.5 CM/SEC
BH CV ECHO MEAS - LV DIASTOLIC VOL/BSA (35-75): 23.7 CM2
BH CV ECHO MEAS - LV MASS(C)D: 207.8 GRAMS
BH CV ECHO MEAS - LV SYSTOLIC VOL/BSA (12-30): 5.3 CM2
BH CV ECHO MEAS - LVIDD: 4.3 CM
BH CV ECHO MEAS - LVIDS: 3 CM
BH CV ECHO MEAS - LVOT AREA: 2.8 CM2
BH CV ECHO MEAS - LVOT DIAM: 1.9 CM
BH CV ECHO MEAS - LVPWD: 1.2 CM
BH CV ECHO MEAS - MED PEAK E' VEL: 4.8 CM/SEC
BH CV ECHO MEAS - MV A MAX VEL: 157 CM/SEC
BH CV ECHO MEAS - MV DEC SLOPE: 296 CM/SEC2
BH CV ECHO MEAS - MV DEC TIME: 0.34 SEC
BH CV ECHO MEAS - MV E MAX VEL: 101 CM/SEC
BH CV ECHO MEAS - MV E/A: 0.64
BH CV ECHO MEAS - PA V2 MAX: 113 CM/SEC
BH CV ECHO MEAS - PI END-D VEL: 105 CM/SEC
BH CV ECHO MEAS - RAP SYSTOLE: 10 MMHG
BH CV ECHO MEAS - RVSP: 32.5 MMHG
BH CV ECHO MEAS - SI(MOD-SP2): 13.2 ML/M2
BH CV ECHO MEAS - SI(MOD-SP4): 18.4 ML/M2
BH CV ECHO MEAS - SV(MOD-SP2): 20.8 ML
BH CV ECHO MEAS - SV(MOD-SP4): 29 ML
BH CV ECHO MEAS - TAPSE (>1.6): 1.61 CM
BH CV ECHO MEAS - TR MAX PG: 22.5 MMHG
BH CV ECHO MEAS - TR MAX VEL: 237 CM/SEC
BH CV ECHO MEASUREMENTS AVERAGE E/E' RATIO: 16.42
BH CV XLRA - TDI S': 12.5 CM/SEC
BLD GP AB SCN SERPL QL: NEGATIVE
BUN SERPL-MCNC: 17 MG/DL (ref 8–23)
BUN/CREAT SERPL: 20.5 (ref 7–25)
CALCIUM SPEC-SCNC: 8.8 MG/DL (ref 8.6–10.5)
CHLORIDE SERPL-SCNC: 105 MMOL/L (ref 98–107)
CO2 SERPL-SCNC: 24 MMOL/L (ref 22–29)
CREAT SERPL-MCNC: 0.83 MG/DL (ref 0.57–1)
DEPRECATED RDW RBC AUTO: 46.8 FL (ref 37–54)
DEPRECATED RDW RBC AUTO: 47.2 FL (ref 37–54)
EGFRCR SERPLBLD CKD-EPI 2021: 72.7 ML/MIN/1.73
EOSINOPHIL # BLD AUTO: 0 10*3/MM3 (ref 0–0.4)
EOSINOPHIL # BLD AUTO: 0.18 10*3/MM3 (ref 0–0.4)
EOSINOPHIL NFR BLD AUTO: 0 % (ref 0.3–6.2)
EOSINOPHIL NFR BLD AUTO: 2.1 % (ref 0.3–6.2)
ERYTHROCYTE [DISTWIDTH] IN BLOOD BY AUTOMATED COUNT: 12.9 % (ref 12.3–15.4)
ERYTHROCYTE [DISTWIDTH] IN BLOOD BY AUTOMATED COUNT: 12.9 % (ref 12.3–15.4)
FLUAV RNA RESP QL NAA+PROBE: NOT DETECTED
FLUBV RNA RESP QL NAA+PROBE: NOT DETECTED
GEN 5 2HR TROPONIN T REFLEX: 7 NG/L
GLUCOSE SERPL-MCNC: 86 MG/DL (ref 65–99)
HCT VFR BLD AUTO: 35 % (ref 34–46.6)
HCT VFR BLD AUTO: 35.7 % (ref 34–46.6)
HCT VFR BLD AUTO: 37.1 % (ref 34–46.6)
HGB BLD-MCNC: 11.3 G/DL (ref 12–15.9)
HGB BLD-MCNC: 11.7 G/DL (ref 12–15.9)
HGB BLD-MCNC: 12 G/DL (ref 12–15.9)
IMM GRANULOCYTES # BLD AUTO: 0.02 10*3/MM3 (ref 0–0.05)
IMM GRANULOCYTES # BLD AUTO: 0.03 10*3/MM3 (ref 0–0.05)
IMM GRANULOCYTES NFR BLD AUTO: 0.2 % (ref 0–0.5)
IMM GRANULOCYTES NFR BLD AUTO: 0.3 % (ref 0–0.5)
LEFT ATRIUM VOLUME INDEX: 45.7 ML/M2
LEFT ATRIUM VOLUME: 71.8 ML
LYMPHOCYTES # BLD AUTO: 1.18 10*3/MM3 (ref 0.7–3.1)
LYMPHOCYTES # BLD AUTO: 2.88 10*3/MM3 (ref 0.7–3.1)
LYMPHOCYTES NFR BLD AUTO: 13.6 % (ref 19.6–45.3)
LYMPHOCYTES NFR BLD AUTO: 33.8 % (ref 19.6–45.3)
MCH RBC QN AUTO: 32.3 PG (ref 26.6–33)
MCH RBC QN AUTO: 32.3 PG (ref 26.6–33)
MCHC RBC AUTO-ENTMCNC: 32.3 G/DL (ref 31.5–35.7)
MCHC RBC AUTO-ENTMCNC: 32.3 G/DL (ref 31.5–35.7)
MCV RBC AUTO: 100 FL (ref 79–97)
MCV RBC AUTO: 99.7 FL (ref 79–97)
MONOCYTES # BLD AUTO: 0.09 10*3/MM3 (ref 0.1–0.9)
MONOCYTES # BLD AUTO: 0.91 10*3/MM3 (ref 0.1–0.9)
MONOCYTES NFR BLD AUTO: 1 % (ref 5–12)
MONOCYTES NFR BLD AUTO: 10.7 % (ref 5–12)
NEUTROPHILS NFR BLD AUTO: 4.49 10*3/MM3 (ref 1.7–7)
NEUTROPHILS NFR BLD AUTO: 52.8 % (ref 42.7–76)
NEUTROPHILS NFR BLD AUTO: 7.4 10*3/MM3 (ref 1.7–7)
NEUTROPHILS NFR BLD AUTO: 85.1 % (ref 42.7–76)
NRBC BLD AUTO-RTO: 0 /100 WBC (ref 0–0.2)
NRBC BLD AUTO-RTO: 0 /100 WBC (ref 0–0.2)
NT-PROBNP SERPL-MCNC: 639.7 PG/ML (ref 0–1800)
PLATELET # BLD AUTO: 213 10*3/MM3 (ref 140–450)
PLATELET # BLD AUTO: 215 10*3/MM3 (ref 140–450)
PMV BLD AUTO: 10.2 FL (ref 6–12)
PMV BLD AUTO: 10.3 FL (ref 6–12)
POTASSIUM SERPL-SCNC: 4.3 MMOL/L (ref 3.5–5.2)
QT INTERVAL: 426 MS
QTC INTERVAL: 418 MS
RBC # BLD AUTO: 3.5 10*6/MM3 (ref 3.77–5.28)
RBC # BLD AUTO: 3.72 10*6/MM3 (ref 3.77–5.28)
RH BLD: NEGATIVE
SARS-COV-2 RNA RESP QL NAA+PROBE: NOT DETECTED
SODIUM SERPL-SCNC: 139 MMOL/L (ref 136–145)
T&S EXPIRATION DATE: NORMAL
TROPONIN T DELTA: -4 NG/L
TROPONIN T SERPL HS-MCNC: 11 NG/L
WBC NRBC COR # BLD: 8.51 10*3/MM3 (ref 3.4–10.8)
WBC NRBC COR # BLD: 8.7 10*3/MM3 (ref 3.4–10.8)

## 2023-10-04 PROCEDURE — 63710000001 BUSPIRONE 5 MG TABLET: Performed by: INTERNAL MEDICINE

## 2023-10-04 PROCEDURE — A9270 NON-COVERED ITEM OR SERVICE: HCPCS | Performed by: INTERNAL MEDICINE

## 2023-10-04 PROCEDURE — 83880 ASSAY OF NATRIURETIC PEPTIDE: CPT | Performed by: NURSE PRACTITIONER

## 2023-10-04 PROCEDURE — 99285 EMERGENCY DEPT VISIT HI MDM: CPT

## 2023-10-04 PROCEDURE — 93010 ELECTROCARDIOGRAM REPORT: CPT | Performed by: INTERNAL MEDICINE

## 2023-10-04 PROCEDURE — 63710000001 AMLODIPINE 5 MG TABLET: Performed by: INTERNAL MEDICINE

## 2023-10-04 PROCEDURE — 63710000001 ACETAMINOPHEN 325 MG TABLET: Performed by: INTERNAL MEDICINE

## 2023-10-04 PROCEDURE — 93356 MYOCRD STRAIN IMG SPCKL TRCK: CPT | Performed by: INTERNAL MEDICINE

## 2023-10-04 PROCEDURE — 63710000001 METOPROLOL SUCCINATE XL 50 MG TABLET SUSTAINED-RELEASE 24 HOUR: Performed by: INTERNAL MEDICINE

## 2023-10-04 PROCEDURE — G0378 HOSPITAL OBSERVATION PER HR: HCPCS

## 2023-10-04 PROCEDURE — 63710000001 HYDROCODONE-ACETAMINOPHEN 10-325 MG TABLET: Performed by: INTERNAL MEDICINE

## 2023-10-04 PROCEDURE — 96374 THER/PROPH/DIAG INJ IV PUSH: CPT

## 2023-10-04 PROCEDURE — 87636 SARSCOV2 & INF A&B AMP PRB: CPT | Performed by: EMERGENCY MEDICINE

## 2023-10-04 PROCEDURE — 63710000001 BUSPIRONE 10 MG TABLET: Performed by: INTERNAL MEDICINE

## 2023-10-04 PROCEDURE — 93306 TTE W/DOPPLER COMPLETE: CPT | Performed by: INTERNAL MEDICINE

## 2023-10-04 PROCEDURE — 63710000001 AZITHROMYCIN 250 MG TABLET: Performed by: INTERNAL MEDICINE

## 2023-10-04 PROCEDURE — 85025 COMPLETE CBC W/AUTO DIFF WBC: CPT | Performed by: EMERGENCY MEDICINE

## 2023-10-04 PROCEDURE — 99284 EMERGENCY DEPT VISIT MOD MDM: CPT

## 2023-10-04 PROCEDURE — 71045 X-RAY EXAM CHEST 1 VIEW: CPT

## 2023-10-04 PROCEDURE — 93005 ELECTROCARDIOGRAM TRACING: CPT | Performed by: EMERGENCY MEDICINE

## 2023-10-04 PROCEDURE — 25510000001 IOPAMIDOL PER 1 ML: Performed by: EMERGENCY MEDICINE

## 2023-10-04 PROCEDURE — 25010000002 METHYLPREDNISOLONE PER 125 MG: Performed by: EMERGENCY MEDICINE

## 2023-10-04 PROCEDURE — 25810000003 SODIUM CHLORIDE 0.9 % SOLUTION: Performed by: EMERGENCY MEDICINE

## 2023-10-04 PROCEDURE — 93356 MYOCRD STRAIN IMG SPCKL TRCK: CPT

## 2023-10-04 PROCEDURE — 71046 X-RAY EXAM CHEST 2 VIEWS: CPT

## 2023-10-04 PROCEDURE — 85018 HEMOGLOBIN: CPT | Performed by: EMERGENCY MEDICINE

## 2023-10-04 PROCEDURE — 86901 BLOOD TYPING SEROLOGIC RH(D): CPT | Performed by: EMERGENCY MEDICINE

## 2023-10-04 PROCEDURE — 84484 ASSAY OF TROPONIN QUANT: CPT | Performed by: NURSE PRACTITIONER

## 2023-10-04 PROCEDURE — 63710000001 BUPROPION 75 MG TABLET: Performed by: INTERNAL MEDICINE

## 2023-10-04 PROCEDURE — 71275 CT ANGIOGRAPHY CHEST: CPT

## 2023-10-04 PROCEDURE — 86850 RBC ANTIBODY SCREEN: CPT | Performed by: EMERGENCY MEDICINE

## 2023-10-04 PROCEDURE — 80048 BASIC METABOLIC PNL TOTAL CA: CPT | Performed by: EMERGENCY MEDICINE

## 2023-10-04 PROCEDURE — 63710000001 ESCITALOPRAM 10 MG TABLET: Performed by: INTERNAL MEDICINE

## 2023-10-04 PROCEDURE — 85025 COMPLETE CBC W/AUTO DIFF WBC: CPT | Performed by: FAMILY MEDICINE

## 2023-10-04 PROCEDURE — 85014 HEMATOCRIT: CPT | Performed by: EMERGENCY MEDICINE

## 2023-10-04 PROCEDURE — 63710000001 LEVOTHYROXINE 112 MCG TABLET: Performed by: INTERNAL MEDICINE

## 2023-10-04 PROCEDURE — 86900 BLOOD TYPING SEROLOGIC ABO: CPT | Performed by: EMERGENCY MEDICINE

## 2023-10-04 PROCEDURE — 93306 TTE W/DOPPLER COMPLETE: CPT

## 2023-10-04 RX ORDER — BISACODYL 10 MG
10 SUPPOSITORY, RECTAL RECTAL DAILY PRN
Status: DISCONTINUED | OUTPATIENT
Start: 2023-10-04 | End: 2023-10-07 | Stop reason: HOSPADM

## 2023-10-04 RX ORDER — BISACODYL 5 MG/1
5 TABLET, DELAYED RELEASE ORAL DAILY PRN
Status: DISCONTINUED | OUTPATIENT
Start: 2023-10-04 | End: 2023-10-07 | Stop reason: HOSPADM

## 2023-10-04 RX ORDER — TRAZODONE HYDROCHLORIDE 100 MG/1
100 TABLET ORAL NIGHTLY
Status: DISCONTINUED | OUTPATIENT
Start: 2023-10-04 | End: 2023-10-07 | Stop reason: HOSPADM

## 2023-10-04 RX ORDER — SODIUM CHLORIDE 0.9 % (FLUSH) 0.9 %
10 SYRINGE (ML) INJECTION AS NEEDED
Status: DISCONTINUED | OUTPATIENT
Start: 2023-10-04 | End: 2023-10-07 | Stop reason: HOSPADM

## 2023-10-04 RX ORDER — ESCITALOPRAM OXALATE 10 MG/1
20 TABLET ORAL DAILY
Status: DISCONTINUED | OUTPATIENT
Start: 2023-10-04 | End: 2023-10-07 | Stop reason: HOSPADM

## 2023-10-04 RX ORDER — AZITHROMYCIN 250 MG/1
500 TABLET, FILM COATED ORAL
Status: DISCONTINUED | OUTPATIENT
Start: 2023-10-04 | End: 2023-10-07 | Stop reason: HOSPADM

## 2023-10-04 RX ORDER — POLYETHYLENE GLYCOL 3350 17 G/17G
17 POWDER, FOR SOLUTION ORAL DAILY PRN
Status: DISCONTINUED | OUTPATIENT
Start: 2023-10-04 | End: 2023-10-07 | Stop reason: HOSPADM

## 2023-10-04 RX ORDER — GUAIFENESIN/DEXTROMETHORPHAN 100-10MG/5
5 SYRUP ORAL EVERY 4 HOURS PRN
Status: DISCONTINUED | OUTPATIENT
Start: 2023-10-04 | End: 2023-10-07 | Stop reason: HOSPADM

## 2023-10-04 RX ORDER — NITROGLYCERIN 0.4 MG/1
0.4 TABLET SUBLINGUAL
Status: DISCONTINUED | OUTPATIENT
Start: 2023-10-04 | End: 2023-10-07 | Stop reason: HOSPADM

## 2023-10-04 RX ORDER — LEVOTHYROXINE SODIUM 112 UG/1
112 TABLET ORAL DAILY
Status: DISCONTINUED | OUTPATIENT
Start: 2023-10-04 | End: 2023-10-07 | Stop reason: HOSPADM

## 2023-10-04 RX ORDER — SODIUM CHLORIDE 0.9 % (FLUSH) 0.9 %
10 SYRINGE (ML) INJECTION EVERY 12 HOURS SCHEDULED
Status: DISCONTINUED | OUTPATIENT
Start: 2023-10-04 | End: 2023-10-07 | Stop reason: HOSPADM

## 2023-10-04 RX ORDER — AMOXICILLIN 250 MG
2 CAPSULE ORAL 2 TIMES DAILY
Status: DISCONTINUED | OUTPATIENT
Start: 2023-10-04 | End: 2023-10-07 | Stop reason: HOSPADM

## 2023-10-04 RX ORDER — ROSUVASTATIN CALCIUM 10 MG/1
10 TABLET, COATED ORAL NIGHTLY
Status: DISCONTINUED | OUTPATIENT
Start: 2023-10-04 | End: 2023-10-07 | Stop reason: HOSPADM

## 2023-10-04 RX ORDER — METOPROLOL SUCCINATE 50 MG/1
50 TABLET, EXTENDED RELEASE ORAL DAILY
Status: DISCONTINUED | OUTPATIENT
Start: 2023-10-04 | End: 2023-10-07 | Stop reason: HOSPADM

## 2023-10-04 RX ORDER — HYDROCODONE BITARTRATE AND ACETAMINOPHEN 10; 325 MG/1; MG/1
1 TABLET ORAL EVERY 4 HOURS PRN
Status: DISCONTINUED | OUTPATIENT
Start: 2023-10-04 | End: 2023-10-07 | Stop reason: HOSPADM

## 2023-10-04 RX ORDER — ACETAMINOPHEN 325 MG/1
650 TABLET ORAL EVERY 4 HOURS PRN
Status: DISCONTINUED | OUTPATIENT
Start: 2023-10-04 | End: 2023-10-07 | Stop reason: HOSPADM

## 2023-10-04 RX ORDER — BUPROPION HYDROCHLORIDE 75 MG/1
75 TABLET ORAL 2 TIMES DAILY
Status: DISCONTINUED | OUTPATIENT
Start: 2023-10-04 | End: 2023-10-07 | Stop reason: HOSPADM

## 2023-10-04 RX ORDER — METHYLPREDNISOLONE SODIUM SUCCINATE 125 MG/2ML
125 INJECTION, POWDER, LYOPHILIZED, FOR SOLUTION INTRAMUSCULAR; INTRAVENOUS ONCE
Status: COMPLETED | OUTPATIENT
Start: 2023-10-04 | End: 2023-10-04

## 2023-10-04 RX ORDER — SODIUM CHLORIDE 9 MG/ML
40 INJECTION, SOLUTION INTRAVENOUS AS NEEDED
Status: DISCONTINUED | OUTPATIENT
Start: 2023-10-04 | End: 2023-10-07 | Stop reason: HOSPADM

## 2023-10-04 RX ORDER — AMLODIPINE BESYLATE 5 MG/1
5 TABLET ORAL DAILY
Status: DISCONTINUED | OUTPATIENT
Start: 2023-10-04 | End: 2023-10-07 | Stop reason: HOSPADM

## 2023-10-04 RX ADMIN — SODIUM CHLORIDE 1000 ML: 9 INJECTION, SOLUTION INTRAVENOUS at 05:15

## 2023-10-04 RX ADMIN — DOCUSATE SODIUM 50 MG AND SENNOSIDES 8.6 MG 2 TABLET: 8.6; 5 TABLET, FILM COATED ORAL at 21:45

## 2023-10-04 RX ADMIN — BUPROPION HYDROCHLORIDE 75 MG: 75 TABLET, FILM COATED ORAL at 21:45

## 2023-10-04 RX ADMIN — HYDROCODONE BITARTRATE AND ACETAMINOPHEN 1 TABLET: 10; 325 TABLET ORAL at 21:53

## 2023-10-04 RX ADMIN — LEVOTHYROXINE SODIUM 112 MCG: 112 TABLET ORAL at 09:32

## 2023-10-04 RX ADMIN — AZITHROMYCIN 500 MG: 250 TABLET, FILM COATED ORAL at 09:32

## 2023-10-04 RX ADMIN — TRAZODONE HYDROCHLORIDE 100 MG: 100 TABLET ORAL at 21:45

## 2023-10-04 RX ADMIN — IOPAMIDOL 100 ML: 755 INJECTION, SOLUTION INTRAVENOUS at 05:24

## 2023-10-04 RX ADMIN — ACETAMINOPHEN 650 MG: 325 TABLET, FILM COATED ORAL at 09:33

## 2023-10-04 RX ADMIN — METOPROLOL SUCCINATE 50 MG: 50 TABLET, EXTENDED RELEASE ORAL at 09:32

## 2023-10-04 RX ADMIN — Medication 10 ML: at 09:33

## 2023-10-04 RX ADMIN — Medication 10 ML: at 21:46

## 2023-10-04 RX ADMIN — BUSPIRONE HYDROCHLORIDE 15 MG: 5 TABLET ORAL at 17:12

## 2023-10-04 RX ADMIN — AMLODIPINE BESYLATE 5 MG: 5 TABLET ORAL at 09:32

## 2023-10-04 RX ADMIN — METHYLPREDNISOLONE SODIUM SUCCINATE 125 MG: 125 INJECTION, POWDER, LYOPHILIZED, FOR SOLUTION INTRAMUSCULAR; INTRAVENOUS at 03:00

## 2023-10-04 RX ADMIN — BUSPIRONE HYDROCHLORIDE 15 MG: 5 TABLET ORAL at 13:01

## 2023-10-04 RX ADMIN — ACETAMINOPHEN 650 MG: 325 TABLET, FILM COATED ORAL at 21:45

## 2023-10-04 RX ADMIN — BUSPIRONE HYDROCHLORIDE 15 MG: 5 TABLET ORAL at 21:45

## 2023-10-04 RX ADMIN — HYDROCODONE BITARTRATE AND ACETAMINOPHEN 1 TABLET: 10; 325 TABLET ORAL at 13:06

## 2023-10-04 RX ADMIN — HYDROCODONE BITARTRATE AND ACETAMINOPHEN 1 TABLET: 10; 325 TABLET ORAL at 09:33

## 2023-10-04 RX ADMIN — BUPROPION HYDROCHLORIDE 75 MG: 75 TABLET, FILM COATED ORAL at 09:33

## 2023-10-04 RX ADMIN — ROSUVASTATIN CALCIUM 10 MG: 10 TABLET, FILM COATED ORAL at 21:45

## 2023-10-04 RX ADMIN — ESCITSLOPRAM 20 MG: 10 TABLET ORAL at 09:32

## 2023-10-04 RX ADMIN — HYDROCODONE BITARTRATE AND ACETAMINOPHEN 1 TABLET: 10; 325 TABLET ORAL at 17:17

## 2023-10-04 NOTE — ED PROVIDER NOTES
Subjective   History of Present Illness  Pt presents to the  with report of cough.  States that she felt like she was getting bronchitis and then began to cough up some brb.  Reports coughed up a clot earlier.  No n/v/f/c. + congestion. + sore throat.  No SOB.  No injuries/trauma.       Review of Systems   Constitutional:  Negative for chills and fever.   HENT:  Positive for congestion and sore throat.    Respiratory:  Positive for cough. Negative for shortness of breath.         + hemoptysis     Cardiovascular:  Negative for chest pain.   Gastrointestinal:  Negative for abdominal pain, nausea and vomiting.   Skin:  Negative for rash.   All other systems reviewed and are negative.    Past Medical History:   Diagnosis Date    Anxiety     Arthrosis     Atrial fibrillation     Vazquez's esophagus 2000    Coronary artery disease     Depression     Fibromyalgia     Graves disease 1970    Heart murmur     Heart valve disease     Hyperlipidemia     Hypertension     Stroke        Allergies   Allergen Reactions    Levaquin [Levofloxacin] Other (See Comments)     Spasms    Aripiprazole Unknown - High Severity    Codeine Dizziness     Other reaction(s): dizziness      Morphine Other (See Comments)       Past Surgical History:   Procedure Laterality Date    CARDIAC CATHETERIZATION      CARPAL TUNNEL RELEASE Bilateral 2010    ESSURE TUBAL LIGATION Bilateral 1977    EYE SURGERY      HERNIA REPAIR  1992    JOINT REPLACEMENT      REPLACEMENT TOTAL KNEE BILATERAL Bilateral 1998    TENDON REPAIR Right 2019    THYROIDECTOMY  1970       Family History   Problem Relation Age of Onset    No Known Problems Mother     No Known Problems Father        Social History     Socioeconomic History    Marital status:    Tobacco Use    Smoking status: Never    Smokeless tobacco: Never   Vaping Use    Vaping Use: Never used   Substance and Sexual Activity    Alcohol use: Never    Drug use: Yes     Types: Hydrocodone    Sexual activity: Yes            Objective   Physical Exam  Vitals and nursing note reviewed.   Constitutional:       Appearance: Normal appearance.   HENT:      Head: Normocephalic.      Nose: Nose normal.      Mouth/Throat:      Mouth: Mucous membranes are moist.      Pharynx: No oropharyngeal exudate or posterior oropharyngeal erythema.      Comments: No bleeding noted  Eyes:      Conjunctiva/sclera: Conjunctivae normal.   Cardiovascular:      Rate and Rhythm: Normal rate and regular rhythm.      Pulses: Normal pulses.      Heart sounds: Normal heart sounds.   Pulmonary:      Effort: Pulmonary effort is normal.      Breath sounds: Normal breath sounds. No wheezing, rhonchi or rales.   Abdominal:      General: Abdomen is flat. Bowel sounds are normal.      Palpations: Abdomen is soft.   Musculoskeletal:      Cervical back: Normal range of motion and neck supple.   Skin:     General: Skin is warm and dry.      Capillary Refill: Capillary refill takes less than 2 seconds.   Neurological:      Mental Status: She is alert.       Procedures           ED Course      Labs Reviewed   CBC WITH AUTO DIFFERENTIAL - Abnormal; Notable for the following components:       Result Value    RBC 3.72 (*)     MCV 99.7 (*)     Monocytes, Absolute 0.91 (*)     All other components within normal limits   COVID-19 AND FLU A/B, NP SWAB IN TRANSPORT MEDIA 8-12 HR TAT - Normal    Narrative:     Fact sheet for providers: https://www.fda.gov/media/380699/download    Fact sheet for patients: https://www.fda.gov/media/687174/download    Test performed by PCR.   BASIC METABOLIC PANEL - Normal    Narrative:     GFR Normal >60  Chronic Kidney Disease <60  Kidney Failure <15    The GFR formula is only valid for adults with stable renal function between ages 18 and 70.   COVID PRE-OP / PRE-PROCEDURE SCREENING ORDER (NO ISOLATION)    Narrative:     The following orders were created for panel order COVID PRE-OP / PRE-PROCEDURE SCREENING ORDER (NO ISOLATION) - Swab, Nasal  Cavity.  Procedure                               Abnormality         Status                     ---------                               -----------         ------                     COVID-19 and FLU A/B PCR...[[id number]]  Normal              Final result                 Please view results for these tests on the individual orders.   HEMOGLOBIN AND HEMATOCRIT, BLOOD   TYPE AND SCREEN   CBC AND DIFFERENTIAL    Narrative:     The following orders were created for panel order CBC & Differential.  Procedure                               Abnormality         Status                     ---------                               -----------         ------                     CBC Auto Differential[[id number]]        Abnormal            Final result                 Please view results for these tests on the individual orders.                                            Medical Decision Making  Pt in NAD att in EC.  She has continued to have some small volume mike hemoptysis.  She has had some back and forth PO2 - has been as low as 89% on RA - currently 95% RA.  Labs unremarkable.  Given her hypoxic episodes - portable CXR was obtained and does show some interval worsening/increased markings in RML.  EKG obtained to ensure no ongoing a. Fib. D/w Yamila with pulmonology - recommends CTA and rpt H/H.  Doesn't recommend reversal att.  D/w Dr. Cortez - has accepted for admit/further mgmt    Amount and/or Complexity of Data Reviewed  Labs: ordered.  Radiology: ordered and independent interpretation performed.     Details: PA/Lat cxr - nothing acute    AP cxr - increased markings in RML  ECG/medicine tests: ordered and independent interpretation performed.     Details: sinus reji 58, Nml int, LAD, no acute STT changes    Risk  Prescription drug management.        Final diagnoses:   Hemoptysis       ED Disposition  ED Disposition       ED Disposition   Decision to Admit    Condition   --    Comment   --               No follow-up  provider specified.       Medication List      No changes were made to your prescriptions during this visit.            Carson Galindo,   10/04/23 0236       Carson Galindo,   10/04/23 0508

## 2023-10-04 NOTE — PAYOR COMM NOTE
"10/4/23. [facility] [phone]    [phone]    ER ADMIT ADMIT ON 10/4/23 FAXING FOR INPATIENT.              Libertad Rinaldi (78 y.o. Female)       Date of Birth   1945    Social Security Number       Address   [facility] [address] [place] [address]    Home Phone   [phone]    MRN   [id number]       Mandaen   [facility] [facility]    Marital Status                               Admission Date   10/4/23    Admission Type   Emergency    Admitting Provider   Joseline Ibarra MD    Attending Provider   Joseline Ibarra MD    Department, Room/Bed   [facility] 4B, 460/1       Discharge Date       Discharge Disposition       Discharge Destination                                 Attending Provider: Joseline Ibarra MD    Allergies: Levaquin [Levofloxacin], Aripiprazole, Codeine, Morphine    Isolation: None   Infection: None   Code Status: CPR    Ht: 147.3 cm (58\")   Wt: 64.4 kg (142 lb)    Admission Cmt: None   Principal Problem: Hemoptysis [R04.2]                   Active Insurance as of 10/4/2023       Primary Coverage       Payor Plan Insurance Group Employer/Plan Group    [facility] MEDICARE REPLACEMENT [facility] MEDICARE REPLACEMENT [id number]       Payor Plan Address Payor Plan Phone Number Payor Plan Fax Number Effective Dates    [address] [address]   1/1/2020 - None Entered    [facility] [id number]         Subscriber Name Subscriber Birth Date Member ID       LIBERTAD RINALDI 1945 [id number]                     Emergency Contacts        (Rel.) Home Phone Work Phone Mobile Phone    HALINA RINALDI (Daughter) [phone] -- --    HETAL RINALDI (Son) -- -- [phone]    REBEKA RINALDI (Son) [phone] -- --    RINALDIZELDA REIS (Spouse) [phone] -- [phone]             [facility] Encounter Date/Time: 10/4/2023 0225   Hospital Account: [id number]    MRN: [id number]   Patient:  Libertad Rinaldi   Contact Serial #: " [id number]   SSN:          ENCOUNTER             Patient Class: Inpatient   Unit: [facility] 4B   Hospital Service: Medicine     Bed: 460/1   Admitting Provider: Joseline Ibarra MD   Referring Physician: Joey Cortez   Attending Provider: Joseline Ibarra MD   Adm Diagnosis: Hemoptysis [R04.2]               PATIENT          Name: Libertad Rinaldi : 1945 (78 yrs)   Address: [address] [place] [address] [address]  Sex: Female   City: [place] [address]   County: [facility]   Marital Status:  Ethnicity: NOT                                                                              Race: WHITE   Primary Care Provider: Mmee Tan, * Patients Phone: Home Phone: [phone]     Mobile Phone: [phone]   EMERGENCY CONTACT   Contact Name Legal Guardian? Relationship to Patient Home Phone Work Phone   1. HALINA RINALDI  2. HETAL RINALDI      Daughter  Son [phone]              GUARANTOR            Guarantor: Libertad Rinaldi     : 1945   Address: [facility] [address]  Sex: [place] [address]     Relation to Patient: Self       Home Phone: [phone]   Guarantor ID: [id number]       Work Phone:     GUARANTOR EMPLOYER   Employer:           Status: RETIRED   COVERAGE          PRIMARY INSURANCE   Payor: [employer] MEDICARE REPLACEMENT Plan: [employer] MEDICARE REPLACEMENT   Group Number: [id number] Insurance Type: INDEMNITY   Subscriber Name: LIBERTAD RINALDI Subscriber : 1945   Subscriber ID: [id number] Coverage Address: [address] [address]   Pat. Rel. to Subscriber: Self Coverage Phone:     SECONDARY INSURANCE   Payor: N/A Plan: N/A   Group Number:   Insurance Type:     Subscriber Name:   Subscriber :     Subscriber ID:   Coverage Address:     Pat. Rel. to Subscriber:   Coverage Phone:        Contact Serial # ([id number])         2023    Chart ID ([id number])            Carson Galidno DO   Physician  Emergency  Medicine     ED Provider Notes      Addendum     Date of Service: 10/04/23 0232  Creation Time: 10/04/23 0232     Expand All Collapse All       Subjective      History of Present Illness  Pt presents to the EC with report of cough.  States that she felt like she was getting bronchitis and then began to cough up some brb.  Reports coughed up a clot earlier.  No n/v/f/c. + congestion. + sore throat.  No SOB.  No injuries/trauma.         Review of Systems   Constitutional:  Negative for chills and fever.   HENT:  Positive for congestion and sore throat.    Respiratory:  Positive for cough. Negative for shortness of breath.         + hemoptysis     Cardiovascular:  Negative for chest pain.   Gastrointestinal:  Negative for abdominal pain, nausea and vomiting.   Skin:  Negative for rash.   All other systems reviewed and are negative.     Medical History[]Expand by Default        Past Medical History:   Diagnosis Date    Anxiety      Arthrosis      Atrial fibrillation      Vazquez's esophagus 2000    Coronary artery disease      Depression      Fibromyalgia      Graves disease 1970    Heart murmur      Heart valve disease      Hyperlipidemia      Hypertension      Stroke                   Physical Exam  Vitals and nursing note reviewed.   Constitutional:       Appearance: Normal appearance.   HENT:      Head: Normocephalic.      Nose: Nose normal.      Mouth/Throat:      Mouth: Mucous membranes are moist.      Pharynx: No oropharyngeal exudate or posterior oropharyngeal erythema.      Comments: No bleeding noted  Eyes:      Conjunctiva/sclera: Conjunctivae normal.   Cardiovascular:      Rate and Rhythm: Normal rate and regular rhythm.      Pulses: Normal pulses.      Heart sounds: Normal heart sounds.   Pulmonary:      Effort: Pulmonary effort is normal.      Breath sounds: Normal breath sounds. No wheezing, rhonchi or rales.   Abdominal:      General: Abdomen is flat. Bowel sounds are normal.      Palpations: Abdomen is  soft.   Musculoskeletal:      Cervical back: Normal range of motion and neck supple.   Skin:     General: Skin is warm and dry.      Capillary Refill: Capillary refill takes less than 2 seconds.   Neurological:      Mental Status: She is alert.     Medical Decision Making  Pt in NAD att in EC.  She has continued to have some small volume mike hemoptysis.  She has had some back and forth PO2 - has been as low as 89% on RA - currently 95% RA.  Labs unremarkable.  Given her hypoxic episodes - portable CXR was obtained and does show some interval worsening/increased markings in RML.  EKG obtained to ensure no ongoing a. Fib. D/w Yamila with pulmonology - recommends CTA and rpt H/H.  Doesn't recommend reversal att.  D/w Dr. Cortez - has accepted for admit/further mgmt     Amount and/or Complexity of Data Reviewed  Labs: ordered.  Radiology: ordered and independent interpretation performed.     Details: PA/Lat cxr - nothing acute     AP cxr - increased markings in RML  ECG/medicine tests: ordered and independent interpretation performed.     Details: sinus reji 58, Nml int, LAD, no acute STT changes         Carson Galindo DO  10/04/23 0509           Order-Level Documents:    Scan on 10/4/2023 0430 by [facility], [place]: ECG 12-LEAD         Author: -- Service: -- Author Type: --   Filed: Date of Service: Creation Time:   Status: (Other)   Test Reason : Other~  Blood Pressure :   */*   mmHG  Vent. Rate :  58 BPM     Atrial Rate :  58 BPM     P-R Int : 144 ms          QRS Dur :  70 ms      QT Int : 426 ms       P-R-T Axes :  -6  -7  37 degrees     QTc Int : 418 ms     Sinus bradycardia with Premature atrial complexes  Minimal voltage criteria for LVH, may be normal variant ( R in aVL )  Inferior infarct (cited on or before 20-JUL-2023)  Anteroseptal infarct (cited on or before 20-JUL-2023)  Abnormal ECG  When compared with ECG of 20-JUL-2023 20:06,  Premature atrial complexes are now Present     Referred By:             Confirmed By: Joey Diop MD, MD   Physician  Medicine     H&P      Signed     Date of Service: 10/04/23 0633  Creation Time: 10/04/23 0633     Signed       Expand All Collapse [facility]  HISTORY AND PHYSICAL     Date of Admission: 10/4/2023  Primary Care Physician: Meme Tan APRN     Subjective   Primary Historian: Patient     Chief Complaint: Coughing up blood     History of Present Illness  78 year old female with PMH of cerebellar stroke 7/20/2023, Afib noted on Zio patch 9/7/2023 placed on Eliquis, CAD, MVR, that presents to the ER with complain of coughing up blood. She had a wet cough yesterday that did not bother her much and since about midnight noted bright blood with cough and occasional clots. Denies chest pain, fever, never smoked. CTA chest showed no PE, does mention right heart strain, and 14 mm groundglass opacity/nodule in the right upper lobe may represent an evolving inflammatory or neoplastic process.     Review of Systems   Otherwise complete ROS reviewed and negative except as mentioned in the HPI.     Past Medical History:   Medical History[]Expand by Default        Past Medical History:   Diagnosis Date    Anxiety      Arthrosis      Atrial fibrillation      Vazquez's esophagus 2000    Coronary artery disease      Depression      Fibromyalgia      Graves disease 1970    Heart murmur      Heart valve disease      Hyperlipidemia      Hypertension      Stroke           Past Surgical History:  Surgical History         Past Surgical History:   Procedure Laterality Date    CARDIAC CATHETERIZATION        CARPAL TUNNEL RELEASE Bilateral 2010    ESSURE TUBAL LIGATION Bilateral 1977    EYE SURGERY        HERNIA REPAIR   1992    JOINT REPLACEMENT        REPLACEMENT TOTAL KNEE BILATERAL Bilateral 1998    TENDON REPAIR Right 2019    THYROIDECTOMY   1970         Social History:   reports that she has never smoked. She has never used smokeless tobacco. She reports current drug use. Drug: Hydrocodone. She reports that she does not drink alcohol.     Family History: family history includes No Known Problems in her father and mother.        Allergies:        Allergies   Allergen Reactions    Levaquin [Levofloxacin] Other (See Comments)       Spasms    Aripiprazole Unknown - High Severity    Codeine Dizziness       Other reaction(s): dizziness       Morphine Other (See Comments)         Medications:          Prior to Admission medications    Medication Sig Start Date End Date Taking? Authorizing Provider   amLODIPine (NORVASC) 5 MG tablet Take 1 tablet by mouth Daily. 7/27/23     Meme Tan APRN   apixaban (ELIQUIS) 5 MG tablet tablet Take 1 tablet by mouth 2 (Two) Times a Day. 9/7/23     Obie Fox MD   buPROPion (WELLBUTRIN) 75 MG tablet Take 1 tablet by mouth 2 (Two) Times a Day for 90 days. 7/27/23 10/25/23   Meme Tan APRN   busPIRone (BUSPAR) 15 MG tablet Take 1 tablet by mouth 4 (Four) Times a Day. 7/27/23     Meme Tan APRN   escitalopram (LEXAPRO) 20 MG tablet Take 1 tablet by mouth Daily. 7/27/23     Meme Tan APRN   HYDROcodone-acetaminophen (NORCO)  MG per tablet Take 1 tablet by mouth Every 4 (Four) Hours As Needed. 6/2/22     Yoel Villalobos MD   levothyroxine (SYNTHROID, LEVOTHROID) 112 MCG tablet Take 1 tablet by mouth Daily. 7/27/23     Meme Tan APRN   metoprolol succinate XL (TOPROL-XL) 50 MG 24 hr tablet Take 1 tablet by mouth Daily. 9/7/23     Obie Fox MD   naloxone (NARCAN) 4 MG/0.1ML nasal spray 1 spray into the nostril(s) as directed by provider As Needed (opiod overdose). CALL 911. SPR CONTENTS OF ONE SPRAYER (0.1ML) INTO ONE NOSTRIL. REPEAT IN 2-3 MIN IF SYMPTOMS OF OPIOID EMERGENCY PERSIST, ALTERNATE NOSTRILS  Patient not taking: Reported on 9/7/2023       Yoel Villalobos MD  "  ondansetron ODT (ZOFRAN-ODT) 4 MG disintegrating tablet Place 1 tablet on the tongue Every 8 (Eight) Hours As Needed for Nausea or Vomiting.  Patient not taking: Reported on 9/7/2023 7/27/23     Meme Tan APRN   rosuvastatin (Crestor) 10 MG tablet Take 1 tablet by mouth Daily. 7/27/23     Meme Tan APRN   traZODone (DESYREL) 100 MG tablet Take 1 tablet by mouth Every Night. 7/27/23     Meme Tan APRN      I have utilized all available immediate resources to obtain, update, or review the patient's current medications (including all prescriptions, over-the-counter products, herbals, cannabis/cannabidiol products, and vitamin/mineral/dietary (nutritional) supplements).     Objective      Vital Signs: /59 (BP Location: Right arm, Patient Position: Lying)   Pulse 79   Temp 97.6 °F (36.4 °C) (Oral)   Resp 20   Ht 147.3 cm (58\")   Wt 64.4 kg (142 lb)   SpO2 96%   BMI 29.68 kg/m²   Physical Exam  Vitals reviewed.   Constitutional:       General: She is not in acute distress.     Appearance: She is well-developed. She is not toxic-appearing.      Comments: Wet cough during the exam. No respiratory distress.    HENT:      Head: Normocephalic and atraumatic.      Right Ear: External ear normal.      Left Ear: External ear normal.      Mouth/Throat:      Mouth: Mucous membranes are dry.      Pharynx: Oropharynx is clear.   Eyes:      General:         Right eye: No discharge.         Left eye: No discharge.      Extraocular Movements: Extraocular movements intact.      Conjunctiva/sclera: Conjunctivae normal.      Pupils: Pupils are equal, round, and reactive to light.   Neck:      Vascular: No JVD.   Cardiovascular:      Rate and Rhythm: Normal rate and regular rhythm.      Pulses: Normal pulses.      Heart sounds: Normal heart sounds. No murmur heard.    No friction rub. No gallop.   Pulmonary:      Effort: Pulmonary effort is normal. No respiratory distress.      Breath " sounds: No stridor. No wheezing, rhonchi or rales.   Chest:      Chest wall: No tenderness.   Abdominal:      General: Bowel sounds are normal. There is no distension.      Palpations: Abdomen is soft.      Tenderness: There is no abdominal tenderness. There is no guarding or rebound.      Hernia: No hernia is present.   Musculoskeletal:         General: No swelling, tenderness or deformity. Normal range of motion.      Cervical back: Normal range of motion and neck supple. No rigidity or tenderness. No muscular tenderness.      Right lower leg: No edema.      Left lower leg: No edema.   Skin:     General: Skin is warm and dry.      Findings: No erythema or rash.   Neurological:      General: No focal deficit present.      Mental Status: She is alert and oriented to person, place, and time.      Cranial Nerves: No cranial nerve deficit.      Sensory: No sensory deficit.      Motor: No weakness or abnormal muscle tone.      Deep Tendon Reflexes: Reflexes normal.   Psychiatric:         Mood and Affect: Mood normal.         Behavior: Behavior normal.      Results Reviewed:  Lab Results (last 24 hours)         Procedure Component Value Units Date/Time     Hemoglobin & Hematocrit, Blood [[id number]]  (Abnormal) Collected: 10/04/23 0509     Specimen: Blood Updated: 10/04/23 0533       Hemoglobin 11.7 g/dL         Hematocrit 35.7 %       COVID PRE-OP / PRE-PROCEDURE SCREENING ORDER (NO ISOLATION) - Swab, Nasal Cavity [[id number]]  (Normal) Collected: 10/04/23 0244     Specimen: Swab from Nasal Cavity Updated: 10/04/23 0320     Narrative:       The following orders were created for panel order COVID PRE-OP / PRE-PROCEDURE SCREENING ORDER (NO ISOLATION) - Swab, Nasal Cavity.  Procedure                               Abnormality         Status                     ---------                               -----------         ------                     COVID-19 and FLU A/B PCR...[[id number]]  Normal              Final result                   Please view results for these tests on the individual orders.     COVID-19 and FLU A/B PCR - Swab, Nasopharynx [[id number]]  (Normal) Collected: 10/04/23 0244     Specimen: Swab from Nasopharynx Updated: 10/04/23 0320       COVID19 Not Detected       Influenza A PCR Not Detected       Influenza B PCR Not Detected     Narrative:       Fact sheet for providers: https://www.fda.gov/media/905298/download     Fact sheet for patients: https://www.fda.gov/media/725258/download     Test performed by PCR.     Basic Metabolic Panel [[id number]]  (Normal) Collected: 10/04/23 0243     Specimen: Blood Updated: 10/04/23 0317       Glucose 86 mg/dL         BUN 17 mg/dL         Creatinine 0.83 mg/dL         Sodium 139 mmol/L         Potassium 4.3 mmol/L         Comment: Specimen hemolyzed.  Results may be affected.          Chloride 105 mmol/L         CO2 24.0 mmol/L         Calcium 8.8 mg/dL         BUN/Creatinine Ratio 20.5       Anion Gap 10.0 mmol/L         eGFR 72.7 mL/min/1.73       Narrative:       GFR Normal >60  Chronic Kidney Disease <60  Kidney Failure <15     The GFR formula is only valid for adults with stable renal function between ages 18 and 70.     CBC & Differential [[id number]]  (Abnormal) Collected: 10/04/23 0243     Specimen: Blood Updated: 10/04/23 0257     Narrative:       The following orders were created for panel order CBC & Differential.  Procedure                               Abnormality         Status                     ---------                               -----------         ------                     CBC Auto Differential[[id number]]        Abnormal            Final result                  Please view results for these tests on the individual orders.     CBC Auto Differential [[id number]]  (Abnormal) Collected: 10/04/23 0243     Specimen: Blood Updated: 10/04/23 0257       WBC 8.51 10*3/mm3         RBC 3.72 10*6/mm3         Hemoglobin 12.0 g/dL         Hematocrit 37.1 %         MCV 99.7 fL          MCH 32.3 pg         MCHC 32.3 g/dL         RDW 12.9 %         RDW-SD 47.2 fl         MPV 10.2 fL         Platelets 215 10*3/mm3         Neutrophil % 52.8 %         Lymphocyte % 33.8 %         Monocyte % 10.7 %         Eosinophil % 2.1 %         Basophil % 0.4 %         Immature Grans % 0.2 %         Neutrophils, Absolute 4.49 10*3/mm3         Lymphocytes, Absolute 2.88 10*3/mm3         Monocytes, Absolute 0.91 10*3/mm3         Eosinophils, Absolute 0.18 10*3/mm3         Basophils, Absolute 0.03 10*3/mm3         Immature Grans, Absolute 0.02 10*3/mm3         nRBC 0.0 /100 WBC               Imaging Results (Last 24 Hours)         Procedure Component Value Units Date/Time     XR Chest 2 View [[id number]] Collected: 10/04/23 0628       Updated: 10/04/23 0632     Narrative:       EXAMINATION: XR CHEST 2 VW-     10/4/2023 2:35 AM CDT     HISTORY: hemoptysis     The frontal and lateral views of the chest are obtained. There is no  previous study for comparison.     The lungs are poorly expanded.     Linear interstitial changes in the lower lung bilaterally probably  present atelectasis.     There is no pleural effusion, pulmonary congestion or pneumothorax.     The heart size is moderately prominent. Atheromatous changes of the  tortuous thoracic aorta are noted.     There is no acute bony abnormality.        Impression:       1. No active cardiopulmonary disease.     This report was signed and finalized on 10/4/2023 6:29 AM CDT by Dr. Mohamud Kovacs MD.        CT Angiogram Chest [[id number]] Collected: 10/04/23 0555       Updated: 10/04/23 0612     Narrative:       EXAMINATION: CT ANGIOGRAM CHEST-      10/4/2023 5:19 AM CDT     HISTORY: hemoptysis; R04.2-Hemoptysis     In order to have a CT radiation dose as low as reasonably achievable  Automated Exposure Control was utilized for adjustment of the mA and/or  KV according to patient size.     DLP in mGycm= 211     The CT angiography of the chest is performed after  intravenous contrast  enhancement.     Images are acquired in axial plane and subsequent reconstruction in  coronal and sagittal planes.     There is no previous similar study for comparison.     Correlation made with chest radiograph obtained earlier today.     There is optimal opacification of the pulmonary arteries and branches  bilaterally. No filling defects in the opacified pulmonary arterial bed.     RV/LV ratio is 41/41 which may suggest mild right heart strain. Etiology  is not certain.     Moderate dilatation of the right and left pulmonary arteries are seen  which may suggest pulmonary arterial hypertension.     Atheromatous changes of the thoracic aorta are seen. No aneurysmal  dilatation or dissection.     Severe atheromatous changes of coronary arteries are noted.     There are a few borderline prominent mediastinal and right hilar lymph  nodes. A level 4R lymph node measures 1.1 cm in short axis. A right  hilar lymph node measures 1.6 cm in short axis.     There is moderate cardiomegaly with enlarged left atrium and left  ventricle.     There is normal origin course and caliber of the brachiocephalic, left  common carotid and left subclavian artery. There is significant  medialization of the common carotid arteries.     Thyroid gland is not visualized and not evaluated.     No axillary lymphadenopathy.     The lungs are moderately well expanded.     There is a groundglass nodule opacity located posteriorly in the right  upper lobe adjacent and anterior to the proximal end of the right major  fissure which measures approximately 1.4 cm in diameter. There is  scattered tree-in-bud infiltrate more pronounced in the right upper lung  suggesting distal airway disease/respiratory bronchiolitis. No evidence  of air trapping. No areas of mosaic attenuation.     Central airways patent.     Limited visualized abdomen appears unremarkable except for significantly  distended gallbladder without stones and a  partially exophytic low  density mass in the right kidney. There are small nonobstructing renal  calculi bilaterally larger one in the lower pole of the right kidney  measuring 6 mm. There is evidence of mid to lower abdominal central  ventral fat-containing hernia which is incompletely visualized and  evaluated.     Images reviewed in bone windows show chronic degenerative changes of the  thoracolumbar spine with moderate dextroscoliosis of the thoracic spine.        Impression:       1. No evidence of pulmonary embolism. No aortic aneurysm or dissection.  2. Pulmonary arterial hypertension.  3. The RV/LV ratio suggesting a mild right heart strain. Etiology not  certain.  4. A 14 mm groundglass opacity/nodule in the right upper lobe may  represent an evolving inflammatory or neoplastic process. A follow-up  examination in 3 months is recommended to assure stability/resolution.  5. Borderline mediastinal and right hilar lymphadenopathy.  6. Nonacute abdominal findings as detailed above.     This report was signed and finalized on 10/4/2023 6:09 AM CDT by Dr. Mohamud Kovacs MD.        XR Chest 1 View [[id number]] Resulted: 10/04/23 0447       Updated: 10/04/23 0450             I have personally reviewed and interpreted the radiology studies and ECG obtained at time of admission.      Echocardiogram 7/20/2023    Technically difficult study.    Left ventricular systolic function is hyperdynamic (EF > 70%).    Left ventricular wall thickness is consistent with mild to moderate sigmoid-septal hypertrophy.    Slightly increased (nonobstructive) intracavitary and LVOT gradient (peak 18 mmHg) likely related to hyperdynamic LV function and LV sigmoid septal hypertrophy.    Right ventricle is not well visualized but appears normal in size with normal systolic function.    The left atrial cavity is mildly dilated.    Saline study was inconcluside; see details in report.    Valves are not well visualized, but there is no  hemodynamically significant (more than mild) valve disease appreciated.    Mitral annular calcification is present.     Assessment / Plan   Assessment:        Active Hospital Problems     Diagnosis      **Hemoptysis      Acute bronchitis      Lung nodule      Kidney mass      Paroxysmal SVT (supraventricular tachycardia)      Cerebellar stroke      Primary hypertension      Paroxysmal atrial fibrillation        Treatment Plan  The patient will be admitted to my service here at [facility].   Admit to medical floor  Vitals every 4 hours  Cardiac diet  IVF saline lock     Hemoptysis ? Bronchitis in a patient recently started on anticoagulants  Azithromycin empirically  Cough syrup  Pulmonology consult for guidance and follow up of possible lung nodule  Need to hold Eliquis and restart when considered safe by pulmonology, current risk of worsening lung bleeding outweighs stroke prevention benefits  Avoid Albuterol due to history of PAF and SVT     Right heart strain per CT unclear etiology, will repeat 2D echocardiogram     Kidney mass noted on CT as well, unclear if this is an artifact. Will need CT abd/pelvis to verify     DVT prophylaxis >SCD     Medical Decision Making  Number and Complexity of problems: 4 complex medical problems  Differential Diagnosis: None     Conditions and Status        Condition is unchanged.     [facility] Data  External documents reviewed: [facility] EHR noted and cardiology visit  Cardiac tracing (EKG, telemetry) interpretation: Sinus bradycardia with Premature atrial complexes   Radiology interpretation: See above, CT and CXR images reviewed  Labs reviewed: See above  Any tests that were considered but not ordered: none     Decision rules/scores evaluated (example [id number], Wells, etc): [id number]     Discussed with: Patient     Care Planning  Shared decision making: Patient  Code status and discussions: Full code     Disposition  Social Determinants of Health that impact treatment  "or disposition: None  Estimated length of stay is over 2 midnights.      I confirmed that the patient's advanced care plan is present, code status is documented, and a surrogate decision maker is listed in the patient's medical record.      The patient's surrogate decision maker is Sari Rinaldi, daughter.      The patient was seen and examined by me on 10/04/2023 at 0633.     Electronically signed by Joey Cortez MD, 10/04/23, 06:33 CDT.                           Jonah Driscoll MD   Physician  Pulmonology     Consults      Signed     Date of Service: 10/04/23 0747  Creation Time: 10/04/23 0747  Consult Orders   Inpatient Pulmonology Consult [[id number]] ordered by Joey Cortez MD at 10/04/23 07   Inpatient Pulmonology Consult [[id number]] ordered by Joey Cortez MD at 10/04/23 0456          Signed       Expand All Collapse All            [facility] [place] PULMONARY & CRITICAL CARE CONSULT - [facility]     10/04/23, 07:47 CDT  Patient Care Team:  Meme Tan APRN as PCP - General (Nurse Practitioner)  Name: Nichole Rinaldi  : 1945  MRN: [id number]  Contact Serial Number [id number]     Chief complaint: Hemoptysis  HPI:  We have been consulted by Joseline Ibarra MD to see this 78 y.o. female patient.  Patient presented to the ER early this morning with complaints of hemoptysis; approximately 60 mL.  She has been on Eliquis for atrial fibrillation.  Prior to the hemoptysis beginning, she felt like she was \"getting bronchitis\".  She reports that she was \"coughing hard\".  Hemoglobin is 11.7 this morning.  Eliquis is on hold.  She is a never smoker.  She has no previous lung issues.  She is currently on room air.  She is followed by [facility] cardiology for A-fib, known mitral valve regurgitation and left ventricular hypertrophy.  She is currently in no acute distress and on room air with sats at 95%.  Dr. Driscoll discussed the possibility of bronchoscopy on Friday.  " We will get a repeat echo, BNP and troponin today.  Past Medical History:   has a past medical history of Anxiety, Arthrosis, Atrial fibrillation, Vazquez's esophagus (2000), Coronary artery disease, Depression, Fibromyalgia, Graves disease (1970), Heart murmur, Heart valve disease, Hyperlipidemia, Hypertension, and Stroke.   has a past surgical history that includes Thyroidectomy (1970); Essure tubal ligation (Bilateral, 1977); Hernia repair (1992); Replacement total knee bilateral (Bilateral, 1998); Carpal tunnel release (Bilateral, 2010); Eye surgery; Joint replacement; Tendon repair (Right, 2019); and Cardiac catheterization.        Allergies   Allergen Reactions    Levaquin [Levofloxacin] Other (See Comments)       Spasms    Aripiprazole Unknown - High Severity    Codeine Dizziness       Other reaction(s): dizziness       Morphine Other (See Comments)      Medications:  amLODIPine, 5 mg, Oral, Daily  azithromycin, 500 mg, Oral, Q24H  buPROPion, 75 mg, Oral, BID  busPIRone, 15 mg, Oral, 4x Daily  escitalopram, 20 mg, Oral, Daily  levothyroxine, 112 mcg, Oral, Daily  metoprolol succinate XL, 50 mg, Oral, Daily  rosuvastatin, 10 mg, Oral, Nightly  senna-docusate sodium, 2 tablet, Oral, BID  sodium chloride, 10 mL, Intravenous, Q12H  traZODone, 100 mg, Oral, Nightly        Family History:        Family History   Problem Relation Age of Onset    No Known Problems Mother      No Known Problems Father        Social History:   reports that she has never smoked. She has never used smokeless tobacco. She reports current drug use. Drug: Hydrocodone. She reports that she does not drink alcohol.  Review of Systems:  Cough with hemoptysis  Physical Exam:   Temp:  [97.6 °F (36.4 °C)-98.1 °F (36.7 °C)] 97.6 °F (36.4 °C)  Heart Rate:  [56-79] 79  Resp:  [20] 20  BP: (106-151)/(46-74) 140/59  Intake/Output Summary (Last 24 hours) at 10/4/2023 3287  Last data filed at 10/4/2023 0610      Gross per 24 hour   Intake 1000 ml    Output --   Net 1000 ml      Vitals             10/04/23  0215   Weight: 64.4 kg (142 lb)               SpO2 Percentage     10/04/23 0601 10/04/23 0610 10/04/23 0627   SpO2: 91% 91% 96%      Body mass index is 29.68 kg/m².   Physical Exam  Vitals and nursing note reviewed.   Constitutional:       Appearance: She is well-developed.   HENT:      Head: Normocephalic and atraumatic.   Eyes:      General: No scleral icterus.     Pupils: Pupils are equal, round, and reactive to light.   Cardiovascular:      Rate and Rhythm: Normal rate and regular rhythm.   Pulmonary:      Effort: Pulmonary effort is normal. No respiratory distress.      Breath sounds: Normal breath sounds.   Abdominal:      General: There is no distension.      Palpations: Abdomen is soft.   Musculoskeletal:         General: Normal range of motion.      Cervical back: Normal range of motion and neck supple.      Right lower leg: No edema.      Left lower leg: No edema.   Skin:     General: Skin is warm and dry.   Neurological:      Mental Status: She is alert and oriented to person, place, and time.   Psychiatric:         Mood and Affect: Mood normal.         Behavior: Behavior normal.      Electronically signed by ABHAY Al, 10/4/2023, 07:47 CDT       Physician Substantive Portion: Medical Decision Making  Result Review        Results from last 7 days   Lab Units 10/04/23  0509 10/04/23  0243   WBC 10*3/mm3  --  8.51   HEMOGLOBIN g/dL 11.7* 12.0   PLATELETS 10*3/mm3  --  215           Results from last 7 days   Lab Units 10/04/23  0243   SODIUM mmol/L 139   POTASSIUM mmol/L 4.3   CO2 mmol/L 24.0   BUN mg/dL 17   CREATININE mg/dL 0.83   GLUCOSE mg/dL 86          No results found for: PROBNP  Microbiology Results (last 10 days)         Procedure Component Value - Date/Time     COVID PRE-OP / PRE-PROCEDURE SCREENING ORDER (NO ISOLATION) - Swab, Nasal Cavity [[id number]]  (Normal) Collected: 10/04/23 0244     Lab Status: Final result  Specimen: Swab from Nasal Cavity Updated: 10/04/23 0320     Narrative:       The following orders were created for panel order COVID PRE-OP / PRE-PROCEDURE SCREENING ORDER (NO ISOLATION) - Swab, Nasal Cavity.  Procedure                               Abnormality         Status                     ---------                               -----------         ------                     COVID-19 and FLU A/B PCR...[[id number]]  Normal              Final result                  Please view results for these tests on the individual orders.     COVID-19 and FLU A/B PCR - Swab, Nasopharynx [[id number]]  (Normal) Collected: 10/04/23 0244     Lab Status: Final result Specimen: Swab from Nasopharynx Updated: 10/04/23 0320       COVID19 Not Detected       Influenza A PCR Not Detected       Influenza B PCR Not Detected     Narrative:       Fact sheet for providers: https://www.fda.gov/media/687655/download     Fact sheet for patients: https://www.fda.gov/media/156656/download     Test performed by PCR.                Recent radiology:   Imaging Results (Last 72 Hours)         Procedure Component Value Units Date/Time     XR Chest 1 View [[id number]] Collected: 10/04/23 0631       Updated: 10/04/23 0635     Narrative:       EXAMINATION: XR CHEST 1 VW-     10/4/2023 4:46 AM CDT     HISTORY: hemoptysis     A frontal lordotic view of the chest is compared with the previous study  dated 10/4/2023.     The lungs are moderately well-expanded.     There is no evidence of recent infiltrate, pleural effusion, pulmonary  congestion or pneumothorax.     The heart size is not optimally visualized due to the AP projection.  Atheromatous changes of the thoracic aorta are noted.     No acute bony abnormality. There is deformity of the right proximal  humerus which may be due to previous trauma.     Postsurgical changes of the left upper abdomen are noted.        Impression:       1. No active cardiopulmonary disease.     This report was signed and  finalized on 10/4/2023 6:32 AM CDT by Dr. Mohamud Kovacs MD.        XR Chest 2 View [[id number]] Collected: 10/04/23 0628       Updated: 10/04/23 0632     Narrative:       EXAMINATION: XR CHEST 2 VW-     10/4/2023 2:35 AM CDT     HISTORY: hemoptysis     The frontal and lateral views of the chest are obtained. There is no  previous study for comparison.     The lungs are poorly expanded.     Linear interstitial changes in the lower lung bilaterally probably  present atelectasis.     There is no pleural effusion, pulmonary congestion or pneumothorax.     The heart size is moderately prominent. Atheromatous changes of the  tortuous thoracic aorta are noted.     There is no acute bony abnormality.        Impression:       1. No active cardiopulmonary disease.     This report was signed and finalized on 10/4/2023 6:29 AM CDT by Dr. Mohamud Kovacs MD.        CT Angiogram Chest [[id number]] Collected: 10/04/23 0555       Updated: 10/04/23 0612     Narrative:       EXAMINATION: CT ANGIOGRAM CHEST-      10/4/2023 5:19 AM CDT     HISTORY: hemoptysis; R04.2-Hemoptysis     In order to have a CT radiation dose as low as reasonably achievable  Automated Exposure Control was utilized for adjustment of the mA and/or  KV according to patient size.     DLP in mGycm= 211     The CT angiography of the chest is performed after intravenous contrast  enhancement.     Images are acquired in axial plane and subsequent reconstruction in  coronal and sagittal planes.     There is no previous similar study for comparison.     Correlation made with chest radiograph obtained earlier today.     There is optimal opacification of the pulmonary arteries and branches  bilaterally. No filling defects in the opacified pulmonary arterial bed.     RV/LV ratio is 41/41 which may suggest mild right heart strain. Etiology  is not certain.     Moderate dilatation of the right and left pulmonary arteries are seen  which may suggest pulmonary arterial  hypertension.     Atheromatous changes of the thoracic aorta are seen. No aneurysmal  dilatation or dissection.     Severe atheromatous changes of coronary arteries are noted.     There are a few borderline prominent mediastinal and right hilar lymph  nodes. A level 4R lymph node measures 1.1 cm in short axis. A right  hilar lymph node measures 1.6 cm in short axis.     There is moderate cardiomegaly with enlarged left atrium and left  ventricle.     There is normal origin course and caliber of the brachiocephalic, left  common carotid and left subclavian artery. There is significant  medialization of the common carotid arteries.     Thyroid gland is not visualized and not evaluated.     No axillary lymphadenopathy.     The lungs are moderately well expanded.     There is a groundglass nodule opacity located posteriorly in the right  upper lobe adjacent and anterior to the proximal end of the right major  fissure which measures approximately 1.4 cm in diameter. There is  scattered tree-in-bud infiltrate more pronounced in the right upper lung  suggesting distal airway disease/respiratory bronchiolitis. No evidence  of air trapping. No areas of mosaic attenuation.     Central airways patent.     Limited visualized abdomen appears unremarkable except for significantly  distended gallbladder without stones and a partially exophytic low  density mass in the right kidney. There are small nonobstructing renal  calculi bilaterally larger one in the lower pole of the right kidney  measuring 6 mm. There is evidence of mid to lower abdominal central  ventral fat-containing hernia which is incompletely visualized and  evaluated.     Images reviewed in bone windows show chronic degenerative changes of the  thoracolumbar spine with moderate dextroscoliosis of the thoracic spine.        Impression:       1. No evidence of pulmonary embolism. No aortic aneurysm or dissection.  2. Pulmonary arterial hypertension.  3. The RV/LV  ratio suggesting a mild right heart strain. Etiology not  certain.  4. A 14 mm groundglass opacity/nodule in the right upper lobe may  represent an evolving inflammatory or neoplastic process. A follow-up  examination in 3 months is recommended to assure stability/resolution.  5. Borderline mediastinal and right hilar lymphadenopathy.  6. Nonacute abdominal findings as detailed above.     This report was signed and finalized on 10/4/2023 6:09 AM CDT by Dr. Mohamud Kovacs MD.                     Joseline Ibarra MD   Physician  Hospitalist     Progress Notes      Signed     Date of Service: 10/04/23 0901  Creation Time: 10/04/23 0901     Signed              [facility] Medicine Services  INPATIENT PROGRESS NOTE     Patient Name: Nichole Rinaldi  Date of Admission: 10/4/2023  Today's Date: 10/04/23  Length of Stay: 0  Primary Care Physician: Meme Tan APRN     Subjective   Chief Complaint: Hemoptysis  HPI   78 year old female with PMH of cerebellar stroke 7/20/2023, Afib noted on Zio patch 9/7/2023 placed on Eliquis, CAD, MVR, that presents to the ER with complain of coughing up blood. She had a wet cough yesterday that did not bother her much and since about midnight noted bright blood with cough and occasional clots. Denies chest pain, fever, never smoked. CTA chest showed no PE, does mention right heart strain, and 14 mm groundglass opacity/nodule in the right upper lobe may represent an evolving inflammatory or neoplastic process.   10/4  Admitted for hemoptysis history of  A-fib on Eliquis, PTA noticed no PE mild right RV strain pulmonary hypertension left upper lobe nodule hemoglobin stable pulmonary consult appreciated, remains on room airDrKelly Driscoll discussed the possibility of bronchoscopy on Friday. We will get a repeat echo, BNP and troponin today.            Review of Systems   All pertinent negatives and positives are as above. All other systems have been  reviewed and are negative unless otherwise stated.      Objective    Temp:  [97.6 °F (36.4 °C)-98.1 °F (36.7 °C)] 97.7 °F (36.5 °C)  Heart Rate:  [56-79] 73  Resp:  [18-20] 18  BP: (106-151)/(46-74) 131/69  Physical Exam  Constitutional:       Appearance: Normal appearance.   HENT:      Head: Normocephalic and atraumatic.      Nose: Nose normal.      Mouth/Throat:      Mouth: Mucous membranes are moist. Mucous membranes are dry.   Eyes:      Extraocular Movements: Extraocular movements intact.      Pupils: Pupils are equal, round, and reactive to light.   Cardiovascular:      Rate and Rhythm: Normal rate and regular rhythm.   Pulmonary:      Effort: Pulmonary effort is normal.      Breath sounds: Normal breath sounds.   Abdominal:      General: Abdomen is flat.      Palpations: Abdomen is soft.   Musculoskeletal:         General: Normal range of motion.      Cervical back: Normal range of motion.   Skin:     General: Skin is warm.      Capillary Refill: Capillary refill takes less than 2 seconds.   Neurological:      General: No focal deficit present.      Mental Status: She is alert and oriented to person, place, and time.               Results Review:  I have reviewed the labs, radiology results, and diagnostic studies.     Laboratory Data:         Results from last 7 days   Lab Units 10/04/23  0509 10/04/23  0243   WBC 10*3/mm3  --  8.51   HEMOGLOBIN g/dL 11.7* 12.0   HEMATOCRIT % 35.7 37.1   PLATELETS 10*3/mm3  --  215              Results from last 7 days   Lab Units 10/04/23  0243   SODIUM mmol/L 139   POTASSIUM mmol/L 4.3   CHLORIDE mmol/L 105   CO2 mmol/L 24.0   BUN mg/dL 17   CREATININE mg/dL 0.83   CALCIUM mg/dL 8.8   GLUCOSE mg/dL 86         Culture Data:   No results found for: BLOODCX, URINECX, WOUNDCX, MRSACX, RESPCX, STOOLCX     Radiology Data:   Imaging Results (Last 24 Hours)         Procedure Component Value Units Date/Time     XR Chest 1 View [[id number]] Collected: 10/04/23 0631       Updated:  10/04/23 0635     Narrative:       EXAMINATION: XR CHEST 1 VW-     10/4/2023 4:46 AM CDT     HISTORY: hemoptysis     A frontal lordotic view of the chest is compared with the previous study  dated 10/4/2023.     The lungs are moderately well-expanded.     There is no evidence of recent infiltrate, pleural effusion, pulmonary  congestion or pneumothorax.     The heart size is not optimally visualized due to the AP projection.  Atheromatous changes of the thoracic aorta are noted.     No acute bony abnormality. There is deformity of the right proximal  humerus which may be due to previous trauma.     Postsurgical changes of the left upper abdomen are noted.        Impression:       1. No active cardiopulmonary disease.     This report was signed and finalized on 10/4/2023 6:32 AM CDT by Dr. Mohamud Kovacs MD.        XR Chest 2 View [[id number]] Collected: 10/04/23 0628       Updated: 10/04/23 0632     Narrative:       EXAMINATION: XR CHEST 2 VW-     10/4/2023 2:35 AM CDT     HISTORY: hemoptysis     The frontal and lateral views of the chest are obtained. There is no  previous study for comparison.     The lungs are poorly expanded.     Linear interstitial changes in the lower lung bilaterally probably  present atelectasis.     There is no pleural effusion, pulmonary congestion or pneumothorax.     The heart size is moderately prominent. Atheromatous changes of the  tortuous thoracic aorta are noted.     There is no acute bony abnormality.        Impression:       1. No active cardiopulmonary disease.     This report was signed and finalized on 10/4/2023 6:29 AM CDT by Dr. Mohamud Kovacs MD.        CT Angiogram Chest [[id number]] Collected: 10/04/23 0555       Updated: 10/04/23 0612     Narrative:       EXAMINATION: CT ANGIOGRAM CHEST-      10/4/2023 5:19 AM CDT     HISTORY: hemoptysis; R04.2-Hemoptysis     In order to have a CT radiation dose as low as reasonably achievable  Automated Exposure Control was  utilized for adjustment of the mA and/or  KV according to patient size.     DLP in mGycm= 211     The CT angiography of the chest is performed after intravenous contrast  enhancement.     Images are acquired in axial plane and subsequent reconstruction in  coronal and sagittal planes.     There is no previous similar study for comparison.     Correlation made with chest radiograph obtained earlier today.     There is optimal opacification of the pulmonary arteries and branches  bilaterally. No filling defects in the opacified pulmonary arterial bed.     RV/LV ratio is 41/41 which may suggest mild right heart strain. Etiology  is not certain.     Moderate dilatation of the right and left pulmonary arteries are seen  which may suggest pulmonary arterial hypertension.     Atheromatous changes of the thoracic aorta are seen. No aneurysmal  dilatation or dissection.     Severe atheromatous changes of coronary arteries are noted.     There are a few borderline prominent mediastinal and right hilar lymph  nodes. A level 4R lymph node measures 1.1 cm in short axis. A right  hilar lymph node measures 1.6 cm in short axis.     There is moderate cardiomegaly with enlarged left atrium and left  ventricle.     There is normal origin course and caliber of the brachiocephalic, left  common carotid and left subclavian artery. There is significant  medialization of the common carotid arteries.     Thyroid gland is not visualized and not evaluated.     No axillary lymphadenopathy.     The lungs are moderately well expanded.     There is a groundglass nodule opacity located posteriorly in the right  upper lobe adjacent and anterior to the proximal end of the right major  fissure which measures approximately 1.4 cm in diameter. There is  scattered tree-in-bud infiltrate more pronounced in the right upper lung  suggesting distal airway disease/respiratory bronchiolitis. No evidence  of air trapping. No areas of mosaic attenuation.      Central airways patent.     Limited visualized abdomen appears unremarkable except for significantly  distended gallbladder without stones and a partially exophytic low  density mass in the right kidney. There are small nonobstructing renal  calculi bilaterally larger one in the lower pole of the right kidney  measuring 6 mm. There is evidence of mid to lower abdominal central  ventral fat-containing hernia which is incompletely visualized and  evaluated.     Images reviewed in bone windows show chronic degenerative changes of the  thoracolumbar spine with moderate dextroscoliosis of the thoracic spine.        Impression:       1. No evidence of pulmonary embolism. No aortic aneurysm or dissection.  2. Pulmonary arterial hypertension.  3. The RV/LV ratio suggesting a mild right heart strain. Etiology not  certain.  4. A 14 mm groundglass opacity/nodule in the right upper lobe may  represent an evolving inflammatory or neoplastic process. A follow-up  examination in 3 months is recommended to assure stability/resolution.  5. Borderline mediastinal and right hilar lymphadenopathy.  6. Nonacute abdominal findings as detailed above.     This report was signed and finalized on 10/4/2023 6:09 AM CDT by Dr. Mohamud Kovacs MD.                   I have reviewed the patient's current medications.      Assessment/Plan   Assessment       Active Hospital Problems     Diagnosis      **Hemoptysis      Acute bronchitis      Lung nodule      Kidney mass      Paroxysmal SVT (supraventricular tachycardia)      Cerebellar stroke      Primary hypertension      Paroxysmal atrial fibrillation           Treatment Plan  Hemoptysis ? Bronchitis in a patient recently started on anticoagulants  Azithromycin empirically  Cough syrup  Pulmonology consult for guidance and follow up of possible lung nodule  Need to hold Eliquis and restart when considered safe by pulmonology, current risk of worsening lung bleeding outweighs stroke  prevention benefits  Avoid Albuterol due to history of PAF and SVT     Right heart strain per CT unclear etiology, will repeat 2D echocardiogram     Kidney mass noted on CT as well, unclear if this is an artifact. Will need CT abd/pelvis to verify     DVT prophylaxis >SCD     Medical Decision Making  Number and Complexity of problems: 1 acute  Differential Diagnosis: As above     Conditions and Status        Condition is at treatment goal.     MDM Data  External documents reviewed: None  Cardiac tracing (EKG, telemetry) interpretation: Yes  Radiology interpretation: Yes  Labs reviewed: Yes  Any tests that were considered but not ordered: None     Decision rules/scores evaluated (example [id number], Wells, etc): None     Discussed with: Nursing and patient     Care Planning  Shared decision making: Patient apprised of current labs, vitals, imaging and treatment plan.  They are agreeable with proceeding with plans as discussed.   Code status and discussions: Full code     Disposition  Social Determinants of Health that impact treatment or disposition: None  I expect the patient to be discharged to home in few days.      Electronically signed by Joseline Ibarra MD, 10/04/23, 09:01 CDT.                3 1140 -- -- -- 131/69 -- -- -- --   10/04/23 0852 97.7 (36.5) 73 18 131/69 Lying nasal cannula 4 97   10/04/23           vity Troponin T 2Hr  Order: [id number] - Reflex for Order [id number]  Status: Final result       Visible to patient: No (scheduled for 10/4/2023 10:58 AM)       Next appt: 10/17/2023 at 10:15 AM in Family Medicine (Meme Tan, APRN)    Specimen Information: Blood   0 Result Notes         Component  Ref Range & Units 09:21  (10/4/23) 02:43  (10/4/23) 2 mo ago  (7/20/23) 2 mo ago  (7/20/23)   HS Troponin T  <10 ng/L 7 11 High  CM 12 High  10 High    Troponin T Delta  >=-4 - <+4 ng/L -4 Low                       Joey Cortez MD   Physician  Medicine     H&P      Signed     Date of  Service: 10/04/23 0633  Creation Time: 10/04/23 0633     Signed       Expand All Collapse [facility] Medicine Services  HISTORY AND PHYSICAL     Date of Admission: 10/4/2023  Primary Care Physician: Meme Tan APRN     Subjective   Primary Historian: Patient     Chief Complaint: Coughing up blood     History of Present Illness  78 year old female with PMH of cerebellar stroke 7/20/2023, Afib noted on Zio patch 9/7/2023 placed on Eliquis, CAD, MVR, that presents to the ER with complain of coughing up blood. She had a wet cough yesterday that did not bother her much and since about midnight noted bright blood with cough and occasional clots. Denies chest pain, fever, never smoked. CTA chest showed no PE, does mention right heart strain, and 14 mm groundglass opacity/nodule in the right upper lobe may represent an evolving inflammatory or neoplastic process.     Review of Systems   Otherwise complete ROS reviewed and negative except as mentioned in the HPI.     Past Medical History:   Medical History        Past Medical History:   Diagnosis Date    Anxiety      Arthrosis      Atrial fibrillation      Vazquez's esophagus 2000    Coronary artery disease      Depression      Fibromyalgia      Graves disease 1970    Heart murmur      Heart valve disease      Hyperlipidemia      Hypertension      Stroke           Past Surgical History:  Surgical History         Past Surgical History:   Procedure Laterality Date    CARDIAC CATHETERIZATION        CARPAL TUNNEL RELEASE Bilateral 2010    ESSURE TUBAL LIGATION Bilateral 1977    EYE SURGERY        HERNIA REPAIR   1992    JOINT REPLACEMENT        REPLACEMENT TOTAL KNEE BILATERAL Bilateral 1998    TENDON REPAIR Right 2019    THYROIDECTOMY   1970         Social History:  reports that she has never smoked. She has never used smokeless tobacco. She reports current drug use. Drug: Hydrocodone. She reports that she does not drink  alcohol.     Family History: family history includes No Known Problems in her father and mother.        Allergies:        Allergies   Allergen Reactions    Levaquin [Levofloxacin] Other (See Comments)       Spasms    Aripiprazole Unknown - High Severity    Codeine Dizziness       Other reaction(s): dizziness       Morphine Other (See Comments)         Medications:          Prior to Admission medications    Medication Sig Start Date End Date Taking? Authorizing Provider   amLODIPine (NORVASC) 5 MG tablet Take 1 tablet by mouth Daily. 7/27/23     Meme Tan APRN   apixaban (ELIQUIS) 5 MG tablet tablet Take 1 tablet by mouth 2 (Two) Times a Day. 9/7/23     Obie Fox MD   buPROPion (WELLBUTRIN) 75 MG tablet Take 1 tablet by mouth 2 (Two) Times a Day for 90 days. 7/27/23 10/25/23   Meme Tan APRN   busPIRone (BUSPAR) 15 MG tablet Take 1 tablet by mouth 4 (Four) Times a Day. 7/27/23     Meme Tan APRN   escitalopram (LEXAPRO) 20 MG tablet Take 1 tablet by mouth Daily. 7/27/23     Meme Tan APRN   HYDROcodone-acetaminophen (NORCO)  MG per tablet Take 1 tablet by mouth Every 4 (Four) Hours As Needed. 6/2/22     Yoel Villalobos MD   levothyroxine (SYNTHROID, LEVOTHROID) 112 MCG tablet Take 1 tablet by mouth Daily. 7/27/23     Meme Tan APRN   metoprolol succinate XL (TOPROL-XL) 50 MG 24 hr tablet Take 1 tablet by mouth Daily. 9/7/23     Obie Fox MD   naloxone (NARCAN) 4 MG/0.1ML nasal spray 1 spray into the nostril(s) as directed by provider As Needed (opiod overdose). CALL 911. SPR CONTENTS OF ONE SPRAYER (0.1ML) INTO ONE NOSTRIL. REPEAT IN 2-3 MIN IF SYMPTOMS OF OPIOID EMERGENCY PERSIST, ALTERNATE NOSTRILS  Patient not taking: Reported on 9/7/2023       Yoel Villalobos MD   ondansetron ODT (ZOFRAN-ODT) 4 MG disintegrating tablet Place 1 tablet on the tongue Every 8 (Eight) Hours As Needed for Nausea or Vomiting.  Patient not  "taking: Reported on 9/7/2023 7/27/23     Meme Tan APRN   rosuvastatin (Crestor) 10 MG tablet Take 1 tablet by mouth Daily. 7/27/23     Meme Tan APRN   traZODone (DESYREL) 100 MG tablet Take 1 tablet by mouth Every Night. 7/27/23     Meme Tan APRN      I have utilized all available immediate resources to obtain, update, or review the patient's current medications (including all prescriptions, over-the-counter products, herbals, cannabis/cannabidiol products, and vitamin/mineral/dietary (nutritional) supplements).     Objective      Vital Signs: /59 (BP Location: Right arm, Patient Position: Lying)   Pulse 79   Temp 97.6 °F (36.4 °C) (Oral)   Resp 20   Ht 147.3 cm (58\")   Wt 64.4 kg (142 lb)   SpO2 96%   BMI 29.68 kg/m²   Physical Exam  Vitals reviewed.   Constitutional:       General: She is not in acute distress.     Appearance: She is well-developed. She is not toxic-appearing.      Comments: Wet cough during the exam. No respiratory distress.    HENT:      Head: Normocephalic and atraumatic.      Right Ear: External ear normal.      Left Ear: External ear normal.      Mouth/Throat:      Mouth: Mucous membranes are dry.      Pharynx: Oropharynx is clear.   Eyes:      General:         Right eye: No discharge.         Left eye: No discharge.      Extraocular Movements: Extraocular movements intact.      Conjunctiva/sclera: Conjunctivae normal.      Pupils: Pupils are equal, round, and reactive to light.   Neck:      Vascular: No JVD.   Cardiovascular:      Rate and Rhythm: Normal rate and regular rhythm.      Pulses: Normal pulses.      Heart sounds: Normal heart sounds. No murmur heard.    No friction rub. No gallop.   Pulmonary:      Effort: Pulmonary effort is normal. No respiratory distress.      Breath sounds: No stridor. No wheezing, rhonchi or rales.   Chest:      Chest wall: No tenderness.   Abdominal:      General: Bowel sounds are normal. There is no " distension.      Palpations: Abdomen is soft.      Tenderness: There is no abdominal tenderness. There is no guarding or rebound.      Hernia: No hernia is present.   Musculoskeletal:         General: No swelling, tenderness or deformity. Normal range of motion.      Cervical back: Normal range of motion and neck supple. No rigidity or tenderness. No muscular tenderness.      Right lower leg: No edema.      Left lower leg: No edema.   Skin:     General: Skin is warm and dry.      Findings: No erythema or rash.   Neurological:      General: No focal deficit present.      Mental Status: She is alert and oriented to person, place, and time.      Cranial Nerves: No cranial nerve deficit.      Sensory: No sensory deficit.      Motor: No weakness or abnormal muscle tone.      Deep Tendon Reflexes: Reflexes normal.   Psychiatric:         Mood and Affect: Mood normal.         Behavior: Behavior normal.      Results Reviewed:  Lab Results (last 24 hours)         Procedure Component Value Units Date/Time     Hemoglobin & Hematocrit, Blood [[id number]]  (Abnormal) Collected: 10/04/23 0509     Specimen: Blood Updated: 10/04/23 0533       Hemoglobin 11.7 g/dL         Hematocrit 35.7 %       COVID PRE-OP / PRE-PROCEDURE SCREENING ORDER (NO ISOLATION) - Swab, Nasal Cavity [[id number]]  (Normal) Collected: 10/04/23 0244     Specimen: Swab from Nasal Cavity Updated: 10/04/23 0320     Narrative:       The following orders were created for panel order COVID PRE-OP / PRE-PROCEDURE SCREENING ORDER (NO ISOLATION) - Swab, Nasal Cavity.  Procedure                               Abnormality         Status                     ---------                               -----------         ------                     COVID-19 and FLU A/B PCR...[[id number]]  Normal              Final result                  Please view results for these tests on the individual orders.     COVID-19 and FLU A/B PCR - Swab, Nasopharynx [[id number]]  (Normal) Collected:  10/04/23 0244     Specimen: Swab from Nasopharynx Updated: 10/04/23 0320       COVID19 Not Detected       Influenza A PCR Not Detected       Influenza B PCR Not Detected     Narrative:       Fact sheet for providers: https://www.fda.gov/media/327767/download     Fact sheet for patients: https://www.fda.gov/media/410678/download     Test performed by PCR.     Basic Metabolic Panel [[id number]]  (Normal) Collected: 10/04/23 0243     Specimen: Blood Updated: 10/04/23 0317       Glucose 86 mg/dL         BUN 17 mg/dL         Creatinine 0.83 mg/dL         Sodium 139 mmol/L         Potassium 4.3 mmol/L         Comment: Specimen hemolyzed.  Results may be affected.          Chloride 105 mmol/L         CO2 24.0 mmol/L         Calcium 8.8 mg/dL         BUN/Creatinine Ratio 20.5       Anion Gap 10.0 mmol/L         eGFR 72.7 mL/min/1.73       Narrative:       GFR Normal >60  Chronic Kidney Disease <60  Kidney Failure <15     The GFR formula is only valid for adults with stable renal function between ages 18 and 70.     CBC & Differential [[id number]]  (Abnormal) Collected: 10/04/23 0243     Specimen: Blood Updated: 10/04/23 0257     Narrative:       The following orders were created for panel order CBC & Differential.  Procedure                               Abnormality         Status                     ---------                               -----------         ------                     CBC Auto Differential[[id number]]        Abnormal            Final result                  Please view results for these tests on the individual orders.     CBC Auto Differential [[id number]]  (Abnormal) Collected: 10/04/23 0243     Specimen: Blood Updated: 10/04/23 0257       WBC 8.51 10*3/mm3         RBC 3.72 10*6/mm3         Hemoglobin 12.0 g/dL         Hematocrit 37.1 %         MCV 99.7 fL         MCH 32.3 pg         MCHC 32.3 g/dL         RDW 12.9 %         RDW-SD 47.2 fl         MPV 10.2 fL         Platelets 215 10*3/mm3         Neutrophil  % 52.8 %         Lymphocyte % 33.8 %         Monocyte % 10.7 %         Eosinophil % 2.1 %         Basophil % 0.4 %         Immature Grans % 0.2 %         Neutrophils, Absolute 4.49 10*3/mm3         Lymphocytes, Absolute 2.88 10*3/mm3         Monocytes, Absolute 0.91 10*3/mm3         Eosinophils, Absolute 0.18 10*3/mm3         Basophils, Absolute 0.03 10*3/mm3         Immature Grans, Absolute 0.02 10*3/mm3         nRBC 0.0 /100 WBC               Imaging Results (Last 24 Hours)         Procedure Component Value Units Date/Time     XR Chest 2 View [[id number]] Collected: 10/04/23 0628       Updated: 10/04/23 0632     Narrative:       EXAMINATION: XR CHEST 2 VW-     10/4/2023 2:35 AM CDT     HISTORY: hemoptysis     The frontal and lateral views of the chest are obtained. There is no  previous study for comparison.     The lungs are poorly expanded.     Linear interstitial changes in the lower lung bilaterally probably  present atelectasis.     There is no pleural effusion, pulmonary congestion or pneumothorax.     The heart size is moderately prominent. Atheromatous changes of the  tortuous thoracic aorta are noted.     There is no acute bony abnormality.        Impression:       1. No active cardiopulmonary disease.     This report was signed and finalized on 10/4/2023 6:29 AM CDT by Dr. Mohamud Kovacs MD.        CT Angiogram Chest [[id number]] Collected: 10/04/23 0555       Updated: 10/04/23 0612     Narrative:       EXAMINATION: CT ANGIOGRAM CHEST-      10/4/2023 5:19 AM CDT     HISTORY: hemoptysis; R04.2-Hemoptysis     In order to have a CT radiation dose as low as reasonably achievable  Automated Exposure Control was utilized for adjustment of the mA and/or  KV according to patient size.     DLP in mGycm= 211     The CT angiography of the chest is performed after intravenous contrast  enhancement.     Images are acquired in axial plane and subsequent reconstruction in  coronal and sagittal planes.     There is no  previous similar study for comparison.     Correlation made with chest radiograph obtained earlier today.     There is optimal opacification of the pulmonary arteries and branches  bilaterally. No filling defects in the opacified pulmonary arterial bed.     RV/LV ratio is 41/41 which may suggest mild right heart strain. Etiology  is not certain.     Moderate dilatation of the right and left pulmonary arteries are seen  which may suggest pulmonary arterial hypertension.     Atheromatous changes of the thoracic aorta are seen. No aneurysmal  dilatation or dissection.     Severe atheromatous changes of coronary arteries are noted.     There are a few borderline prominent mediastinal and right hilar lymph  nodes. A level 4R lymph node measures 1.1 cm in short axis. A right  hilar lymph node measures 1.6 cm in short axis.     There is moderate cardiomegaly with enlarged left atrium and left  ventricle.     There is normal origin course and caliber of the brachiocephalic, left  common carotid and left subclavian artery. There is significant  medialization of the common carotid arteries.     Thyroid gland is not visualized and not evaluated.     No axillary lymphadenopathy.     The lungs are moderately well expanded.     There is a groundglass nodule opacity located posteriorly in the right  upper lobe adjacent and anterior to the proximal end of the right major  fissure which measures approximately 1.4 cm in diameter. There is  scattered tree-in-bud infiltrate more pronounced in the right upper lung  suggesting distal airway disease/respiratory bronchiolitis. No evidence  of air trapping. No areas of mosaic attenuation.     Central airways patent.     Limited visualized abdomen appears unremarkable except for significantly  distended gallbladder without stones and a partially exophytic low  density mass in the right kidney. There are small nonobstructing renal  calculi bilaterally larger one in the lower pole of the right  kidney  measuring 6 mm. There is evidence of mid to lower abdominal central  ventral fat-containing hernia which is incompletely visualized and  evaluated.     Images reviewed in bone windows show chronic degenerative changes of the  thoracolumbar spine with moderate dextroscoliosis of the thoracic spine.        Impression:       1. No evidence of pulmonary embolism. No aortic aneurysm or dissection.  2. Pulmonary arterial hypertension.  3. The RV/LV ratio suggesting a mild right heart strain. Etiology not  certain.  4. A 14 mm groundglass opacity/nodule in the right upper lobe may  represent an evolving inflammatory or neoplastic process. A follow-up  examination in 3 months is recommended to assure stability/resolution.  5. Borderline mediastinal and right hilar lymphadenopathy.  6. Nonacute abdominal findings as detailed above.     This report was signed and finalized on 10/4/2023 6:09 AM CDT by Dr. Mohamud Kovacs MD.        XR Chest 1 View [[id number]] Resulted: 10/04/23 0447       Updated: 10/04/23 0450             I have personally reviewed and interpreted the radiology studies and ECG obtained at time of admission.      Echocardiogram 7/20/2023    Technically difficult study.    Left ventricular systolic function is hyperdynamic (EF > 70%).    Left ventricular wall thickness is consistent with mild to moderate sigmoid-septal hypertrophy.    Slightly increased (nonobstructive) intracavitary and LVOT gradient (peak 18 mmHg) likely related to hyperdynamic LV function and LV sigmoid septal hypertrophy.    Right ventricle is not well visualized but appears normal in size with normal systolic function.    The left atrial cavity is mildly dilated.    Saline study was inconcluside; see details in report.    Valves are not well visualized, but there is no hemodynamically significant (more than mild) valve disease appreciated.    Mitral annular calcification is present.     Assessment / Plan   Assessment:         Active Hospital Problems     Diagnosis      **Hemoptysis      Acute bronchitis      Lung nodule      Kidney mass      Paroxysmal SVT (supraventricular tachycardia)      Cerebellar stroke      Primary hypertension      Paroxysmal atrial fibrillation        Treatment Plan  The patient will be admitted to my service here at [facility].   Admit to medical floor  Vitals every 4 hours  Cardiac diet  IVF saline lock     Hemoptysis ? Bronchitis in a patient recently started on anticoagulants  Azithromycin empirically  Cough syrup  Pulmonology consult for guidance and follow up of possible lung nodule  Need to hold Eliquis and restart when considered safe by pulmonology, current risk of worsening lung bleeding outweighs stroke prevention benefits  Avoid Albuterol due to history of PAF and SVT     Right heart strain per CT unclear etiology, will repeat 2D echocardiogram     Kidney mass noted on CT as well, unclear if this is an artifact. Will need CT abd/pelvis to verify     DVT prophylaxis >SCD     Medical Decision Making  Number and Complexity of problems: 4 complex medical problems  Differential Diagnosis: None     Conditions and Status        Condition is unchanged.     [facility] Data  External documents reviewed: Epic EHR noted and cardiology visit  Cardiac tracing (EKG, telemetry) interpretation: Sinus bradycardia with Premature atrial complexes   Radiology interpretation: See above, CT and CXR images reviewed  Labs reviewed: See above  Any tests that were considered but not ordered: none     Decision rules/scores evaluated (example [id number], Wells, etc): [id number] 6     Discussed with: Patient     Care Planning  Shared decision making: Patient  Code status and discussions: Full code     Disposition  Social Determinants of Health that impact treatment or disposition: None  Estimated length of stay is over 2 midnights.      I confirmed that the patient's advanced care plan is present, code status is  documented, and a surrogate decision maker is listed in the patient's medical record.      The patient's surrogate decision maker is Sari Rinaldi, daughter.      The patient was seen and examined by me on 10/04/2023 at 0633.     Electronically signed by Joey Cortez MD, 10/04/23, 0                Right heart strain per CT unclear etiology, will repeat 2D echocardiogram    Current Facility-Administered Medications   Medication Dose Route Frequency Provider Last Rate Last Admin    acetaminophen (TYLENOL) tablet 650 mg  650 mg Oral Q4H PRN Joey Cortez MD   650 mg at 10/04/23 0933    amLODIPine (NORVASC) tablet 5 mg  5 mg Oral Daily Joey Cortez MD   5 mg at 10/04/23 0932    azithromycin (ZITHROMAX) tablet 500 mg  500 mg Oral Q24H Joey Cortez MD   500 mg at 10/04/23 0932    sennosides-docusate (PERICOLACE) 8.6-50 MG per tablet 2 tablet  2 tablet Oral BID Joey Cortez MD        And    polyethylene glycol (MIRALAX) packet 17 g  17 g Oral Daily PRN Joey Cortez MD        And    bisacodyl (DULCOLAX) EC tablet 5 mg  5 mg Oral Daily PRN Joey Cortez MD        And    bisacodyl (DULCOLAX) suppository 10 mg  10 mg Rectal Daily PRN Joey Cortez MD        buPROPion (WELLBUTRIN) tablet 75 mg  75 mg Oral BID Joey Cortez MD   75 mg at 10/04/23 0933    busPIRone (BUSPAR) tablet 15 mg  15 mg Oral 4x Daily Joey Cortez MD        escitalopram (LEXAPRO) tablet 20 mg  20 mg Oral Daily Joey Cortez MD   20 mg at 10/04/23 0932    guaiFENesin-dextromethorphan (ROBITUSSIN DM) 100-10 MG/5ML syrup 5 mL  5 mL Oral Q4H PRN Joey Cortez MD        HYDROcodone-acetaminophen (NORCO)  MG per tablet 1 tablet  1 tablet Oral Q4H PRN Joey Cortez MD   1 tablet at 10/04/23 0933    levothyroxine (SYNTHROID, LEVOTHROID) tablet 112 mcg  112 mcg Oral Daily Joey Cortez MD   112 mcg at  10/04/23 0932    metoprolol succinate XL (TOPROL-XL) 24 hr tablet 50 mg  50 mg Oral Daily Joey Cortez MD   50 mg at 10/04/23 0932    nitroglycerin (NITROSTAT) SL tablet 0.4 mg  0.4 mg Sublingual Q5 Min PRN Joey Cortez MD        rosuvastatin (CRESTOR) tablet 10 mg  10 mg Oral Nightly Joey Cortez MD        sodium chloride 0.9 % flush 10 mL  10 mL Intravenous PRN Joey Cortez MD        sodium chloride 0.9 % flush 10 mL  10 mL Intravenous Q12H Joey Cortez MD   10 mL at 10/04/23 0933    sodium chloride 0.9 % flush 10 mL  10 mL Intravenous PRN Joey Cortez MD        sodium chloride 0.9 % infusion 40 mL  40 mL Intravenous PRN Joey Cortez MD        traZODone (DESYREL) tablet 100 mg  100 mg Oral Nightly Joey Cortez MD

## 2023-10-04 NOTE — CONSULTS
"      [facility] PULMONARY & CRITICAL CARE CONSULT - [facility]    10/04/23, 07:47 CDT  Patient Care Team:  Meme Tan APRN as PCP - General (Nurse Practitioner)  Name: Nichole Rinaldi  : 1945  MRN: [id number]  Contact Serial Number [id number]    Chief complaint: Hemoptysis  HPI:  We have been consulted by Joseline Ibarra MD to see this 78 y.o. female patient.  Patient presented to the ER early this morning with complaints of hemoptysis; approximately 60 mL.  She has been on Eliquis for atrial fibrillation.  Prior to the hemoptysis beginning, she felt like she was \"getting bronchitis\".  She reports that she was \"coughing hard\".  Hemoglobin is 11.7 this morning.  Eliquis is on hold.  She is a never smoker.  She has no previous lung issues.  She is currently on room air.  She is followed by [facility] cardiology for A-fib, known mitral valve regurgitation and left ventricular hypertrophy.  She is currently in no acute distress and on room air with sats at 95%.  Dr. Driscoll discussed the possibility of bronchoscopy on Friday.  We will get a repeat echo, BNP and troponin today.  Past Medical History:   has a past medical history of Anxiety, Arthrosis, Atrial fibrillation, Vazquez's esophagus (), Coronary artery disease, Depression, Fibromyalgia, Graves disease (), Heart murmur, Heart valve disease, Hyperlipidemia, Hypertension, and Stroke.   has a past surgical history that includes Thyroidectomy (); Essure tubal ligation (Bilateral, ); Hernia repair (); Replacement total knee bilateral (Bilateral, ); Carpal tunnel release (Bilateral, ); Eye surgery; Joint replacement; Tendon repair (Right, 2019); and Cardiac catheterization.  Allergies   Allergen Reactions    Levaquin [Levofloxacin] Other (See Comments)     Spasms    Aripiprazole Unknown - High Severity    Codeine Dizziness     Other reaction(s): dizziness      Morphine Other (See Comments)     Medications:  amLODIPine, 5 " mg, Oral, Daily  azithromycin, 500 mg, Oral, Q24H  buPROPion, 75 mg, Oral, BID  busPIRone, 15 mg, Oral, 4x Daily  escitalopram, 20 mg, Oral, Daily  levothyroxine, 112 mcg, Oral, Daily  metoprolol succinate XL, 50 mg, Oral, Daily  rosuvastatin, 10 mg, Oral, Nightly  senna-docusate sodium, 2 tablet, Oral, BID  sodium chloride, 10 mL, Intravenous, Q12H  traZODone, 100 mg, Oral, Nightly         Family History:  Family History   Problem Relation Age of Onset    No Known Problems Mother     No Known Problems Father      Social History:   reports that she has never smoked. She has never used smokeless tobacco. She reports current drug use. Drug: Hydrocodone. She reports that she does not drink alcohol.  Review of Systems:  Cough with hemoptysis  Physical Exam:   Temp:  [97.6 °F (36.4 °C)-98.1 °F (36.7 °C)] 97.6 °F (36.4 °C)  Heart Rate:  [56-79] 79  Resp:  [20] 20  BP: (106-151)/(46-74) 140/59  Intake/Output Summary (Last 24 hours) at 10/4/2023 0747  Last data filed at 10/4/2023 0610  Gross per 24 hour   Intake 1000 ml   Output --   Net 1000 ml         10/04/23  0215   Weight: 64.4 kg (142 lb)     SpO2 Percentage    10/04/23 0601 10/04/23 0610 10/04/23 0627   SpO2: 91% 91% 96%     Body mass index is 29.68 kg/m².   Physical Exam  Vitals and nursing note reviewed.   Constitutional:       Appearance: She is well-developed.   HENT:      Head: Normocephalic and atraumatic.   Eyes:      General: No scleral icterus.     Pupils: Pupils are equal, round, and reactive to light.   Cardiovascular:      Rate and Rhythm: Normal rate and regular rhythm.   Pulmonary:      Effort: Pulmonary effort is normal. No respiratory distress.      Breath sounds: Normal breath sounds.   Abdominal:      General: There is no distension.      Palpations: Abdomen is soft.   Musculoskeletal:         General: Normal range of motion.      Cervical back: Normal range of motion and neck supple.      Right lower leg: No edema.      Left lower leg: No edema.    Skin:     General: Skin is warm and dry.   Neurological:      Mental Status: She is alert and oriented to person, place, and time.   Psychiatric:         Mood and Affect: Mood normal.         Behavior: Behavior normal.     Electronically signed by ABHAY Al, 10/4/2023, 07:47 CDT      Physician Substantive Portion: Medical Decision Making  Result Review  Results from last 7 days   Lab Units 10/04/23  0509 10/04/23  0243   WBC 10*3/mm3  --  8.51   HEMOGLOBIN g/dL 11.7* 12.0   PLATELETS 10*3/mm3  --  215     Results from last 7 days   Lab Units 10/04/23  0243   SODIUM mmol/L 139   POTASSIUM mmol/L 4.3   CO2 mmol/L 24.0   BUN mg/dL 17   CREATININE mg/dL 0.83   GLUCOSE mg/dL 86         No results found for: PROBNP  Microbiology Results (last 10 days)       Procedure Component Value - Date/Time    COVID PRE-OP / PRE-PROCEDURE SCREENING ORDER (NO ISOLATION) - Swab, Nasal Cavity [[id number]]  (Normal) Collected: 10/04/23 0244    Lab Status: Final result Specimen: Swab from Nasal Cavity Updated: 10/04/23 0320    Narrative:      The following orders were created for panel order COVID PRE-OP / PRE-PROCEDURE SCREENING ORDER (NO ISOLATION) - Swab, Nasal Cavity.  Procedure                               Abnormality         Status                     ---------                               -----------         ------                     COVID-19 and FLU A/B PCR...[[id number]]  Normal              Final result                 Please view results for these tests on the individual orders.    COVID-19 and FLU A/B PCR - Swab, Nasopharynx [[id number]]  (Normal) Collected: 10/04/23 0244    Lab Status: Final result Specimen: Swab from Nasopharynx Updated: 10/04/23 0320     COVID19 Not Detected     Influenza A PCR Not Detected     Influenza B PCR Not Detected    Narrative:      Fact sheet for providers: https://www.fda.gov/media/588349/download    Fact sheet for patients: https://www.fda.gov/media/368052/download    Test  performed by PCR.             Recent radiology:   Imaging Results (Last 72 Hours)       Procedure Component Value Units Date/Time    XR Chest 1 View [[id number]] Collected: 10/04/23 0631     Updated: 10/04/23 0635    Narrative:      EXAMINATION: XR CHEST 1 VW-     10/4/2023 4:46 AM CDT     HISTORY: hemoptysis     A frontal lordotic view of the chest is compared with the previous study  dated 10/4/2023.     The lungs are moderately well-expanded.     There is no evidence of recent infiltrate, pleural effusion, pulmonary  congestion or pneumothorax.     The heart size is not optimally visualized due to the AP projection.  Atheromatous changes of the thoracic aorta are noted.     No acute bony abnormality. There is deformity of the right proximal  humerus which may be due to previous trauma.     Postsurgical changes of the left upper abdomen are noted.       Impression:      1. No active cardiopulmonary disease.     This report was signed and finalized on 10/4/2023 6:32 AM CDT by Dr. Mohamud Kovacs MD.       XR Chest 2 View [[id number]] Collected: 10/04/23 0628     Updated: 10/04/23 0632    Narrative:      EXAMINATION: XR CHEST 2 VW-     10/4/2023 2:35 AM CDT     HISTORY: hemoptysis     The frontal and lateral views of the chest are obtained. There is no  previous study for comparison.     The lungs are poorly expanded.     Linear interstitial changes in the lower lung bilaterally probably  present atelectasis.     There is no pleural effusion, pulmonary congestion or pneumothorax.     The heart size is moderately prominent. Atheromatous changes of the  tortuous thoracic aorta are noted.     There is no acute bony abnormality.       Impression:      1. No active cardiopulmonary disease.     This report was signed and finalized on 10/4/2023 6:29 AM CDT by Dr. Mohamud Kovacs MD.       CT Angiogram Chest [[id number]] Collected: 10/04/23 0555     Updated: 10/04/23 0612    Narrative:      EXAMINATION: CT ANGIOGRAM  CHEST-      10/4/2023 5:19 AM CDT     HISTORY: hemoptysis; R04.2-Hemoptysis     In order to have a CT radiation dose as low as reasonably achievable  Automated Exposure Control was utilized for adjustment of the mA and/or  KV according to patient size.     DLP in mGycm= 211     The CT angiography of the chest is performed after intravenous contrast  enhancement.     Images are acquired in axial plane and subsequent reconstruction in  coronal and sagittal planes.     There is no previous similar study for comparison.     Correlation made with chest radiograph obtained earlier today.     There is optimal opacification of the pulmonary arteries and branches  bilaterally. No filling defects in the opacified pulmonary arterial bed.     RV/LV ratio is 41/41 which may suggest mild right heart strain. Etiology  is not certain.     Moderate dilatation of the right and left pulmonary arteries are seen  which may suggest pulmonary arterial hypertension.     Atheromatous changes of the thoracic aorta are seen. No aneurysmal  dilatation or dissection.     Severe atheromatous changes of coronary arteries are noted.     There are a few borderline prominent mediastinal and right hilar lymph  nodes. A level 4R lymph node measures 1.1 cm in short axis. A right  hilar lymph node measures 1.6 cm in short axis.     There is moderate cardiomegaly with enlarged left atrium and left  ventricle.     There is normal origin course and caliber of the brachiocephalic, left  common carotid and left subclavian artery. There is significant  medialization of the common carotid arteries.     Thyroid gland is not visualized and not evaluated.     No axillary lymphadenopathy.     The lungs are moderately well expanded.     There is a groundglass nodule opacity located posteriorly in the right  upper lobe adjacent and anterior to the proximal end of the right major  fissure which measures approximately 1.4 cm in diameter. There is  scattered  tree-in-bud infiltrate more pronounced in the right upper lung  suggesting distal airway disease/respiratory bronchiolitis. No evidence  of air trapping. No areas of mosaic attenuation.     Central airways patent.     Limited visualized abdomen appears unremarkable except for significantly  distended gallbladder without stones and a partially exophytic low  density mass in the right kidney. There are small nonobstructing renal  calculi bilaterally larger one in the lower pole of the right kidney  measuring 6 mm. There is evidence of mid to lower abdominal central  ventral fat-containing hernia which is incompletely visualized and  evaluated.     Images reviewed in bone windows show chronic degenerative changes of the  thoracolumbar spine with moderate dextroscoliosis of the thoracic spine.       Impression:      1. No evidence of pulmonary embolism. No aortic aneurysm or dissection.  2. Pulmonary arterial hypertension.  3. The RV/LV ratio suggesting a mild right heart strain. Etiology not  certain.  4. A 14 mm groundglass opacity/nodule in the right upper lobe may  represent an evolving inflammatory or neoplastic process. A follow-up  examination in 3 months is recommended to assure stability/resolution.  5. Borderline mediastinal and right hilar lymphadenopathy.  6. Nonacute abdominal findings as detailed above.     This report was signed and finalized on 10/4/2023 6:09 AM CDT by Dr. Mohamud Kovacs MD.               Personal review of imaging : CT scan shows faint ground glass opacity in posterior rul  Other test results: Results for orders placed during the hospital encounter of 07/20/23    Adult Transthoracic Echo Complete W/ Cont if Necessary Per Protocol (With Agitated Saline)    Interpretation Summary    Technically difficult study.    Left ventricular systolic function is hyperdynamic (EF > 70%).    Left ventricular wall thickness is consistent with mild to moderate sigmoid-septal hypertrophy.    Slightly  increased (nonobstructive) intracavitary and LVOT gradient (peak 18 mmHg) likely related to hyperdynamic LV function and LV sigmoid septal hypertrophy.    Right ventricle is not well visualized but appears normal in size with normal systolic function.    The left atrial cavity is mildly dilated.    Saline study was inconcluside; see details in report.    Valves are not well visualized, but there is no hemodynamically significant (more than mild) valve disease appreciated.    Mitral annular calcification is present.     Independent review of ekg: narrow complex bradycardia with irregularity, probably sr with pac vs afib with rate control, normal qt interval.  Telemetry shows sr  Problem List as identified by Epic (may contain historical, inactive problems)    Hemoptysis    Paroxysmal atrial fibrillation    Primary hypertension    Cerebellar stroke    Paroxysmal SVT (supraventricular tachycardia)    Acute bronchitis    Lung nodule    Kidney mass    Pulmonary Assessment:  Submassive hemoptysis, acute onset a few hours ago  Bronchitic symptoms and suspected upper respiratory tract infection  Nonsmoker  Hx stroke and anticoagulation therapy  Bradycardia on admission, appears improved  Ground glass opacity in rul of uncertain significance and uncertain connection to presentation  Paroxysmal atrial fibrillation    Recommend/plan:   Empiric abx therapy  Hold eliquis  Will plan bronchoscopy Friday off anticoagulants to evaluate for any pulmonary lesion requiring additional attention  Assuming unremarkable bronchoscopy, will plan follow up ct chest in 3 months to reassess the ggo.    This visit was performed by both a physician and an Advanced Practice RN.  I personally evaluated and examined the patient.  I performed all aspects of the medical decision making as documented.  Electronically signed by Jonah Driscoll MD, 10/4/2023, 08:55 CDT

## 2023-10-04 NOTE — PROGRESS NOTES
[facility] Medicine Services  INPATIENT PROGRESS NOTE    Patient Name: Nichole Rinaldi  Date of Admission: 10/4/2023  Today's Date: 10/04/23  Length of Stay: 0  Primary Care Physician: Meme Tan APRN    Subjective   Chief Complaint: Hemoptysis  HPI   78 year old female with PMH of cerebellar stroke 7/20/2023, Afib noted on Zio patch 9/7/2023 placed on Eliquis, CAD, MVR, that presents to the ER with complain of coughing up blood. She had a wet cough yesterday that did not bother her much and since about midnight noted bright blood with cough and occasional clots. Denies chest pain, fever, never smoked. CTA chest showed no PE, does mention right heart strain, and 14 mm groundglass opacity/nodule in the right upper lobe may represent an evolving inflammatory or neoplastic process.   10/4  Admitted for hemoptysis history of  A-fib on Eliquis, PTA noticed no PE mild right RV strain pulmonary hypertension left upper lobe nodule hemoglobin stable pulmonary consult appreciated, remains on room airDr. Yamila discussed the possibility of bronchoscopy on Friday. We will get a repeat echo, BNP and troponin today.         Review of Systems   All pertinent negatives and positives are as above. All other systems have been reviewed and are negative unless otherwise stated.     Objective    Temp:  [97.6 °F (36.4 °C)-98.1 °F (36.7 °C)] 97.7 °F (36.5 °C)  Heart Rate:  [56-79] 73  Resp:  [18-20] 18  BP: (106-151)/(46-74) 131/69  Physical Exam  Constitutional:       Appearance: Normal appearance.   HENT:      Head: Normocephalic and atraumatic.      Nose: Nose normal.      Mouth/Throat:      Mouth: Mucous membranes are moist. Mucous membranes are dry.   Eyes:      Extraocular Movements: Extraocular movements intact.      Pupils: Pupils are equal, round, and reactive to light.   Cardiovascular:      Rate and Rhythm: Normal rate and regular rhythm.   Pulmonary:      Effort: Pulmonary effort  is normal.      Breath sounds: Normal breath sounds.   Abdominal:      General: Abdomen is flat.      Palpations: Abdomen is soft.   Musculoskeletal:         General: Normal range of motion.      Cervical back: Normal range of motion.   Skin:     General: Skin is warm.      Capillary Refill: Capillary refill takes less than 2 seconds.   Neurological:      General: No focal deficit present.      Mental Status: She is alert and oriented to person, place, and time.           Results Review:  I have reviewed the labs, radiology results, and diagnostic studies.    Laboratory Data:   Results from last 7 days   Lab Units 10/04/23  0509 10/04/23  0243   WBC 10*3/mm3  --  8.51   HEMOGLOBIN g/dL 11.7* 12.0   HEMATOCRIT % 35.7 37.1   PLATELETS 10*3/mm3  --  215        Results from last 7 days   Lab Units 10/04/23  0243   SODIUM mmol/L 139   POTASSIUM mmol/L 4.3   CHLORIDE mmol/L 105   CO2 mmol/L 24.0   BUN mg/dL 17   CREATININE mg/dL 0.83   CALCIUM mg/dL 8.8   GLUCOSE mg/dL 86       Culture Data:   No results found for: BLOODCX, URINECX, WOUNDCX, MRSACX, RESPCX, STOOLCX    Radiology Data:   Imaging Results (Last 24 Hours)       Procedure Component Value Units Date/Time    XR Chest 1 View [[id number]] Collected: 10/04/23 0631     Updated: 10/04/23 0635    Narrative:      EXAMINATION: XR CHEST 1 VW-     10/4/2023 4:46 AM CDT     HISTORY: hemoptysis     A frontal lordotic view of the chest is compared with the previous study  dated 10/4/2023.     The lungs are moderately well-expanded.     There is no evidence of recent infiltrate, pleural effusion, pulmonary  congestion or pneumothorax.     The heart size is not optimally visualized due to the AP projection.  Atheromatous changes of the thoracic aorta are noted.     No acute bony abnormality. There is deformity of the right proximal  humerus which may be due to previous trauma.     Postsurgical changes of the left upper abdomen are noted.       Impression:      1. No active  cardiopulmonary disease.     This report was signed and finalized on 10/4/2023 6:32 AM CDT by Dr. Mohamud Kovacs MD.       XR Chest 2 View [[id number]] Collected: 10/04/23 0628     Updated: 10/04/23 0632    Narrative:      EXAMINATION: XR CHEST 2 VW-     10/4/2023 2:35 AM CDT     HISTORY: hemoptysis     The frontal and lateral views of the chest are obtained. There is no  previous study for comparison.     The lungs are poorly expanded.     Linear interstitial changes in the lower lung bilaterally probably  present atelectasis.     There is no pleural effusion, pulmonary congestion or pneumothorax.     The heart size is moderately prominent. Atheromatous changes of the  tortuous thoracic aorta are noted.     There is no acute bony abnormality.       Impression:      1. No active cardiopulmonary disease.     This report was signed and finalized on 10/4/2023 6:29 AM CDT by Dr. Mohamud Kovacs MD.       CT Angiogram Chest [[id number]] Collected: 10/04/23 0555     Updated: 10/04/23 0612    Narrative:      EXAMINATION: CT ANGIOGRAM CHEST-      10/4/2023 5:19 AM CDT     HISTORY: hemoptysis; R04.2-Hemoptysis     In order to have a CT radiation dose as low as reasonably achievable  Automated Exposure Control was utilized for adjustment of the mA and/or  KV according to patient size.     DLP in mGycm= 211     The CT angiography of the chest is performed after intravenous contrast  enhancement.     Images are acquired in axial plane and subsequent reconstruction in  coronal and sagittal planes.     There is no previous similar study for comparison.     Correlation made with chest radiograph obtained earlier today.     There is optimal opacification of the pulmonary arteries and branches  bilaterally. No filling defects in the opacified pulmonary arterial bed.     RV/LV ratio is 41/41 which may suggest mild right heart strain. Etiology  is not certain.     Moderate dilatation of the right and left pulmonary arteries are  seen  which may suggest pulmonary arterial hypertension.     Atheromatous changes of the thoracic aorta are seen. No aneurysmal  dilatation or dissection.     Severe atheromatous changes of coronary arteries are noted.     There are a few borderline prominent mediastinal and right hilar lymph  nodes. A level 4R lymph node measures 1.1 cm in short axis. A right  hilar lymph node measures 1.6 cm in short axis.     There is moderate cardiomegaly with enlarged left atrium and left  ventricle.     There is normal origin course and caliber of the brachiocephalic, left  common carotid and left subclavian artery. There is significant  medialization of the common carotid arteries.     Thyroid gland is not visualized and not evaluated.     No axillary lymphadenopathy.     The lungs are moderately well expanded.     There is a groundglass nodule opacity located posteriorly in the right  upper lobe adjacent and anterior to the proximal end of the right major  fissure which measures approximately 1.4 cm in diameter. There is  scattered tree-in-bud infiltrate more pronounced in the right upper lung  suggesting distal airway disease/respiratory bronchiolitis. No evidence  of air trapping. No areas of mosaic attenuation.     Central airways patent.     Limited visualized abdomen appears unremarkable except for significantly  distended gallbladder without stones and a partially exophytic low  density mass in the right kidney. There are small nonobstructing renal  calculi bilaterally larger one in the lower pole of the right kidney  measuring 6 mm. There is evidence of mid to lower abdominal central  ventral fat-containing hernia which is incompletely visualized and  evaluated.     Images reviewed in bone windows show chronic degenerative changes of the  thoracolumbar spine with moderate dextroscoliosis of the thoracic spine.       Impression:      1. No evidence of pulmonary embolism. No aortic aneurysm or dissection.  2. Pulmonary  arterial hypertension.  3. The RV/LV ratio suggesting a mild right heart strain. Etiology not  certain.  4. A 14 mm groundglass opacity/nodule in the right upper lobe may  represent an evolving inflammatory or neoplastic process. A follow-up  examination in 3 months is recommended to assure stability/resolution.  5. Borderline mediastinal and right hilar lymphadenopathy.  6. Nonacute abdominal findings as detailed above.     This report was signed and finalized on 10/4/2023 6:09 AM CDT by Dr. Mohamud Kovacs MD.               I have reviewed the patient's current medications.     Assessment/Plan   Assessment  Active Hospital Problems    Diagnosis     **Hemoptysis     Acute bronchitis     Lung nodule     Kidney mass     Paroxysmal SVT (supraventricular tachycardia)     Cerebellar stroke     Primary hypertension     Paroxysmal atrial fibrillation        Treatment Plan  Hemoptysis ? Bronchitis in a patient recently started on anticoagulants  Azithromycin empirically  Cough syrup  Pulmonology consult for guidance and follow up of possible lung nodule  Need to hold Eliquis and restart when considered safe by pulmonology, current risk of worsening lung bleeding outweighs stroke prevention benefits  Avoid Albuterol due to history of PAF and SVT     Right heart strain per CT unclear etiology, will repeat 2D echocardiogram     Kidney mass noted on CT as well, unclear if this is an artifact. Will need CT abd/pelvis to verify     DVT prophylaxis >SCD    Medical Decision Making  Number and Complexity of problems: 1 acute  Differential Diagnosis: As above    Conditions and Status        Condition is at treatment goal.     MDM Data  External documents reviewed: None  Cardiac tracing (EKG, telemetry) interpretation: Yes  Radiology interpretation: Yes  Labs reviewed: Yes  Any tests that were considered but not ordered: None     Decision rules/scores evaluated (example [id number], [place], etc): None     Discussed with: Nursing  and patient     Care Planning  Shared decision making: Patient apprised of current labs, vitals, imaging and treatment plan.  They are agreeable with proceeding with plans as discussed.   Code status and discussions: Full code    Disposition  Social Determinants of Health that impact treatment or disposition: None  I expect the patient to be discharged to home in few days.     Electronically signed by Joseline Ibarra MD, 10/04/23, 09:01 CDT.

## 2023-10-04 NOTE — ED NOTES
Nursing report ED to floor  Nichoel Rinaldi  78 y.o.  female    HPI:   Chief Complaint   Patient presents with    Cough       Admitting doctor:   Joey Cortez MD    Consulting provider(s):  Consults       Date and Time Order Name Status Description    10/4/2023  4:56 AM Inpatient Pulmonology Consult               Admitting diagnosis:   The encounter diagnosis was Hemoptysis.    Code status:   Current Code Status       Date Active Code Status Order ID Comments User Context       Prior            Allergies:   Levaquin [levofloxacin], Aripiprazole, Codeine, and Morphine    Intake and Output    Intake/Output Summary (Last 24 hours) at 10/4/2023 0610  Last data filed at 10/4/2023 0610  Gross per 24 hour   Intake 1000 ml   Output --   Net 1000 ml       Weight:       10/04/23  0215   Weight: 64.4 kg (142 lb)       Most recent vitals:   Vitals:    10/04/23 0401 10/04/23 0402 10/04/23 0501 10/04/23 0601   BP: 106/46  117/71 121/67   BP Location:       Patient Position:       Pulse:   60 72   Resp:       Temp:       TempSrc:       SpO2:  95% 94% 91%   Weight:       Height:         Oxygen Therapy: .    Active LDAs/IV Access:   Lines, Drains & Airways       Active LDAs       Name Placement date Placement time Site Days    Peripheral IV 10/04/23 0244 Left Antecubital 10/04/23  0244  Antecubital  less than 1                    Labs (abnormal labs have a star):   Labs Reviewed   CBC WITH AUTO DIFFERENTIAL - Abnormal; Notable for the following components:       Result Value    RBC 3.72 (*)     MCV 99.7 (*)     Monocytes, Absolute 0.91 (*)     All other components within normal limits   HEMOGLOBIN AND HEMATOCRIT, BLOOD - Abnormal; Notable for the following components:    Hemoglobin 11.7 (*)     All other components within normal limits   COVID-19 AND FLU A/B, NP SWAB IN TRANSPORT MEDIA 8-12 HR TAT - Normal    Narrative:     Fact sheet for providers: https://www.fda.gov/media/822217/download    Fact sheet for patients:  https://www.fda.gov/media/986293/download    Test performed by PCR.   BASIC METABOLIC PANEL - Normal    Narrative:     GFR Normal >60  Chronic Kidney Disease <60  Kidney Failure <15    The GFR formula is only valid for adults with stable renal function between ages 18 and 70.   COVID PRE-OP / PRE-PROCEDURE SCREENING ORDER (NO ISOLATION)    Narrative:     The following orders were created for panel order COVID PRE-OP / PRE-PROCEDURE SCREENING ORDER (NO ISOLATION) - Swab, Nasal Cavity.  Procedure                               Abnormality         Status                     ---------                               -----------         ------                     COVID-19 and FLU A/B PCR...[[id number]]  Normal              Final result                 Please view results for these tests on the individual orders.   TYPE AND SCREEN   CBC AND DIFFERENTIAL    Narrative:     The following orders were created for panel order CBC & Differential.  Procedure                               Abnormality         Status                     ---------                               -----------         ------                     CBC Auto Differential[[id number]]        Abnormal            Final result                 Please view results for these tests on the individual orders.       Meds given in ED:   Medications   sodium chloride 0.9 % flush 10 mL (has no administration in time range)   methylPREDNISolone sodium succinate (SOLU-Medrol) injection 125 mg (125 mg Intravenous Given 10/4/23 0300)   sodium chloride 0.9 % bolus 1,000 mL (0 mL Intravenous Stopped 10/4/23 0610)   iopamidol (ISOVUE-370) 76 % injection 100 mL (100 mL Intravenous Given 10/4/23 0524)           NIH Stroke Scale:       Isolation/Infection(s):  No active isolations   No active infections     COVID Testing  Collected .  Resulted .    Nursing report ED to floor:  Mental status: .  Ambulatory status: .  Precautions: .    ED nurse phone extentsion- ..

## 2023-10-04 NOTE — H&P
[facility] Medicine Services  HISTORY AND PHYSICAL    Date of Admission: 10/4/2023  Primary Care Physician: Meme Tan APRN    Subjective   Primary Historian: Patient    Chief Complaint: Coughing up blood    History of Present Illness  78 year old female with PMH of cerebellar stroke 7/20/2023, Afib noted on Zio patch 9/7/2023 placed on Eliquis, CAD, MVR, that presents to the ER with complain of coughing up blood. She had a wet cough yesterday that did not bother her much and since about midnight noted bright blood with cough and occasional clots. Denies chest pain, fever, never smoked. CTA chest showed no PE, does mention right heart strain, and 14 mm groundglass opacity/nodule in the right upper lobe may represent an evolving inflammatory or neoplastic process.    Review of Systems   Otherwise complete ROS reviewed and negative except as mentioned in the HPI.    Past Medical History:   Past Medical History:   Diagnosis Date    Anxiety     Arthrosis     Atrial fibrillation     Vazquez's esophagus 2000    Coronary artery disease     Depression     Fibromyalgia     Graves disease 1970    Heart murmur     Heart valve disease     Hyperlipidemia     Hypertension     Stroke      Past Surgical History:  Past Surgical History:   Procedure Laterality Date    CARDIAC CATHETERIZATION      CARPAL TUNNEL RELEASE Bilateral 2010    ESSURE TUBAL LIGATION Bilateral 1977    EYE SURGERY      HERNIA REPAIR  1992    JOINT REPLACEMENT      REPLACEMENT TOTAL KNEE BILATERAL Bilateral 1998    TENDON REPAIR Right 2019    THYROIDECTOMY  1970     Social History:  reports that she has never smoked. She has never used smokeless tobacco. She reports current drug use. Drug: Hydrocodone. She reports that she does not drink alcohol.    Family History: family history includes No Known Problems in her father and mother.       Allergies:  Allergies   Allergen Reactions    Levaquin [Levofloxacin] Other  (See Comments)     Spasms    Aripiprazole Unknown - High Severity    Codeine Dizziness     Other reaction(s): dizziness      Morphine Other (See Comments)       Medications:  Prior to Admission medications    Medication Sig Start Date End Date Taking? Authorizing Provider   amLODIPine (NORVASC) 5 MG tablet Take 1 tablet by mouth Daily. 7/27/23   Meme Tan APRN   apixaban (ELIQUIS) 5 MG tablet tablet Take 1 tablet by mouth 2 (Two) Times a Day. 9/7/23   Obie Fox MD   buPROPion (WELLBUTRIN) 75 MG tablet Take 1 tablet by mouth 2 (Two) Times a Day for 90 days. 7/27/23 10/25/23  Meme Tan APRN   busPIRone (BUSPAR) 15 MG tablet Take 1 tablet by mouth 4 (Four) Times a Day. 7/27/23   Meme Tan APRN   escitalopram (LEXAPRO) 20 MG tablet Take 1 tablet by mouth Daily. 7/27/23   Meme Tan APRN   HYDROcodone-acetaminophen (NORCO)  MG per tablet Take 1 tablet by mouth Every 4 (Four) Hours As Needed. 6/2/22   Yoel Villalobos MD   levothyroxine (SYNTHROID, LEVOTHROID) 112 MCG tablet Take 1 tablet by mouth Daily. 7/27/23   Meme Tan APRN   metoprolol succinate XL (TOPROL-XL) 50 MG 24 hr tablet Take 1 tablet by mouth Daily. 9/7/23   Obie Fox MD   naloxone (NARCAN) 4 MG/0.1ML nasal spray 1 spray into the nostril(s) as directed by provider As Needed (opiod overdose). CALL 911. SPR CONTENTS OF ONE SPRAYER (0.1ML) INTO ONE NOSTRIL. REPEAT IN 2-3 MIN IF SYMPTOMS OF OPIOID EMERGENCY PERSIST, ALTERNATE NOSTRILS  Patient not taking: Reported on 9/7/2023    Yoel Villalobos MD   ondansetron ODT (ZOFRAN-ODT) 4 MG disintegrating tablet Place 1 tablet on the tongue Every 8 (Eight) Hours As Needed for Nausea or Vomiting.  Patient not taking: Reported on 9/7/2023 7/27/23   Meme Tan APRN   rosuvastatin (Crestor) 10 MG tablet Take 1 tablet by mouth Daily. 7/27/23   Meme Tan APRN   traZODone (DESYREL) 100 MG tablet Take 1  "tablet by mouth Every Night. 7/27/23   Meme Tan APRN     I have utilized all available immediate resources to obtain, update, or review the patient's current medications (including all prescriptions, over-the-counter products, herbals, cannabis/cannabidiol products, and vitamin/mineral/dietary (nutritional) supplements).    Objective     Vital Signs: /59 (BP Location: Right arm, Patient Position: Lying)   Pulse 79   Temp 97.6 °F (36.4 °C) (Oral)   Resp 20   Ht 147.3 cm (58\")   Wt 64.4 kg (142 lb)   SpO2 96%   BMI 29.68 kg/m²   Physical Exam  Vitals reviewed.   Constitutional:       General: She is not in acute distress.     Appearance: She is well-developed. She is not toxic-appearing.      Comments: Wet cough during the exam. No respiratory distress.    HENT:      Head: Normocephalic and atraumatic.      Right Ear: External ear normal.      Left Ear: External ear normal.      Mouth/Throat:      Mouth: Mucous membranes are dry.      Pharynx: Oropharynx is clear.   Eyes:      General:         Right eye: No discharge.         Left eye: No discharge.      Extraocular Movements: Extraocular movements intact.      Conjunctiva/sclera: Conjunctivae normal.      Pupils: Pupils are equal, round, and reactive to light.   Neck:      Vascular: No JVD.   Cardiovascular:      Rate and Rhythm: Normal rate and regular rhythm.      Pulses: Normal pulses.      Heart sounds: Normal heart sounds. No murmur heard.    No friction rub. No gallop.   Pulmonary:      Effort: Pulmonary effort is normal. No respiratory distress.      Breath sounds: No stridor. No wheezing, rhonchi or rales.   Chest:      Chest wall: No tenderness.   Abdominal:      General: Bowel sounds are normal. There is no distension.      Palpations: Abdomen is soft.      Tenderness: There is no abdominal tenderness. There is no guarding or rebound.      Hernia: No hernia is present.   Musculoskeletal:         General: No swelling, tenderness or " deformity. Normal range of motion.      Cervical back: Normal range of motion and neck supple. No rigidity or tenderness. No muscular tenderness.      Right lower leg: No edema.      Left lower leg: No edema.   Skin:     General: Skin is warm and dry.      Findings: No erythema or rash.   Neurological:      General: No focal deficit present.      Mental Status: She is alert and oriented to person, place, and time.      Cranial Nerves: No cranial nerve deficit.      Sensory: No sensory deficit.      Motor: No weakness or abnormal muscle tone.      Deep Tendon Reflexes: Reflexes normal.   Psychiatric:         Mood and Affect: Mood normal.         Behavior: Behavior normal.      Results Reviewed:  Lab Results (last 24 hours)       Procedure Component Value Units Date/Time    Hemoglobin & Hematocrit, Blood [[id number]]  (Abnormal) Collected: 10/04/23 0509    Specimen: Blood Updated: 10/04/23 0533     Hemoglobin 11.7 g/dL      Hematocrit 35.7 %     COVID PRE-OP / PRE-PROCEDURE SCREENING ORDER (NO ISOLATION) - Swab, Nasal Cavity [[id number]]  (Normal) Collected: 10/04/23 0244    Specimen: Swab from Nasal Cavity Updated: 10/04/23 0320    Narrative:      The following orders were created for panel order COVID PRE-OP / PRE-PROCEDURE SCREENING ORDER (NO ISOLATION) - Swab, Nasal Cavity.  Procedure                               Abnormality         Status                     ---------                               -----------         ------                     COVID-19 and FLU A/B PCR...[[id number]]  Normal              Final result                 Please view results for these tests on the individual orders.    COVID-19 and FLU A/B PCR - Swab, Nasopharynx [[id number]]  (Normal) Collected: 10/04/23 0244    Specimen: Swab from Nasopharynx Updated: 10/04/23 0320     COVID19 Not Detected     Influenza A PCR Not Detected     Influenza B PCR Not Detected    Narrative:      Fact sheet for providers:  https://www.fda.gov/media/440722/download    Fact sheet for patients: https://www.fda.gov/media/831502/download    Test performed by PCR.    Basic Metabolic Panel [[id number]]  (Normal) Collected: 10/04/23 0243    Specimen: Blood Updated: 10/04/23 0317     Glucose 86 mg/dL      BUN 17 mg/dL      Creatinine 0.83 mg/dL      Sodium 139 mmol/L      Potassium 4.3 mmol/L      Comment: Specimen hemolyzed.  Results may be affected.        Chloride 105 mmol/L      CO2 24.0 mmol/L      Calcium 8.8 mg/dL      BUN/Creatinine Ratio 20.5     Anion Gap 10.0 mmol/L      eGFR 72.7 mL/min/1.73     Narrative:      GFR Normal >60  Chronic Kidney Disease <60  Kidney Failure <15    The GFR formula is only valid for adults with stable renal function between ages 18 and 70.    CBC & Differential [[id number]]  (Abnormal) Collected: 10/04/23 0243    Specimen: Blood Updated: 10/04/23 0257    Narrative:      The following orders were created for panel order CBC & Differential.  Procedure                               Abnormality         Status                     ---------                               -----------         ------                     CBC Auto Differential[[id number]]        Abnormal            Final result                 Please view results for these tests on the individual orders.    CBC Auto Differential [[id number]]  (Abnormal) Collected: 10/04/23 0243    Specimen: Blood Updated: 10/04/23 0257     WBC 8.51 10*3/mm3      RBC 3.72 10*6/mm3      Hemoglobin 12.0 g/dL      Hematocrit 37.1 %      MCV 99.7 fL      MCH 32.3 pg      MCHC 32.3 g/dL      RDW 12.9 %      RDW-SD 47.2 fl      MPV 10.2 fL      Platelets 215 10*3/mm3      Neutrophil % 52.8 %      Lymphocyte % 33.8 %      Monocyte % 10.7 %      Eosinophil % 2.1 %      Basophil % 0.4 %      Immature Grans % 0.2 %      Neutrophils, Absolute 4.49 10*3/mm3      Lymphocytes, Absolute 2.88 10*3/mm3      Monocytes, Absolute 0.91 10*3/mm3      Eosinophils, Absolute 0.18 10*3/mm3       Basophils, Absolute 0.03 10*3/mm3      Immature Grans, Absolute 0.02 10*3/mm3      nRBC 0.0 /100 WBC           Imaging Results (Last 24 Hours)       Procedure Component Value Units Date/Time    XR Chest 2 View [[id number]] Collected: 10/04/23 0628     Updated: 10/04/23 0632    Narrative:      EXAMINATION: XR CHEST 2 VW-     10/4/2023 2:35 AM CDT     HISTORY: hemoptysis     The frontal and lateral views of the chest are obtained. There is no  previous study for comparison.     The lungs are poorly expanded.     Linear interstitial changes in the lower lung bilaterally probably  present atelectasis.     There is no pleural effusion, pulmonary congestion or pneumothorax.     The heart size is moderately prominent. Atheromatous changes of the  tortuous thoracic aorta are noted.     There is no acute bony abnormality.       Impression:      1. No active cardiopulmonary disease.     This report was signed and finalized on 10/4/2023 6:29 AM CDT by Dr. Mohamud Kovacs MD.       CT Angiogram Chest [[id number]] Collected: 10/04/23 0555     Updated: 10/04/23 0612    Narrative:      EXAMINATION: CT ANGIOGRAM CHEST-      10/4/2023 5:19 AM CDT     HISTORY: hemoptysis; R04.2-Hemoptysis     In order to have a CT radiation dose as low as reasonably achievable  Automated Exposure Control was utilized for adjustment of the mA and/or  KV according to patient size.     DLP in mGycm= 211     The CT angiography of the chest is performed after intravenous contrast  enhancement.     Images are acquired in axial plane and subsequent reconstruction in  coronal and sagittal planes.     There is no previous similar study for comparison.     Correlation made with chest radiograph obtained earlier today.     There is optimal opacification of the pulmonary arteries and branches  bilaterally. No filling defects in the opacified pulmonary arterial bed.     RV/LV ratio is 41/41 which may suggest mild right heart strain. Etiology  is not certain.      Moderate dilatation of the right and left pulmonary arteries are seen  which may suggest pulmonary arterial hypertension.     Atheromatous changes of the thoracic aorta are seen. No aneurysmal  dilatation or dissection.     Severe atheromatous changes of coronary arteries are noted.     There are a few borderline prominent mediastinal and right hilar lymph  nodes. A level 4R lymph node measures 1.1 cm in short axis. A right  hilar lymph node measures 1.6 cm in short axis.     There is moderate cardiomegaly with enlarged left atrium and left  ventricle.     There is normal origin course and caliber of the brachiocephalic, left  common carotid and left subclavian artery. There is significant  medialization of the common carotid arteries.     Thyroid gland is not visualized and not evaluated.     No axillary lymphadenopathy.     The lungs are moderately well expanded.     There is a groundglass nodule opacity located posteriorly in the right  upper lobe adjacent and anterior to the proximal end of the right major  fissure which measures approximately 1.4 cm in diameter. There is  scattered tree-in-bud infiltrate more pronounced in the right upper lung  suggesting distal airway disease/respiratory bronchiolitis. No evidence  of air trapping. No areas of mosaic attenuation.     Central airways patent.     Limited visualized abdomen appears unremarkable except for significantly  distended gallbladder without stones and a partially exophytic low  density mass in the right kidney. There are small nonobstructing renal  calculi bilaterally larger one in the lower pole of the right kidney  measuring 6 mm. There is evidence of mid to lower abdominal central  ventral fat-containing hernia which is incompletely visualized and  evaluated.     Images reviewed in bone windows show chronic degenerative changes of the  thoracolumbar spine with moderate dextroscoliosis of the thoracic spine.       Impression:      1. No evidence of  pulmonary embolism. No aortic aneurysm or dissection.  2. Pulmonary arterial hypertension.  3. The RV/LV ratio suggesting a mild right heart strain. Etiology not  certain.  4. A 14 mm groundglass opacity/nodule in the right upper lobe may  represent an evolving inflammatory or neoplastic process. A follow-up  examination in 3 months is recommended to assure stability/resolution.  5. Borderline mediastinal and right hilar lymphadenopathy.  6. Nonacute abdominal findings as detailed above.     This report was signed and finalized on 10/4/2023 6:09 AM CDT by Dr. Mohamud Kovacs MD.       XR Chest 1 View [[id number]] Resulted: 10/04/23 0447     Updated: 10/04/23 0450          I have personally reviewed and interpreted the radiology studies and ECG obtained at time of admission.     Echocardiogram 7/20/2023    Technically difficult study.    Left ventricular systolic function is hyperdynamic (EF > 70%).    Left ventricular wall thickness is consistent with mild to moderate sigmoid-septal hypertrophy.    Slightly increased (nonobstructive) intracavitary and LVOT gradient (peak 18 mmHg) likely related to hyperdynamic LV function and LV sigmoid septal hypertrophy.    Right ventricle is not well visualized but appears normal in size with normal systolic function.    The left atrial cavity is mildly dilated.    Saline study was inconcluside; see details in report.    Valves are not well visualized, but there is no hemodynamically significant (more than mild) valve disease appreciated.    Mitral annular calcification is present.    Assessment / Plan   Assessment:   Active Hospital Problems    Diagnosis     **Hemoptysis     Acute bronchitis     Lung nodule     Kidney mass     Paroxysmal SVT (supraventricular tachycardia)     Cerebellar stroke     Primary hypertension     Paroxysmal atrial fibrillation      Treatment Plan  The patient will be admitted to my service [facility] at [facility].   Admit to medical  floor  Vitals every 4 hours  Cardiac diet  IVF saline lock    Hemoptysis ? Bronchitis in a patient recently started on anticoagulants  Azithromycin empirically  Cough syrup  Pulmonology consult for guidance and follow up of possible lung nodule  Need to hold Eliquis and restart when considered safe by pulmonology, current risk of worsening lung bleeding outweighs stroke prevention benefits  Avoid Albuterol due to history of PAF and SVT    Right heart strain per CT unclear etiology, will repeat 2D echocardiogram    Kidney mass noted on CT as well, unclear if this is an artifact. Will need CT abd/pelvis to verify    DVT prophylaxis >SCD    Medical Decision Making  Number and Complexity of problems: 4 complex medical problems  Differential Diagnosis: None    Conditions and Status        Condition is unchanged.     [facility] Data  External documents reviewed: Epic EHR noted and cardiology visit  Cardiac tracing (EKG, telemetry) interpretation: Sinus bradycardia with Premature atrial complexes   Radiology interpretation: See above, CT and CXR images reviewed  Labs reviewed: See above  Any tests that were considered but not ordered: none     Decision rules/scores evaluated (example [id number], Wells, etc): [id number]     Discussed with: Patient     Care Planning  Shared decision making: Patient  Code status and discussions: Full code    Disposition  Social Determinants of Health that impact treatment or disposition: None  Estimated length of stay is over 2 midnights.     I confirmed that the patient's advanced care plan is present, code status is documented, and a surrogate decision maker is listed in the patient's medical record.     The patient's surrogate decision maker is Sari Rinaldi, daughter.     The patient was seen and examined by me on 10/04/2023 at 0633.    Electronically signed by Joey Cortez MD, 10/04/23, 06:33 CDT.

## 2023-10-05 ENCOUNTER — APPOINTMENT (OUTPATIENT)
Dept: CT IMAGING | Facility: HOSPITAL | Age: 78
End: 2023-10-05
Payer: MEDICARE

## 2023-10-05 LAB
ANION GAP SERPL CALCULATED.3IONS-SCNC: 6 MMOL/L (ref 5–15)
BASOPHILS # BLD AUTO: 0.01 10*3/MM3 (ref 0–0.2)
BASOPHILS NFR BLD AUTO: 0.1 % (ref 0–1.5)
BUN SERPL-MCNC: 12 MG/DL (ref 8–23)
BUN/CREAT SERPL: 20.7 (ref 7–25)
CALCIUM SPEC-SCNC: 8.5 MG/DL (ref 8.6–10.5)
CHLORIDE SERPL-SCNC: 110 MMOL/L (ref 98–107)
CO2 SERPL-SCNC: 27 MMOL/L (ref 22–29)
CREAT SERPL-MCNC: 0.58 MG/DL (ref 0.57–1)
DEPRECATED RDW RBC AUTO: 47.5 FL (ref 37–54)
EGFRCR SERPLBLD CKD-EPI 2021: 93.3 ML/MIN/1.73
EOSINOPHIL # BLD AUTO: 0.02 10*3/MM3 (ref 0–0.4)
EOSINOPHIL NFR BLD AUTO: 0.2 % (ref 0.3–6.2)
ERYTHROCYTE [DISTWIDTH] IN BLOOD BY AUTOMATED COUNT: 13 % (ref 12.3–15.4)
GLUCOSE BLDC GLUCOMTR-MCNC: 100 MG/DL (ref 70–130)
GLUCOSE SERPL-MCNC: 111 MG/DL (ref 65–99)
HCT VFR BLD AUTO: 32 % (ref 34–46.6)
HGB BLD-MCNC: 10.4 G/DL (ref 12–15.9)
IMM GRANULOCYTES # BLD AUTO: 0.03 10*3/MM3 (ref 0–0.05)
IMM GRANULOCYTES NFR BLD AUTO: 0.3 % (ref 0–0.5)
LYMPHOCYTES # BLD AUTO: 2.52 10*3/MM3 (ref 0.7–3.1)
LYMPHOCYTES NFR BLD AUTO: 26 % (ref 19.6–45.3)
MCH RBC QN AUTO: 32.5 PG (ref 26.6–33)
MCHC RBC AUTO-ENTMCNC: 32.5 G/DL (ref 31.5–35.7)
MCV RBC AUTO: 100 FL (ref 79–97)
MONOCYTES # BLD AUTO: 1.11 10*3/MM3 (ref 0.1–0.9)
MONOCYTES NFR BLD AUTO: 11.5 % (ref 5–12)
NEUTROPHILS NFR BLD AUTO: 5.99 10*3/MM3 (ref 1.7–7)
NEUTROPHILS NFR BLD AUTO: 61.9 % (ref 42.7–76)
NRBC BLD AUTO-RTO: 0 /100 WBC (ref 0–0.2)
PLATELET # BLD AUTO: 192 10*3/MM3 (ref 140–450)
PMV BLD AUTO: 10.1 FL (ref 6–12)
POTASSIUM SERPL-SCNC: 3.9 MMOL/L (ref 3.5–5.2)
RBC # BLD AUTO: 3.2 10*6/MM3 (ref 3.77–5.28)
SODIUM SERPL-SCNC: 143 MMOL/L (ref 136–145)
WBC NRBC COR # BLD: 9.68 10*3/MM3 (ref 3.4–10.8)

## 2023-10-05 PROCEDURE — 74177 CT ABD & PELVIS W/CONTRAST: CPT

## 2023-10-05 PROCEDURE — 82948 REAGENT STRIP/BLOOD GLUCOSE: CPT

## 2023-10-05 PROCEDURE — 85025 COMPLETE CBC W/AUTO DIFF WBC: CPT | Performed by: FAMILY MEDICINE

## 2023-10-05 PROCEDURE — 80048 BASIC METABOLIC PNL TOTAL CA: CPT | Performed by: FAMILY MEDICINE

## 2023-10-05 PROCEDURE — 25510000001 IOPAMIDOL PER 1 ML: Performed by: HOSPITALIST

## 2023-10-05 PROCEDURE — G0378 HOSPITAL OBSERVATION PER HR: HCPCS

## 2023-10-05 RX ADMIN — BUSPIRONE HYDROCHLORIDE 15 MG: 5 TABLET ORAL at 20:09

## 2023-10-05 RX ADMIN — LEVOTHYROXINE SODIUM 112 MCG: 112 TABLET ORAL at 05:57

## 2023-10-05 RX ADMIN — DOCUSATE SODIUM 50 MG AND SENNOSIDES 8.6 MG 2 TABLET: 8.6; 5 TABLET, FILM COATED ORAL at 20:09

## 2023-10-05 RX ADMIN — BUPROPION HYDROCHLORIDE 75 MG: 75 TABLET, FILM COATED ORAL at 20:09

## 2023-10-05 RX ADMIN — Medication 10 ML: at 11:52

## 2023-10-05 RX ADMIN — ACETAMINOPHEN 650 MG: 325 TABLET, FILM COATED ORAL at 11:50

## 2023-10-05 RX ADMIN — ESCITSLOPRAM 20 MG: 10 TABLET ORAL at 08:28

## 2023-10-05 RX ADMIN — HYDROCODONE BITARTRATE AND ACETAMINOPHEN 1 TABLET: 10; 325 TABLET ORAL at 08:36

## 2023-10-05 RX ADMIN — AMLODIPINE BESYLATE 5 MG: 5 TABLET ORAL at 08:28

## 2023-10-05 RX ADMIN — TRAZODONE HYDROCHLORIDE 100 MG: 100 TABLET ORAL at 20:09

## 2023-10-05 RX ADMIN — BUSPIRONE HYDROCHLORIDE 15 MG: 5 TABLET ORAL at 11:52

## 2023-10-05 RX ADMIN — HYDROCODONE BITARTRATE AND ACETAMINOPHEN 1 TABLET: 10; 325 TABLET ORAL at 15:53

## 2023-10-05 RX ADMIN — METOPROLOL SUCCINATE 50 MG: 50 TABLET, EXTENDED RELEASE ORAL at 08:28

## 2023-10-05 RX ADMIN — AZITHROMYCIN 500 MG: 250 TABLET, FILM COATED ORAL at 08:29

## 2023-10-05 RX ADMIN — BUPROPION HYDROCHLORIDE 75 MG: 75 TABLET, FILM COATED ORAL at 08:28

## 2023-10-05 RX ADMIN — IOPAMIDOL 100 ML: 755 INJECTION, SOLUTION INTRAVENOUS at 08:50

## 2023-10-05 RX ADMIN — ROSUVASTATIN CALCIUM 10 MG: 10 TABLET, FILM COATED ORAL at 20:09

## 2023-10-05 RX ADMIN — HYDROCODONE BITARTRATE AND ACETAMINOPHEN 1 TABLET: 10; 325 TABLET ORAL at 20:09

## 2023-10-05 RX ADMIN — BUSPIRONE HYDROCHLORIDE 15 MG: 5 TABLET ORAL at 17:42

## 2023-10-05 RX ADMIN — Medication 10 ML: at 20:09

## 2023-10-05 RX ADMIN — BUSPIRONE HYDROCHLORIDE 15 MG: 5 TABLET ORAL at 08:29

## 2023-10-05 NOTE — PROGRESS NOTES
[facility] Medicine Services  INPATIENT PROGRESS NOTE    Patient Name: Nichole Rinaldi  Date of Admission: 10/4/2023  Today's Date: 10/05/23  Length of Stay: 0  Primary Care Physician: Meme Tan APRN    Subjective   Chief Complaint: Hemoptysis  Cough     78 year old female with PMH of cerebellar stroke 7/20/2023, Afib noted on Zio patch 9/7/2023 placed on Eliquis, CAD, MVR, that presents to the ER with complain of coughing up blood. She had a wet cough yesterday that did not bother her much and since about midnight noted bright blood with cough and occasional clots. Denies chest pain, fever, never smoked. CTA chest showed no PE, does mention right heart strain, and 14 mm groundglass opacity/nodule in the right upper lobe may represent an evolving inflammatory or neoplastic process.   10/4  Admitted for hemoptysis history of  A-fib on Eliquis, PTA noticed no PE mild right RV strain pulmonary hypertension left upper lobe nodule hemoglobin stable pulmonary consult appreciated, remains on room airDr. Yamila discussed the possibility of bronchoscopy on Friday. We will get a repeat echo, BNP and troponin today.   10/5  Feeling better appreciate pulmonary, BNP is unremarkable  Echo of the heart is noticed plan is for bronchoscopy tomorrow no more active hemoptysis for now    Review of Systems   Respiratory:  Positive for cough.     All pertinent negatives and positives are as above. All other systems have been reviewed and are negative unless otherwise stated.     Objective    Temp:  [98.1 °F (36.7 °C)-98.8 °F (37.1 °C)] 98.4 °F (36.9 °C)  Heart Rate:  [64-83] 66  Resp:  [16-18] 16  BP: (121-144)/(57-74) 125/74  Physical Exam  Constitutional:       Appearance: Normal appearance.   HENT:      Head: Normocephalic and atraumatic.      Nose: Nose normal.      Mouth/Throat:      Mouth: Mucous membranes are moist. Mucous membranes are dry.   Eyes:      Extraocular Movements:  Extraocular movements intact.      Pupils: Pupils are equal, round, and reactive to light.   Cardiovascular:      Rate and Rhythm: Normal rate and regular rhythm.   Pulmonary:      Effort: Pulmonary effort is normal.      Breath sounds: Normal breath sounds.   Abdominal:      General: Abdomen is flat.      Palpations: Abdomen is soft.   Musculoskeletal:         General: Normal range of motion.      Cervical back: Normal range of motion.   Skin:     General: Skin is warm.      Capillary Refill: Capillary refill takes less than 2 seconds.   Neurological:      General: No focal deficit present.      Mental Status: She is alert and oriented to person, place, and time.           Results Review:  I have reviewed the labs, radiology results, and diagnostic studies.    Laboratory Data:   Results from last 7 days   Lab Units 10/05/23  0425 10/04/23  1431 10/04/23  0509 10/04/23  0243   WBC 10*3/mm3 9.68 8.70  --  8.51   HEMOGLOBIN g/dL 10.4* 11.3* 11.7* 12.0   HEMATOCRIT % 32.0* 35.0 35.7 37.1   PLATELETS 10*3/mm3 192 213  --  215          Results from last 7 days   Lab Units 10/05/23  0425 10/04/23  0243   SODIUM mmol/L 143 139   POTASSIUM mmol/L 3.9 4.3   CHLORIDE mmol/L 110* 105   CO2 mmol/L 27.0 24.0   BUN mg/dL 12 17   CREATININE mg/dL 0.58 0.83   CALCIUM mg/dL 8.5* 8.8   GLUCOSE mg/dL 111* 86         Culture Data:   No results found for: BLOODCX, URINECX, WOUNDCX, MRSACX, RESPCX, STOOLCX    Radiology Data:   Imaging Results (Last 24 Hours)       Procedure Component Value Units Date/Time    CT Abdomen Pelvis With Contrast [[id number]] Collected: 10/05/23 0908     Updated: 10/05/23 0930    Narrative:      EXAMINATION: CT ABDOMEN PELVIS W CONTRAST-      10/5/2023 8:55 AM CDT     HISTORY: right kindey mass; R04.2-Hemoptysis     In order to have a CT radiation dose as low as reasonably achievable  Automated Exposure Control was utilized for adjustment of the mA and/or  KV according to patient size.     DLP in mGycm= 973      The CT scan of the abdomen and pelvis is performed after intravenous  contrast enhancement.     Images are acquired in axial plane and subsequent reconstruction in  coronal and sagittal planes.     There is no previous similar study for comparison. Correlation is made  with CT angiography chest dated 10/4/2023.     The adrenal glands bilaterally are normal.     There is lobulation of the renal contour bilaterally. There is a  well-defined sharply marginated oval low density mass along the lateral  margin of the mid right kidney measuring 3.4 x 2.3 cm in axial plane  images. CT density suggest a cyst. It shows no enhancement. There are  several smaller cortical nodules in both kidneys, more numerous in the  right kidney which is too small to be further characterized. There are  bilateral radiopaque renal calculi, the largest one in the lower pole of  the right kidney measuring 6 mm. No hydronephrosis on either side. The  urinary bladder is poorly distended with moderate diffuse thickening of  the walls and enhancement. There is surrounding extraperitoneal fat  infiltration.     There is a small fat-containing umbilical hernia. There is a moderate  size fat-containing supraumbilical central/ventral hernia which measures  approximately 5 cm in diameter. The neck of the hernia measures 1.3 cm.  There are omental vessels entering into this sac. No abnormal  infiltration or stranding of the fat in the hernia.     Normal size uterus is seen. Prominent pelvic veins are seen around the  uterus and adnexa with significantly dilated and prominent gonadal veins  bilaterally left more than the right. No free fluid in the pelvis.     The stomach is well-distended. There is asymmetrical heterogeneous soft  tissue density/thickening along the lesser curvature side of the gastric  antrum with heterogeneous enhancement. A small part of this mass appears  to be projecting into the lumen of the gastric antrum. The remaining  stomach  is full of fluid and gas. Duodenum is normal. The small bowel is  nondistended. No finding to suggest appendicitis. Large volume stool is  seen in the colon. No finding to suggest obstruction. There is  diverticulosis of the distal colon. No evidence of diverticulitis.     Atheromatous changes of the abdominal aorta and iliac arteries. There  are large calcific plaques at the origin of both renal arteries with  moderate, 50 to 69%, stenosis of both renal arteries.     There is no evidence of abdominal or pelvic lymphadenopathy.     Images reviewed in bone window show chronic degenerative changes of the  lumbar spine with a mild levoscoliosis. Multilevel disc degeneration is  seen. No acute bony abnormality.       Impression:      1. Multiple renal nodules bilaterally. The largest nodule in the right  kidney represent a cyst. The remaining nodules are too small to be  further characterized. Another follow-up CT scan of the abdomen with  renal mass protocol may be obtained to ensure stability of the nodules.  2. Nonobstructing renal calculi bilaterally.  3. Marked thickening and enhancement of the poorly distended urinary  bladder. This may partly be due to incomplete distention. There is a  probability of chronic cystitis.  4. A poorly marginated masslike density along the lesser curvature side  of the gastric antrum which partly protrudes into the lumen of the  gastric antrum. This may represent a neoplastic process. Further  evaluation with gastroscopy may be obtained.  5. Large volume stool in the colon. No obstruction. Diverticulosis of  the colon. No evidence for diverticulitis.   6. 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                 This report was signed and finalized on 10/5/2023 9:27 AM CDT by Dr. Mohamud Kovacs MD.               I have reviewed the patient's current medications.     Assessment/Plan   Assessment  Active Hospital Problems    Diagnosis     **Hemoptysis     Acute  bronchitis     Lung nodule     Kidney mass     Paroxysmal SVT (supraventricular tachycardia)     Cerebellar stroke     Primary hypertension     Paroxysmal atrial fibrillation        Treatment Plan  Hemoptysis ? Bronchitis in a patient recently started on anticoagulants  Azithromycin empirically  Cough syrup  Pulmonology consult for guidance and follow up of possible lung nodule  Need to hold Eliquis and restart when considered safe by pulmonology, current risk of worsening lung bleeding outweighs stroke prevention benefits  Avoid Albuterol due to history of PAF and SVT     Right heart strain per CT unclear etiology, will repeat 2D echocardiogram     Kidney mass noted on CT as well, unclear if this is an artifact. Will need CT abd/pelvis to verify     DVT prophylaxis >SCD    Medical Decision Making  Number and Complexity of problems: 1 acute  Differential Diagnosis: As above    Conditions and Status        Condition is at treatment goal.     MDM Data  External documents reviewed: None  Cardiac tracing (EKG, telemetry) interpretation: Yes  Radiology interpretation: Yes  Labs reviewed: Yes  Any tests that were considered but not ordered: None     Decision rules/scores evaluated (example [id number], Wells, etc): None     Discussed with: Nursing and patient     Care Planning  Shared decision making: Patient apprised of current labs, vitals, imaging and treatment plan.  They are agreeable with proceeding with plans as discussed.   Code status and discussions: Full code    Disposition  Social Determinants of Health that impact treatment or disposition: None  I expect the patient to be discharged to home in few days.     Electronically signed by Joseline Ibarra MD, 10/05/23, 10:38 CDT.

## 2023-10-05 NOTE — PLAN OF CARE
Goal Outcome Evaluation:  Plan of Care Reviewed With: patient        Progress: no change  Outcome Evaluation: RA c/o pain prn pain med admin per order w/effective results conts bloody sputum

## 2023-10-05 NOTE — PROGRESS NOTES
[facility] PULMONARY AND CRITICAL CARE PROGRESS NOTE - [facility]    Patient: Nichole Rinaldi  1945   MR# [id number]   Acct# [id number]  10/05/23   07:16 CDT  Referring Provider: Joseline Ibarra MD    Chief Complaint: Hemoptysis  Interval history: She continues to have some bloody sputum.  She remains on room air.  BNP and troponins looked okay yesterday.  Echocardiogram was done and noted below.  She is planned for bronchoscopy tomorrow.  No overnight events reported.    ECHO:     Left ventricular ejection fraction appears to be 61 - 65%.    Left ventricular wall thickness is consistent with mild concentric hypertrophy.    Left ventricular diastolic function is consistent with (grade I) impaired relaxation.    Left atrial volume is moderately increased.    Mild aortic valve stenosis is present.    Estimated right ventricular systolic pressure from tricuspid regurgitation is normal (<35 mmHg).    Meds:  amLODIPine, 5 mg, Oral, Daily  azithromycin, 500 mg, Oral, Q24H  buPROPion, 75 mg, Oral, BID  busPIRone, 15 mg, Oral, 4x Daily  escitalopram, 20 mg, Oral, Daily  levothyroxine, 112 mcg, Oral, Daily  metoprolol succinate XL, 50 mg, Oral, Daily  rosuvastatin, 10 mg, Oral, Nightly  senna-docusate sodium, 2 tablet, Oral, BID  sodium chloride, 10 mL, Intravenous, Q12H  traZODone, 100 mg, Oral, Nightly           Physical Exam:  SpO2 Percentage    10/04/23 1950 10/04/23 2347 10/05/23 0426   SpO2: 96% 98% 93%     Body mass index is 30.26 kg/m².   Temp:  [97.7 °F (36.5 °C)-98.8 °F (37.1 °C)] 98.3 °F (36.8 °C)  Heart Rate:  [64-83] 64  Resp:  [18] 18  BP: (121-144)/(57-74) 128/74  Intake/Output Summary (Last 24 hours) at 10/5/2023 0716  Last data filed at 10/4/2023 0852  Gross per 24 hour   Intake --   Output 700 ml   Net -700 ml     Physical Exam  Vitals and nursing note reviewed.   Constitutional:       Appearance: She is well-developed.   HENT:      Head: Normocephalic and atraumatic.   Eyes:      General: No  scleral icterus.  Cardiovascular:      Rate and Rhythm: Normal rate and regular rhythm.   Pulmonary:      Effort: Pulmonary effort is normal.      Comments: Evidence of hemoptysis  Abdominal:      General: There is no distension.   Musculoskeletal:         General: Normal range of motion.      Cervical back: Normal range of motion and neck supple.   Skin:     General: Skin is warm and dry.   Neurological:      Mental Status: She is alert and oriented to person, place, and time.   Psychiatric:         Mood and Affect: Mood normal.         Behavior: Behavior normal.     Electronically signed by ABHAY Al, 10/5/2023, 07:16 CDT      Physician substantive portion: medical decision making    Result Review    Laboratory Data:  Results from last 7 days   Lab Units 10/05/23  0425 10/04/23  1431 10/04/23  0509 10/04/23  0243   WBC 10*3/mm3 9.68 8.70  --  8.51   HEMOGLOBIN g/dL 10.4* 11.3* 11.7* 12.0   PLATELETS 10*3/mm3 192 213  --  215     Results from last 7 days   Lab Units 10/05/23  0425 10/04/23  0243   SODIUM mmol/L 143 139   POTASSIUM mmol/L 3.9 4.3   CO2 mmol/L 27.0 24.0   BUN mg/dL 12 17   CREATININE mg/dL 0.58 0.83         Microbiology Results (last 10 days)       Procedure Component Value - Date/Time    COVID PRE-OP / PRE-PROCEDURE SCREENING ORDER (NO ISOLATION) - Swab, Nasal Cavity [[id number]]  (Normal) Collected: 10/04/23 0244    Lab Status: Final result Specimen: Swab from Nasal Cavity Updated: 10/04/23 0320    Narrative:      The following orders were created for panel order COVID PRE-OP / PRE-PROCEDURE SCREENING ORDER (NO ISOLATION) - Swab, Nasal Cavity.  Procedure                               Abnormality         Status                     ---------                               -----------         ------                     COVID-19 and FLU A/B PCR...[[id number]]  Normal              Final result                 Please view results for these tests on the individual orders.    COVID-19 and  FLU A/B PCR - Swab, Nasopharynx [[id number]]  (Normal) Collected: 10/04/23 0244    Lab Status: Final result Specimen: Swab from Nasopharynx Updated: 10/04/23 0320     COVID19 Not Detected     Influenza A PCR Not Detected     Influenza B PCR Not Detected    Narrative:      Fact sheet for providers: https://www.fda.gov/media/486136/download    Fact sheet for patients: https://www.fda.gov/media/871003/download    Test performed by PCR.           Recent films:  Adult Transthoracic Echo Complete W/ Cont if Necessary Per Protocol    Result Date: 10/4/2023    Left ventricular ejection fraction appears to be 61 - 65%.   Left ventricular wall thickness is consistent with mild concentric hypertrophy.   Left ventricular diastolic function is consistent with (grade I) impaired relaxation.   Left atrial volume is moderately increased.   Mild aortic valve stenosis is present.   Estimated right ventricular systolic pressure from tricuspid regurgitation is normal (<35 mmHg).     XR Chest 2 View    Result Date: 10/4/2023  EXAMINATION: XR CHEST 2 VW-  10/4/2023 2:35 AM CDT  HISTORY: hemoptysis  The frontal and lateral views of the chest are obtained. There is no previous study for comparison.  The lungs are poorly expanded.  Linear interstitial changes in the lower lung bilaterally probably present atelectasis.  There is no pleural effusion, pulmonary congestion or pneumothorax.  The heart size is moderately prominent. Atheromatous changes of the tortuous thoracic aorta are noted.  There is no acute bony abnormality.      Impression: 1. No active cardiopulmonary disease.  This report was signed and finalized on 10/4/2023 6:29 AM CDT by Dr. Mohamud Kovacs MD.      XR Chest 1 View    Result Date: 10/4/2023  EXAMINATION: XR CHEST 1 VW-  10/4/2023 4:46 AM CDT  HISTORY: hemoptysis  A frontal lordotic view of the chest is compared with the previous study dated 10/4/2023.  The lungs are moderately well-expanded.  There is no evidence of  recent infiltrate, pleural effusion, pulmonary congestion or pneumothorax.  The heart size is not optimally visualized due to the AP projection. Atheromatous changes of the thoracic aorta are noted.  No acute bony abnormality. There is deformity of the right proximal humerus which may be due to previous trauma.  Postsurgical changes of the left upper abdomen are noted.      Impression: 1. No active cardiopulmonary disease.  This report was signed and finalized on 10/4/2023 6:32 AM CDT by Dr. Mohamud Kovacs MD.      CT Angiogram Chest    Result Date: 10/4/2023  EXAMINATION: CT ANGIOGRAM CHEST-   10/4/2023 5:19 AM CDT  HISTORY: hemoptysis; R04.2-Hemoptysis  In order to have a CT radiation dose as low as reasonably achievable Automated Exposure Control was utilized for adjustment of the mA and/or KV according to patient size.  DLP in mGycm= 211  The CT angiography of the chest is performed after intravenous contrast enhancement.  Images are acquired in axial plane and subsequent reconstruction in coronal and sagittal planes.  There is no previous similar study for comparison.  Correlation made with chest radiograph obtained earlier today.  There is optimal opacification of the pulmonary arteries and branches bilaterally. No filling defects in the opacified pulmonary arterial bed.  RV/LV ratio is 41/41 which may suggest mild right heart strain. Etiology is not certain.  Moderate dilatation of the right and left pulmonary arteries are seen which may suggest pulmonary arterial hypertension.  Atheromatous changes of the thoracic aorta are seen. No aneurysmal dilatation or dissection.  Severe atheromatous changes of coronary arteries are noted.  There are a few borderline prominent mediastinal and right hilar lymph nodes. A level 4R lymph node measures 1.1 cm in short axis. A right hilar lymph node measures 1.6 cm in short axis.  There is moderate cardiomegaly with enlarged left atrium and left ventricle.  There is  normal origin course and caliber of the brachiocephalic, left common carotid and left subclavian artery. There is significant medialization of the common carotid arteries.  Thyroid gland is not visualized and not evaluated.  No axillary lymphadenopathy.  The lungs are moderately well expanded.  There is a groundglass nodule opacity located posteriorly in the right upper lobe adjacent and anterior to the proximal end of the right major fissure which measures approximately 1.4 cm in diameter. There is scattered tree-in-bud infiltrate more pronounced in the right upper lung suggesting distal airway disease/respiratory bronchiolitis. No evidence of air trapping. No areas of mosaic attenuation.  Central airways patent.  Limited visualized abdomen appears unremarkable except for significantly distended gallbladder without stones and a partially exophytic low density mass in the right kidney. There are small nonobstructing renal calculi bilaterally larger one in the lower pole of the right kidney measuring 6 mm. There is evidence of mid to lower abdominal central ventral fat-containing hernia which is incompletely visualized and evaluated.  Images reviewed in bone windows show chronic degenerative changes of the thoracolumbar spine with moderate dextroscoliosis of the thoracic spine.      Impression: 1. No evidence of pulmonary embolism. No aortic aneurysm or dissection. 2. Pulmonary arterial hypertension. 3. The RV/LV ratio suggesting a mild right heart strain. Etiology not certain. 4. A 14 mm groundglass opacity/nodule in the right upper lobe may represent an evolving inflammatory or neoplastic process. A follow-up examination in 3 months is recommended to assure stability/resolution. 5. Borderline mediastinal and right hilar lymphadenopathy. 6. Nonacute abdominal findings as detailed above.  This report was signed and finalized on 10/4/2023 6:09 AM CDT by Dr. Mohamud Kovacs MD.            Pulmonary  Assessment:  Hemoptysis  Anticoagulant use; eliquis, stopped, and excederin up until day of admission  Suspected upper resp tract infection  Ground glass lung nodule  Mild aortic stenosis  Right heart strain appearance on ct scan, no correlating findings on echo to support pulm htn.    Recommend/plan:   Bronchoscopy tomorrow to evaluate for source of bleeding  Continue holding eliquis  Continue holding asa containing products  Bronchoscopy tomorrow    This visit was performed by both a physician and an [facility] RN.  I performed all aspects of the medical decision making as documented.  Electronically signed by Jonah Driscoll MD, 10/5/2023, 09:22 CDT

## 2023-10-05 NOTE — PLAN OF CARE
Goal Outcome Evaluation:  Plan of Care Reviewed With: patient        Progress: no change  Outcome Evaluation: VS stable. RA. still with bloody sputum. Norco given once. Tylenol 650 given once for chronic fibromyalgia headach per pt request.

## 2023-10-06 ENCOUNTER — ANESTHESIA EVENT (OUTPATIENT)
Dept: GASTROENTEROLOGY | Facility: HOSPITAL | Age: 78
End: 2023-10-06
Payer: MEDICARE

## 2023-10-06 ENCOUNTER — ANESTHESIA (OUTPATIENT)
Dept: GASTROENTEROLOGY | Facility: HOSPITAL | Age: 78
End: 2023-10-06
Payer: MEDICARE

## 2023-10-06 LAB
ANION GAP SERPL CALCULATED.3IONS-SCNC: 7 MMOL/L (ref 5–15)
BASOPHILS # BLD AUTO: 0.02 10*3/MM3 (ref 0–0.2)
BASOPHILS NFR BLD AUTO: 0.3 % (ref 0–1.5)
BUN SERPL-MCNC: 11 MG/DL (ref 8–23)
BUN/CREAT SERPL: 18 (ref 7–25)
CALCIUM SPEC-SCNC: 8.2 MG/DL (ref 8.6–10.5)
CHLORIDE SERPL-SCNC: 108 MMOL/L (ref 98–107)
CO2 SERPL-SCNC: 27 MMOL/L (ref 22–29)
CREAT SERPL-MCNC: 0.61 MG/DL (ref 0.57–1)
CRP SERPL-MCNC: <0.3 MG/DL (ref 0–0.5)
DEPRECATED RDW RBC AUTO: 49.1 FL (ref 37–54)
EGFRCR SERPLBLD CKD-EPI 2021: 92.2 ML/MIN/1.73
EOSINOPHIL # BLD AUTO: 0.08 10*3/MM3 (ref 0–0.4)
EOSINOPHIL NFR BLD AUTO: 1.1 % (ref 0.3–6.2)
ERYTHROCYTE [DISTWIDTH] IN BLOOD BY AUTOMATED COUNT: 13.2 % (ref 12.3–15.4)
GLUCOSE SERPL-MCNC: 85 MG/DL (ref 65–99)
HCT VFR BLD AUTO: 33.1 % (ref 34–46.6)
HGB BLD-MCNC: 10.6 G/DL (ref 12–15.9)
IMM GRANULOCYTES # BLD AUTO: 0.02 10*3/MM3 (ref 0–0.05)
IMM GRANULOCYTES NFR BLD AUTO: 0.3 % (ref 0–0.5)
KOH PREP NAIL: ABNORMAL
LYMPHOCYTES # BLD AUTO: 3.28 10*3/MM3 (ref 0.7–3.1)
LYMPHOCYTES NFR BLD AUTO: 43.3 % (ref 19.6–45.3)
MCH RBC QN AUTO: 32.3 PG (ref 26.6–33)
MCHC RBC AUTO-ENTMCNC: 32 G/DL (ref 31.5–35.7)
MCV RBC AUTO: 100.9 FL (ref 79–97)
MONOCYTES # BLD AUTO: 0.77 10*3/MM3 (ref 0.1–0.9)
MONOCYTES NFR BLD AUTO: 10.2 % (ref 5–12)
NEUTROPHILS NFR BLD AUTO: 3.4 10*3/MM3 (ref 1.7–7)
NEUTROPHILS NFR BLD AUTO: 44.8 % (ref 42.7–76)
NRBC BLD AUTO-RTO: 0 /100 WBC (ref 0–0.2)
PLATELET # BLD AUTO: 204 10*3/MM3 (ref 140–450)
PMV BLD AUTO: 10.5 FL (ref 6–12)
POTASSIUM SERPL-SCNC: 3.8 MMOL/L (ref 3.5–5.2)
RBC # BLD AUTO: 3.28 10*6/MM3 (ref 3.77–5.28)
SODIUM SERPL-SCNC: 142 MMOL/L (ref 136–145)
WBC NRBC COR # BLD: 7.57 10*3/MM3 (ref 3.4–10.8)

## 2023-10-06 PROCEDURE — A9270 NON-COVERED ITEM OR SERVICE: HCPCS | Performed by: INTERNAL MEDICINE

## 2023-10-06 PROCEDURE — 63710000001 ROSUVASTATIN 10 MG TABLET: Performed by: INTERNAL MEDICINE

## 2023-10-06 PROCEDURE — 63710000001 AZITHROMYCIN 250 MG TABLET: Performed by: INTERNAL MEDICINE

## 2023-10-06 PROCEDURE — 86235 NUCLEAR ANTIGEN ANTIBODY: CPT | Performed by: INTERNAL MEDICINE

## 2023-10-06 PROCEDURE — 25010000002 PROPOFOL 10 MG/ML EMULSION: Performed by: NURSE ANESTHETIST, CERTIFIED REGISTERED

## 2023-10-06 PROCEDURE — 87220 TISSUE EXAM FOR FUNGI: CPT | Performed by: INTERNAL MEDICINE

## 2023-10-06 PROCEDURE — 63710000001 ESCITALOPRAM 10 MG TABLET: Performed by: INTERNAL MEDICINE

## 2023-10-06 PROCEDURE — 86140 C-REACTIVE PROTEIN: CPT | Performed by: INTERNAL MEDICINE

## 2023-10-06 PROCEDURE — G0378 HOSPITAL OBSERVATION PER HR: HCPCS

## 2023-10-06 PROCEDURE — 86037 ANCA TITER EACH ANTIBODY: CPT | Performed by: INTERNAL MEDICINE

## 2023-10-06 PROCEDURE — 63710000001 METOPROLOL SUCCINATE XL 50 MG TABLET SUSTAINED-RELEASE 24 HOUR: Performed by: INTERNAL MEDICINE

## 2023-10-06 PROCEDURE — 87102 FUNGUS ISOLATION CULTURE: CPT | Performed by: INTERNAL MEDICINE

## 2023-10-06 PROCEDURE — 63710000001 BUSPIRONE 10 MG TABLET: Performed by: INTERNAL MEDICINE

## 2023-10-06 PROCEDURE — 87206 SMEAR FLUORESCENT/ACID STAI: CPT | Performed by: INTERNAL MEDICINE

## 2023-10-06 PROCEDURE — 63710000001 TRAZODONE 100 MG TABLET: Performed by: INTERNAL MEDICINE

## 2023-10-06 PROCEDURE — 85025 COMPLETE CBC W/AUTO DIFF WBC: CPT | Performed by: FAMILY MEDICINE

## 2023-10-06 PROCEDURE — 83516 IMMUNOASSAY NONANTIBODY: CPT | Performed by: INTERNAL MEDICINE

## 2023-10-06 PROCEDURE — 87147 CULTURE TYPE IMMUNOLOGIC: CPT | Performed by: INTERNAL MEDICINE

## 2023-10-06 PROCEDURE — 63710000001 BUPROPION 75 MG TABLET: Performed by: INTERNAL MEDICINE

## 2023-10-06 PROCEDURE — 86225 DNA ANTIBODY NATIVE: CPT | Performed by: INTERNAL MEDICINE

## 2023-10-06 PROCEDURE — 25010000002 LIDOCAINE 1 % SOLUTION: Performed by: INTERNAL MEDICINE

## 2023-10-06 PROCEDURE — 87205 SMEAR GRAM STAIN: CPT | Performed by: INTERNAL MEDICINE

## 2023-10-06 PROCEDURE — 63710000001 ACETAMINOPHEN 325 MG TABLET: Performed by: INTERNAL MEDICINE

## 2023-10-06 PROCEDURE — 87070 CULTURE OTHR SPECIMN AEROBIC: CPT | Performed by: INTERNAL MEDICINE

## 2023-10-06 PROCEDURE — 87116 MYCOBACTERIA CULTURE: CPT | Performed by: INTERNAL MEDICINE

## 2023-10-06 PROCEDURE — 88112 CYTOPATH CELL ENHANCE TECH: CPT | Performed by: INTERNAL MEDICINE

## 2023-10-06 PROCEDURE — 80048 BASIC METABOLIC PNL TOTAL CA: CPT | Performed by: FAMILY MEDICINE

## 2023-10-06 PROCEDURE — 0 LIDOCAINE 1 % SOLUTION: Performed by: INTERNAL MEDICINE

## 2023-10-06 PROCEDURE — 87186 SC STD MICRODIL/AGAR DIL: CPT | Performed by: INTERNAL MEDICINE

## 2023-10-06 PROCEDURE — 63710000001 LIDOCAINE VISCOUS HCL 2 % SOLUTION: Performed by: INTERNAL MEDICINE

## 2023-10-06 PROCEDURE — 63710000001 BUSPIRONE 5 MG TABLET: Performed by: INTERNAL MEDICINE

## 2023-10-06 PROCEDURE — 63710000001 AMLODIPINE 5 MG TABLET: Performed by: INTERNAL MEDICINE

## 2023-10-06 PROCEDURE — 63710000001 HYDROCODONE-ACETAMINOPHEN 10-325 MG TABLET: Performed by: INTERNAL MEDICINE

## 2023-10-06 RX ORDER — SODIUM CHLORIDE 0.9 % (FLUSH) 0.9 %
10 SYRINGE (ML) INJECTION AS NEEDED
Status: DISCONTINUED | OUTPATIENT
Start: 2023-10-06 | End: 2023-10-06 | Stop reason: HOSPADM

## 2023-10-06 RX ORDER — SODIUM CHLORIDE 9 MG/ML
100 INJECTION, SOLUTION INTRAVENOUS CONTINUOUS
Status: DISCONTINUED | OUTPATIENT
Start: 2023-10-06 | End: 2023-10-07 | Stop reason: HOSPADM

## 2023-10-06 RX ORDER — PROPOFOL 10 MG/ML
VIAL (ML) INTRAVENOUS AS NEEDED
Status: DISCONTINUED | OUTPATIENT
Start: 2023-10-06 | End: 2023-10-06 | Stop reason: SURG

## 2023-10-06 RX ORDER — SODIUM CHLORIDE 0.9 % (FLUSH) 0.9 %
10 SYRINGE (ML) INJECTION EVERY 12 HOURS SCHEDULED
Status: DISCONTINUED | OUTPATIENT
Start: 2023-10-06 | End: 2023-10-06 | Stop reason: HOSPADM

## 2023-10-06 RX ORDER — LIDOCAINE HYDROCHLORIDE 20 MG/ML
SOLUTION OROPHARYNGEAL AS NEEDED
Status: DISCONTINUED | OUTPATIENT
Start: 2023-10-06 | End: 2023-10-06 | Stop reason: HOSPADM

## 2023-10-06 RX ORDER — LIDOCAINE HYDROCHLORIDE 20 MG/ML
INJECTION, SOLUTION INFILTRATION; PERINEURAL AS NEEDED
Status: DISCONTINUED | OUTPATIENT
Start: 2023-10-06 | End: 2023-10-06 | Stop reason: HOSPADM

## 2023-10-06 RX ORDER — SODIUM CHLORIDE 9 MG/ML
40 INJECTION, SOLUTION INTRAVENOUS AS NEEDED
Status: DISCONTINUED | OUTPATIENT
Start: 2023-10-06 | End: 2023-10-06 | Stop reason: HOSPADM

## 2023-10-06 RX ORDER — LIDOCAINE HYDROCHLORIDE 10 MG/ML
INJECTION, SOLUTION INFILTRATION; PERINEURAL AS NEEDED
Status: DISCONTINUED | OUTPATIENT
Start: 2023-10-06 | End: 2023-10-06 | Stop reason: HOSPADM

## 2023-10-06 RX ORDER — LIDOCAINE HYDROCHLORIDE 20 MG/ML
INJECTION, SOLUTION EPIDURAL; INFILTRATION; INTRACAUDAL; PERINEURAL AS NEEDED
Status: DISCONTINUED | OUTPATIENT
Start: 2023-10-06 | End: 2023-10-06 | Stop reason: SURG

## 2023-10-06 RX ADMIN — TRAZODONE HYDROCHLORIDE 100 MG: 100 TABLET ORAL at 20:27

## 2023-10-06 RX ADMIN — ACETAMINOPHEN 650 MG: 325 TABLET, FILM COATED ORAL at 14:13

## 2023-10-06 RX ADMIN — Medication 10 ML: at 20:27

## 2023-10-06 RX ADMIN — ESCITSLOPRAM 20 MG: 10 TABLET ORAL at 12:20

## 2023-10-06 RX ADMIN — AMLODIPINE BESYLATE 5 MG: 5 TABLET ORAL at 12:20

## 2023-10-06 RX ADMIN — HYDROCODONE BITARTRATE AND ACETAMINOPHEN 1 TABLET: 10; 325 TABLET ORAL at 00:03

## 2023-10-06 RX ADMIN — BUPROPION HYDROCHLORIDE 75 MG: 75 TABLET, FILM COATED ORAL at 12:19

## 2023-10-06 RX ADMIN — BUSPIRONE HYDROCHLORIDE 15 MG: 5 TABLET ORAL at 12:20

## 2023-10-06 RX ADMIN — ROSUVASTATIN CALCIUM 10 MG: 10 TABLET, FILM COATED ORAL at 20:27

## 2023-10-06 RX ADMIN — BUSPIRONE HYDROCHLORIDE 15 MG: 5 TABLET ORAL at 17:00

## 2023-10-06 RX ADMIN — HYDROCODONE BITARTRATE AND ACETAMINOPHEN 1 TABLET: 10; 325 TABLET ORAL at 12:20

## 2023-10-06 RX ADMIN — AZITHROMYCIN 500 MG: 250 TABLET, FILM COATED ORAL at 12:20

## 2023-10-06 RX ADMIN — LIDOCAINE HYDROCHLORIDE 100 MG: 20 INJECTION, SOLUTION EPIDURAL; INFILTRATION; INTRACAUDAL; PERINEURAL at 09:56

## 2023-10-06 RX ADMIN — PROPOFOL INJECTABLE EMULSION 50 MG: 10 INJECTION, EMULSION INTRAVENOUS at 09:56

## 2023-10-06 RX ADMIN — BUSPIRONE HYDROCHLORIDE 15 MG: 5 TABLET ORAL at 20:27

## 2023-10-06 RX ADMIN — METOPROLOL SUCCINATE 50 MG: 50 TABLET, EXTENDED RELEASE ORAL at 12:20

## 2023-10-06 RX ADMIN — ACETAMINOPHEN 650 MG: 325 TABLET, FILM COATED ORAL at 22:16

## 2023-10-06 RX ADMIN — SODIUM CHLORIDE 100 ML/HR: 900 INJECTION INTRAVENOUS at 09:23

## 2023-10-06 RX ADMIN — HYDROCODONE BITARTRATE AND ACETAMINOPHEN 1 TABLET: 10; 325 TABLET ORAL at 20:34

## 2023-10-06 RX ADMIN — Medication 10 ML: at 12:21

## 2023-10-06 RX ADMIN — BUPROPION HYDROCHLORIDE 75 MG: 75 TABLET, FILM COATED ORAL at 20:27

## 2023-10-06 RX ADMIN — HYDROCODONE BITARTRATE AND ACETAMINOPHEN 1 TABLET: 10; 325 TABLET ORAL at 16:58

## 2023-10-06 NOTE — ANESTHESIA PREPROCEDURE EVALUATION
Anesthesia Evaluation                  Airway   Dental      Pulmonary    Cardiovascular     PT is on anticoagulation therapy  Patient on routine beta blocker    (+) hypertension, valvular problems/murmurs, CAD, dysrhythmias Tachycardia, hyperlipidemia      Neuro/Psych  (+) CVA  GI/Hepatic/Renal/Endo    (+) GERD, PUD, thyroid problem hypothyroidism    Musculoskeletal     (+) chronic pain  Abdominal    Substance History      OB/GYN          Other                        Anesthesia Plan    ASA 4     MAC     (hemoptysis history of A-fib on Eliquis, PTA noticed no PE mild right RV strain pulmonary hypertension. Recent stroke (ASA 4). TTE ok )  intravenous induction     Anesthetic plan, risks, benefits, and alternatives have been provided, discussed and informed consent has been obtained with: patient.      CODE STATUS:    Code Status (Patient has no pulse and is not breathing): CPR (Attempt to Resuscitate)  Medical Interventions (Patient has pulse or is breathing): Full Support

## 2023-10-06 NOTE — PLAN OF CARE
Goal Outcome Evaluation:  Plan of Care Reviewed With: patient        Progress: no change  Outcome Evaluation: vss, SB/S 56-63. Pt npo at midnight for bronc this am. Prn norco for pain. up adlib.

## 2023-10-06 NOTE — ANESTHESIA POSTPROCEDURE EVALUATION
Patient: Nichole Rinaldi    Procedure Summary       Date: 10/06/23 Room / Location: [facility] ENDOSCOPY 4 / [facility] ENDOSCOPY    Anesthesia Start: 0951 Anesthesia Stop: 1009    Procedure: BRONCHOSCOPY BIOPSY (Bilateral: Bronchus) Diagnosis:       Hemoptysis      (Hemoptysis [R04.2])    Surgeons: Jonah Driscoll MD Provider: Lauren Ortiz CRNA    Anesthesia Type: MAC ASA Status: 4            Anesthesia Type: MAC    Vitals  Vitals Value Taken Time   /54 10/06/23 1036   Temp     Pulse 65 10/06/23 1038   Resp 12 10/06/23 1035   SpO2 93 % 10/06/23 1038   Vitals shown include unvalidated device data.        Post Anesthesia Care and Evaluation    Patient location during evaluation: PHASE II  Patient participation: complete - patient participated  Level of consciousness: awake and alert  Pain management: adequate    Airway patency: patent  Anesthetic complications: No anesthetic complications  PONV Status: none  Cardiovascular status: acceptable  Respiratory status: acceptable  Hydration status: acceptable

## 2023-10-06 NOTE — OP NOTE
Bronchoscopy Procedure Note    Date of Operation: 10/06/23    Pre-op Diagnosis: Hemoptysis    Post-op Diagnosis:  same.  Source appears likely to be posterior segment of RUL    Surgeon: Jonah Driscoll MD    Anesthesia:  Monitored Anesthesia Care.  Aerosolized 4% lidocaine, lidocaine gel to nares.  Topical 1% lidocaine to vocal cords and central airways via bronchoscope.    Operation: Flexible fiberoptic bronchoscopy, diagnostic without c-arm    Bronchoscopy, Diagnostic was performed    Findings: some airway soiling with blood bilat.  Most prominent in posterior rul segment    Specimen: bronch wash    Estimated Blood Loss: None    Complications: none    Indications and History:  The patient is a 78 y.o.  female..  The risks, benefits, complications, treatment options and expected outcomes were discussed with the patient.  The possibilities of reaction to medication, pulmonary aspiration, perforation of a viscus, bleeding, failure to diagnose a condition and creating a complication requiring transfusion or operation were discussed with the patient who freely signed the consent.  Time out was taken prior to the procedure.    Description of Procedure:    After the induction of topical nasopharyngeal anesthesia, the patient was positioned supine and the bronchoscope was passed through the right naris . The vocal cords were visualized and 1% plain lidocaine 5 ml was topically placed onto the vocal cords and alen. The cords were normal. The scope was then passed into the trachea. 1% plain lidocaine 5 ml was used topically on the alen.      The trachea, mainstem bronchi, RUL, RML, Bronchus Intermedius, RLL, JAZZY, JAZZY upper division and lingula, and LLL, and all primary segmental airways were examined and were normal except as described below:    Endobronchial findings: blood soilage in rul, rll, lll airways.  See photographs of right lung airways after washes were collected, with some reaccumulation in  posterior  rul segment.  Trachea: Normal mucosa  Shadia: Sharp  Right main bronchus: Normal mucosa  Right upper lobe bronchus: Normal mucosa.  Most prominent area of blood soilage appears to be posterior segment.  No endobronchial lesions  Right middle lobe bronchus: Normal mucosa  Right lower lobe bronchus: Normal mucosa  Left main bronchus: Normal mucosa  Left upper lobe bronchus: Normal mucosa  Left lower lobe bronchus: Normal mucosa    Washes were collected by suction and sent for micro and cytology studies.  The bronchoscope was removed.    The Patient was taken to the Endoscopy Recovery area in satisfactory condition.      Jonah Driscoll MD at 10:08 CDT, 10/6/2023

## 2023-10-06 NOTE — PLAN OF CARE
Goal Outcome Evaluation:   VSS. Bronchoscopy done this AM. Alertx4. Room air. Up ad jd. Regular cardiac diet. No complaints at this time. Call light in reach.

## 2023-10-06 NOTE — PROGRESS NOTES
[facility] [place] PULMONARY AND CRITICAL CARE PROGRESS NOTE - [facility]    Patient: Nichole Rinaldi  1945   MR# [id number]   Acct# [id number]  10/06/23   07:47 CDT  Referring Provider: Joseline Ibarra MD    Chief Complaint: Hemoptysis  Interval history: She continues to have some bloody sputum.  She remains on room air.  She will have a bronchoscopy this morning.  No overnight events reported.    ECHO:     Left ventricular ejection fraction appears to be 61 - 65%.    Left ventricular wall thickness is consistent with mild concentric hypertrophy.    Left ventricular diastolic function is consistent with (grade I) impaired relaxation.    Left atrial volume is moderately increased.    Mild aortic valve stenosis is present.    Estimated right ventricular systolic pressure from tricuspid regurgitation is normal (<35 mmHg).    Meds:  amLODIPine, 5 mg, Oral, Daily  azithromycin, 500 mg, Oral, Q24H  buPROPion, 75 mg, Oral, BID  busPIRone, 15 mg, Oral, 4x Daily  escitalopram, 20 mg, Oral, Daily  levothyroxine, 112 mcg, Oral, Daily  metoprolol succinate XL, 50 mg, Oral, Daily  rosuvastatin, 10 mg, Oral, Nightly  senna-docusate sodium, 2 tablet, Oral, BID  sodium chloride, 10 mL, Intravenous, Q12H  traZODone, 100 mg, Oral, Nightly           Physical Exam:  SpO2 Percentage    10/05/23 1928 10/05/23 2301 10/06/23 0339   SpO2: 97% 96% 95%     Body mass index is 30.18 kg/m².   Temp:  [97.6 °F (36.4 °C)-98.5 °F (36.9 °C)] 98.5 °F (36.9 °C)  Heart Rate:  [58-72] 59  Resp:  [16-18] 18  BP: (117-151)/(45-69) 117/45  Intake/Output Summary (Last 24 hours) at 10/6/2023 0747  Last data filed at 10/6/2023 0339  Gross per 24 hour   Intake 960 ml   Output --   Net 960 ml       Physical Exam  Vitals and nursing note reviewed.   Constitutional:       Appearance: She is well-developed.   HENT:      Head: Normocephalic and atraumatic.   Eyes:      General: No scleral icterus.  Cardiovascular:      Rate and Rhythm: Normal rate and  regular rhythm.   Pulmonary:      Effort: Pulmonary effort is normal.      Comments: Evidence of hemoptysis  Abdominal:      General: There is no distension.   Musculoskeletal:         General: Normal range of motion.      Cervical back: Normal range of motion and neck supple.   Skin:     General: Skin is warm and dry.   Neurological:      Mental Status: She is alert and oriented to person, place, and time.   Psychiatric:         Mood and Affect: Mood normal.         Behavior: Behavior normal.     Electronically signed by ABHAY Al, 10/6/2023, 07:47 CDT      Physician substantive portion: medical decision making    Result Review    Laboratory Data:  Results from last 7 days   Lab Units 10/06/23  0353 10/05/23  0425 10/04/23  1431   WBC 10*3/mm3 7.57 9.68 8.70   HEMOGLOBIN g/dL 10.6* 10.4* 11.3*   PLATELETS 10*3/mm3 204 192 213     Results from last 7 days   Lab Units 10/06/23  0353 10/05/23  0425 10/04/23  0243   SODIUM mmol/L 142 143 139   POTASSIUM mmol/L 3.8 3.9 4.3   CO2 mmol/L 27.0 27.0 24.0   BUN mg/dL 11 12 17   CREATININE mg/dL 0.61 0.58 0.83         Microbiology Results (last 10 days)       Procedure Component Value - Date/Time    COVID PRE-OP / PRE-PROCEDURE SCREENING ORDER (NO ISOLATION) - Swab, Nasal Cavity [[id number]]  (Normal) Collected: 10/04/23 0244    Lab Status: Final result Specimen: Swab from Nasal Cavity Updated: 10/04/23 0320    Narrative:      The following orders were created for panel order COVID PRE-OP / PRE-PROCEDURE SCREENING ORDER (NO ISOLATION) - Swab, Nasal Cavity.  Procedure                               Abnormality         Status                     ---------                               -----------         ------                     COVID-19 and FLU A/B PCR...[[id number]]  Normal              Final result                 Please view results for these tests on the individual orders.    COVID-19 and FLU A/B PCR - Swab, Nasopharynx [[id number]]  (Normal) Collected:  10/04/23 0244    Lab Status: Final result Specimen: Swab from Nasopharynx Updated: 10/04/23 0320     COVID19 Not Detected     Influenza A PCR Not Detected     Influenza B PCR Not Detected    Narrative:      Fact sheet for providers: https://www.fda.gov/media/500997/download    Fact sheet for patients: https://www.fda.gov/media/390639/download    Test performed by PCR.           Recent films:  Adult Transthoracic Echo Complete W/ Cont if Necessary Per Protocol    Result Date: 10/4/2023    Left ventricular ejection fraction appears to be 61 - 65%.   Left ventricular wall thickness is consistent with mild concentric hypertrophy.   Left ventricular diastolic function is consistent with (grade I) impaired relaxation.   Left atrial volume is moderately increased.   Mild aortic valve stenosis is present.   Estimated right ventricular systolic pressure from tricuspid regurgitation is normal (<35 mmHg).     CT Abdomen Pelvis With Contrast    Result Date: 10/5/2023  EXAMINATION: CT ABDOMEN PELVIS W CONTRAST-   10/5/2023 8:55 AM CDT  HISTORY: right kindey mass; R04.2-Hemoptysis  In order to have a CT radiation dose as low as reasonably achievable Automated Exposure Control was utilized for adjustment of the mA and/or KV according to patient size.  DLP in mGycm= 973  The CT scan of the abdomen and pelvis is performed after intravenous contrast enhancement.  Images are acquired in axial plane and subsequent reconstruction in coronal and sagittal planes.  There is no previous similar study for comparison. Correlation is made with CT angiography chest dated 10/4/2023.  The adrenal glands bilaterally are normal.  There is lobulation of the renal contour bilaterally. There is a well-defined sharply marginated oval low density mass along the lateral margin of the mid right kidney measuring 3.4 x 2.3 cm in axial plane images. CT density suggest a cyst. It shows no enhancement. There are several smaller cortical nodules in both  kidneys, more numerous in the right kidney which is too small to be further characterized. There are bilateral radiopaque renal calculi, the largest one in the lower pole of the right kidney measuring 6 mm. No hydronephrosis on either side. The urinary bladder is poorly distended with moderate diffuse thickening of the walls and enhancement. There is surrounding extraperitoneal fat infiltration.  There is a small fat-containing umbilical hernia. There is a moderate size fat-containing supraumbilical central/ventral hernia which measures approximately 5 cm in diameter. The neck of the hernia measures 1.3 cm. There are omental vessels entering into this sac. No abnormal infiltration or stranding of the fat in the hernia.  Normal size uterus is seen. Prominent pelvic veins are seen around the uterus and adnexa with significantly dilated and prominent gonadal veins bilaterally left more than the right. No free fluid in the pelvis.  The stomach is well-distended. There is asymmetrical heterogeneous soft tissue density/thickening along the lesser curvature side of the gastric antrum with heterogeneous enhancement. A small part of this mass appears to be projecting into the lumen of the gastric antrum. The remaining stomach is full of fluid and gas. Duodenum is normal. The small bowel is nondistended. No finding to suggest appendicitis. Large volume stool is seen in the colon. No finding to suggest obstruction. There is diverticulosis of the distal colon. No evidence of diverticulitis.  Atheromatous changes of the abdominal aorta and iliac arteries. There are large calcific plaques at the origin of both renal arteries with moderate, 50 to 69%, stenosis of both renal arteries.  There is no evidence of abdominal or pelvic lymphadenopathy.  Images reviewed in bone window show chronic degenerative changes of the lumbar spine with a mild levoscoliosis. Multilevel disc degeneration is seen. No acute bony abnormality.       Impression: 1. Multiple renal nodules bilaterally. The largest nodule in the right kidney represent a cyst. The remaining nodules are too small to be further characterized. Another follow-up CT scan of the abdomen with renal mass protocol may be obtained to ensure stability of the nodules. 2. Nonobstructing renal calculi bilaterally. 3. Marked thickening and enhancement of the poorly distended urinary bladder. This may partly be due to incomplete distention. There is a probability of chronic cystitis. 4. A poorly marginated masslike density along the lesser curvature side of the gastric antrum which partly protrudes into the lumen of the gastric antrum. This may represent a neoplastic process. Further evaluation with gastroscopy may be obtained. 5. Large volume stool in the colon. No obstruction. Diverticulosis of the colon. No evidence for diverticulitis. 6. 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      This report was signed and finalized on 10/5/2023 9:27 AM CDT by Dr. Mohamud Kovacs MD.            Pulmonary Assessment:  Hemoptysis  Small posterior rul infiltrate  Multiple renal nodules  Abnormal density in gastric antrum    Recommend/plan:   Bronchoscopy today; see separate note.  This has shown no obvious active bleeding or visible mucosal lesions in the central airways, some blood in posterior segmental bronchus (B2) of right upper lobe.  This is the segment containing the ground glass density on ct, might indicate small focal inflammatory focus  Continue antibiotics  Check annalisa, anca, crp  Defer subdiaphragmatic ct findings to others.    This visit was performed by both a physician and an [facility] RN.  I performed all aspects of the medical decision making as documented.  Electronically signed by oJnah Driscoll MD, 10/6/2023, 10:50 CDT

## 2023-10-06 NOTE — PROGRESS NOTES
[facility] Medicine Services  INPATIENT PROGRESS NOTE    Patient Name: Nichole Rinaldi  Date of Admission: 10/4/2023  Today's Date: 10/06/23  Length of Stay: 0  Primary Care Physician: Meme Tan APRN    Subjective   Chief Complaint: Hemoptysis  Cough     78 year old female with PMH of cerebellar stroke 7/20/2023, Afib noted on Zio patch 9/7/2023 placed on Eliquis, CAD, MVR, that presents to the ER with complain of coughing up blood. She had a wet cough yesterday that did not bother her much and since about midnight noted bright blood with cough and occasional clots. Denies chest pain, fever, never smoked. CTA chest showed no PE, does mention right heart strain, and 14 mm groundglass opacity/nodule in the right upper lobe may represent an evolving inflammatory or neoplastic process.   10/4  Admitted for hemoptysis history of  A-fib on Eliquis, PTA noticed no PE mild right RV strain pulmonary hypertension left upper lobe nodule hemoglobin stable pulmonary consult appreciated, remains on room airDr. Yamila discussed the possibility of bronchoscopy on Friday. We will get a repeat echo, BNP and troponin today.   10/5  Feeling better appreciate pulmonary, BNP is unremarkable  Echo of the heart is noticed plan is for bronchoscopy tomorrow no more active hemoptysis for now  10/6  For bronchoscopy today appreciate pulmonary hopefully DC home tomorrow, FOB source of bleeding posterior RUL continue abx, check IFRAH, ANCa, CRP, F/u O/P  Renal CT mass protocol , EGD for gastric mass   CT abdomen pelvis: Multiple renal nodules bilaterally. The largest nodule in the right  kidney represent a cyst. The remaining nodules are too small to be  further characterized. Another follow-up CT scan of the abdomen with  renal mass protocol may be obtained to ensure stability of the nodules.  2. Nonobstructing renal calculi bilaterally.  3. Marked thickening and enhancement of the poorly distended  urinary  bladder. This may partly be due to incomplete distention. There is a  probability of chronic cystitis.  4. A poorly marginated masslike density along the lesser curvature side  of the gastric antrum which partly protrudes into the lumen of the  gastric antrum. This may represent a neoplastic process. Further  evaluation with gastroscopy may be obtained.  5. Large volume stool in the colon. No obstruction. Diverticulosis of  the colon. No evidence for diverticulitis.   6. 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       Review of Systems   Respiratory:  Positive for cough.     All pertinent negatives and positives are as above. All other systems have been reviewed and are negative unless otherwise stated.     Objective    Temp:  [97.6 °F (36.4 °C)-98.5 °F (36.9 °C)] 98.4 °F (36.9 °C)  Heart Rate:  [58-72] 63  Resp:  [16-18] 18  BP: (117-151)/(45-69) 145/66  Physical Exam  Constitutional:       Appearance: Normal appearance.   HENT:      Head: Normocephalic and atraumatic.      Nose: Nose normal.      Mouth/Throat:      Mouth: Mucous membranes are moist. Mucous membranes are dry.   Eyes:      Extraocular Movements: Extraocular movements intact.      Pupils: Pupils are equal, round, and reactive to light.   Cardiovascular:      Rate and Rhythm: Normal rate and regular rhythm.   Pulmonary:      Effort: Pulmonary effort is normal.      Breath sounds: Normal breath sounds.   Abdominal:      General: Abdomen is flat.      Palpations: Abdomen is soft.   Musculoskeletal:         General: Normal range of motion.      Cervical back: Normal range of motion.   Skin:     General: Skin is warm.      Capillary Refill: Capillary refill takes less than 2 seconds.   Neurological:      General: No focal deficit present.      Mental Status: She is alert and oriented to person, place, and time.           Results Review:  I have reviewed the labs, radiology results, and diagnostic studies.    Laboratory Data:   Results  from last 7 days   Lab Units 10/06/23  0353 10/05/23  0425 10/04/23  1431   WBC 10*3/mm3 7.57 9.68 8.70   HEMOGLOBIN g/dL 10.6* 10.4* 11.3*   HEMATOCRIT % 33.1* 32.0* 35.0   PLATELETS 10*3/mm3 204 192 213          Results from last 7 days   Lab Units 10/06/23  0353 10/05/23  0425 10/04/23  0243   SODIUM mmol/L 142 143 139   POTASSIUM mmol/L 3.8 3.9 4.3   CHLORIDE mmol/L 108* 110* 105   CO2 mmol/L 27.0 27.0 24.0   BUN mg/dL 11 12 17   CREATININE mg/dL 0.61 0.58 0.83   CALCIUM mg/dL 8.2* 8.5* 8.8   GLUCOSE mg/dL 85 111* 86         Culture Data:   No results found for: BLOODCX, URINECX, WOUNDCX, MRSACX, RESPCX, STOOLCX    Radiology Data:   Imaging Results (Last 24 Hours)       ** No results found for the last 24 hours. **            I have reviewed the patient's current medications.     Assessment/Plan   Assessment  Active Hospital Problems    Diagnosis     **Hemoptysis     Acute bronchitis     Lung nodule     Kidney mass     Paroxysmal SVT (supraventricular tachycardia)     Cerebellar stroke     Primary hypertension     Paroxysmal atrial fibrillation        Treatment Plan  Hemoptysis ? Bronchitis in a patient recently started on anticoagulants  Azithromycin empirically  Cough syrup  Pulmonology consult for guidance and follow up of possible lung nodule  Need to hold Eliquis and restart when considered safe by pulmonology, current risk of worsening lung bleeding outweighs stroke prevention benefits  Avoid Albuterol due to history of PAF and SVT     Right heart strain per CT unclear etiology, will repeat 2D echocardiogram     Kidney mass noted on CT as well, unclear if this is an artifact. Will need CT abd/pelvis to verify     DVT prophylaxis >SCD    Medical Decision Making  Number and Complexity of problems: 1 acute  Differential Diagnosis: As above    Conditions and Status        Condition is at treatment goal.     MDM Data  External documents reviewed: None  Cardiac tracing (EKG, telemetry) interpretation:  Yes  Radiology interpretation: Yes  Labs reviewed: Yes  Any tests that were considered but not ordered: None     Decision rules/scores evaluated (example [id number], Wells, etc): None     Discussed with: Nursing and patient     Care Planning  Shared decision making: Patient apprised of current labs, vitals, imaging and treatment plan.  They are agreeable with proceeding with plans as discussed.   Code status and discussions: Full code    Disposition  Social Determinants of Health that impact treatment or disposition: None  I expect the patient to be discharged to home in few days.     Electronically signed by Joseline Ibarra MD, 10/06/23, 09:59 CDT.

## 2023-10-07 ENCOUNTER — READMISSION MANAGEMENT (OUTPATIENT)
Dept: CALL CENTER | Facility: HOSPITAL | Age: 78
End: 2023-10-07
Payer: MEDICARE

## 2023-10-07 VITALS
OXYGEN SATURATION: 95 % | WEIGHT: 143.2 LBS | SYSTOLIC BLOOD PRESSURE: 141 MMHG | RESPIRATION RATE: 16 BRPM | HEIGHT: 58 IN | TEMPERATURE: 98.2 F | DIASTOLIC BLOOD PRESSURE: 54 MMHG | BODY MASS INDEX: 30.06 KG/M2 | HEART RATE: 73 BPM

## 2023-10-07 LAB
ANION GAP SERPL CALCULATED.3IONS-SCNC: 6 MMOL/L (ref 5–15)
BUN SERPL-MCNC: 11 MG/DL (ref 8–23)
BUN/CREAT SERPL: 18 (ref 7–25)
CALCIUM SPEC-SCNC: 8.3 MG/DL (ref 8.6–10.5)
CHLORIDE SERPL-SCNC: 109 MMOL/L (ref 98–107)
CO2 SERPL-SCNC: 26 MMOL/L (ref 22–29)
CREAT SERPL-MCNC: 0.61 MG/DL (ref 0.57–1)
EGFRCR SERPLBLD CKD-EPI 2021: 92.2 ML/MIN/1.73
GLUCOSE SERPL-MCNC: 91 MG/DL (ref 65–99)
NIGHT BLUE STAIN TISS: NORMAL
POTASSIUM SERPL-SCNC: 4 MMOL/L (ref 3.5–5.2)
SODIUM SERPL-SCNC: 141 MMOL/L (ref 136–145)

## 2023-10-07 PROCEDURE — G0378 HOSPITAL OBSERVATION PER HR: HCPCS

## 2023-10-07 PROCEDURE — A9270 NON-COVERED ITEM OR SERVICE: HCPCS | Performed by: INTERNAL MEDICINE

## 2023-10-07 PROCEDURE — 63710000001 BUSPIRONE 10 MG TABLET: Performed by: INTERNAL MEDICINE

## 2023-10-07 PROCEDURE — 63710000001 BUPROPION 75 MG TABLET: Performed by: INTERNAL MEDICINE

## 2023-10-07 PROCEDURE — 63710000001 ESCITALOPRAM 10 MG TABLET: Performed by: INTERNAL MEDICINE

## 2023-10-07 PROCEDURE — 63710000001 AZITHROMYCIN 250 MG TABLET: Performed by: INTERNAL MEDICINE

## 2023-10-07 PROCEDURE — 63710000001 LEVOTHYROXINE 112 MCG TABLET: Performed by: INTERNAL MEDICINE

## 2023-10-07 PROCEDURE — 63710000001 HYDROCODONE-ACETAMINOPHEN 10-325 MG TABLET: Performed by: INTERNAL MEDICINE

## 2023-10-07 PROCEDURE — 63710000001 METOPROLOL SUCCINATE XL 50 MG TABLET SUSTAINED-RELEASE 24 HOUR: Performed by: INTERNAL MEDICINE

## 2023-10-07 PROCEDURE — 63710000001 BUSPIRONE 5 MG TABLET: Performed by: INTERNAL MEDICINE

## 2023-10-07 PROCEDURE — 80048 BASIC METABOLIC PNL TOTAL CA: CPT | Performed by: INTERNAL MEDICINE

## 2023-10-07 PROCEDURE — 63710000001 ACETAMINOPHEN 325 MG TABLET: Performed by: INTERNAL MEDICINE

## 2023-10-07 PROCEDURE — 63710000001 AMLODIPINE 5 MG TABLET: Performed by: INTERNAL MEDICINE

## 2023-10-07 RX ORDER — AZITHROMYCIN 1 G/1
1 POWDER, FOR SUSPENSION ORAL ONCE
Qty: 1 PACKET | Refills: 0 | Status: SHIPPED | OUTPATIENT
Start: 2023-10-07 | End: 2023-10-07

## 2023-10-07 RX ADMIN — Medication 10 ML: at 08:01

## 2023-10-07 RX ADMIN — BUPROPION HYDROCHLORIDE 75 MG: 75 TABLET, FILM COATED ORAL at 08:15

## 2023-10-07 RX ADMIN — HYDROCODONE BITARTRATE AND ACETAMINOPHEN 1 TABLET: 10; 325 TABLET ORAL at 12:09

## 2023-10-07 RX ADMIN — BUSPIRONE HYDROCHLORIDE 15 MG: 5 TABLET ORAL at 12:09

## 2023-10-07 RX ADMIN — LEVOTHYROXINE SODIUM 112 MCG: 112 TABLET ORAL at 05:22

## 2023-10-07 RX ADMIN — BUSPIRONE HYDROCHLORIDE 15 MG: 5 TABLET ORAL at 08:01

## 2023-10-07 RX ADMIN — HYDROCODONE BITARTRATE AND ACETAMINOPHEN 1 TABLET: 10; 325 TABLET ORAL at 08:04

## 2023-10-07 RX ADMIN — ACETAMINOPHEN 650 MG: 325 TABLET, FILM COATED ORAL at 09:22

## 2023-10-07 RX ADMIN — ESCITSLOPRAM 20 MG: 10 TABLET ORAL at 08:01

## 2023-10-07 RX ADMIN — AMLODIPINE BESYLATE 5 MG: 5 TABLET ORAL at 08:01

## 2023-10-07 RX ADMIN — METOPROLOL SUCCINATE 50 MG: 50 TABLET, EXTENDED RELEASE ORAL at 08:01

## 2023-10-07 RX ADMIN — AZITHROMYCIN 500 MG: 250 TABLET, FILM COATED ORAL at 08:01

## 2023-10-07 NOTE — PROGRESS NOTES
[facility] [place]GRESS [place] - [facility]    Patient: Nichole Rinaldi  1945   MR# [id number]   Acct# [id number]  10/07/23   09:44 CDT  Referring Provider: Joseline Ibarra MD    Chief Complaint: Hemoptysis-improved  Interval history: She underwent bronchoscopy yesterday.  She has not had any further hemoptysis overnight.  She anticipates going home today.  She remains on room air.  No overnight events reported.  She will have outpatient follow-up with pulmonology and a CT scan in 4 to 5 weeks.  Dr. Driscoll told her it is okay to restart Eliquis.      Meds:  amLODIPine, 5 mg, Oral, Daily  azithromycin, 500 mg, Oral, Q24H  buPROPion, 75 mg, Oral, BID  busPIRone, 15 mg, Oral, 4x Daily  escitalopram, 20 mg, Oral, Daily  levothyroxine, 112 mcg, Oral, Daily  metoprolol succinate XL, 50 mg, Oral, Daily  rosuvastatin, 10 mg, Oral, Nightly  senna-docusate sodium, 2 tablet, Oral, BID  sodium chloride, 10 mL, Intravenous, Q12H  traZODone, 100 mg, Oral, Nightly      sodium chloride, 100 mL/hr, Last Rate: 100 mL/hr (10/06/23 0951)        Physical Exam:  SpO2 Percentage    10/06/23 2008 10/07/23 0332 10/07/23 0749   SpO2: 94% 95% 95%     Body mass index is 29.93 kg/m².   Temp:  [98.2 °F (36.8 °C)-98.8 °F (37.1 °C)] 98.2 °F (36.8 °C)  Heart Rate:  [59-73] 73  Resp:  [12-16] 16  BP: ()/(54-82) 141/54  Intake/Output Summary (Last 24 hours) at 10/7/2023 0944  Last data filed at 10/7/2023 0332  Gross per 24 hour   Intake 480 ml   Output --   Net 480 ml     Physical Exam  Vitals and nursing note reviewed.   Constitutional:       Appearance: She is well-developed.   HENT:      Head: Normocephalic and atraumatic.   Eyes:      General: No scleral icterus.  Cardiovascular:      Rate and Rhythm: Normal rate and regular rhythm.   Pulmonary:      Effort: Pulmonary effort is normal.   Abdominal:      General: There is no distension.   Musculoskeletal:         General: Normal range of motion.       Cervical back: Normal range of motion and neck supple.   Skin:     General: Skin is warm and dry.   Neurological:      Mental Status: She is alert and oriented to person, place, and time.   Psychiatric:         Mood and Affect: Mood normal.         Behavior: Behavior normal.       Electronically signed by ABHAY Al, 10/7/2023, 09:44 CDT      Physician substantive portion: medical decision making    Result Review    Laboratory Data:  Results from last 7 days   Lab Units 10/06/23  0353 10/05/23  0425 10/04/23  1431   WBC 10*3/mm3 7.57 9.68 8.70   HEMOGLOBIN g/dL 10.6* 10.4* 11.3*   PLATELETS 10*3/mm3 204 192 213     Results from last 7 days   Lab Units 10/07/23  0446 10/06/23  1114 10/06/23  0353 10/05/23  0425   SODIUM mmol/L 141  --  142 143   POTASSIUM mmol/L 4.0  --  3.8 3.9   CO2 mmol/L 26.0  --  27.0 27.0   BUN mg/dL 11  --  11 12   CREATININE mg/dL 0.61  --  0.61 0.58   CRP mg/dL  --  <0.30  --   --          Microbiology Results (last 10 days)       Procedure Component Value - Date/Time    KOH Prep - Wash, Bronchus [[id number]]  (Abnormal) Collected: 10/06/23 1006    Lab Status: Final result Specimen: Wash from Bronchus Updated: 10/06/23 1547     KOH Prep Yeast seen    Respiratory Culture - Wash, Bronchus [[id number]] Collected: 10/06/23 1006    Lab Status: Preliminary result Specimen: Wash from Bronchus Updated: 10/07/23 0943     Respiratory Culture Heavy growth (4+) Normal Respiratory Keily     Gram Stain Rare (1+) Gram positive cocci      Few (2+) WBCs seen      Rare (1+) Epithelial cells seen    COVID PRE-OP / PRE-PROCEDURE SCREENING ORDER (NO ISOLATION) - Swab, Nasal Cavity [[id number]]  (Normal) Collected: 10/04/23 0244    Lab Status: Final result Specimen: Swab from Nasal Cavity Updated: 10/04/23 0320    Narrative:      The following orders were created for panel order COVID PRE-OP / PRE-PROCEDURE SCREENING ORDER (NO ISOLATION) - Swab, Nasal Cavity.  Procedure                                Abnormality         Status                     ---------                               -----------         ------                     COVID-19 and FLU A/B PCR...[[id number]]  Normal              Final result                 Please view results for these tests on the individual orders.    COVID-19 and FLU A/B PCR - Swab, Nasopharynx [[id number]]  (Normal) Collected: 10/04/23 0244    Lab Status: Final result Specimen: Swab from Nasopharynx Updated: 10/04/23 0320     COVID19 Not Detected     Influenza A PCR Not Detected     Influenza B PCR Not Detected    Narrative:      Fact sheet for providers: https://www.fda.gov/media/965549/download    Fact sheet for patients: https://www.fda.gov/media/944818/download    Test performed by PCR.           Recent films:  No radiology results from the last 24 hrs         Pulmonary Assessment:  Hemoptysis much better  Focal pneumonia posterior segment of RUL, likely source of hemoptysis  afib    Recommend/plan:   Recommend completion of antibiotics over next 3-4 days with po meds  Ok to home  Recommend resuming eliquis but avoid nsaids  Follow up in office  Repeat ct scan in a few weeks to verify resolution of rul infiltrate  Call back if hemoptysis recurs.    This visit was performed by both a physician and an [facility] RN.  I performed all aspects of the medical decision making as documented.  Electronically signed by Jonah Driscoll MD, 10/7/2023, 10:08 CDT

## 2023-10-07 NOTE — PROGRESS NOTES
[facility] Medicine Services  INPATIENT PROGRESS NOTE    Patient Name: Nichole Rinaldi  Date of Admission: 10/4/2023  Today's Date: 10/07/23  Length of Stay: 0  Primary Care Physician: Meme Tan APRN    Subjective   Chief Complaint: Hemoptysis  Cough       78 year old female with PMH of cerebellar stroke 7/20/2023, Afib noted on Zio patch 9/7/2023 placed on Eliquis, CAD, MVR, that presents to the ER with complain of coughing up blood. She had a wet cough yesterday that did not bother her much and since about midnight noted bright blood with cough and occasional clots. Denies chest pain, fever, never smoked. CTA chest showed no PE, does mention right heart strain, and 14 mm groundglass opacity/nodule in the right upper lobe may represent an evolving inflammatory or neoplastic process.   10/4  Admitted for hemoptysis history of  A-fib on Eliquis, PTA noticed no PE mild right RV strain pulmonary hypertension left upper lobe nodule hemoglobin stable pulmonary consult appreciated, remains on room airDr. Yamila discussed the possibility of bronchoscopy on Friday. We will get a repeat echo, BNP and troponin today.   10/5  Feeling better appreciate pulmonary, BNP is unremarkable  Echo of the heart is noticed plan is for bronchoscopy tomorrow no more active hemoptysis for now  10/6  For bronchoscopy today appreciate pulmonary hopefully DC home tomorrow, FOB source of bleeding posterior RUL continue abx, check IFRAH, ANCa, CRP, F/u O/P  Renal CT mass protocol , EGD for gastric mass   CT abdomen pelvis: Multiple renal nodules bilaterally. The largest nodule in the right  kidney represent a cyst. The remaining nodules are too small to be  further characterized. Another follow-up CT scan of the abdomen with  renal mass protocol may be obtained to ensure stability of the nodules.  2. Nonobstructing renal calculi bilaterally.  3. Marked thickening and enhancement of the poorly  distended urinary  bladder. This may partly be due to incomplete distention. There is a  probability of chronic cystitis.  4. A poorly marginated masslike density along the lesser curvature side  of the gastric antrum which partly protrudes into the lumen of the  gastric antrum. This may represent a neoplastic process. Further  evaluation with gastroscopy may be obtained.  5. Large volume stool in the colon. No obstruction. Diverticulosis of  the colon. No evidence for diverticulitis.   6. 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  10/7  She underwent bronchoscopy yesterday.  She has not had any further hemoptysis overnight.  She anticipates going home today.  She remains on room air.  No overnight events reported.  She will have outpatient follow-up with pulmonology and a CT scan in 4 to 5 weeks.  Dr. Driscoll told her it is okay to restart Eliquis.  Will DC home follow-up with PCP in 1 week patient needs to have EGD and CT of the abdomen pelvis with IV contrast kidney mass protocol as outpatient    Review of Systems   Respiratory:  Positive for cough.       All pertinent negatives and positives are as above. All other systems have been reviewed and are negative unless otherwise stated.     Objective    Temp:  [98.2 °F (36.8 °C)-98.8 °F (37.1 °C)] 98.2 °F (36.8 °C)  Heart Rate:  [59-73] 73  Resp:  [12-16] 16  BP: ()/(54-82) 141/54  Physical Exam  Constitutional:       Appearance: Normal appearance.   HENT:      Head: Normocephalic and atraumatic.      Nose: Nose normal.      Mouth/Throat:      Mouth: Mucous membranes are moist. Mucous membranes are dry.   Eyes:      Extraocular Movements: Extraocular movements intact.      Pupils: Pupils are equal, round, and reactive to light.   Cardiovascular:      Rate and Rhythm: Normal rate and regular rhythm.   Pulmonary:      Effort: Pulmonary effort is normal.      Breath sounds: Normal breath sounds.   Abdominal:      General: Abdomen is flat.       Palpations: Abdomen is soft.   Musculoskeletal:         General: Normal range of motion.      Cervical back: Normal range of motion.   Skin:     General: Skin is warm.      Capillary Refill: Capillary refill takes less than 2 seconds.   Neurological:      General: No focal deficit present.      Mental Status: She is alert and oriented to person, place, and time.             Results Review:  I have reviewed the labs, radiology results, and diagnostic studies.    Laboratory Data:   Results from last 7 days   Lab Units 10/06/23  0353 10/05/23  0425 10/04/23  1431   WBC 10*3/mm3 7.57 9.68 8.70   HEMOGLOBIN g/dL 10.6* 10.4* 11.3*   HEMATOCRIT % 33.1* 32.0* 35.0   PLATELETS 10*3/mm3 204 192 213        Results from last 7 days   Lab Units 10/07/23  0446 10/06/23  0353 10/05/23  0425   SODIUM mmol/L 141 142 143   POTASSIUM mmol/L 4.0 3.8 3.9   CHLORIDE mmol/L 109* 108* 110*   CO2 mmol/L 26.0 27.0 27.0   BUN mg/dL 11 11 12   CREATININE mg/dL 0.61 0.61 0.58   CALCIUM mg/dL 8.3* 8.2* 8.5*   GLUCOSE mg/dL 91 85 111*       Culture Data:   No results found for: BLOODCX, URINECX, WOUNDCX, MRSACX, RESPCX, STOOLCX    Radiology Data:   Imaging Results (Last 24 Hours)       ** No results found for the last 24 hours. **            I have reviewed the patient's current medications.     Assessment/Plan   Assessment  Active Hospital Problems    Diagnosis     **Hemoptysis     Acute bronchitis     Lung nodule     Kidney mass     Paroxysmal SVT (supraventricular tachycardia)     Cerebellar stroke     Primary hypertension     Paroxysmal atrial fibrillation        Treatment Plan  Hemoptysis ? Bronchitis in a patient recently started on anticoagulants  Azithromycin empirically  Cough syrup  Pulmonology consult for guidance and follow up of possible lung nodule  Need to hold Eliquis and restart when considered safe by pulmonology, current risk of worsening lung bleeding outweighs stroke prevention benefits  Avoid Albuterol due to history of PAF  and SVT     Right heart strain per CT unclear etiology, will repeat 2D echocardiogram     Kidney mass noted on CT as well, unclear if this is an artifact. Will need CT abd/pelvis to verify     DVT prophylaxis >SCD    Medical Decision Making  Number and Complexity of problems: 1 acute  Differential Diagnosis: As above    Conditions and Status        Condition is at treatment goal.     [facility] Data  External documents reviewed: None  Cardiac tracing (EKG, telemetry) interpretation: Yes  Radiology interpretation: Yes  Labs reviewed: Yes  Any tests that were considered but not ordered: None     Decision rules/scores evaluated (example [id number], [place], etc): None     Discussed with: Nursing and patient     Care Planning  Shared decision making: Patient apprised of current labs, vitals, imaging and treatment plan.  They are agreeable with proceeding with plans as discussed.   Code status and discussions: Full code    Disposition  Social Determinants of Health that impact treatment or disposition: None  I expect the patient to be discharged to home in few days.     Electronically signed by Joseline Ibarra MD, 10/07/23, 09:51 CDT.

## 2023-10-07 NOTE — DISCHARGE SUMMARY
[facility] Medicine Services  DISCHARGE SUMMARY       Date of Admission: 10/4/2023  Date of Discharge:  10/7/2023  Primary Care Physician: Meme Tan APRN    Presenting Problem/History of Present Illness:  78 year old female with PMH of cerebellar stroke 7/20/2023, Afib noted on Zio patch 9/7/2023 placed on Eliquis, CAD, MVR, that presents to the ER with complain of coughing up blood. She had a wet cough yesterday that did not bother her much and since about midnight noted bright blood with cough and occasional clots. Denies chest pain, fever, never smoked. CTA chest showed no PE, does mention right heart strain, and 14 mm groundglass opacity/nodule in the right upper lobe may represent an evolving inflammatory or neoplastic process.   10/4  Admitted for hemoptysis history of  A-fib on Eliquis, PTA noticed no PE mild right RV strain pulmonary hypertension left upper lobe nodule hemoglobin stable pulmonary consult appreciated, remains on room airDrKelly Driscoll discussed the possibility of bronchoscopy on Friday. We will get a repeat echo, BNP and troponin today.   10/5  Feeling better appreciate pulmonary, BNP is unremarkable  Echo of the heart is noticed plan is for bronchoscopy tomorrow no more active hemoptysis for now  10/6  For bronchoscopy today appreciate pulmonary hopefully DC home tomorrow, FOB source of bleeding posterior RUL continue abx, check IFRAH, ANCa, CRP, F/u O/P  Renal CT mass protocol , EGD for gastric mass   CT abdomen pelvis: Multiple renal nodules bilaterally. The largest nodule in the right  kidney represent a cyst. The remaining nodules are too small to be  further characterized. Another follow-up CT scan of the abdomen with  renal mass protocol may be obtained to ensure stability of the nodules.  2. Nonobstructing renal calculi bilaterally.  3. Marked thickening and enhancement of the poorly distended urinary  bladder. This may partly be due to  incomplete distention. There is a  probability of chronic cystitis.  4. A poorly marginated masslike density along the lesser curvature side  of the gastric antrum which partly protrudes into the lumen of the  gastric antrum. This may represent a neoplastic process. Further  evaluation with gastroscopy may be obtained.  5. Large volume stool in the colon. No obstruction. Diverticulosis of  the colon. No evidence for diverticulitis.   6. 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  10/7  She underwent bronchoscopy yesterday.  She has not had any further hemoptysis overnight.  She anticipates going home today.  She remains on room air.  No overnight events reported.  She will have outpatient follow-up with pulmonology and a CT scan in 4 to 5 weeks.  Dr. Driscoll told her it is okay to restart Eliquis.  Will DC home follow-up with PCP in 1 week patient needs to have EGD and CT of the abdomen pelvis with IV contrast kidney mass protocol as outpatient    Final Discharge Diagnoses:  Active Hospital Problems    Diagnosis     **Hemoptysis     Acute bronchitis     Lung nodule     Kidney mass     Paroxysmal SVT (supraventricular tachycardia)     Cerebellar stroke     Primary hypertension     Paroxysmal atrial fibrillation        Consults: Pulmonary    Procedures Performed: Bronchoscopy    Pertinent Test Results:   Results for orders placed during the hospital encounter of 10/04/23    Adult Transthoracic Echo Complete W/ Cont if Necessary Per Protocol    Interpretation Summary    Left ventricular ejection fraction appears to be 61 - 65%.    Left ventricular wall thickness is consistent with mild concentric hypertrophy.    Left ventricular diastolic function is consistent with (grade I) impaired relaxation.    Left atrial volume is moderately increased.    Mild aortic valve stenosis is present.    Estimated right ventricular systolic pressure from tricuspid regurgitation is normal (<35 mmHg).      Imaging Results (All)        Procedure Component Value Units Date/Time    CT Abdomen Pelvis With Contrast [[id number]] Collected: 10/05/23 0908     Updated: 10/05/23 0930    Narrative:      EXAMINATION: CT ABDOMEN PELVIS W CONTRAST-      10/5/2023 8:55 AM CDT     HISTORY: right kindey mass; R04.2-Hemoptysis     In order to have a CT radiation dose as low as reasonably achievable  Automated Exposure Control was utilized for adjustment of the mA and/or  KV according to patient size.     DLP in mGycm= 973     The CT scan of the abdomen and pelvis is performed after intravenous  contrast enhancement.     Images are acquired in axial plane and subsequent reconstruction in  coronal and sagittal planes.     There is no previous similar study for comparison. Correlation is made  with CT angiography chest dated 10/4/2023.     The adrenal glands bilaterally are normal.     There is lobulation of the renal contour bilaterally. There is a  well-defined sharply marginated oval low density mass along the lateral  margin of the mid right kidney measuring 3.4 x 2.3 cm in axial plane  images. CT density suggest a cyst. It shows no enhancement. There are  several smaller cortical nodules in both kidneys, more numerous in the  right kidney which is too small to be further characterized. There are  bilateral radiopaque renal calculi, the largest one in the lower pole of  the right kidney measuring 6 mm. No hydronephrosis on either side. The  urinary bladder is poorly distended with moderate diffuse thickening of  the walls and enhancement. There is surrounding extraperitoneal fat  infiltration.     There is a small fat-containing umbilical hernia. There is a moderate  size fat-containing supraumbilical central/ventral hernia which measures  approximately 5 cm in diameter. The neck of the hernia measures 1.3 cm.  There are omental vessels entering into this sac. No abnormal  infiltration or stranding of the fat in the hernia.     Normal size uterus is seen.  Prominent pelvic veins are seen around the  uterus and adnexa with significantly dilated and prominent gonadal veins  bilaterally left more than the right. No free fluid in the pelvis.     The stomach is well-distended. There is asymmetrical heterogeneous soft  tissue density/thickening along the lesser curvature side of the gastric  antrum with heterogeneous enhancement. A small part of this mass appears  to be projecting into the lumen of the gastric antrum. The remaining  stomach is full of fluid and gas. Duodenum is normal. The small bowel is  nondistended. No finding to suggest appendicitis. Large volume stool is  seen in the colon. No finding to suggest obstruction. There is  diverticulosis of the distal colon. No evidence of diverticulitis.     Atheromatous changes of the abdominal aorta and iliac arteries. There  are large calcific plaques at the origin of both renal arteries with  moderate, 50 to 69%, stenosis of both renal arteries.     There is no evidence of abdominal or pelvic lymphadenopathy.     Images reviewed in bone window show chronic degenerative changes of the  lumbar spine with a mild levoscoliosis. Multilevel disc degeneration is  seen. No acute bony abnormality.       Impression:      1. Multiple renal nodules bilaterally. The largest nodule in the right  kidney represent a cyst. The remaining nodules are too small to be  further characterized. Another follow-up CT scan of the abdomen with  renal mass protocol may be obtained to ensure stability of the nodules.  2. Nonobstructing renal calculi bilaterally.  3. Marked thickening and enhancement of the poorly distended urinary  bladder. This may partly be due to incomplete distention. There is a  probability of chronic cystitis.  4. A poorly marginated masslike density along the lesser curvature side  of the gastric antrum which partly protrudes into the lumen of the  gastric antrum. This may represent a neoplastic process. Further  evaluation  with gastroscopy may be obtained.  5. Large volume stool in the colon. No obstruction. Diverticulosis of  the colon. No evidence for diverticulitis.   6. 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                 This report was signed and finalized on 10/5/2023 9:27 AM CDT by Dr. Mohamud Kovacs MD.       XR Chest 1 View [[id number]] Collected: 10/04/23 0631     Updated: 10/04/23 0635    Narrative:      EXAMINATION: XR CHEST 1 VW-     10/4/2023 4:46 AM CDT     HISTORY: hemoptysis     A frontal lordotic view of the chest is compared with the previous study  dated 10/4/2023.     The lungs are moderately well-expanded.     There is no evidence of recent infiltrate, pleural effusion, pulmonary  congestion or pneumothorax.     The heart size is not optimally visualized due to the AP projection.  Atheromatous changes of the thoracic aorta are noted.     No acute bony abnormality. There is deformity of the right proximal  humerus which may be due to previous trauma.     Postsurgical changes of the left upper abdomen are noted.       Impression:      1. No active cardiopulmonary disease.     This report was signed and finalized on 10/4/2023 6:32 AM CDT by Dr. Mohamud Kovacs MD.       XR Chest 2 View [[id number]] Collected: 10/04/23 0628     Updated: 10/04/23 0632    Narrative:      EXAMINATION: XR CHEST 2 VW-     10/4/2023 2:35 AM CDT     HISTORY: hemoptysis     The frontal and lateral views of the chest are obtained. There is no  previous study for comparison.     The lungs are poorly expanded.     Linear interstitial changes in the lower lung bilaterally probably  present atelectasis.     There is no pleural effusion, pulmonary congestion or pneumothorax.     The heart size is moderately prominent. Atheromatous changes of the  tortuous thoracic aorta are noted.     There is no acute bony abnormality.       Impression:      1. No active cardiopulmonary disease.     This report was signed and finalized on  10/4/2023 6:29 AM CDT by Dr. Mohamud Kovacs MD.       CT Angiogram Chest [[id number]] Collected: 10/04/23 0555     Updated: 10/04/23 0612    Narrative:      EXAMINATION: CT ANGIOGRAM CHEST-      10/4/2023 5:19 AM CDT     HISTORY: hemoptysis; R04.2-Hemoptysis     In order to have a CT radiation dose as low as reasonably achievable  Automated Exposure Control was utilized for adjustment of the mA and/or  KV according to patient size.     DLP in mGycm= 211     The CT angiography of the chest is performed after intravenous contrast  enhancement.     Images are acquired in axial plane and subsequent reconstruction in  coronal and sagittal planes.     There is no previous similar study for comparison.     Correlation made with chest radiograph obtained earlier today.     There is optimal opacification of the pulmonary arteries and branches  bilaterally. No filling defects in the opacified pulmonary arterial bed.     RV/LV ratio is 41/41 which may suggest mild right heart strain. Etiology  is not certain.     Moderate dilatation of the right and left pulmonary arteries are seen  which may suggest pulmonary arterial hypertension.     Atheromatous changes of the thoracic aorta are seen. No aneurysmal  dilatation or dissection.     Severe atheromatous changes of coronary arteries are noted.     There are a few borderline prominent mediastinal and right hilar lymph  nodes. A level 4R lymph node measures 1.1 cm in short axis. A right  hilar lymph node measures 1.6 cm in short axis.     There is moderate cardiomegaly with enlarged left atrium and left  ventricle.     There is normal origin course and caliber of the brachiocephalic, left  common carotid and left subclavian artery. There is significant  medialization of the common carotid arteries.     Thyroid gland is not visualized and not evaluated.     No axillary lymphadenopathy.     The lungs are moderately well expanded.     There is a groundglass nodule opacity located  posteriorly in the right  upper lobe adjacent and anterior to the proximal end of the right major  fissure which measures approximately 1.4 cm in diameter. There is  scattered tree-in-bud infiltrate more pronounced in the right upper lung  suggesting distal airway disease/respiratory bronchiolitis. No evidence  of air trapping. No areas of mosaic attenuation.     Central airways patent.     Limited visualized abdomen appears unremarkable except for significantly  distended gallbladder without stones and a partially exophytic low  density mass in the right kidney. There are small nonobstructing renal  calculi bilaterally larger one in the lower pole of the right kidney  measuring 6 mm. There is evidence of mid to lower abdominal central  ventral fat-containing hernia which is incompletely visualized and  evaluated.     Images reviewed in bone windows show chronic degenerative changes of the  thoracolumbar spine with moderate dextroscoliosis of the thoracic spine.       Impression:      1. No evidence of pulmonary embolism. No aortic aneurysm or dissection.  2. Pulmonary arterial hypertension.  3. The RV/LV ratio suggesting a mild right heart strain. Etiology not  certain.  4. A 14 mm groundglass opacity/nodule in the right upper lobe may  represent an evolving inflammatory or neoplastic process. A follow-up  examination in 3 months is recommended to assure stability/resolution.  5. Borderline mediastinal and right hilar lymphadenopathy.  6. Nonacute abdominal findings as detailed above.     This report was signed and finalized on 10/4/2023 6:09 AM CDT by Dr. Mohamud Kovacs MD.             LAB RESULTS:      Lab 10/06/23  1114 10/06/23  0353 10/05/23  0425 10/04/23  1431 10/04/23  0509 10/04/23  0243   WBC  --  7.57 9.68 8.70  --  8.51   HEMOGLOBIN  --  10.6* 10.4* 11.3* 11.7* 12.0   HEMATOCRIT  --  33.1* 32.0* 35.0 35.7 37.1   PLATELETS  --  204 192 213  --  215   NEUTROS ABS  --  3.40 5.99 7.40*  --  4.49    IMMATURE GRANS (ABS)  --  0.02 0.03 0.03  --  0.02   LYMPHS ABS  --  3.28* 2.52 1.18  --  2.88   MONOS ABS  --  0.77 1.11* 0.09*  --  0.91*   EOS ABS  --  0.08 0.02 0.00  --  0.18   MCV  --  100.9* 100.0* 100.0*  --  99.7*   CRP <0.30  --   --   --   --   --          Lab 10/07/23  0446 10/06/23  0353 10/05/23  0425 10/04/23  0243   SODIUM 141 142 143 139   POTASSIUM 4.0 3.8 3.9 4.3   CHLORIDE 109* 108* 110* 105   CO2 26.0 27.0 27.0 24.0   ANION GAP 6.0 7.0 6.0 10.0   BUN 11 11 12 17   CREATININE 0.61 0.61 0.58 0.83   EGFR 92.2 92.2 93.3 72.7   GLUCOSE 91 85 111* 86   CALCIUM 8.3* 8.2* 8.5* 8.8             Lab 10/04/23  0921 10/04/23  0243   PROBNP  --  639.7   HSTROP T 7 11*             Lab 10/04/23  0509   ABO TYPING O   RH TYPING Negative   ANTIBODY SCREEN Negative         Brief Urine Lab Results  (Last result in the past 365 days)        Color   Clarity   Blood   Leuk Est   Nitrite   Protein   CREAT   Urine HCG        07/27/23 1432 Yellow   Clear   Negative   Moderate (2+)   Negative   Negative                 Microbiology Results (last 10 days)       Procedure Component Value - Date/Time    KOH Prep - Wash, Bronchus [[id number]]  (Abnormal) Collected: 10/06/23 1006    Lab Status: Final result Specimen: Wash from Bronchus Updated: 10/06/23 1547     KOH Prep Yeast seen    Respiratory Culture - Wash, Bronchus [[id number]] Collected: 10/06/23 1006    Lab Status: Preliminary result Specimen: Wash from Bronchus Updated: 10/07/23 0943     Respiratory Culture Heavy growth (4+) Normal Respiratory Keily     Gram Stain Rare (1+) Gram positive cocci      Few (2+) WBCs seen      Rare (1+) Epithelial cells seen    COVID PRE-OP / PRE-PROCEDURE SCREENING ORDER (NO ISOLATION) - Swab, Nasal Cavity [[id number]]  (Normal) Collected: 10/04/23 0244    Lab Status: Final result Specimen: Swab from Nasal Cavity Updated: 10/04/23 0320    Narrative:      The following orders were created for panel order COVID PRE-OP / PRE-PROCEDURE  SCREENING ORDER (NO ISOLATION) - Swab, Nasal Cavity.  Procedure                               Abnormality         Status                     ---------                               -----------         ------                     COVID-19 and FLU A/B PCR...[[id number]]  Normal              Final result                 Please view results for these tests on the individual orders.    COVID-19 and FLU A/B PCR - Swab, Nasopharynx [[id number]]  (Normal) Collected: 10/04/23 0244    Lab Status: Final result Specimen: Swab from Nasopharynx Updated: 10/04/23 0320     COVID19 Not Detected     Influenza A PCR Not Detected     Influenza B PCR Not Detected    Narrative:      Fact sheet for providers: https://www.fda.gov/media/869852/download    Fact sheet for patients: https://www.fda.gov/media/700512/download    Test performed by PCR.            Hospital Course: \  78 year old female with PMH of cerebellar stroke 7/20/2023, Afib noted on Zio patch 9/7/2023 placed on Eliquis, CAD, MVR, that presents to the ER with complain of coughing up blood. She had a wet cough yesterday that did not bother her much and since about midnight noted bright blood with cough and occasional clots. Denies chest pain, fever, never smoked. CTA chest showed no PE, does mention right heart strain, and 14 mm groundglass opacity/nodule in the right upper lobe may represent an evolving inflammatory or neoplastic process.   10/4  Admitted for hemoptysis history of  A-fib on Eliquis, PTA noticed no PE mild right RV strain pulmonary hypertension left upper lobe nodule hemoglobin stable pulmonary consult appreciated, remains on room airDrKelly Driscoll discussed the possibility of bronchoscopy on Friday. We will get a repeat echo, BNP and troponin today.   10/5  Feeling better appreciate pulmonary, BNP is unremarkable  Echo of the heart is noticed plan is for bronchoscopy tomorrow no more active hemoptysis for now  10/6  For bronchoscopy today appreciate pulmonary  "hopefully DC home tomorrow, FOB source of bleeding posterior RUL continue abx, check IFRAH, ANCa, CRP, F/u O/P  Renal CT mass protocol , EGD for gastric mass   CT abdomen pelvis: Multiple renal nodules bilaterally. The largest nodule in the right  kidney represent a cyst. The remaining nodules are too small to be  further characterized. Another follow-up CT scan of the abdomen with  renal mass protocol may be obtained to ensure stability of the nodules.  2. Nonobstructing renal calculi bilaterally.  3. Marked thickening and enhancement of the poorly distended urinary  bladder. This may partly be due to incomplete distention. There is a  probability of chronic cystitis.  4. A poorly marginated masslike density along the lesser curvature side  of the gastric antrum which partly protrudes into the lumen of the  gastric antrum. This may represent a neoplastic process. Further  evaluation with gastroscopy may be obtained.  5. Large volume stool in the colon. No obstruction. Diverticulosis of  the colon. No evidence for diverticulitis.   6. 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  10/7  She underwent bronchoscopy yesterday.  She has not had any further hemoptysis overnight.  She anticipates going home today.  She remains on room air.  No overnight events reported.  She will have outpatient follow-up with pulmonology and a CT scan in 4 to 5 weeks.  Dr. Driscoll told her it is okay to restart Eliquis.  Will DC home follow-up with PCP in 1 week patient needs to have EGD and CT of the abdomen pelvis with IV contrast kidney mass protocol as outpatient    Physical Exam on Discharge:  /54 (BP Location: Left arm, Patient Position: Lying)   Pulse 73   Temp 98.2 °F (36.8 °C) (Oral)   Resp 16   Ht 147.3 cm (58\")   Wt 65 kg (143 lb 3.2 oz)   SpO2 95%   BMI 29.93 kg/m²   Physical Exam  Constitutional:       Appearance: Normal appearance.   HENT:      Head: Normocephalic and atraumatic.      Nose: Nose normal. "   Eyes:      Extraocular Movements: Extraocular movements intact.      Pupils: Pupils are equal, round, and reactive to light.   Cardiovascular:      Rate and Rhythm: Normal rate. Rhythm irregular.   Pulmonary:      Effort: Pulmonary effort is normal. Respiratory distress present.   Abdominal:      General: Abdomen is flat.      Palpations: Abdomen is soft.   Musculoskeletal:         General: Normal range of motion.      Cervical back: Normal range of motion.   Skin:     General: Skin is warm.      Capillary Refill: Capillary refill takes less than 2 seconds.   Neurological:      General: No focal deficit present.      Mental Status: She is alert.           Condition on Discharge: Stable    Discharge Disposition:  Home or Self Care    Discharge Medications:     Discharge Medications        New Medications        Instructions Start Date   azithromycin 1 g powder  Commonly known as: Zithromax   1 g, Oral, Once             Continue These Medications        Instructions Start Date   amLODIPine 5 MG tablet  Commonly known as: NORVASC   5 mg, Oral, Daily      apixaban 5 MG tablet tablet  Commonly known as: ELIQUIS   5 mg, Oral, 2 Times Daily      buPROPion 75 MG tablet  Commonly known as: WELLBUTRIN   75 mg, Oral, 2 Times Daily      busPIRone 15 MG tablet  Commonly known as: BUSPAR   15 mg, Oral, 4 Times Daily      escitalopram 20 MG tablet  Commonly known as: LEXAPRO   20 mg, Oral, Daily      HYDROcodone-acetaminophen  MG per tablet  Commonly known as: NORCO   1 tablet, Oral, Every 4 Hours PRN      levothyroxine 112 MCG tablet  Commonly known as: SYNTHROID, LEVOTHROID   112 mcg, Oral, Daily      metoprolol succinate XL 50 MG 24 hr tablet  Commonly known as: TOPROL-XL   50 mg, Oral, Daily      naloxone 4 MG/0.1ML nasal spray  Commonly known as: NARCAN   1 spray, Nasal, As Needed, CALL 911. SPR CONTENTS OF ONE SPRAYER (0.1ML) INTO ONE NOSTRIL. REPEAT IN 2-3 MIN IF SYMPTOMS OF OPIOID EMERGENCY PERSIST, ALTERNATE  NOSTRILS      ondansetron ODT 4 MG disintegrating tablet  Commonly known as: ZOFRAN-ODT   4 mg, Translingual, Every 8 Hours PRN      rosuvastatin 10 MG tablet  Commonly known as: Crestor   10 mg, Oral, Daily      traZODone 100 MG tablet  Commonly known as: DESYREL   100 mg, Oral, Nightly                   Discharge Diet: AHA      Activity at Discharge:     Follow-up Appointments:   Future Appointments   Date Time Provider Department Center   10/17/2023 10:15 AM Meme Tan APRN [facility]   11/6/2023  2:00 PM Rachel Zhu MD [facility]   11/30/2023  2:30 PM Obie Fox MD [facility]       Test Results Pending at Discharge: None    Electronically signed by Joseline Ibarra MD, 10/07/23, 11:22 CDT.    Time: 45 minutes.

## 2023-10-07 NOTE — PLAN OF CARE
Problem: Fall Injury Risk  Goal: Absence of Fall and Fall-Related Injury  Outcome: Ongoing, Progressing  Intervention: Promote Injury-Free Environment  Recent Flowsheet Documentation  Taken 10/6/2023 2000 by Dariana Miguel RN  Safety Promotion/Fall Prevention: safety round/check completed     Problem: Hypertension Comorbidity  Goal: Blood Pressure in Desired Range  Outcome: Ongoing, Progressing     Problem: Adult Inpatient Plan of Care  Goal: Plan of Care Review  Outcome: Ongoing, Progressing  Goal: Patient-Specific Goal (Individualized)  Outcome: Ongoing, Progressing  Goal: Absence of Hospital-Acquired Illness or Injury  Outcome: Ongoing, Progressing  Intervention: Identify and Manage Fall Risk  Recent Flowsheet Documentation  Taken 10/6/2023 2000 by Dariana Miguel, RN  Safety Promotion/Fall Prevention: safety round/check completed  Goal: Optimal Comfort and Wellbeing  Outcome: Ongoing, Progressing  Goal: Readiness for Transition of Care  Outcome: Ongoing, Progressing   Goal Outcome Evaluation:

## 2023-10-08 NOTE — OUTREACH NOTE
Prep Survey      Flowsheet Row Responses   [facility] facility patient discharged from? [facility]   Is LACE score < 7 ? No   Eligibility Readm Mgmt   Discharge diagnosis Hemoptysis       Acute bronchitis       Lung nodule       Kidney mass       Paroxysmal SVT (supraventricular tachycardia)       Cerebellar stroke       Primary hypertension       Paroxysmal atrial fibrillation   Does the patient have one of the following disease processes/diagnoses(primary or secondary)? Stroke   Does the patient have Home health ordered? No   Is there a DME ordered? No   Prep survey completed? Yes            Shamika SCHULTZ - Registered Nurse

## 2023-10-09 LAB
C-ANCA TITR SER IF: NORMAL TITER
CENTROMERE B AB SER-ACNC: <0.2 AI (ref 0–0.9)
CHROMATIN AB SERPL-ACNC: <0.2 AI (ref 0–0.9)
DSDNA AB SER-ACNC: <1 IU/ML (ref 0–9)
ENA JO1 AB SER-ACNC: <0.2 AI (ref 0–0.9)
ENA RNP AB SER-ACNC: 0.2 AI (ref 0–0.9)
ENA SCL70 AB SER-ACNC: <0.2 AI (ref 0–0.9)
ENA SM AB SER-ACNC: <0.2 AI (ref 0–0.9)
ENA SS-A AB SER-ACNC: <0.2 AI (ref 0–0.9)
ENA SS-B AB SER-ACNC: <0.2 AI (ref 0–0.9)
FUNGUS WND CULT: ABNORMAL
Lab: NORMAL
MYELOPEROXIDASE AB SER IA-ACNC: <0.2 UNITS (ref 0–0.9)
P-ANCA ATYPICAL TITR SER IF: NORMAL TITER
P-ANCA TITR SER IF: NORMAL TITER
PROTEINASE3 AB SER IA-ACNC: <0.2 UNITS (ref 0–0.9)

## 2023-10-10 LAB
BACTERIA SPEC RESP CULT: ABNORMAL
BACTERIA SPEC RESP CULT: ABNORMAL
CYTO UR: NORMAL
GRAM STN SPEC: ABNORMAL
LAB AP CASE REPORT: NORMAL
Lab: NORMAL
PATH REPORT.FINAL DX SPEC: NORMAL
PATH REPORT.GROSS SPEC: NORMAL

## 2023-10-10 NOTE — PROGRESS NOTES
Let pt know this looked ok.  Blood tests for other conditions all looked ok.  The culture is growing staph, uncertain of the significance of that as long as she is doing ok.  Nothing else to do for now as long as she continues improving.  Keep follow up as planned

## 2023-10-11 ENCOUNTER — READMISSION MANAGEMENT (OUTPATIENT)
Dept: CALL CENTER | Facility: HOSPITAL | Age: 78
End: 2023-10-11
Payer: MEDICARE

## 2023-10-11 NOTE — OUTREACH NOTE
Stroke Week 1 Survey      Flowsheet Row Responses   [facility] patient discharged from? [facility]   Does the patient have one of the following disease processes/diagnoses(primary or secondary)? Stroke   Week 1 attempt successful? Yes   Call start time 1233   Call end time 1252   Discharge diagnosis Hemoptysis       Acute bronchitis       Lung nodule       Kidney mass       Paroxysmal SVT (supraventricular tachycardia)       Cerebellar stroke       Primary hypertension       Paroxysmal atrial fibrillation   Person spoke with today (if not patient) and relationship patient   Meds reviewed with patient/caregiver? Yes   Is the patient having any side effects they believe may be caused by any medication additions or changes? No   Does the patient have all medications ordered at discharge? Yes   Is the patient taking all medications as directed (includes completed medication regime)? Yes   Comments regarding appointments Neurology f/u appt on 11/6/23. Cardiology f/u appt on 11/30/23. Pulmonary f/u appt on 10/26/23   Does the patient have a primary care provider?  Yes   Does the patient have an appointment with their PCP within 7 days of discharge? Yes   Comments regarding PCP PCP f/u appt on 10/13/23.   Has the patient kept scheduled appointments due by today? N/A   Has home health visited the patient within 72 hours of discharge? N/A   Psychosocial issues? No   Does the patient require any assistance with activities of daily living such as eating, bathing, dressing, walking, etc.? No   Does the patient have any residual symptoms from stroke/TIA? No   Does the patient understand the diet ordered at discharge? Yes   Comments pt states she is getting stronger every day. she still feels a little weak. she states she has f/u appts to evaluate for cysts on her kidneys, spot in her lung and stomach. pt is checking her b/p at home. no residual effects reported.   Did the patient receive a copy of their discharge  instructions? Yes   Nursing interventions Reviewed instructions with patient   What is the patient's perception of their health status since discharge? Improving   Nursing interventions Nurse provided patient education   Is the patient/caregiver able to teach back the risk factors for a stroke? High blood pressure-goal below 120/80, High Cholesterol, Carotid or other artery disease, History of TIAs   Is the patient/caregiver able to teach back signs and symptoms related to disease process for when to call PCP? Yes   Is the patient/caregiver able to teach back signs and symptoms related to disease process for when to call 911? Yes   If the patient is a current smoker, are they able to teach back resources for cessation? Not a smoker   Is the patient/caregiver able to teach back the hierarchy of who to call/visit for symptoms/problems? PCP, Specialist, Home health nurse, Urgent Care, ED, 911 Yes   Is the patient able to teach back FAST for Stroke? B alance: Watch for sudden loss of balance, E yes: Check for vision loss, F ace: Look for an uneven smile, A rm: Check if one arm is weak, S peech: Listen for slurred speech, T quinn: Call 9-1-1 right away   Week 1 call completed? Yes   Revoked No further contact(revokes)-requires comment   Is the patient interested in additional calls from an ambulatory ? No   Would this patient benefit from a Referral to [facility] Social Work? No   Graduated/Revoked comments pt very appreciative of the call. doing well, has all f/u appts set. has returned to baseline. no concerns or needs.   Call end time 1252            Lissette HESS - Registered Nurse

## 2023-10-12 ENCOUNTER — OFFICE VISIT (OUTPATIENT)
Dept: FAMILY MEDICINE CLINIC | Facility: CLINIC | Age: 78
End: 2023-10-12
Payer: MEDICARE

## 2023-10-12 VITALS
TEMPERATURE: 97.3 F | RESPIRATION RATE: 20 BRPM | SYSTOLIC BLOOD PRESSURE: 127 MMHG | DIASTOLIC BLOOD PRESSURE: 75 MMHG | BODY MASS INDEX: 30.02 KG/M2 | WEIGHT: 143 LBS | HEART RATE: 64 BPM | OXYGEN SATURATION: 97 % | HEIGHT: 58 IN

## 2023-10-12 DIAGNOSIS — Z09 HOSPITAL DISCHARGE FOLLOW-UP: Primary | ICD-10-CM

## 2023-10-12 DIAGNOSIS — F41.9 ANXIETY: ICD-10-CM

## 2023-10-12 DIAGNOSIS — E87.8 ELECTROLYTE IMBALANCE: ICD-10-CM

## 2023-10-12 DIAGNOSIS — R73.01 ELEVATED FASTING GLUCOSE: ICD-10-CM

## 2023-10-12 DIAGNOSIS — K31.89 GASTRIC WALL THICKENING: ICD-10-CM

## 2023-10-12 DIAGNOSIS — N28.9 RENAL LESION: ICD-10-CM

## 2023-10-12 DIAGNOSIS — E78.49 OTHER HYPERLIPIDEMIA: ICD-10-CM

## 2023-10-12 DIAGNOSIS — R93.5 ABNORMAL CT OF THE ABDOMEN: ICD-10-CM

## 2023-10-12 DIAGNOSIS — R04.2 HEMOPTYSIS: ICD-10-CM

## 2023-10-12 RX ORDER — ESCITALOPRAM OXALATE 20 MG/1
20 TABLET ORAL DAILY
Qty: 90 TABLET | Refills: 3 | Status: SHIPPED | OUTPATIENT
Start: 2023-10-12

## 2023-10-12 RX ORDER — BUSPIRONE HYDROCHLORIDE 15 MG/1
15 TABLET ORAL 4 TIMES DAILY
Qty: 360 TABLET | Refills: 3 | Status: SHIPPED | OUTPATIENT
Start: 2023-10-12

## 2023-10-12 RX ORDER — BUPROPION HYDROCHLORIDE 75 MG/1
75 TABLET ORAL 2 TIMES DAILY
Qty: 180 TABLET | Refills: 1 | Status: SHIPPED | OUTPATIENT
Start: 2023-10-12

## 2023-10-12 RX ORDER — ROSUVASTATIN CALCIUM 10 MG/1
10 TABLET, COATED ORAL DAILY
Qty: 90 TABLET | Refills: 1 | Status: SHIPPED | OUTPATIENT
Start: 2023-10-12

## 2023-10-12 NOTE — PROGRESS NOTES
Transitional Care Follow Up Visit  Subjective     Nichole Rinaldi is a 78 y.o. female who presents for a transitional care management visit.    Within 48 business hours after discharge our office contacted her via telephone to coordinate her care and needs.      I reviewed and discussed the details of that call along with the discharge summary, hospital problems, inpatient lab results, inpatient diagnostic studies, and consultation reports with Nichole.     Current outpatient and discharge medications have been reconciled for the patient.  Reviewed by: ABHAY Joiner           No data to display              Risk for Readmission (LACE) Score: 5 (10/7/2023  5:01 AM)      History of Present Illness  Pt is here today for a hospital f/u.  Pt reports that today she has been feeling fatigued.  Pt is also needing an order for an endoscopy.  Pt states she uses [facility] for her medications and [facility] in [place] for temporary medications.     Course During Hospital Stay: 3 days     The following portions of the patient's history were reviewed and updated as appropriate: allergies, current medications, past family history, past medical history, past social history, past surgical history, and problem list.    Review of Systems    SANDER:    PHQ-2 Total Score:    PHQ-9 Total Score:      Objective   Physical Exam  Vitals and nursing note reviewed.   Constitutional:       General: She is not in acute distress.     Appearance: Normal appearance. She is overweight. She is not ill-appearing.   HENT:      Head: Normocephalic and atraumatic.      Right Ear: External ear normal.      Left Ear: External ear normal.      Nose: Nose normal.   Eyes:      Conjunctiva/sclera: Conjunctivae normal.   Cardiovascular:      Rate and Rhythm: Normal rate and regular rhythm.      Pulses: Normal pulses.      Heart sounds: Normal heart sounds.   Pulmonary:      Effort: Pulmonary effort is normal.      Breath sounds: Normal breath  sounds.   Skin:     General: Skin is warm and dry.      Capillary Refill: Capillary refill takes less than 2 seconds.   Neurological:      Mental Status: She is alert and oriented to person, place, and time. Mental status is at baseline.      GCS: GCS eye subscore is 4. GCS verbal subscore is 5. GCS motor subscore is 6.   Psychiatric:         Mood and Affect: Mood normal.         Behavior: Behavior normal.         Thought Content: Thought content normal.         Judgment: Judgment normal.         Assessment & Plan   Problem List Items Addressed This Visit          Cardiac and Vasculature    Other hyperlipidemia    Relevant Medications    rosuvastatin (Crestor) 10 MG tablet       Mental Health    Anxiety    Relevant Medications    buPROPion (WELLBUTRIN) 75 MG tablet    busPIRone (BUSPAR) 15 MG tablet    escitalopram (LEXAPRO) 20 MG tablet       Pulmonary and Pneumonias    Hemoptysis     Other Visit Diagnoses       Hospital discharge follow-up    -  Primary    Elevated fasting glucose        Relevant Orders    CBC Auto Differential    Electrolyte imbalance        Relevant Orders    Comprehensive Metabolic Panel    Renal lesion        Relevant Orders    CT Abdomen Pelvis With & Without Contrast    Gastric wall thickening        Relevant Orders    Ambulatory Referral to Gastroenterology    Abnormal CT of the abdomen        Relevant Orders    Ambulatory Referral to Gastroenterology           Data Reviewed:  Lipid Panel (07/27/2023 14:32)  TSH (07/27/2023 14:32)  Basic Metabolic Panel (10/07/2023 04:46)   CBC & Differential (10/06/2023 03:53)   ED to Hosp-Admission (Discharged) with Joseline Ibarra MD; Carson Galindo DO (10/04/2023)   Office Visit with Meme Tan APRN (07/27/2023)   CT Abdomen Pelvis With Contrast (10/05/2023 08:57)  CT Angiogram Chest (10/04/2023 05:22)    Patient is seen today for a hospital follow-up.  She was admitted at [facility] from  over 4-10/7.  She presented for hemoptysis on  chronic anticoagulation and was admitted.  She had a bronchoscopy performed by Dr. Driscoll, pulmonologist.  She was discharged from the hospital and was told that she could restart her Eliquis.  During her hospitalization she had a CT abdomen pelvis as well as a CTA chest performed on review it appears her CT abdomen pelvis showed a renal mass likely characterized as a cyst with other small lesions that were not able to be characterized.  They recommended a follow-up CT abdomen pelvis which we will schedule for 1 month.  Of note, she does also have an umbilical hernia, ventral hernia, and gastric wall thickening.  Regarding the gastric wall thickening I have recommended an EGD, we will refer her to GI for this.  Her CTA revealed cardiomegaly and a lung nodule, she is to follow-up with pulmonology for this for a repeat CT scan.  She has a follow-up scheduled with Dr. Driscoll for 10/26, the order for her repeat CT has already been placed.  We will get repeat labs today as well.    Patient states she is also needing refills on her Lexapro, BuSpar, Wellbutrin, and Crestor.  Her last lipid panel was normal, will repeat in 3 months.  Patient denies HI/SI.    Plan:  1.  Continue chronic medications as above  2.  Repeat CT abdomen pelvis with and without contrast in 1 month  3.  GI referral for EGD  4.  Follow-up with pulmonology, repeat CT chest  5.  Follow-up with cardiology and neurology as scheduled  6.  Labs pending  7.  Follow-up in 3 months

## 2023-10-13 LAB
MYCOBACTERIUM SPEC CULT: NORMAL
NIGHT BLUE STAIN TISS: NORMAL
NIGHT BLUE STAIN TISS: NORMAL

## 2023-10-16 ENCOUNTER — HOSPITAL ENCOUNTER (EMERGENCY)
Facility: HOSPITAL | Age: 78
Discharge: HOME | End: 2023-10-16
Attending: EMERGENCY MEDICINE | Admitting: EMERGENCY MEDICINE
Payer: MEDICARE

## 2023-10-16 ENCOUNTER — APPOINTMENT (OUTPATIENT)
Dept: CT IMAGING | Facility: HOSPITAL | Age: 78
End: 2023-10-16
Payer: MEDICARE

## 2023-10-16 VITALS
SYSTOLIC BLOOD PRESSURE: 138 MMHG | RESPIRATION RATE: 15 BRPM | BODY MASS INDEX: 29.81 KG/M2 | DIASTOLIC BLOOD PRESSURE: 68 MMHG | HEART RATE: 68 BPM | WEIGHT: 142 LBS | HEIGHT: 58 IN | OXYGEN SATURATION: 98 % | TEMPERATURE: 98 F

## 2023-10-16 DIAGNOSIS — G45.9 TIA (TRANSIENT ISCHEMIC ATTACK): Primary | ICD-10-CM

## 2023-10-16 LAB
ALBUMIN SERPL-MCNC: 3.8 G/DL (ref 3.5–5.2)
ALBUMIN/GLOB SERPL: 1.6 G/DL
ALP SERPL-CCNC: 97 U/L (ref 39–117)
ALT SERPL W P-5'-P-CCNC: 9 U/L (ref 1–33)
ANION GAP SERPL CALCULATED.3IONS-SCNC: 8 MMOL/L (ref 5–15)
AST SERPL-CCNC: 18 U/L (ref 1–32)
BASOPHILS # BLD AUTO: 0.03 10*3/MM3 (ref 0–0.2)
BASOPHILS NFR BLD AUTO: 0.4 % (ref 0–1.5)
BILIRUB SERPL-MCNC: 0.2 MG/DL (ref 0–1.2)
BUN SERPL-MCNC: 9 MG/DL (ref 8–23)
BUN/CREAT SERPL: 13 (ref 7–25)
CALCIUM SPEC-SCNC: 8.8 MG/DL (ref 8.6–10.5)
CHLORIDE SERPL-SCNC: 104 MMOL/L (ref 98–107)
CO2 SERPL-SCNC: 27 MMOL/L (ref 22–29)
CREAT SERPL-MCNC: 0.69 MG/DL (ref 0.57–1)
DEPRECATED RDW RBC AUTO: 46.6 FL (ref 37–54)
EGFRCR SERPLBLD CKD-EPI 2021: 89.5 ML/MIN/1.73
EOSINOPHIL # BLD AUTO: 0.13 10*3/MM3 (ref 0–0.4)
EOSINOPHIL NFR BLD AUTO: 1.6 % (ref 0.3–6.2)
ERYTHROCYTE [DISTWIDTH] IN BLOOD BY AUTOMATED COUNT: 12.9 % (ref 12.3–15.4)
GLOBULIN UR ELPH-MCNC: 2.4 GM/DL
GLUCOSE SERPL-MCNC: 104 MG/DL (ref 65–99)
HCT VFR BLD AUTO: 37.6 % (ref 34–46.6)
HGB BLD-MCNC: 12.3 G/DL (ref 12–15.9)
IMM GRANULOCYTES # BLD AUTO: 0.02 10*3/MM3 (ref 0–0.05)
IMM GRANULOCYTES NFR BLD AUTO: 0.2 % (ref 0–0.5)
INR PPP: 1.04 (ref 0.91–1.09)
LYMPHOCYTES # BLD AUTO: 3.01 10*3/MM3 (ref 0.7–3.1)
LYMPHOCYTES NFR BLD AUTO: 36.4 % (ref 19.6–45.3)
MCH RBC QN AUTO: 32.4 PG (ref 26.6–33)
MCHC RBC AUTO-ENTMCNC: 32.7 G/DL (ref 31.5–35.7)
MCV RBC AUTO: 98.9 FL (ref 79–97)
MONOCYTES # BLD AUTO: 0.86 10*3/MM3 (ref 0.1–0.9)
MONOCYTES NFR BLD AUTO: 10.4 % (ref 5–12)
NEUTROPHILS NFR BLD AUTO: 4.23 10*3/MM3 (ref 1.7–7)
NEUTROPHILS NFR BLD AUTO: 51 % (ref 42.7–76)
NRBC BLD AUTO-RTO: 0 /100 WBC (ref 0–0.2)
PLATELET # BLD AUTO: 231 10*3/MM3 (ref 140–450)
PMV BLD AUTO: 9.9 FL (ref 6–12)
POTASSIUM SERPL-SCNC: 4.2 MMOL/L (ref 3.5–5.2)
PROT SERPL-MCNC: 6.2 G/DL (ref 6–8.5)
PROTHROMBIN TIME: 13.7 SECONDS (ref 11.8–14.8)
QT INTERVAL: 406 MS
QTC INTERVAL: 401 MS
RBC # BLD AUTO: 3.8 10*6/MM3 (ref 3.77–5.28)
SODIUM SERPL-SCNC: 139 MMOL/L (ref 136–145)
WBC NRBC COR # BLD: 8.28 10*3/MM3 (ref 3.4–10.8)

## 2023-10-16 PROCEDURE — 85025 COMPLETE CBC W/AUTO DIFF WBC: CPT | Performed by: EMERGENCY MEDICINE

## 2023-10-16 PROCEDURE — 93005 ELECTROCARDIOGRAM TRACING: CPT | Performed by: EMERGENCY MEDICINE

## 2023-10-16 PROCEDURE — 80053 COMPREHEN METABOLIC PANEL: CPT | Performed by: EMERGENCY MEDICINE

## 2023-10-16 PROCEDURE — 99284 EMERGENCY DEPT VISIT MOD MDM: CPT

## 2023-10-16 PROCEDURE — 85610 PROTHROMBIN TIME: CPT | Performed by: EMERGENCY MEDICINE

## 2023-10-16 PROCEDURE — 70450 CT HEAD/BRAIN W/O DYE: CPT

## 2023-10-16 RX ORDER — SODIUM CHLORIDE 0.9 % (FLUSH) 0.9 %
10 SYRINGE (ML) INJECTION AS NEEDED
Status: DISCONTINUED | OUTPATIENT
Start: 2023-10-16 | End: 2023-10-16 | Stop reason: HOSPADM

## 2023-10-16 NOTE — ED PROVIDER NOTES
Subjective   History of Present Illness  78-year-old female presents to the ED with complaint of brief, transient visual abnormalities.  She has a history of coronary disease, hypertension, hyperlipidemia, A-fib, stroke.  Around 11:30 PM 10/15/2023, patient developed some vision changes that she describes as flashes of rainbow colored light in the left visual field of both eyes.  States she saw black and yin white discolorations in a semicircular pattern along the left lateral visual field.  No visual field cut.  No slurred speech or facial asymmetry.  No confusion, does complain of mild headache which she attributes to her fibromyalgia.  Checked her blood pressure and it was 120/50s.  No chest pain or shortness of breath.  No palpitations.  Patient states symptoms lasted approximately 25 minutes.  Had recent cerebellar stroke July 2023, and concerned she may have another stroke so came to the ED for evaluation.  Upon arrival to the ED, she was asymptomatic.  NIH is 0.    History provided by:  Patient      Review of Systems   All other systems reviewed and are negative.      Past Medical History:   Diagnosis Date    Anxiety     Arthrosis     Atrial fibrillation     Vazquez's esophagus 2000    Coronary artery disease     Depression     Fibromyalgia     Graves disease 1970    Heart murmur     Heart valve disease     Hyperlipidemia     Hypertension     Stroke        Allergies   Allergen Reactions    Levaquin [Levofloxacin] Other (See Comments)     Spasms    Aripiprazole Unknown - High Severity    Codeine Dizziness     Other reaction(s): dizziness      Morphine Other (See Comments)       Past Surgical History:   Procedure Laterality Date    BRONCHOSCOPY Bilateral 10/6/2023    Procedure: BRONCHOSCOPY BIOPSY;  Surgeon: Jonah Driscoll MD;  Location: [facility] ENDOSCOPY;  Service: Pulmonary;  Laterality: Bilateral;  pre op: hemoptysis   post op:scant endobronchial blood,   PCP: Meme Tan APRN    CARDIAC  CATHETERIZATION      CARPAL TUNNEL RELEASE Bilateral 2010    ESSURE TUBAL LIGATION Bilateral 1977    EYE SURGERY      HERNIA REPAIR  1992    JOINT REPLACEMENT      REPLACEMENT TOTAL KNEE BILATERAL Bilateral 1998    TENDON REPAIR Right 2019    THYROIDECTOMY  1970       Family History   Problem Relation Age of Onset    No Known Problems Mother     No Known Problems Father        Social History     Socioeconomic History    Marital status:    Tobacco Use    Smoking status: Never    Smokeless tobacco: Never   Vaping Use    Vaping Use: Never used   Substance and Sexual Activity    Alcohol use: Never    Drug use: Yes     Types: Hydrocodone    Sexual activity: Yes           Objective   Physical Exam  Vitals and nursing note reviewed.   Constitutional:       Appearance: Normal appearance. She is normal weight.   HENT:      Head: Normocephalic and atraumatic.      Nose: Nose normal. No congestion or rhinorrhea.      Mouth/Throat:      Mouth: Mucous membranes are moist.      Pharynx: No oropharyngeal exudate or posterior oropharyngeal erythema.   Eyes:      Extraocular Movements: Extraocular movements intact.      Conjunctiva/sclera: Conjunctivae normal.      Pupils: Pupils are equal, round, and reactive to light.   Cardiovascular:      Rate and Rhythm: Normal rate and regular rhythm.      Heart sounds: Normal heart sounds. No murmur heard.  Pulmonary:      Effort: Pulmonary effort is normal.      Breath sounds: Normal breath sounds. No wheezing, rhonchi or rales.   Abdominal:      General: Abdomen is flat. Bowel sounds are normal.      Palpations: Abdomen is soft.   Musculoskeletal:         General: No swelling or tenderness. Normal range of motion.      Cervical back: Normal range of motion and neck supple.   Skin:     General: Skin is warm.      Capillary Refill: Capillary refill takes less than 2 seconds.   Neurological:      General: No focal deficit present.      Mental Status: She is alert and oriented to person,  place, and time. Mental status is at baseline.      Cranial Nerves: No cranial nerve deficit.      Sensory: No sensory deficit.      Motor: No weakness.         Procedures       Lab Results (last 24 hours)       Procedure Component Value Units Date/Time    CBC & Differential [[id number]]  (Abnormal) Collected: 10/16/23 0212    Specimen: Blood Updated: 10/16/23 0220    Narrative:      The following orders were created for panel order CBC & Differential.  Procedure                               Abnormality         Status                     ---------                               -----------         ------                     CBC Auto Differential[[id number]]        Abnormal            Final result                 Please view results for these tests on the individual orders.    Comprehensive Metabolic Panel [[id number]]  (Abnormal) Collected: 10/16/23 0212    Specimen: Blood Updated: 10/16/23 0238     Glucose 104 mg/dL      BUN 9 mg/dL      Creatinine 0.69 mg/dL      Sodium 139 mmol/L      Potassium 4.2 mmol/L      Chloride 104 mmol/L      CO2 27.0 mmol/L      Calcium 8.8 mg/dL      Total Protein 6.2 g/dL      Albumin 3.8 g/dL      ALT (SGPT) 9 U/L      AST (SGOT) 18 U/L      Alkaline Phosphatase 97 U/L      Total Bilirubin 0.2 mg/dL      Globulin 2.4 gm/dL      A/G Ratio 1.6 g/dL      BUN/Creatinine Ratio 13.0     Anion Gap 8.0 mmol/L      eGFR 89.5 mL/min/1.73     Narrative:      GFR Normal >60  Chronic Kidney Disease <60  Kidney Failure <15    The GFR formula is only valid for adults with stable renal function between ages 18 and 70.    Protime-INR [[id number]]  (Normal) Collected: 10/16/23 0212    Specimen: Blood Updated: 10/16/23 0234     Protime 13.7 Seconds      INR 1.04    CBC Auto Differential [[id number]]  (Abnormal) Collected: 10/16/23 0212    Specimen: Blood Updated: 10/16/23 0220     WBC 8.28 10*3/mm3      RBC 3.80 10*6/mm3      Hemoglobin 12.3 g/dL      Hematocrit 37.6 %      MCV 98.9 fL      MCH 32.4 pg       MCHC 32.7 g/dL      RDW 12.9 %      RDW-SD 46.6 fl      MPV 9.9 fL      Platelets 231 10*3/mm3      Neutrophil % 51.0 %      Lymphocyte % 36.4 %      Monocyte % 10.4 %      Eosinophil % 1.6 %      Basophil % 0.4 %      Immature Grans % 0.2 %      Neutrophils, Absolute 4.23 10*3/mm3      Lymphocytes, Absolute 3.01 10*3/mm3      Monocytes, Absolute 0.86 10*3/mm3      Eosinophils, Absolute 0.13 10*3/mm3      Basophils, Absolute 0.03 10*3/mm3      Immature Grans, Absolute 0.02 10*3/mm3      nRBC 0.0 /100 WBC          No Radiology Exams Resulted Within Past 24 Hours     ED Course  ED Course as of 10/16/23 0435   Mon Oct 16, 2023   0431 78-year-old female with transient binocular vision change which she describes as seeing flashes of light and unusually shaped colorful patterns in the left visual field of both eyes.  Did not have any loss of visual field.  Also had mild headache which she attributes to her fibromyalgia.  CT head negative for acute process.  Possible TIA versus complicated migraine.  Has known  recent history of stroke was admitted, had extensive inpatient work-up and risk factor modification and medication optimization.  She has been asymptomatic for the past 3 hours in the ED.  She recently had extensive stroke work-up, do not feel additional inpatient evaluation will be beneficial at this time.  Patient is okay with this plan, states she has an appoint with a primary doctor in a couple days and appointment with her neurologist coming up.  She has recurrent symptoms she should return to the ED.  Encouraged to take all of her medications as prescribed. [AW]      ED Course User Index  [AW] Gwyn Sheikh MD                                           Medical Decision Making  Amount and/or Complexity of Data Reviewed  Labs: ordered.  Radiology: ordered.    Risk  Prescription drug management.        Final diagnoses:   TIA (transient ischemic attack)       ED Disposition  ED Disposition       ED  Disposition   Discharge    Condition   Stable    Comment   --               Meme Tan, APRN  [address]  [address]  [facility] [address]  [phone]    Schedule an appointment as soon as possible for a visit in 2 days           Medication List      No changes were made to your prescriptions during this visit.            Gwyn Sheikh MD  10/16/23 9625

## 2023-10-17 ENCOUNTER — OFFICE VISIT (OUTPATIENT)
Dept: FAMILY MEDICINE CLINIC | Facility: CLINIC | Age: 78
End: 2023-10-17
Payer: MEDICARE

## 2023-10-17 VITALS
RESPIRATION RATE: 20 BRPM | WEIGHT: 141 LBS | SYSTOLIC BLOOD PRESSURE: 114 MMHG | HEART RATE: 67 BPM | DIASTOLIC BLOOD PRESSURE: 73 MMHG | HEIGHT: 58 IN | BODY MASS INDEX: 29.6 KG/M2

## 2023-10-17 DIAGNOSIS — I34.0 NON-RHEUMATIC MITRAL REGURGITATION: ICD-10-CM

## 2023-10-17 DIAGNOSIS — B37.2 SKIN YEAST INFECTION: ICD-10-CM

## 2023-10-17 DIAGNOSIS — E05.00 GRAVES DISEASE: ICD-10-CM

## 2023-10-17 DIAGNOSIS — F51.01 PRIMARY INSOMNIA: ICD-10-CM

## 2023-10-17 DIAGNOSIS — E55.9 VITAMIN D DEFICIENCY: ICD-10-CM

## 2023-10-17 DIAGNOSIS — N28.1 RENAL CYST: ICD-10-CM

## 2023-10-17 DIAGNOSIS — I10 PRIMARY HYPERTENSION: ICD-10-CM

## 2023-10-17 DIAGNOSIS — Z00.00 MEDICARE ANNUAL WELLNESS VISIT, SUBSEQUENT: Primary | ICD-10-CM

## 2023-10-17 DIAGNOSIS — K21.9 GASTROESOPHAGEAL REFLUX DISEASE WITHOUT ESOPHAGITIS: ICD-10-CM

## 2023-10-17 DIAGNOSIS — M79.7 FIBROMYALGIA: ICD-10-CM

## 2023-10-17 DIAGNOSIS — M19.90 OSTEOARTHRITIS, UNSPECIFIED OSTEOARTHRITIS TYPE, UNSPECIFIED SITE: ICD-10-CM

## 2023-10-17 DIAGNOSIS — I48.0 PAROXYSMAL ATRIAL FIBRILLATION: ICD-10-CM

## 2023-10-17 DIAGNOSIS — F41.9 ANXIETY: ICD-10-CM

## 2023-10-17 RX ORDER — NYSTATIN 100000 [USP'U]/G
POWDER TOPICAL 4 TIMES DAILY
Qty: 60 G | Refills: 5 | Status: SHIPPED | OUTPATIENT
Start: 2023-10-17

## 2023-10-17 RX ORDER — ESCITALOPRAM OXALATE 20 MG/1
20 TABLET ORAL DAILY
Qty: 30 TABLET | Refills: 0 | Status: SHIPPED | OUTPATIENT
Start: 2023-10-17

## 2023-10-17 RX ORDER — FLUCONAZOLE 150 MG/1
150 TABLET ORAL
Qty: 3 TABLET | Refills: 0 | Status: SHIPPED | OUTPATIENT
Start: 2023-10-17

## 2023-10-17 RX ORDER — AMLODIPINE BESYLATE 5 MG/1
5 TABLET ORAL DAILY
Qty: 90 TABLET | Refills: 1 | Status: SHIPPED | OUTPATIENT
Start: 2023-10-17

## 2023-10-17 RX ORDER — METOPROLOL SUCCINATE 50 MG/1
50 TABLET, EXTENDED RELEASE ORAL DAILY
Qty: 90 TABLET | Refills: 3 | Status: SHIPPED | OUTPATIENT
Start: 2023-10-17

## 2023-10-17 RX ORDER — TRAZODONE HYDROCHLORIDE 100 MG/1
100 TABLET ORAL NIGHTLY
Qty: 90 TABLET | Refills: 1 | Status: SHIPPED | OUTPATIENT
Start: 2023-10-17

## 2023-10-17 RX ORDER — LEVOTHYROXINE SODIUM 112 UG/1
112 TABLET ORAL DAILY
Qty: 90 TABLET | Refills: 1 | Status: SHIPPED | OUTPATIENT
Start: 2023-10-17

## 2023-10-17 NOTE — PROGRESS NOTES
The ABCs of the Annual Wellness Visit  Subsequent Medicare Wellness Visit    Chief Complaint   Patient presents with    sub medicare wellness        Subjective   History of Present Illness:  Nichole Rinaldi is a 78 y.o. female who presents for a Subsequent Medicare Wellness Visit.    HEALTH RISK ASSESSMENT    Recent Hospitalizations:  Recently treated at the following:  [facility]    Current Medical Providers:  Patient Care Team:  Meme Tan APRN as PCP - General (Nurse Practitioner)  Jonah Driscoll MD as Consulting Physician (Pulmonary Disease)    Smoking Status:  Social History     Tobacco Use   Smoking Status Never   Smokeless Tobacco Never       Alcohol Consumption:  Social History     Substance and Sexual Activity   Alcohol Use Never       Depression Screen:       10/17/2023    10:23 AM   PHQ-2/PHQ-9 Depression Screening   Little Interest or Pleasure in Doing Things 0-->not at all   Feeling Down, Depressed or Hopeless 0-->not at all   PHQ-9: Brief Depression Severity Measure Score 0       SANDER:      PHQ-2 Depression Screening  Little interest or pleasure in doing things? 0-->not at all   Feeling down, depressed, or hopeless? 0-->not at all   PHQ-2 Total Score 0      PHQ-9 Depression Screening  Little interest or pleasure in doing things? 0-->not at all   Feeling down, depressed, or hopeless? 0-->not at all   Trouble falling or staying asleep, or sleeping too much?     Feeling tired or having little energy?     Poor appetite or overeating?     Feeling bad about yourself - or that you are a failure or have let yourself or your family down?     Trouble concentrating on things, such as reading the newspaper or watching television?     Moving or speaking so slowly that other people could have noticed? Or the opposite - being so fidgety or restless that you have been moving around a lot more than usual?     Thoughts that you would be better off dead, or of hurting yourself in some way?      PHQ-9 Total Score 0   If you checked off any problems, how difficult have these problems made it for you to do your work, take care of things at home, or get along with other people?          Fall Risk Screen:  JOSETTE Fall Risk Assessment was completed, and patient is at MODERATE risk for falls. Assessment completed on:10/17/2023    Health Habits and Functional and Cognitive Screening:      10/17/2023    10:26 AM   Functional & Cognitive Status   Do you have difficulty preparing food and eating? No   Do you have difficulty bathing yourself, getting dressed or grooming yourself? No   Do you have difficulty using the toilet? No   Do you have difficulty moving around from place to place? No   Do you have trouble with steps or getting out of a bed or a chair? No   Current Diet Limited Junk Food   Dental Exam Not up to date   Eye Exam Not up to date   Exercise (times per week) 0 times per week   Do you need help using the phone?  No   Are you deaf or do you have serious difficulty hearing?  No   Do you need help to go to places out of walking distance? No   Do you need help shopping? Yes   Do you need help preparing meals?  No   Do you need help with housework?  No   Do you need help with laundry? No   Do you need help taking your medications? No   Do you need help managing money? No   Do you ever drive or ride in a car without wearing a seat belt? No   Have you felt unusual stress, anger or loneliness in the last month? No   Who do you live with? Spouse   If you need help, do you have trouble finding someone available to you? No   Have you been bothered in the last four weeks by sexual problems? No   Do you have difficulty concentrating, remembering or making decisions? No              Does the patient have evidence of cognitive impairment? No    Asprin use counseling:Contraindicated from taking ASA    Age-appropriate Screening Schedule:  Refer to the list below for future screening recommendations based on patient's  age, sex and/or medical conditions. Orders for these recommended tests are listed in the plan section. The patient has been provided with a written plan.    Health Maintenance   Topic Date Due    DXA SCAN  Never done    TDAP/TD VACCINES (1 - Tdap) Never done    ZOSTER VACCINE (1 of 2) Never done    Pneumococcal Vaccine 65+ (1 - PCV) Never done    HEPATITIS C SCREENING  Never done    COLORECTAL CANCER SCREENING  06/14/2022    INFLUENZA VACCINE  08/01/2023    COVID-19 Vaccine (8 - 2023-24 season) 09/01/2023    LIPID PANEL  07/27/2024    BMI FOLLOWUP  07/27/2024    ANNUAL WELLNESS VISIT  10/17/2024          The following portions of the patient's history were reviewed and updated as appropriate: allergies, current medications, past family history, past medical history, past social history, past surgical history, and problem list.    Outpatient Medications Prior to Visit   Medication Sig Dispense Refill    buPROPion (WELLBUTRIN) 75 MG tablet Take 1 tablet by mouth 2 (Two) Times a Day. 180 tablet 1    busPIRone (BUSPAR) 15 MG tablet Take 1 tablet by mouth 4 (Four) Times a Day. 360 tablet 3    HYDROcodone-acetaminophen (NORCO)  MG per tablet Take 1 tablet by mouth Every 4 (Four) Hours As Needed.      naloxone (NARCAN) 4 MG/0.1ML nasal spray 1 spray into the nostril(s) as directed by provider As Needed (opiod overdose). CALL 911. SPR CONTENTS OF ONE SPRAYER (0.1ML) INTO ONE NOSTRIL. REPEAT IN 2-3 MIN IF SYMPTOMS OF OPIOID EMERGENCY PERSIST, ALTERNATE NOSTRILS      ondansetron ODT (ZOFRAN-ODT) 4 MG disintegrating tablet Place 1 tablet on the tongue Every 8 (Eight) Hours As Needed for Nausea or Vomiting. 90 tablet 5    rosuvastatin (Crestor) 10 MG tablet Take 1 tablet by mouth Daily. 90 tablet 1    amLODIPine (NORVASC) 5 MG tablet Take 1 tablet by mouth Daily. 90 tablet 1    apixaban (ELIQUIS) 5 MG tablet tablet Take 1 tablet by mouth 2 (Two) Times a Day. 60 tablet 11    escitalopram (LEXAPRO) 20 MG tablet Take 1  "tablet by mouth Daily. 90 tablet 3    levothyroxine (SYNTHROID, LEVOTHROID) 112 MCG tablet Take 1 tablet by mouth Daily. 90 tablet 1    metoprolol succinate XL (TOPROL-XL) 50 MG 24 hr tablet Take 1 tablet by mouth Daily. 30 tablet 11    traZODone (DESYREL) 100 MG tablet Take 1 tablet by mouth Every Night. 90 tablet 1     No facility-administered medications prior to visit.       Patient Active Problem List   Diagnosis    Osteoarthritis    Vazquez's esophagus    Graves disease    Bilateral hip pain    Bradycardia    Carpal tunnel syndrome on right    Chronic pain    Syncope, near    Depression    Herniated disc    Lumbar facet arthropathy    Non-rheumatic mitral regurgitation    Other fatigue    Other hyperlipidemia    Paroxysmal atrial fibrillation    Trochanteric bursitis of both hips    Fibromyalgia    Obesity (BMI 30.0-34.9)    Anxiety    Primary hypertension    Primary insomnia    Gastroesophageal reflux disease without esophagitis    Vitamin D deficiency    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    Cerebellar stroke    Paroxysmal SVT (supraventricular tachycardia)    Hemoptysis    Acute bronchitis    Lung nodule    Kidney mass       Advanced Care Planning:  ACP discussion was declined by the patient. Patient does not have an advance directive, declines further assistance.    Review of Systems    Compared to one year ago, the patient feels her physical health is the same.  Compared to one year ago, the patient feels her mental health is the same.    Reviewed chart for potential of high risk medication in the elderly: yes  Reviewed chart for potential of harmful drug interactions in the elderly:yes    Objective         Vitals:    10/17/23 1025   BP: 114/73   Pulse: 67   Resp: 20   Weight: 64 kg (141 lb)   Height: 147.3 cm (58\")       Body mass index is 29.47 kg/m².  Discussed the patient's BMI with her. The BMI is above average; BMI management plan is completed.    Physical Exam  Vitals and " nursing note reviewed.   Constitutional:       Appearance: Normal appearance. She is well-developed.   HENT:      Head: Normocephalic and atraumatic.      Right Ear: Tympanic membrane, ear canal and external ear normal.      Left Ear: Tympanic membrane, ear canal and external ear normal.      Nose: Nose normal. No septal deviation, nasal tenderness or congestion.      Mouth/Throat:      Lips: Pink. No lesions.      Mouth: Mucous membranes are moist. No oral lesions.      Dentition: Normal dentition.      Pharynx: Oropharynx is clear. No pharyngeal swelling, oropharyngeal exudate or posterior oropharyngeal erythema.   Eyes:      General: Lids are normal. Vision grossly intact. No scleral icterus.        Right eye: No discharge.         Left eye: No discharge.      Extraocular Movements: Extraocular movements intact.      Conjunctiva/sclera: Conjunctivae normal.      Right eye: Right conjunctiva is not injected.      Left eye: Left conjunctiva is not injected.      Pupils: Pupils are equal, round, and reactive to light.   Neck:      Thyroid: No thyroid mass.      Trachea: Trachea normal.   Cardiovascular:      Rate and Rhythm: Normal rate and regular rhythm.      Heart sounds: Murmur heard.      No gallop.   Pulmonary:      Effort: Pulmonary effort is normal.      Breath sounds: Normal breath sounds and air entry. No wheezing, rhonchi or rales.   Musculoskeletal:         General: No tenderness or deformity. Normal range of motion.      Cervical back: Full passive range of motion without pain, normal range of motion and neck supple.      Thoracic back: Normal.      Right lower leg: No edema.      Left lower leg: No edema.   Skin:     General: Skin is warm and dry.      Coloration: Skin is not jaundiced.      Findings: No rash.   Neurological:      Mental Status: She is alert and oriented to person, place, and time.      Sensory: Sensation is intact.      Motor: Motor function is intact.      Coordination: Coordination  is intact.      Gait: Gait is intact.      Deep Tendon Reflexes: Reflexes are normal and symmetric.   Psychiatric:         Mood and Affect: Mood and affect normal.         Behavior: Behavior normal.         Judgment: Judgment normal.         Lab Results   Component Value Date    TRIG 141 07/27/2023    HDL 53 07/27/2023    LDL 52 07/27/2023    VLDL 24 07/27/2023    HGBA1C 5.30 07/21/2023        Assessment & Plan   Medicare Risks and Personalized Health Plan  CMS Preventative Services Quick Reference  Advance Directive Discussion  Cardiovascular risk  Colon Cancer Screening  Fall Risk  Immunizations Discussed/Encouraged (specific immunizations; Influenza )  Obesity/Overweight   Osteoporosis Risk    The above risks/problems have been discussed with the patient.  Pertinent information has been shared with the patient in the After Visit Summary.  Follow up plans and orders are seen below in the Assessment/Plan Section.    Problem List Items Addressed This Visit       Osteoarthritis    Graves disease    Relevant Medications    levothyroxine (SYNTHROID, LEVOTHROID) 112 MCG tablet    metoprolol succinate XL (TOPROL-XL) 50 MG 24 hr tablet    Non-rheumatic mitral regurgitation    Overview     Formatting of this note might be different from the original.  severe on cath 11/2015 followed at this time per   Per Echo 11/28/17 moderate per Dr. Aburto  Last Assessment & Plan:   Formatting of this note might be different from the original.  Continue to follow clinically.  No overt heart failure.  Last echocardiogram with modest regurgitation.         Relevant Medications    metoprolol succinate XL (TOPROL-XL) 50 MG 24 hr tablet    amLODIPine (NORVASC) 5 MG tablet    Paroxysmal atrial fibrillation    Overview     Last Assessment & Plan:   Formatting of this note might be different from the original.  No evidence of recurrence on recent 30 day event monitor. Continue clinical monitoring for recurrence.  Not on an  anticoagulation regimen at this time.         Relevant Medications    metoprolol succinate XL (TOPROL-XL) 50 MG 24 hr tablet    amLODIPine (NORVASC) 5 MG tablet    Fibromyalgia    Anxiety    Relevant Medications    escitalopram (LEXAPRO) 20 MG tablet    Primary hypertension    Relevant Medications    metoprolol succinate XL (TOPROL-XL) 50 MG 24 hr tablet    amLODIPine (NORVASC) 5 MG tablet    Primary insomnia    Relevant Medications    traZODone (DESYREL) 100 MG tablet    Gastroesophageal reflux disease without esophagitis    Vitamin D deficiency     Other Visit Diagnoses       Medicare annual wellness visit, subsequent    -  Primary    Skin yeast infection        Relevant Medications    nystatin (MYCOSTATIN) 997124 UNIT/GM powder    fluconazole (Diflucan) 150 MG tablet    Renal cyst        Refer to urology    Relevant Orders    Ambulatory Referral to Urology         Patient is here for a Medicare wellness exam.  Patient also had a recent hospital admission along with a recent ER visit.  Patient was admitted into the hospital for a few days for coughing up blood.  She was recently started on Eliquis, and this concerned her when she was coughing up blood.  She went to the emergency room for evaluation.  She was found to have pneumonia and treated.  Dr. Driscoll with pulmonology is following with her now.  She has a follow-up appointment with him coming up.  Radiology recommended to do a follow-up CT due to the abnormal findings.  This will be taking care of by pulmonology.  Patient is also following with cardiology for mitral valve regurg and A-fib.  She also has an appointment with them coming up.  Patient has also had a recent stroke, and went to ER a couple days ago for TIAs.  Referral to neurology has also already been made.  She has an appointment coming up with that.  While she was in the hospital there was some renal cyst incidentally found.  I will place a urology referral for this for follow-up.  Patient is  also complaining of a skin yeast infection.  We will send in nystatin powder.    Follow Up:  Return in about 3 months (around 1/17/2024) for Recheck.     An After Visit Summary and PPPS were given to the patient.

## 2023-10-17 NOTE — PROGRESS NOTES
After obtaining consent, and per orders of Meme Velázquez, injection of Fluzone 0.5ml administered to L deltoid IM. Given by Annie Payne MA. Patient instructed to remain in clinic for 20 minutes afterwards, and to report any adverse reaction to me immediately. Pt tolerated well with no adverse reactions.

## 2023-10-20 LAB
MYCOBACTERIUM SPEC CULT: NORMAL
NIGHT BLUE STAIN TISS: NORMAL
NIGHT BLUE STAIN TISS: NORMAL

## 2023-10-24 ENCOUNTER — TELEPHONE (OUTPATIENT)
Dept: CARDIOLOGY | Facility: CLINIC | Age: 78
End: 2023-10-24
Payer: MEDICARE

## 2023-10-24 ENCOUNTER — HOSPITAL ENCOUNTER (OUTPATIENT)
Dept: CT IMAGING | Facility: HOSPITAL | Age: 78
Discharge: HOME | End: 2023-10-24
Admitting: NURSE PRACTITIONER
Payer: MEDICARE

## 2023-10-24 DIAGNOSIS — R91.1 LUNG NODULE: ICD-10-CM

## 2023-10-24 DIAGNOSIS — R04.2 HEMOPTYSIS: ICD-10-CM

## 2023-10-24 PROCEDURE — 71250 CT THORAX DX C-: CPT

## 2023-10-24 NOTE — TELEPHONE ENCOUNTER
----- Message from Obie Fox MD sent at 10/20/2023  4:25 PM CDT -----  Please let her know that the echo showed normal ventricular function (squeeze function of her heart) with mild hypertrophy (thickening) and otherwise mild age-related changes.  There are no significant or concerning findings.

## 2023-10-26 ENCOUNTER — OFFICE VISIT (OUTPATIENT)
Dept: PULMONOLOGY | Facility: CLINIC | Age: 78
End: 2023-10-26
Payer: MEDICARE

## 2023-10-26 VITALS
HEART RATE: 68 BPM | HEIGHT: 58 IN | DIASTOLIC BLOOD PRESSURE: 80 MMHG | SYSTOLIC BLOOD PRESSURE: 124 MMHG | BODY MASS INDEX: 29.6 KG/M2 | WEIGHT: 141 LBS | OXYGEN SATURATION: 94 %

## 2023-10-26 DIAGNOSIS — R91.8 GROUND GLASS OPACITY PRESENT ON IMAGING OF LUNG: ICD-10-CM

## 2023-10-26 DIAGNOSIS — Z29.11 NEED FOR PROPHYLACTIC VACCINATION AND INOCULATION AGAINST RESPIRATORY SYNCYTIAL VIRUS (RSV): ICD-10-CM

## 2023-10-26 DIAGNOSIS — R04.2 HEMOPTYSIS: Primary | ICD-10-CM

## 2023-10-26 NOTE — PROGRESS NOTES
"Background:  Pt w hx hemoptysis, presumed pneumonia   Chief Complaint  Coughing Up Blood (Hospitalized for this/)    Subjective    History of Present Illness     Nichole Rinaldi is here for follow up with [facility].  History of Present Illness  Pt follows up after history of hemorrhagic pneumonia.  A follow up ct was negative prior to this visit.  She is doing much better.  She has had no additional hemoptysis.  Connective tissue eval is below, negative.  Bronchoscopy cultures showed staph and we treated her for that.     Tobacco Use: Low Risk  (10/26/2023)    Patient History     Smoking Tobacco Use: Never     Smokeless Tobacco Use: Never     Passive Exposure: Not on file      Current Outpatient Medications   Medication Instructions    amLODIPine (NORVASC) 5 mg, Oral, Daily    apixaban (ELIQUIS) 5 mg, Oral, 2 Times Daily    buPROPion (WELLBUTRIN) 75 mg, Oral, 2 Times Daily    busPIRone (BUSPAR) 15 mg, Oral, 4 Times Daily    escitalopram (LEXAPRO) 20 mg, Oral, Daily    fluconazole (DIFLUCAN) 150 mg, Oral, Every 3 Days    HYDROcodone-acetaminophen (NORCO)  MG per tablet 1 tablet, Oral, Every 4 Hours PRN    levothyroxine (SYNTHROID, LEVOTHROID) 112 mcg, Oral, Daily    metoprolol succinate XL (TOPROL-XL) 50 mg, Oral, Daily    naloxone (NARCAN) 4 MG/0.1ML nasal spray 1 spray, Nasal, As Needed, CALL 911. SPR CONTENTS OF ONE SPRAYER (0.1ML) INTO ONE NOSTRIL. REPEAT IN 2-3 MIN IF SYMPTOMS OF OPIOID EMERGENCY PERSIST, ALTERNATE NOSTRILS    nystatin (MYCOSTATIN) 965673 UNIT/GM powder Topical, 4 Times Daily    ondansetron ODT (ZOFRAN-ODT) 4 mg, Translingual, Every 8 Hours PRN    rosuvastatin (CRESTOR) 10 mg, Oral, Daily    RSVPreF3 Vac Recomb Adjuvanted (AREXVY) 120 mcg, Intramuscular, Once    traZODone (DESYREL) 100 mg, Oral, Nightly      Objective     Vital Signs:   /80   Pulse 68   Ht 147.3 cm (58\")   Wt 64 kg (141 lb)   SpO2 94% Comment: RA  BMI 29.47 " kg/m²   Physical Exam  Constitutional:       General: She is not in acute distress.     Appearance: She is well-developed. She is not ill-appearing or toxic-appearing.   HENT:      Head: Atraumatic.   Eyes:      General: No scleral icterus.     Conjunctiva/sclera: Conjunctivae normal.   Cardiovascular:      Rate and Rhythm: Normal rate and regular rhythm.      Heart sounds: S1 normal and S2 normal.   Pulmonary:      Effort: Pulmonary effort is normal.      Breath sounds: Normal breath sounds.   Abdominal:      General: There is no distension.   Musculoskeletal:         General: No deformity.      Cervical back: Neck supple.   Skin:     Coloration: Skin is not pale.      Findings: No rash.   Neurological:      Mental Status: She is alert.      Result Review  Data Reviewed:    CT Chest Without Contrast Diagnostic    Result Date: 10/24/2023  Impression: 1. No acute lung disease. 2. Patchy groundglass infiltrate seen 3 weeks ago has resolved.             This report was signed and finalized on 10/24/2023 1:55 PM CDT by Dr. Arvind Thapa MD.      CT Angiogram Chest    Addendum Date: 10/17/2023    ADDENDUM: 1. Please add the following to the technique: The 3D maximal intensity projection image reconstruction was performed in addition to 2D coronal and sagittal plane imaging sequence.  This report was signed and finalized on 10/17/2023 8:35 AM CDT by Dr. Mohamud Kovacs MD.      Result Date: 10/17/2023  Impression: 1. No evidence of pulmonary embolism. No aortic aneurysm or dissection. 2. Pulmonary arterial hypertension. 3. The RV/LV ratio suggesting a mild right heart strain. Etiology not certain. 4. A 14 mm groundglass opacity/nodule in the right upper lobe may represent an evolving inflammatory or neoplastic process. A follow-up examination in 3 months is recommended to assure stability/resolution. 5. Borderline mediastinal and right hilar lymphadenopathy. 6. Nonacute abdominal findings as detailed above.  This report  was signed and finalized on 10/4/2023 6:09 AM CDT by Dr. Mohamud Kovacs MD.      CT Head Without Contrast    Result Date: 10/16/2023  Impression: 1. No acute intracranial abnormality.  The above study was initially reviewed and reported by [facility]. I do not find any discrepancies.           This report was signed and finalized on 10/16/2023 5:47 AM CDT by Dr. Mohamud Kovacs MD.      CT Abdomen Pelvis With Contrast    Result Date: 10/5/2023  Impression: 1. Multiple renal nodules bilaterally. The largest nodule in the right kidney represent a cyst. The remaining nodules are too small to be further characterized. Another follow-up CT scan of the abdomen with renal mass protocol may be obtained to ensure stability of the nodules. 2. Nonobstructing renal calculi bilaterally. 3. Marked thickening and enhancement of the poorly distended urinary bladder. This may partly be due to incomplete distention. There is a probability of chronic cystitis. 4. A poorly marginated masslike density along the lesser curvature side of the gastric antrum which partly protrudes into the lumen of the gastric antrum. This may represent a neoplastic process. Further evaluation with gastroscopy may be obtained. 5. Large volume stool in the colon. No obstruction. Diverticulosis of the colon. No evidence for diverticulitis. 6. 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      This report was signed and finalized on 10/5/2023 9:27 AM CDT by Dr. Mohamud Kovacs MD.      XR Chest 1 View    Result Date: 10/4/2023  Impression: 1. No active cardiopulmonary disease.  This report was signed and finalized on 10/4/2023 6:32 AM CDT by Dr. Mohamud Kovacs MD.      XR Chest 2 View    Result Date: 10/4/2023  Impression: 1. No active cardiopulmonary disease.  This report was signed and finalized on 10/4/2023 6:29 AM CDT by Dr. Mohamud Kovacs MD.     Personal review of imaging : CT scan shows resolution of infiltrate that was seen on the  prior          C-reactive Protein (10/06/2023 11:14) neg  ANCA Panel (10/06/2023 11:14) neg  IFRAH Comprehensive Panel (10/06/2023 11:14) neg  Respiratory Culture - Wash, Bronchus (10/06/2023 10:06)  staph  AFB Culture - Wash, Bronchus (10/06/2023 10:06)  JOHN Prep - Wash, Bronchus (10/06/2023 10:06)  Fungus Culture - Wash, Bronchus (10/06/2023 10:06) yeast  Non-gynecologic Cytology (10/06/2023 10:06) neg     Assessment and Plan    Diagnoses and all orders for this visit:    1. Hemoptysis (Primary)    2. Ground glass opacity present on imaging of lung    3. Need for prophylactic vaccination and inoculation against respiratory syncytial virus (RSV)  -     RSVPreF3 Vac Recomb Adjuvanted (AREXVY) 120 MCG/0.5ML reconstituted suspension injection; Inject 0.5 mL into the appropriate muscle as directed by prescriber 1 (One) Time for 1 dose.  Dispense: 1 kit; Refill: 0    Continue expectant management  Call if hemoptysis recurs  Recommend rsv vaccine  Check PFT in 3 months to evaluate for any pulmonary pathophysiology.  If normal, nothing else to do    Follow Up   Return in about 3 months (around 1/26/2024) for full PFT.  Patient was given instructions and counseling regarding her condition or for health maintenance advice. Please see specific information pulled into the AVS if appropriate.    Electronically signed by Jonah Driscoll MD, 10/26/2023, 16:22 CDT

## 2023-10-27 LAB
MYCOBACTERIUM SPEC CULT: NORMAL
NIGHT BLUE STAIN TISS: NORMAL
NIGHT BLUE STAIN TISS: NORMAL

## 2023-10-27 NOTE — PROGRESS NOTES
Chief Complaint  Renal Cyst    Subjective          Nichole Rinaldi presents to [facility] UROLOG[facility]   Renal mass identified.  Incidental finding during admission for hemoptysis and concerning chest  x-ray appearance.  She denies any hematuria.        Current Outpatient Medications:     amLODIPine (NORVASC) 5 MG tablet, Take 1 tablet by mouth Daily., Disp: 90 tablet, Rfl: 1    apixaban (ELIQUIS) 5 MG tablet tablet, Take 1 tablet by mouth 2 (Two) Times a Day., Disp: 90 tablet, Rfl: 3    buPROPion (WELLBUTRIN) 75 MG tablet, Take 1 tablet by mouth 2 (Two) Times a Day., Disp: 180 tablet, Rfl: 1    busPIRone (BUSPAR) 15 MG tablet, Take 1 tablet by mouth 4 (Four) Times a Day., Disp: 360 tablet, Rfl: 3    escitalopram (LEXAPRO) 20 MG tablet, Take 1 tablet by mouth Daily., Disp: 30 tablet, Rfl: 0    fluconazole (Diflucan) 150 MG tablet, Take 1 tablet by mouth Every 3 (Three) Days., Disp: 3 tablet, Rfl: 0    HYDROcodone-acetaminophen (NORCO)  MG per tablet, Take 1 tablet by mouth Every 4 (Four) Hours As Needed., Disp: , Rfl:     levothyroxine (SYNTHROID, LEVOTHROID) 112 MCG tablet, Take 1 tablet by mouth Daily., Disp: 90 tablet, Rfl: 1    metoprolol succinate XL (TOPROL-XL) 50 MG 24 hr tablet, Take 1 tablet by mouth Daily., Disp: 90 tablet, Rfl: 3    naloxone (NARCAN) 4 MG/0.1ML nasal spray, 1 spray into the nostril(s) as directed by provider As Needed (opiod overdose). CALL 911. SPR CONTENTS OF ONE SPRAYER (0.1ML) INTO ONE NOSTRIL. REPEAT IN 2-3 MIN IF SYMPTOMS OF OPIOID EMERGENCY PERSIST, ALTERNATE NOSTRILS, Disp: , Rfl:     nystatin (MYCOSTATIN) 370344 UNIT/GM powder, Apply  topically to the appropriate area as directed 4 (Four) Times a Day., Disp: 60 g, Rfl: 5    ondansetron ODT (ZOFRAN-ODT) 4 MG disintegrating tablet, Place 1 tablet on the tongue Every 8 (Eight) Hours As Needed for Nausea or Vomiting., Disp: 90 tablet, Rfl: 5    rosuvastatin (Crestor) 10 MG tablet, Take 1 tablet by mouth Daily.,  "Disp: 90 tablet, Rfl: 1    traZODone (DESYREL) 100 MG tablet, Take 1 tablet by mouth Every Night., Disp: 90 tablet, Rfl: 1  Past Medical History:   Diagnosis Date    Anxiety     Arthrosis     Atrial fibrillation     Vazquez's esophagus 2000    Coronary artery disease     Depression     Fibromyalgia     Graves disease 1970    Heart murmur     Heart valve disease     Hyperlipidemia     Hypertension     Stroke      Past Surgical History:   Procedure Laterality Date    BRONCHOSCOPY Bilateral 10/6/2023    Procedure: BRONCHOSCOPY BIOPSY;  Surgeon: Jonah Driscoll MD;  Location: [facility] ENDOSCOPY;  Service: Pulmonary;  Laterality: Bilateral;  pre op: hemoptysis   post op:scant endobronchial blood,   PCP: Meme Tan APRN    CARDIAC CATHETERIZATION      CARPAL TUNNEL RELEASE Bilateral 2010    ESSURE TUBAL LIGATION Bilateral 1977    EYE SURGERY      HERNIA REPAIR  1992    JOINT REPLACEMENT      REPLACEMENT TOTAL KNEE BILATERAL Bilateral 1998    TENDON REPAIR Right 2019    THYROIDECTOMY  1970           Review of Systems      Objective   PHYSICAL EXAM  Vital Signs:   Temp 96.7 °F (35.9 °C)   Ht 147.3 cm (58\")   Wt 65 kg (143 lb 3.2 oz)   BMI 29.93 kg/m²     Physical Exam      DATA  Result Review :              Results for orders placed or performed in visit on 11/02/23   POC Urinalysis Dipstick, Multipro    Specimen: Urine   Result Value Ref Range    Color Yellow Yellow, Straw, Dark Yellow, [place]    Clarity, UA Clear Clear    Glucose, UA Negative Negative mg/dL    Bilirubin Negative Negative    Ketones, UA Negative Negative    Specific Gravity  1.010 1.005 - 1.030    Blood, UA Negative Negative    pH, Urine 6.0 5.0 - 8.0    Protein, POC Negative Negative mg/dL    Urobilinogen, UA 0.2 E.U./dL Normal, 0.2 E.U./dL    Nitrite, UA Negative Negative    Leukocytes Trace (A) Negative       CT Abdomen Pelvis With Contrast (10/05/2023 08:57)   I looked at these films myself.  She does have multiple lesions in the " kidney that are probably all cyst.  It is difficult to do assessments of enhancement considering she has had only a contrasted study and some of these lesions are so small.             ASSESSMENT AND PLAN          Problem List Items Addressed This Visit    None  Visit Diagnoses       Renal cyst    -  Primary    Relevant Orders    POC Urinalysis Dipstick, Multipro (Completed)    CT Abdomen Kidney With & Without Contrast        These lesions are very small.  I recommended that she have a CT renal mass protocol.  I probably would repeat this for 6 months considering how small they are.  She remains asymptomatic.          FOLLOW UP     Return in about 6 months (around 5/2/2024) for with ct renal mass protocol before next visit.        (Please note that portions of this note were completed with a voice recognition program.)  Norman Swanson MD  11/03/23  10:59 CDT

## 2023-11-02 ENCOUNTER — OFFICE VISIT (OUTPATIENT)
Dept: UROLOGY | Facility: CLINIC | Age: 78
End: 2023-11-02
Payer: MEDICARE

## 2023-11-02 VITALS — HEIGHT: 58 IN | TEMPERATURE: 96.7 F | WEIGHT: 143.2 LBS | BODY MASS INDEX: 30.06 KG/M2

## 2023-11-02 DIAGNOSIS — N28.1 RENAL CYST: Primary | ICD-10-CM

## 2023-11-02 LAB
BILIRUB BLD-MCNC: NEGATIVE MG/DL
CLARITY, POC: CLEAR
COLOR UR: YELLOW
GLUCOSE UR STRIP-MCNC: NEGATIVE MG/DL
KETONES UR QL: NEGATIVE
LEUKOCYTE EST, POC: ABNORMAL
NITRITE UR-MCNC: NEGATIVE MG/ML
PH UR: 6 [PH] (ref 5–8)
PROT UR STRIP-MCNC: NEGATIVE MG/DL
RBC # UR STRIP: NEGATIVE /UL
SP GR UR: 1.01 (ref 1–1.03)
UROBILINOGEN UR QL: ABNORMAL

## 2023-11-02 PROCEDURE — 81003 URINALYSIS AUTO W/O SCOPE: CPT | Performed by: UROLOGY

## 2023-11-02 PROCEDURE — 1160F RVW MEDS BY RX/DR IN RCRD: CPT | Performed by: UROLOGY

## 2023-11-02 PROCEDURE — 99203 OFFICE O/P NEW LOW 30 MIN: CPT | Performed by: UROLOGY

## 2023-11-02 PROCEDURE — 1159F MED LIST DOCD IN RCRD: CPT | Performed by: UROLOGY

## 2023-11-03 LAB
MYCOBACTERIUM SPEC CULT: NORMAL
NIGHT BLUE STAIN TISS: NORMAL
NIGHT BLUE STAIN TISS: NORMAL

## 2023-11-06 ENCOUNTER — OFFICE VISIT (OUTPATIENT)
Dept: NEUROLOGY | Facility: CLINIC | Age: 78
End: 2023-11-06
Payer: MEDICARE

## 2023-11-06 VITALS
BODY MASS INDEX: 30.02 KG/M2 | HEART RATE: 62 BPM | SYSTOLIC BLOOD PRESSURE: 126 MMHG | WEIGHT: 143 LBS | DIASTOLIC BLOOD PRESSURE: 72 MMHG | HEIGHT: 58 IN | OXYGEN SATURATION: 94 %

## 2023-11-06 DIAGNOSIS — H53.9 VISUAL CHANGES: ICD-10-CM

## 2023-11-06 DIAGNOSIS — Z86.73 HISTORY OF STROKE: Primary | ICD-10-CM

## 2023-11-06 PROCEDURE — 1160F RVW MEDS BY RX/DR IN RCRD: CPT | Performed by: PSYCHIATRY & NEUROLOGY

## 2023-11-06 PROCEDURE — 3078F DIAST BP <80 MM HG: CPT | Performed by: PSYCHIATRY & NEUROLOGY

## 2023-11-06 PROCEDURE — 99214 OFFICE O/P EST MOD 30 MIN: CPT | Performed by: PSYCHIATRY & NEUROLOGY

## 2023-11-06 PROCEDURE — 3074F SYST BP LT 130 MM HG: CPT | Performed by: PSYCHIATRY & NEUROLOGY

## 2023-11-06 PROCEDURE — 1159F MED LIST DOCD IN RCRD: CPT | Performed by: PSYCHIATRY & NEUROLOGY

## 2023-11-06 NOTE — PROGRESS NOTES
Subjective        Nichole Rinalid presents to [facility]    History of Present Illness  The patient is a 78-year-old female who I am seeing as a hospital follow-up for a cerebrovascular accident in 07/2023. She was hospitalized in 2023 for reported confusion, nausea, and speech difficulty. She was not a candidate for TPA due to being outside the window. CTA did not show any large vessel occlusion. MRI showed bilateral cerebellar and left occipital cerebrovascular accidents. She was sent home with a Zio patch which did not show atrial fibrillation; however, she has seen cardiology in the meantime, and due to her elevated CHADS VASc and history of paroxysmal atrial fibrillation, she was started on Eliquis. She had another emergency room visit just a few weeks ago due to transient visual abnormalities, and a CT scan of her head was performed which was within normal limits. The patient is accompanied by her daughter.    The patient had some episodes with her vision where she saw bright gold and white squiggly lines and a half-moon [place] along with flashing in her bilateral eyes even with her eyes shut. This lasted about 25 minutes. About a week ago, the patient notes that the bottom half of her eyes were fuzzy and gray. This lasted for approximately 10 minutes. The patient has been experiencing headaches every day for years.    The patient has had cataract surgery on both eyes. She has not seen an ophthalmologist in 3 or 4 years.    The patient had been in the hospital due to hemoptysis. She was off her blood thinner for a week, and it was continued. At that time, she was diagnosed with pneumonia.    The patient's foot caught on the edge of a box, and she tripped at 6:00 AM this morning, 10/06/2023, and she fell. She has some bruising on her hand, and her right knee is swollen.    About 3 or 4 times a week, the patient experiences headaches. She describes the pain as sharp pain down the  left side of her head which lasts approximately 1 hour. During these episodes, if she turns her head at all, the pain is severe. Other headaches are usually on the posterior portion of her neck radiating to the anterior aspect. She feels that a lot of her headaches could be sinus pressure. She takes Tylenol daily for the headaches.              Past Medical History:   Diagnosis Date    Anxiety     Arthrosis     Atrial fibrillation     Vazquez's esophagus 2000    Coronary artery disease     Depression     Fibromyalgia     Graves disease 1970    Heart murmur     Heart valve disease     Hyperlipidemia     Hypertension     Stroke           Current Outpatient Medications:     amLODIPine (NORVASC) 5 MG tablet, Take 1 tablet by mouth Daily., Disp: 90 tablet, Rfl: 1    apixaban (ELIQUIS) 5 MG tablet tablet, Take 1 tablet by mouth 2 (Two) Times a Day., Disp: 90 tablet, Rfl: 3    buPROPion (WELLBUTRIN) 75 MG tablet, Take 1 tablet by mouth 2 (Two) Times a Day., Disp: 180 tablet, Rfl: 1    busPIRone (BUSPAR) 15 MG tablet, Take 1 tablet by mouth 4 (Four) Times a Day., Disp: 360 tablet, Rfl: 3    escitalopram (LEXAPRO) 20 MG tablet, Take 1 tablet by mouth Daily., Disp: 30 tablet, Rfl: 0    HYDROcodone-acetaminophen (NORCO)  MG per tablet, Take 1 tablet by mouth Every 4 (Four) Hours As Needed., Disp: , Rfl:     levothyroxine (SYNTHROID, LEVOTHROID) 112 MCG tablet, Take 1 tablet by mouth Daily., Disp: 90 tablet, Rfl: 1    metoprolol succinate XL (TOPROL-XL) 50 MG 24 hr tablet, Take 1 tablet by mouth Daily., Disp: 90 tablet, Rfl: 3    naloxone (NARCAN) 4 MG/0.1ML nasal spray, 1 spray into the nostril(s) as directed by provider As Needed (opiod overdose). CALL 911. SPR CONTENTS OF ONE SPRAYER (0.1ML) INTO ONE NOSTRIL. REPEAT IN 2-3 MIN IF SYMPTOMS OF OPIOID EMERGENCY PERSIST, ALTERNATE NOSTRILS, Disp: , Rfl:     nystatin (MYCOSTATIN) 966824 UNIT/GM powder, Apply  topically to the appropriate area as directed 4 (Four) Times a  "Day., Disp: 60 g, Rfl: 5    ondansetron ODT (ZOFRAN-ODT) 4 MG disintegrating tablet, Place 1 tablet on the tongue Every 8 (Eight) Hours As Needed for Nausea or Vomiting., Disp: 90 tablet, Rfl: 5    rosuvastatin (Crestor) 10 MG tablet, Take 1 tablet by mouth Daily., Disp: 90 tablet, Rfl: 1    traZODone (DESYREL) 100 MG tablet, Take 1 tablet by mouth Every Night., Disp: 90 tablet, Rfl: 1       Objective   Vital Signs:   /72 (BP Location: Left arm, Patient Position: Sitting, Cuff Size: Adult)   Pulse 62   Ht 147.3 cm (58\")   Wt 64.9 kg (143 lb)   SpO2 94%   BMI 29.89 kg/m²     Physical Exam  Constitutional:       General: She is awake.   Eyes:      Extraocular Movements: Extraocular movements intact.   Neurological:      Mental Status: She is alert.   Psychiatric:         Speech: Speech normal.        Neurological Exam  Mental Status  Awake and alert. Speech is normal. Language is fluent with no aphasia.    Cranial Nerves  CN III, IV, VI: Extraocular movements intact bilaterally.  CN VII: Full and symmetric facial movement.    Gait  Casual gait is normal including stance, stride, and arm swing.      Result Review :                     Assessment and Plan   78-year-old female with history of paroxysmal atrial fibrillation, CAD, fibromyalgia, depression, hypertension, and hyperlipidemia, here for hospital follow-up of a cerebrovascular accident. I personally reviewed her MRI brain which showed small areas of acute ischemia in the bilateral cerebellar hemispheres and left occipital lobe. These were definitely concerning for a cardioembolic cause. Her CTA was negative for any significant stenosis. Echocardiogram  did not show any thrombus or shunt. She had a ZIO patch done after the fact which did not show atrial fibrillation, but due to her high CHADS VASc score and history of paroxysmal atrial fibrillation, she was started on Eliquis by cardiology. Her LDL was 60 and hemoglobin A1c 5.3 percent. She is " currently on rosuvastatin. She does experience headaches, but I think these maybe medication overuse headaches, which we discussed at length. She has some positive visual symptoms which may be migraine related and do not sound like a cerebrovascular accident. She also had a fall today and has a very swollen right knee and painful left hand.    PLAN  1. I have asked the patient to call her primary care physician's office today to try to obtain and appointment for evaluation after her fall.   2. Continue Eliquis and statin.   3. Referral to ophthalmology for new visual symptoms.  4. We discussed decreasing the amount of Tylenol she uses.   5. Follow up 6 months.      Follow Up   No follow-ups on file.  Patient was given instructions and counseling regarding her condition or for health maintenance advice. Please see specific information pulled into the AVS if appropriate.     Transcribed from ambient dictation for Rachel Zhu MD by Shawna Tinoco.  11/06/23   18:29 CST    Patient or patient representative verbalized consent to the visit recording.  I have personally performed the services described in this document as transcribed by the above individual, and it is both accurate and complete.

## 2023-11-10 LAB
MYCOBACTERIUM SPEC CULT: NORMAL
NIGHT BLUE STAIN TISS: NORMAL
NIGHT BLUE STAIN TISS: NORMAL

## 2023-11-13 ENCOUNTER — HOSPITAL ENCOUNTER (OUTPATIENT)
Dept: CT IMAGING | Facility: HOSPITAL | Age: 78
Discharge: HOME | End: 2023-11-13
Payer: MEDICARE

## 2023-11-13 ENCOUNTER — OFFICE VISIT (OUTPATIENT)
Dept: FAMILY MEDICINE CLINIC | Facility: CLINIC | Age: 78
End: 2023-11-13
Payer: MEDICARE

## 2023-11-13 ENCOUNTER — HOSPITAL ENCOUNTER (OUTPATIENT)
Dept: GENERAL RADIOLOGY | Facility: HOSPITAL | Age: 78
Discharge: HOME | End: 2023-11-13
Payer: MEDICARE

## 2023-11-13 VITALS
TEMPERATURE: 97.4 F | HEIGHT: 58 IN | WEIGHT: 144 LBS | RESPIRATION RATE: 20 BRPM | SYSTOLIC BLOOD PRESSURE: 118 MMHG | DIASTOLIC BLOOD PRESSURE: 72 MMHG | HEART RATE: 58 BPM | BODY MASS INDEX: 30.23 KG/M2
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
## 2023-11-13 DIAGNOSIS — K31.89 GASTRIC WALL THICKENING: ICD-10-CM

## 2023-11-13 DIAGNOSIS — N28.9 RENAL LESION: ICD-10-CM

## 2023-11-13 DIAGNOSIS — M25.561 ACUTE PAIN OF RIGHT KNEE: ICD-10-CM

## 2023-11-13 DIAGNOSIS — M79.602 LEFT ARM PAIN: ICD-10-CM

## 2023-11-13 DIAGNOSIS — S63.502A SPRAIN OF LEFT WRIST, INITIAL ENCOUNTER: ICD-10-CM

## 2023-11-13 DIAGNOSIS — Z79.01 CHRONIC ANTICOAGULATION: ICD-10-CM

## 2023-11-13 DIAGNOSIS — N28.1 RENAL CYST, RIGHT: ICD-10-CM

## 2023-11-13 LAB — CREAT BLDA-MCNC: 0.8 MG/DL (ref 0.6–1.3)

## 2023-11-13 PROCEDURE — 73590 X-RAY EXAM OF LOWER LEG: CPT

## 2023-11-13 PROCEDURE — 82565 ASSAY OF CREATININE: CPT

## 2023-11-13 PROCEDURE — 73110 X-RAY EXAM OF WRIST: CPT

## 2023-11-13 PROCEDURE — 25510000001 IOPAMIDOL 61 % SOLUTION

## 2023-11-13 PROCEDURE — 73080 X-RAY EXAM OF ELBOW: CPT

## 2023-11-13 PROCEDURE — 73030 X-RAY EXAM OF SHOULDER: CPT

## 2023-11-13 PROCEDURE — 73060 X-RAY EXAM OF HUMERUS: CPT

## 2023-11-13 PROCEDURE — 73200 CT UPPER EXTREMITY W/O DYE: CPT

## 2023-11-13 PROCEDURE — 73130 X-RAY EXAM OF HAND: CPT

## 2023-11-13 PROCEDURE — 74178 CT ABD&PLV WO CNTR FLWD CNTR: CPT

## 2023-11-13 PROCEDURE — 73090 X-RAY EXAM OF FOREARM: CPT

## 2023-11-13 PROCEDURE — 73562 X-RAY EXAM OF KNEE 3: CPT

## 2023-11-13 RX ADMIN — IOPAMIDOL 100 ML: 612 INJECTION, SOLUTION INTRAVENOUS at 13:12

## 2023-11-13 NOTE — PROGRESS NOTES
"Chief Complaint  Fall, Knee Pain, Wrist Pain, and Shoulder Pain    Subjective    History of Present Illness      Patient presents to [facility] for   History of Present Illness  She had a fall 1 week ago. She states she just tripped. Denies syncope. She has pain in her right knee. Left wrist and  her left shoulder.        Review of Systems    I have reviewed and agree with the HPI and ROS information as above.  Macrina E Occitan, APRN     Objective   Vital Signs:   /72   Pulse 58   Temp 97.4 °F (36.3 °C)   Resp 20   Ht 147.3 cm (58\")   Wt 65.3 kg (144 lb)   BMI 30.10 kg/m²          Physical Exam  Vitals and nursing note reviewed.   Constitutional:       General: She is not in acute distress.     Appearance: Normal appearance. She is not toxic-appearing or diaphoretic.   HENT:      Head: Normocephalic and atraumatic.      Right Ear: External ear normal.      Left Ear: External ear normal.      Nose: Nose normal.   Eyes:      Extraocular Movements: Extraocular movements intact.      Conjunctiva/sclera: Conjunctivae normal.      Pupils: Pupils are equal, round, and reactive to light.   Cardiovascular:      Rate and Rhythm: Normal rate and regular rhythm.      Pulses: Normal pulses.           Radial pulses are 2+ on the right side and 2+ on the left side.        Dorsalis pedis pulses are 2+ on the right side and 2+ on the left side.        Posterior tibial pulses are 2+ on the right side and 2+ on the left side.      Heart sounds: Normal heart sounds.   Pulmonary:      Effort: Pulmonary effort is normal. No respiratory distress.      Breath sounds: Normal breath sounds. No stridor. No wheezing, rhonchi or rales.      Comments: Lung sounds clear throughout bilaterally  Chest:      Chest wall: No tenderness.   Musculoskeletal:      Left shoulder: Tenderness present. No swelling or crepitus. Decreased range of motion. Normal strength. Normal pulse.      Left upper arm: Tenderness " present. No swelling.      Left elbow: No swelling. Normal range of motion. Tenderness present.      Left forearm: Tenderness present. No swelling.      Left wrist: Tenderness present. No swelling, snuff box tenderness or crepitus. Decreased range of motion. Normal pulse.      Left hand: Tenderness present. No swelling. Decreased range of motion. Normal strength. Normal sensation. There is no disruption of two-point discrimination. Normal capillary refill. Normal pulse.      Cervical back: Normal, normal range of motion and neck supple. No rigidity or tenderness.      Thoracic back: Normal.      Lumbar back: Normal.      Right hip: Normal.      Right upper leg: Normal.      Right knee: Swelling, ecchymosis and bony tenderness present. No crepitus. Decreased range of motion. Normal pulse.      Right lower leg: Tenderness present. No swelling. No edema.      Left lower leg: No edema.      Right ankle: Normal.      Right Achilles Tendon: Normal.      Right foot: Normal.      Comments: Extensive bruising to right knee and lower leg, bruising noted to various areas of LUE   Skin:     General: Skin is warm and dry.      Capillary Refill: Capillary refill takes less than 2 seconds.   Neurological:      Mental Status: She is alert and oriented to person, place, and time. Mental status is at baseline.      GCS: GCS eye subscore is 4. GCS verbal subscore is 5. GCS motor subscore is 6.   Psychiatric:         Mood and Affect: Mood normal.         Behavior: Behavior normal.         Thought Content: Thought content normal.         Judgment: Judgment normal.          SANDER-7: Over the last two weeks, how often have you been bothered by the following problems?  If you checked any problems, how difficult have these problems made it for you to do your work, take care of things at home, or get along with other people: Not difficult at all    PHQ-2 Depression Screening  Little interest or pleasure in doing things? 0-->not at all    Feeling down, depressed, or hopeless? 0-->not at all   PHQ-2 Total Score 0     PHQ-9 Depression Screening  Little interest or pleasure in doing things? 0-->not at all   Feeling down, depressed, or hopeless? 0-->not at all   Trouble falling or staying asleep, or sleeping too much?     Feeling tired or having little energy?     Poor appetite or overeating?     Feeling bad about yourself - or that you are a failure or have let yourself or your family down?     Trouble concentrating on things, such as reading the newspaper or watching television?     Moving or speaking so slowly that other people could have noticed? Or the opposite - being so fidgety or restless that you have been moving around a lot more than usual?     Thoughts that you would be better off dead, or of hurting yourself in some way?     PHQ-9 Total Score 0   If you checked off any problems, how difficult have these problems made it for you to do your work, take care of things at home, or get along with other people?        Result Review  Data Reviewed:                   Assessment and Plan      Diagnoses and all orders for this visit:    1. Fall, initial encounter (Primary)  -     XR Knee 3 View Right  -     XR Tibia Fibula 2 View Right  -     XR Hand 3+ View Left  -     XR Wrist 3+ View Left  -     XR Forearm 2 View Left  -     XR Elbow 3+ View Left  -     XR Humerus Left  -     XR Shoulder 2+ View Left  -     CT Upper Extremity Left Without Contrast    2. Chronic anticoagulation    3. Left arm pain  -     XR Hand 3+ View Left  -     XR Wrist 3+ View Left  -     XR Forearm 2 View Left  -     XR Elbow 3+ View Left  -     XR Humerus Left  -     XR Shoulder 2+ View Left  -     CT Upper Extremity Left Without Contrast    4. Acute pain of right knee  -     XR Knee 3 View Right  -     XR Tibia Fibula 2 View Right    5. Sprain of left wrist, initial encounter  -     Elastic Bandages & Supports (Adjustable Wrist Brace) misc; Use 1 each As Needed (Wrist  pain).  Dispense: 1 each; Refill: 0    6. Renal cyst, right    7. Gastric wall thickening      Patient is seen today for right knee and lower leg pain as well as left upper extremity pain following a mechanical ground-level fall that occurred a week ago.  She states she excellently tripped over a box causing her to fall to the ground.  She is on chronic anticoagulation, Eliquis.  She states her knee was initially the most bruised and swollen, however this has subsided a little bit.  On exam there is significant bruising noted to the right knee as well as the right lower leg.  She is ambulatory with a cane, states that she does not typically use a cane at baseline but used one previously and had it at home.  There are also various areas of bruising noted to the left upper extremity.  Patient complains of pain throughout the entire extremity.   strength is normal and equal bilaterally, cap refill and sensation intact, pulse intact.  Discussed that we will proceed with x-rays and have her return to discuss results.  She denies any head injury or LOC.  Denies any back pain, there is no midline tenderness on exam.  Of note, the patient reports she did previously have bilateral knee arthroplasties completed back in 1998.    Plan:  1.  X-rays pending  2.  Rest, ice, elevation, compression    **Update: X-ray of the right knee reveals some mild swelling, no obvious effusion.  Tib-fib reveals no acute findings.  X-rays of the upper extremity reveal arthritis.  On the x-ray shoulder read they suggest getting a CT of the upper extremity to further rule out fracture as there was an abnormality that they were unsure whether it was artifact versus a fracture.  CT scan has been obtained, results pending, we will call her with results.  I did go ahead and print a prescription for a wrist brace as she has requested something for compression and comfort for her wrist.  I have recommended rest, ice, and elevation.  She is on blood  thinners so will avoid NSAIDs at this time.  She can take Tylenol as needed.  While she was here she did get her CT abdomen pelvis completed that was ordered 1 month ago from her last visit.  We did go over these results.  They recommend a 6-month to 1 year follow-up on her right renal cyst, she is currently following with urology for this.  They also recommended following up with GI for an EGD regarding her gastric wall thickening, she does have an appointment with them in 2 days.**        Follow Up   Return if symptoms worsen or fail to improve.  Patient was given instructions and counseling regarding her condition or for health maintenance advice. Please see specific information pulled into the AVS if appropriate.

## 2023-11-14 ENCOUNTER — TELEPHONE (OUTPATIENT)
Dept: FAMILY MEDICINE CLINIC | Facility: CLINIC | Age: 78
End: 2023-11-14
Payer: MEDICARE

## 2023-11-14 NOTE — PROGRESS NOTES
Chief Complaint   Patient presents with    Abnormal Imaging     Abnormal ct was in hospital coughing up blood       PCP: Meme Tan APRN  REFER: Macrina Escobedo APRN    Subjective     HPI    Nichole Rinaldi hospitalized at [facility] Oct 4-7 2023.  CT scan suggestive of gastric mass CT Abdomen Pelvis With Contrast (10/05/2023 08:57)  .    She had CT scan 11/13/23 with thickening to gastric antrum.    Hiatal hernia repair, nissen fundoplication in 1990s.  Had a tube placed at that time as well.   No heartburn, no indigestion.  No abdominal pain.  No difficulty with bowels.  Bowels move every other day, this is normal `  No bright red blood per rectum, no melena.   No nausea/vomitting.       CT Abdomen Pelvis With & Without Contrast (11/13/2023 13:20) -significant mucosal  thickening involving the gastric antrum similar to the previous study.  No discrete mass is identified.    ENDOSCOPY, INT (05/12/2015 00:00)     SCANNED - COLONOSCOPY (05/12/2015 00:00) -polyps           Past Medical History:   Diagnosis Date    Anxiety     Arthrosis     Atrial fibrillation     Vazquez's esophagus 2000    Coronary artery disease     Depression     Fibromyalgia     Graves disease 1970    Heart murmur     Heart valve disease     Hyperlipidemia     Hypertension     Stroke        Past Surgical History:   Procedure Laterality Date    BRONCHOSCOPY Bilateral 10/6/2023    Procedure: BRONCHOSCOPY BIOPSY;  Surgeon: Jonah Driscoll MD;  Location: [facility] ENDOSCOPY;  Service: Pulmonary;  Laterality: Bilateral;  pre op: hemoptysis   post op:scant endobronchial blood,   PCP: Meme Tan APRN    CARDIAC CATHETERIZATION      CARPAL TUNNEL RELEASE Bilateral 2010    ESSURE TUBAL LIGATION Bilateral 1977    EYE SURGERY      HERNIA REPAIR  1992    JOINT REPLACEMENT      REPLACEMENT TOTAL KNEE BILATERAL Bilateral 1998    TENDON REPAIR Right 2019    THYROIDECTOMY  1970       Outpatient Medications Marked as Taking  for the 11/15/23 encounter (Office Visit) with Jonnie Nolasco APRN   Medication Sig Dispense Refill    amLODIPine (NORVASC) 5 MG tablet Take 1 tablet by mouth Daily. 90 tablet 1    apixaban (ELIQUIS) 5 MG tablet tablet Take 1 tablet by mouth 2 (Two) Times a Day. 90 tablet 3    buPROPion (WELLBUTRIN) 75 MG tablet Take 1 tablet by mouth 2 (Two) Times a Day. 180 tablet 1    busPIRone (BUSPAR) 15 MG tablet Take 1 tablet by mouth 4 (Four) Times a Day. 360 tablet 3    escitalopram (LEXAPRO) 20 MG tablet Take 1 tablet by mouth Daily. 30 tablet 0    HYDROcodone-acetaminophen (NORCO)  MG per tablet Take 1 tablet by mouth Every 4 (Four) Hours As Needed.      levothyroxine (SYNTHROID, LEVOTHROID) 112 MCG tablet Take 1 tablet by mouth Daily. 90 tablet 1    metoprolol succinate XL (TOPROL-XL) 50 MG 24 hr tablet Take 1 tablet by mouth Daily. 90 tablet 3    naloxone (NARCAN) 4 MG/0.1ML nasal spray 1 spray into the nostril(s) as directed by provider As Needed (opiod overdose). CALL 911. SPR CONTENTS OF ONE SPRAYER (0.1ML) INTO ONE NOSTRIL. REPEAT IN 2-3 MIN IF SYMPTOMS OF OPIOID EMERGENCY PERSIST, ALTERNATE NOSTRILS      nystatin (MYCOSTATIN) 798968 UNIT/GM powder Apply  topically to the appropriate area as directed 4 (Four) Times a Day. 60 g 5    ondansetron ODT (ZOFRAN-ODT) 4 MG disintegrating tablet Place 1 tablet on the tongue Every 8 (Eight) Hours As Needed for Nausea or Vomiting. 90 tablet 5    rosuvastatin (Crestor) 10 MG tablet Take 1 tablet by mouth Daily. 90 tablet 1    traZODone (DESYREL) 100 MG tablet Take 1 tablet by mouth Every Night. 90 tablet 1       Allergies   Allergen Reactions    Levaquin [Levofloxacin] Other (See Comments)     Spasms    Aripiprazole Unknown - High Severity    Codeine Dizziness     Other reaction(s): dizziness      Morphine Other (See Comments)       Social History     Socioeconomic History    Marital status:    Tobacco Use    Smoking status: Never     Passive exposure: Never  "   Smokeless tobacco: Never   Vaping Use    Vaping Use: Never used   Substance and Sexual Activity    Alcohol use: Never    Drug use: Never    Sexual activity: Yes       Review of Systems   Constitutional:  Negative for fever and unexpected weight change.   HENT:  Negative for trouble swallowing.    Respiratory:  Negative for shortness of breath.    Cardiovascular:  Negative for chest pain.   Gastrointestinal:  Negative for abdominal pain and anal bleeding.       Objective     Vitals:    11/15/23 1322   BP: 124/74   Pulse: 70   Temp: 97 °F (36.1 °C)   SpO2: 97%   Weight: 64.4 kg (142 lb)   Height: 172.7 cm (68\")     Body mass index is 21.59 kg/m².    Physical Exam  Constitutional:       Appearance: Normal appearance. She is well-developed.   Eyes:      General: No scleral icterus.  Cardiovascular:      Heart sounds: Normal heart sounds. No murmur heard.  Pulmonary:      Effort: Pulmonary effort is normal.   Abdominal:      General: Bowel sounds are normal. There is no distension.      Palpations: Abdomen is soft.      Tenderness: There is no abdominal tenderness. There is no guarding.   Skin:     General: Skin is warm and dry.      Coloration: Skin is not jaundiced.   Neurological:      Mental Status: She is alert.   Psychiatric:         Behavior: Behavior is cooperative.         Imaging Results (Most Recent)       None            Body mass index is 21.59 kg/m².    Assessment & Plan     Diagnoses and all orders for this visit:    1. Abnormal CT scan (Primary)  -     Case Request; Standing  -     Implement Anesthesia Orders Day of Procedure; Standing  -     Obtain Informed Consent; Standing  -     Case Request    2. Anticoagulated    3. History of colon polyps         ESOPHAGOGASTRODUODENOSCOPY WITH ANESTHESIA (N/A)        Due to previous history of colon polyps she needs colonoscopy evaluation.  In light of abnormality to gastric antrum and recent pneumonia I do not recommend having procedures on same day.  This " explained to Nichole Rinaldi and she verbalized understanding.  We will discuss timing of colonoscopy after EGD.      Discussed abnormality to CT scan being scar tissue v other underlying cause that cannot be detected without EGD.      Patient is to continue all blood pressure and cardiac medications prior to procedure and has been advised to take medications morning of procedure   Pt verbalized understanding     Advised pt to stop use of NSAIDs, Fish Oil, and MV 5 days prior to procedure, per Dr Elizalde protocol.  Tylenol based products are ok to take.  Pt verbalized understanding.    The risks of the endoscopy were discussed in detail.  We discussed the risks of perforation (one out of 2279-8337, riskier with dilation), bleeding (one out of 500), and the rare risks of infection, adverse reaction to anesthesia, respiratory failure, cardiac failure including MI and adverse reaction to medications, etc.  We discussed consequences that could occur if a risk were to develop such as the need for hospitalization, blood transfusion, surgical intervention, medications, pain and disability and death.  Alternatives include not doing anything, or pursuing an UGI series which only offers a diagnosis with potential less accuracy compared to EGD.  The patient verbalizes understanding and agrees to proceed.    Patient is to hold their anticoagulation medication per the direction of their prescribing provider,  Dr Fox. This is to prevent any risk or complication from bleeding intra and post procedure. If they develop bleeding post procedure they are to go the emergency department for further evaluation and treatment immediately.  We will obtain clearance from prescribing provider requesting Eliquis will need to be held x 2 days prior to procedure.         Jonnie Nolasco, APRN  11/15/23          There are no Patient Instructions on file for this visit.

## 2023-11-14 NOTE — TELEPHONE ENCOUNTER
----- Message from ABHAY Joiner sent at 11/13/2023  9:47 PM CST -----  Please let the patient know that her CT is negative for any fracture. There is arthritis noted. Recommend Tylenol arthritis as she cannot have NSAIDs due to being on blood thinners. Recommend rest as well as alternating ice and heat as needed.

## 2023-11-15 ENCOUNTER — OFFICE VISIT (OUTPATIENT)
Dept: GASTROENTEROLOGY | Facility: CLINIC | Age: 78
End: 2023-11-15
Payer: MEDICARE

## 2023-11-15 VITALS
HEART RATE: 70 BPM | WEIGHT: 142 LBS | HEIGHT: 68 IN | TEMPERATURE: 97 F | BODY MASS INDEX: 21.52 KG/M2 | OXYGEN SATURATION: 97 % | SYSTOLIC BLOOD PRESSURE: 124 MMHG | DIASTOLIC BLOOD PRESSURE: 74 MMHG

## 2023-11-15 DIAGNOSIS — R93.89 ABNORMAL CT SCAN: Primary | ICD-10-CM

## 2023-11-15 DIAGNOSIS — Z86.010 HISTORY OF COLON POLYPS: ICD-10-CM

## 2023-11-15 DIAGNOSIS — Z79.01 ANTICOAGULATED: ICD-10-CM

## 2023-11-17 PROBLEM — R93.89 ABNORMAL CT SCAN: Status: ACTIVE | Noted: 2023-11-15

## 2023-11-17 LAB
MYCOBACTERIUM SPEC CULT: NORMAL
NIGHT BLUE STAIN TISS: NORMAL
NIGHT BLUE STAIN TISS: NORMAL

## 2023-11-20 ENCOUNTER — TELEPHONE (OUTPATIENT)
Dept: GASTROENTEROLOGY | Facility: CLINIC | Age: 78
End: 2023-11-20
Payer: MEDICARE

## 2023-11-20 NOTE — TELEPHONE ENCOUNTER
Spoke with patient today - Canceled EGD with Dr. Elizalde Did not r/s   Sent letter to Meme Tan APRN

## 2023-11-21 LAB — FUNGUS WND CULT: ABNORMAL

## 2023-11-22 ENCOUNTER — TELEPHONE (OUTPATIENT)
Dept: CARDIOLOGY | Facility: CLINIC | Age: 78
End: 2023-11-22

## 2023-11-22 NOTE — TELEPHONE ENCOUNTER
Caller: Nichole Rinaldi    Relationship to patient: Self    Best call back number: [phone] (home)        Type of visit: FOLLOW UP         If rescheduling, when is the original appointment: 11/21/23     Additional notes:RESCHEDULED PER PATIENT TO 01/04/24.

## 2023-12-12 ENCOUNTER — OFFICE VISIT (OUTPATIENT)
Dept: FAMILY MEDICINE CLINIC | Facility: CLINIC | Age: 78
End: 2023-12-12
Payer: MEDICARE

## 2023-12-12 VITALS
OXYGEN SATURATION: 96 % | HEART RATE: 67 BPM | BODY MASS INDEX: 21.67 KG/M2 | WEIGHT: 143 LBS | SYSTOLIC BLOOD PRESSURE: 120 MMHG | HEIGHT: 68 IN | DIASTOLIC BLOOD PRESSURE: 71 MMHG

## 2023-12-12 DIAGNOSIS — J01.90 ACUTE SINUSITIS, RECURRENCE NOT SPECIFIED, UNSPECIFIED LOCATION: Primary | ICD-10-CM

## 2023-12-12 DIAGNOSIS — R05.1 ACUTE COUGH: ICD-10-CM

## 2023-12-12 PROCEDURE — 99213 OFFICE O/P EST LOW 20 MIN: CPT | Performed by: NURSE PRACTITIONER

## 2023-12-12 PROCEDURE — 3078F DIAST BP <80 MM HG: CPT | Performed by: NURSE PRACTITIONER

## 2023-12-12 PROCEDURE — 1159F MED LIST DOCD IN RCRD: CPT | Performed by: NURSE PRACTITIONER

## 2023-12-12 PROCEDURE — 1160F RVW MEDS BY RX/DR IN RCRD: CPT | Performed by: NURSE PRACTITIONER

## 2023-12-12 PROCEDURE — 3074F SYST BP LT 130 MM HG: CPT | Performed by: NURSE PRACTITIONER

## 2023-12-12 RX ORDER — CEFTRIAXONE 1 G/1
1 INJECTION, POWDER, FOR SOLUTION INTRAMUSCULAR; INTRAVENOUS EVERY 24 HOURS
Status: COMPLETED | OUTPATIENT
Start: 2023-12-12 | End: 2023-12-12

## 2023-12-12 RX ORDER — OMEPRAZOLE 40 MG/1
CAPSULE, DELAYED RELEASE ORAL
COMMUNITY
Start: 2023-11-28

## 2023-12-12 RX ORDER — CEFDINIR 300 MG/1
300 CAPSULE ORAL 2 TIMES DAILY
Qty: 20 CAPSULE | Refills: 0 | Status: SHIPPED | OUTPATIENT
Start: 2023-12-12

## 2023-12-12 RX ORDER — DEXTROMETHORPHAN HYDROBROMIDE AND PROMETHAZINE HYDROCHLORIDE 15; 6.25 MG/5ML; MG/5ML
5 SYRUP ORAL 4 TIMES DAILY PRN
Qty: 180 ML | Refills: 0 | Status: SHIPPED | OUTPATIENT
Start: 2023-12-12

## 2023-12-12 RX ORDER — DEXAMETHASONE SODIUM PHOSPHATE 4 MG/ML
8 INJECTION, SOLUTION INTRA-ARTICULAR; INTRALESIONAL; INTRAMUSCULAR; INTRAVENOUS; SOFT TISSUE ONCE
Status: COMPLETED | OUTPATIENT
Start: 2023-12-12 | End: 2023-12-12

## 2023-12-12 RX ADMIN — DEXAMETHASONE SODIUM PHOSPHATE 8 MG: 4 INJECTION, SOLUTION INTRA-ARTICULAR; INTRALESIONAL; INTRAMUSCULAR; INTRAVENOUS; SOFT TISSUE at 15:03

## 2023-12-12 RX ADMIN — CEFTRIAXONE 1 G: 1 INJECTION, POWDER, FOR SOLUTION INTRAMUSCULAR; INTRAVENOUS at 15:03

## 2023-12-12 NOTE — PROGRESS NOTES
After obtaining consent, and per orders of Meme Tan, injection of cefTRIAXone (ROCEPHIN) injection 1 g  and  dexAMETHasone (DECADRON) injection 8 mg given by BALDO Scruggs. Patient instructed to remain in clinic for 20 minutes afterwards, and to report any adverse reaction to me immediately.      Pt tolerated well

## 2023-12-12 NOTE — PROGRESS NOTES
"Chief Complaint  Cough, Sinusitis, Headache, URI, Sore Throat, Ear Fullness, and Nausea    Subjective    History of Present Illness      Patient presents to [facility] for   History of Present Illness  Pt presents today with Cough, Sinusitis, Headache,congestion , Sore Throat,  bilateral Ear Fullness, and Nausea. Ongoing for 4 days, tried tylenol and cough syrup to help relief some symptoms.   Pt states on 12/8 exposed to strep by family members       Review of Systems    I have reviewed and agree with the HPI information as above.  Meme Tan, APRN     Objective   Vital Signs:   /71   Pulse 67   Ht 172.7 cm (67.99\")   Wt 64.9 kg (143 lb)   SpO2 96%   BMI 21.75 kg/m²     BMI is within normal parameters. No other follow-up for BMI required.      Physical Exam  Vitals and nursing note reviewed.   Constitutional:       Appearance: Normal appearance.   HENT:      Head: Normocephalic and atraumatic.      Right Ear: Tympanic membrane is erythematous and bulging.      Left Ear: Tympanic membrane is erythematous and bulging.      Nose: Congestion present.      Mouth/Throat:      Pharynx: Posterior oropharyngeal erythema present.   Cardiovascular:      Rate and Rhythm: Normal rate and regular rhythm.      Pulses: Normal pulses.      Heart sounds: Normal heart sounds.   Pulmonary:      Effort: Pulmonary effort is normal.   Musculoskeletal:         General: Normal range of motion.      Cervical back: Normal range of motion and neck supple.   Skin:     General: Skin is warm and dry.   Neurological:      General: No focal deficit present.      Mental Status: She is alert and oriented to person, place, and time.   Psychiatric:         Mood and Affect: Mood normal.         Behavior: Behavior normal.          SANDER-7:      PHQ-2 Depression Screening  Little interest or pleasure in doing things?     Feeling down, depressed, or hopeless?     PHQ-2 Total Score       PHQ-9 Depression " Screening  Little interest or pleasure in doing things?     Feeling down, depressed, or hopeless?     Trouble falling or staying asleep, or sleeping too much?     Feeling tired or having little energy?     Poor appetite or overeating?     Feeling bad about yourself - or that you are a failure or have let yourself or your family down?     Trouble concentrating on things, such as reading the newspaper or watching television?     Moving or speaking so slowly that other people could have noticed? Or the opposite - being so fidgety or restless that you have been moving around a lot more than usual?     Thoughts that you would be better off dead, or of hurting yourself in some way?     PHQ-9 Total Score     If you checked off any problems, how difficult have these problems made it for you to do your work, take care of things at home, or get along with other people?        Result Review  Data Reviewed:                   Assessment and Plan      Diagnoses and all orders for this visit:    1. Acute sinusitis, recurrence not specified, unspecified location (Primary)  -     dexAMETHasone (DECADRON) injection 8 mg  -     cefTRIAXone (ROCEPHIN) injection 1 g  -     cefdinir (OMNICEF) 300 MG capsule; Take 1 capsule by mouth 2 (Two) Times a Day.  Dispense: 20 capsule; Refill: 0    2. Acute cough  -     promethazine-dextromethorphan (PROMETHAZINE-DM) 6.25-15 MG/5ML syrup; Take 5 mL by mouth 4 (Four) Times a Day As Needed for Cough.  Dispense: 180 mL; Refill: 0    Patient c/o cough and congestion. Denies fever. She states that she feels a rattle in her chest and is afraid to get pneumonia. Will treat as above.         Follow Up   Return if symptoms worsen or fail to improve.  Patient was given instructions and counseling regarding her condition or for health maintenance advice. Please see specific information pulled into the AVS if appropriate.

## 2024-01-02 RX ORDER — APIXABAN 5 MG/1
5 TABLET, FILM COATED ORAL 2 TIMES DAILY
Qty: 200 TABLET | Refills: 2 | OUTPATIENT
Start: 2024-01-02

## 2024-01-04 ENCOUNTER — TELEPHONE (OUTPATIENT)
Dept: CARDIOLOGY | Facility: CLINIC | Age: 79
End: 2024-01-04

## 2024-01-04 ENCOUNTER — OFFICE VISIT (OUTPATIENT)
Dept: CARDIOLOGY | Facility: CLINIC | Age: 79
End: 2024-01-04
Payer: MEDICARE

## 2024-01-04 VITALS
SYSTOLIC BLOOD PRESSURE: 110 MMHG | DIASTOLIC BLOOD PRESSURE: 84 MMHG | BODY MASS INDEX: 21.52 KG/M2 | HEART RATE: 57 BPM | OXYGEN SATURATION: 97 % | WEIGHT: 142 LBS | HEIGHT: 68 IN

## 2024-01-04 DIAGNOSIS — I10 ESSENTIAL HYPERTENSION: ICD-10-CM

## 2024-01-04 DIAGNOSIS — E78.49 OTHER HYPERLIPIDEMIA: ICD-10-CM

## 2024-01-04 DIAGNOSIS — I34.0 NON-RHEUMATIC MITRAL REGURGITATION: ICD-10-CM

## 2024-01-04 DIAGNOSIS — I47.10 PAROXYSMAL SVT (SUPRAVENTRICULAR TACHYCARDIA): ICD-10-CM

## 2024-01-04 DIAGNOSIS — I48.0 PAROXYSMAL ATRIAL FIBRILLATION: Primary | ICD-10-CM

## 2024-01-04 DIAGNOSIS — I51.7 LEFT VENTRICULAR HYPERTROPHY: ICD-10-CM

## 2024-01-04 NOTE — LETTER
January 4, 2024     ABHAY Gold  [address]  [address]  [facility] [place] [address]    Patient: Nichole Rinaldi   YOB: 1945   Date of Visit: 1/4/2024       Dear ABHAY Gold    Nichole Rinaldi was in my office today. Below is a copy of my note.    If you have questions, please do not hesitate to call me. I look forward to following Nichole along with you.         Sincerely,        Obie Fox MD        CC: No Recipients      Reason for Visit: cardiovascular follow up.    HPI:  Nichole Rinaldi is a 78 y.o. female is here today for follow-up.  She was seen in cardiology consultation on 9/7/2023 for assistance with management of paroxysmal atrial fibrillation.  She was started on Eliquis last visit and metoprolol was decreased down to 50 mg.  The lower dose of metoprolol seems to be working.  She is doing well today and not having any issues or complaints.  Blood pressure has been well controlled at home.      Previous Cardiac Testing and Procedures:  -Nuclear stress (6/24/2017) negative for ischemia, EF 73%  -Echo (11/28/2017) EF 55 to 60%, mild LA enlargement, moderate AI, moderate to severe MR  -Echo (6/18/2018) normal LV size and function, mild LVH, moderate LA enlargement, mild RA enlargement, mild to moderate MR and TR, mild PI, normal RVSP  -Echo (7/21/2023) EF > 70%, mild to moderate sigmoid septal hypertrophy, LVOT gradient 18 mmHg, normal RV size and function, mild LA enlargement, trace to mild MR and TR  -Holter monitor (6/7/2023) rare PACs and PVCs, multiple runs of SVT, symptoms correlated with sinus rhythm, PACs, and SVT     Lab data:  -Lipid panel (7/27/2023) total cholesterol 129, HDL 53, LDL 52, triglycerides 141  -BMP (7/27/2023) creatinine 0.75, potassium 4.3, sodium 139    Patient Active Problem List   Diagnosis   • Osteoarthritis   • Vazquez's esophagus   • Graves disease   • Bilateral hip pain   • Bradycardia   • Carpal tunnel syndrome on right   • Chronic  pain   • Syncope, near   • Depression   • Herniated disc   • Lumbar facet arthropathy   • Non-rheumatic mitral regurgitation   • Other fatigue   • Other hyperlipidemia   • Paroxysmal atrial fibrillation   • Trochanteric bursitis of both hips   • Fibromyalgia   • Obesity (BMI 30.0-34.9)   • Anxiety   • Essential hypertension   • Primary insomnia   • Gastroesophageal reflux disease without esophagitis   • Vitamin D deficiency   • 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   • Cerebellar stroke   • Paroxysmal SVT (supraventricular tachycardia)   • Hemoptysis   • Acute bronchitis   • Lung nodule   • Kidney mass   • Abnormal CT scan   • Left ventricular hypertrophy       Social History     Tobacco Use   • Smoking status: Never     Passive exposure: Never   • Smokeless tobacco: Never   Vaping Use   • Vaping Use: Never used   Substance Use Topics   • Alcohol use: Never   • Drug use: Never       Family History   Problem Relation Age of Onset   • No Known Problems Mother    • No Known Problems Father    • Colon cancer Neg Hx    • Colon polyps Neg Hx    • Esophageal cancer Neg Hx        The following portions of the patient's history were reviewed and updated as appropriate: allergies, current medications, past family history, past medical history, past social history, past surgical history, and problem list.      Current Outpatient Medications:   •  amLODIPine (NORVASC) 5 MG tablet, Take 1 tablet by mouth Daily., Disp: 90 tablet, Rfl: 1  •  apixaban (ELIQUIS) 5 MG tablet tablet, Take 1 tablet by mouth 2 (Two) Times a Day., Disp: 60 tablet, Rfl: 11  •  buPROPion (WELLBUTRIN) 75 MG tablet, Take 1 tablet by mouth 2 (Two) Times a Day., Disp: 180 tablet, Rfl: 1  •  busPIRone (BUSPAR) 15 MG tablet, Take 1 tablet by mouth 4 (Four) Times a Day., Disp: 360 tablet, Rfl: 3  •  cefdinir (OMNICEF) 300 MG capsule, Take 1 capsule by mouth 2 (Two) Times a Day., Disp: 20 capsule, Rfl: 0  •  escitalopram (LEXAPRO) 20 MG tablet,  "Take 1 tablet by mouth Daily., Disp: 30 tablet, Rfl: 0  •  HYDROcodone-acetaminophen (NORCO)  MG per tablet, Take 1 tablet by mouth Every 4 (Four) Hours As Needed., Disp: , Rfl:   •  levothyroxine (SYNTHROID, LEVOTHROID) 112 MCG tablet, Take 1 tablet by mouth Daily., Disp: 90 tablet, Rfl: 1  •  metoprolol succinate XL (TOPROL-XL) 50 MG 24 hr tablet, Take 1 tablet by mouth Daily., Disp: 90 tablet, Rfl: 3  •  naloxone (NARCAN) 4 MG/0.1ML nasal spray, 1 spray into the nostril(s) as directed by provider As Needed (opiod overdose). CALL 911. SPR CONTENTS OF ONE SPRAYER (0.1ML) INTO ONE NOSTRIL. REPEAT IN 2-3 MIN IF SYMPTOMS OF OPIOID EMERGENCY PERSIST, ALTERNATE NOSTRILS, Disp: , Rfl:   •  nystatin (MYCOSTATIN) 602053 UNIT/GM powder, Apply  topically to the appropriate area as directed 4 (Four) Times a Day., Disp: 60 g, Rfl: 5  •  omeprazole (priLOSEC) 40 MG capsule, , Disp: , Rfl:   •  ondansetron ODT (ZOFRAN-ODT) 4 MG disintegrating tablet, Place 1 tablet on the tongue Every 8 (Eight) Hours As Needed for Nausea or Vomiting., Disp: 90 tablet, Rfl: 5  •  promethazine-dextromethorphan (PROMETHAZINE-DM) 6.25-15 MG/5ML syrup, Take 5 mL by mouth 4 (Four) Times a Day As Needed for Cough., Disp: 180 mL, Rfl: 0  •  rosuvastatin (Crestor) 10 MG tablet, Take 1 tablet by mouth Daily., Disp: 90 tablet, Rfl: 1  •  traZODone (DESYREL) 100 MG tablet, Take 1 tablet by mouth Every Night., Disp: 90 tablet, Rfl: 1    Review of Systems   Constitutional: Negative for chills and fever.   Cardiovascular:  Negative for chest pain and paroxysmal nocturnal dyspnea.   Respiratory:  Negative for cough and shortness of breath.    Skin:  Negative for rash.   Gastrointestinal:  Negative for abdominal pain and heartburn.   Neurological:  Negative for dizziness and numbness.       Objective  /84   Pulse 57   Ht 172.7 cm (67.99\")   Wt 64.4 kg (142 lb)   SpO2 97%   BMI 21.60 kg/m²   Constitutional:       Appearance: Well-developed. "   HENT:      Head: Normocephalic and atraumatic.   Pulmonary:      Effort: Pulmonary effort is normal.      Breath sounds: Normal breath sounds.   Cardiovascular:      Normal rate. Regular rhythm.      Murmurs: There is no murmur.      No gallop.  No click.   Edema:     Peripheral edema absent.   Skin:     General: Skin is warm and dry.   Neurological:      Mental Status: Alert and oriented to person, place, and time.       Procedures      ICD-10-CM ICD-9-CM   1. Paroxysmal atrial fibrillation  I48.0 427.31   2. Paroxysmal SVT (supraventricular tachycardia)  I47.10 427.0   3. Non-rheumatic mitral regurgitation  I34.0 424.0   4. Left ventricular hypertrophy  I51.7 429.3   5. Essential hypertension  I10 401.9   6. Other hyperlipidemia  E78.49 272.4         Assessment/Plan:  1.  Paroxysmal atrial fibrillation: Regular rhythm on exam.  No significant palpitations.  Continue reduced dose of metoprolol and anticoagulation with Eliquis.     2.  Paroxysmal SVT: Multiple runs of nonsustained SVT on Holter monitor from 6/7/2023, most of which are short and benign, but some did correlate with symptoms.  No significant palpitations at this time.  If she becomes symptomatic in the future, antiarrhythmic medicine or referral to EP could be considered.     3.  Mitral regurgitation: Mild MR on echo from 7/21/2023.  Continue routine surveillance.     4.  Left ventricular hypertrophy:  Mild to moderate sigmoid septal hypertrophy on echo from 7/21/2023, which also demonstrated hyperdynamic LV function and mild LVOT gradient of 18 mmHg.  Not consistent with HOCM.  Encourage good hydration.     5.  Essential hypertension: Blood pressure is normal today and also has been reasonably well-controlled at home.  Continue metoprolol and amlodipine.     6.  Hyperlipidemia: Lipid panel from 7/27/2023 showed good control.  Continue rosuvastatin.

## 2024-01-04 NOTE — PROGRESS NOTES
Reason for Visit: cardiovascular follow up.    HPI:  Nichole Rinaldi is a 78 y.o. female is here today for follow-up.  She was seen in cardiology consultation on 9/7/2023 for assistance with management of paroxysmal atrial fibrillation.  She was started on Eliquis last visit and metoprolol was decreased down to 50 mg.  The lower dose of metoprolol seems to be working.  She is doing well today and not having any issues or complaints.  Blood pressure has been well controlled at home.      Previous Cardiac Testing and Procedures:  -Nuclear stress (6/24/2017) negative for ischemia, EF 73%  -Echo (11/28/2017) EF 55 to 60%, mild LA enlargement, moderate AI, moderate to severe MR  -Echo (6/18/2018) normal LV size and function, mild LVH, moderate LA enlargement, mild RA enlargement, mild to moderate MR and TR, mild PI, normal RVSP  -Echo (7/21/2023) EF > 70%, mild to moderate sigmoid septal hypertrophy, LVOT gradient 18 mmHg, normal RV size and function, mild LA enlargement, trace to mild MR and TR  -Holter monitor (6/7/2023) rare PACs and PVCs, multiple runs of SVT, symptoms correlated with sinus rhythm, PACs, and SVT     Lab data:  -Lipid panel (7/27/2023) total cholesterol 129, HDL 53, LDL 52, triglycerides 141  -BMP (7/27/2023) creatinine 0.75, potassium 4.3, sodium 139    Patient Active Problem List   Diagnosis    Osteoarthritis    Vazquez's esophagus    Graves disease    Bilateral hip pain    Bradycardia    Carpal tunnel syndrome on right    Chronic pain    Syncope, near    Depression    Herniated disc    Lumbar facet arthropathy    Non-rheumatic mitral regurgitation    Other fatigue    Other hyperlipidemia    Paroxysmal atrial fibrillation    Trochanteric bursitis of both hips    Fibromyalgia    Obesity (BMI 30.0-34.9)    Anxiety    Essential hypertension    Primary insomnia    Gastroesophageal reflux disease without esophagitis    Vitamin D deficiency    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     Cerebellar stroke    Paroxysmal SVT (supraventricular tachycardia)    Hemoptysis    Acute bronchitis    Lung nodule    Kidney mass    Abnormal CT scan    Left ventricular hypertrophy       Social History     Tobacco Use    Smoking status: Never     Passive exposure: Never    Smokeless tobacco: Never   Vaping Use    Vaping Use: Never used   Substance Use Topics    Alcohol use: Never    Drug use: Never       Family History   Problem Relation Age of Onset    No Known Problems Mother     No Known Problems Father     Colon cancer Neg Hx     Colon polyps Neg Hx     Esophageal cancer Neg Hx        The following portions of the patient's history were reviewed and updated as appropriate: allergies, current medications, past family history, past medical history, past social history, past surgical history, and problem list.      Current Outpatient Medications:     amLODIPine (NORVASC) 5 MG tablet, Take 1 tablet by mouth Daily., Disp: 90 tablet, Rfl: 1    apixaban (ELIQUIS) 5 MG tablet tablet, Take 1 tablet by mouth 2 (Two) Times a Day., Disp: 60 tablet, Rfl: 11    buPROPion (WELLBUTRIN) 75 MG tablet, Take 1 tablet by mouth 2 (Two) Times a Day., Disp: 180 tablet, Rfl: 1    busPIRone (BUSPAR) 15 MG tablet, Take 1 tablet by mouth 4 (Four) Times a Day., Disp: 360 tablet, Rfl: 3    cefdinir (OMNICEF) 300 MG capsule, Take 1 capsule by mouth 2 (Two) Times a Day., Disp: 20 capsule, Rfl: 0    escitalopram (LEXAPRO) 20 MG tablet, Take 1 tablet by mouth Daily., Disp: 30 tablet, Rfl: 0    HYDROcodone-acetaminophen (NORCO)  MG per tablet, Take 1 tablet by mouth Every 4 (Four) Hours As Needed., Disp: , Rfl:     levothyroxine (SYNTHROID, LEVOTHROID) 112 MCG tablet, Take 1 tablet by mouth Daily., Disp: 90 tablet, Rfl: 1    metoprolol succinate XL (TOPROL-XL) 50 MG 24 hr tablet, Take 1 tablet by mouth Daily., Disp: 90 tablet, Rfl: 3    naloxone (NARCAN) 4 MG/0.1ML nasal spray, 1 spray into the nostril(s) as directed by provider As Needed  "(opiod overdose). CALL 911. SPR CONTENTS OF ONE SPRAYER (0.1ML) INTO ONE NOSTRIL. REPEAT IN 2-3 MIN IF SYMPTOMS OF OPIOID EMERGENCY PERSIST, ALTERNATE NOSTRILS, Disp: , Rfl:     nystatin (MYCOSTATIN) 768693 UNIT/GM powder, Apply  topically to the appropriate area as directed 4 (Four) Times a Day., Disp: 60 g, Rfl: 5    omeprazole (priLOSEC) 40 MG capsule, , Disp: , Rfl:     ondansetron ODT (ZOFRAN-ODT) 4 MG disintegrating tablet, Place 1 tablet on the tongue Every 8 (Eight) Hours As Needed for Nausea or Vomiting., Disp: 90 tablet, Rfl: 5    promethazine-dextromethorphan (PROMETHAZINE-DM) 6.25-15 MG/5ML syrup, Take 5 mL by mouth 4 (Four) Times a Day As Needed for Cough., Disp: 180 mL, Rfl: 0    rosuvastatin (Crestor) 10 MG tablet, Take 1 tablet by mouth Daily., Disp: 90 tablet, Rfl: 1    traZODone (DESYREL) 100 MG tablet, Take 1 tablet by mouth Every Night., Disp: 90 tablet, Rfl: 1    Review of Systems   Constitutional: Negative for chills and fever.   Cardiovascular:  Negative for chest pain and paroxysmal nocturnal dyspnea.   Respiratory:  Negative for cough and shortness of breath.    Skin:  Negative for rash.   Gastrointestinal:  Negative for abdominal pain and heartburn.   Neurological:  Negative for dizziness and numbness.       Objective   /84   Pulse 57   Ht 172.7 cm (67.99\")   Wt 64.4 kg (142 lb)   SpO2 97%   BMI 21.60 kg/m²   Constitutional:       Appearance: Well-developed.   HENT:      Head: Normocephalic and atraumatic.   Pulmonary:      Effort: Pulmonary effort is normal.      Breath sounds: Normal breath sounds.   Cardiovascular:      Normal rate. Regular rhythm.      Murmurs: There is no murmur.      No gallop.  No click.   Edema:     Peripheral edema absent.   Skin:     General: Skin is warm and dry.   Neurological:      Mental Status: Alert and oriented to person, place, and time.       Procedures      ICD-10-CM ICD-9-CM   1. Paroxysmal atrial fibrillation  I48.0 427.31   2. Paroxysmal " SVT (supraventricular tachycardia)  I47.10 427.0   3. Non-rheumatic mitral regurgitation  I34.0 424.0   4. Left ventricular hypertrophy  I51.7 429.3   5. Essential hypertension  I10 401.9   6. Other hyperlipidemia  E78.49 272.4         Assessment/Plan:  1.  Paroxysmal atrial fibrillation: Regular rhythm on exam.  No significant palpitations.  Continue reduced dose of metoprolol and anticoagulation with Eliquis.     2.  Paroxysmal SVT: Multiple runs of nonsustained SVT on Holter monitor from 6/7/2023, most of which are short and benign, but some did correlate with symptoms.  No significant palpitations at this time.  If she becomes symptomatic in the future, antiarrhythmic medicine or referral to EP could be considered.     3.  Mitral regurgitation: Mild MR on echo from 7/21/2023.  Continue routine surveillance.     4.  Left ventricular hypertrophy:  Mild to moderate sigmoid septal hypertrophy on echo from 7/21/2023, which also demonstrated hyperdynamic LV function and mild LVOT gradient of 18 mmHg.  Not consistent with HOCM.  Encourage good hydration.     5.  Essential hypertension: Blood pressure is normal today and also has been reasonably well-controlled at home.  Continue metoprolol and amlodipine.     6.  Hyperlipidemia: Lipid panel from 7/27/2023 showed good control.  Continue rosuvastatin.

## 2024-01-05 ENCOUNTER — TELEPHONE (OUTPATIENT)
Dept: FAMILY MEDICINE CLINIC | Facility: CLINIC | Age: 79
End: 2024-01-05
Payer: MEDICARE

## 2024-01-05 DIAGNOSIS — R91.1 LUNG NODULE: Primary | ICD-10-CM

## 2024-01-05 NOTE — TELEPHONE ENCOUNTER
Y[facility] from [facility] stating that the patient needs a referral there for insurance purposes. This ok?

## 2024-02-01 ENCOUNTER — OFFICE VISIT (OUTPATIENT)
Dept: PULMONOLOGY | Facility: CLINIC | Age: 79
End: 2024-02-01
Payer: MEDICARE

## 2024-02-01 VITALS
OXYGEN SATURATION: 95 % | SYSTOLIC BLOOD PRESSURE: 130 MMHG | BODY MASS INDEX: 29.81 KG/M2 | HEIGHT: 58 IN | HEART RATE: 68 BPM | DIASTOLIC BLOOD PRESSURE: 78 MMHG | WEIGHT: 142 LBS

## 2024-02-01 DIAGNOSIS — Z23 NEED FOR VACCINATION AGAINST STREPTOCOCCUS PNEUMONIAE: ICD-10-CM

## 2024-02-01 DIAGNOSIS — R04.2 HEMOPTYSIS: Primary | ICD-10-CM

## 2024-02-01 DIAGNOSIS — Z29.11 NEED FOR PROPHYLACTIC VACCINATION AND INOCULATION AGAINST RESPIRATORY SYNCYTIAL VIRUS (RSV): ICD-10-CM

## 2024-02-01 NOTE — PROCEDURES
Spirometry with Diffusion Capacity & Lung Volumes    Performed by: Emily Bishop, RRT  Authorized by: Jonah Driscoll MD     Pre Drug % Predicted    FVC: 102%   FEV1: 107%   FEF 25-75%: 125%   FEV1/FVC: 81%   T%   RV: 83%   DLCO: 69%   D/VAsb: 70%    Interpretation   Spirometry   Spirometry shows normal results. midflow is normal.  Lung Volume Measurements  Measurements show normal results.   Diffusion Capacity  The patient's diffusion capacity is normal.  Diffusion capacity is normal when corrected for alveolar volume.   Electronically signed by Jonah Driscoll MD, 2024, 17:15 CST

## 2024-02-01 NOTE — PROGRESS NOTES
"Background:  Pt w hx hemoptysis, presumed pneumonia   Chief Complaint  Coughing Up Blood    Subjective    History of Present Illness     Nichole Rinaldi is here for follow up with [facility].  History of Present Illness  She returns feeling well with no complaints.  No hemoptysis or cough.  Follow up ct after her hospitalization showed clearance of the lung infiltrate.     Tobacco Use: Low Risk  (2/1/2024)    Patient History     Smoking Tobacco Use: Never     Smokeless Tobacco Use: Never     Passive Exposure: Never      Current Outpatient Medications   Medication Instructions    amLODIPine (NORVASC) 5 mg, Oral, Daily    apixaban (ELIQUIS) 5 mg, Oral, 2 Times Daily    buPROPion (WELLBUTRIN) 75 mg, Oral, 2 Times Daily    busPIRone (BUSPAR) 15 mg, Oral, 4 Times Daily    cefdinir (OMNICEF) 300 mg, Oral, 2 Times Daily    escitalopram (LEXAPRO) 20 mg, Oral, Daily    HYDROcodone-acetaminophen (NORCO)  MG per tablet 1 tablet, Oral, Every 4 Hours PRN    levothyroxine (SYNTHROID, LEVOTHROID) 112 mcg, Oral, Daily    metoprolol succinate XL (TOPROL-XL) 50 mg, Oral, Daily    naloxone (NARCAN) 4 MG/0.1ML nasal spray 1 spray, Nasal, As Needed, CALL 911. SPR CONTENTS OF ONE SPRAYER (0.1ML) INTO ONE NOSTRIL. REPEAT IN 2-3 MIN IF SYMPTOMS OF OPIOID EMERGENCY PERSIST, ALTERNATE NOSTRILS    nystatin (MYCOSTATIN) 681091 UNIT/GM powder Topical, 4 Times Daily    omeprazole (priLOSEC) 40 MG capsule     ondansetron ODT (ZOFRAN-ODT) 4 mg, Translingual, Every 8 Hours PRN    promethazine-dextromethorphan (PROMETHAZINE-DM) 6.25-15 MG/5ML syrup 5 mL, Oral, 4 Times Daily PRN    rosuvastatin (CRESTOR) 10 mg, Oral, Daily    traZODone (DESYREL) 100 mg, Oral, Nightly      Objective     Vital Signs:   /78   Pulse 68   Ht 147.3 cm (58\")   Wt 64.4 kg (142 lb)   SpO2 95% Comment: RA  BMI 29.68 kg/m²   Physical Exam  Constitutional:       General: She is not in acute distress.     " Appearance: She is well-developed. She is not ill-appearing or toxic-appearing.   HENT:      Head: Atraumatic.   Eyes:      General: No scleral icterus.     Conjunctiva/sclera: Conjunctivae normal.   Cardiovascular:      Rate and Rhythm: Normal rate and regular rhythm.      Heart sounds: S1 normal and S2 normal.   Pulmonary:      Effort: Pulmonary effort is normal.      Breath sounds: Normal breath sounds.   Abdominal:      General: There is no distension.   Musculoskeletal:         General: No deformity.      Cervical back: Neck supple.   Skin:     Coloration: Skin is not pale.      Findings: No rash.   Neurological:      Mental Status: She is alert.        Result Review  Data Reviewed:  CT Chest Without Contrast Diagnostic (10/24/2023 12:49)    1. No acute lung disease.  2. Patchy groundglass infiltrate seen 3 weeks ago has resolved.    PFT Values          2/1/2024    15:00   Pre Drug PFT Results      FEV1 107   FEF 25-75% 125   FEV1/FVC 81   Other Tests PFT Results   TLC 95   RV 83   DLCO 69   D/VAsb 70                Assessment and Plan    Diagnoses and all orders for this visit:    1. Hemoptysis (Primary)  -     Spirometry with Diffusion Capacity & Lung Volumes    2. Need for prophylactic vaccination and inoculation against respiratory syncytial virus (RSV)  -     RSVPreF3 Vac Recomb Adjuvanted (AREXVY) 120 MCG/0.5ML reconstituted suspension injection; Inject 0.5 mL into the appropriate muscle as directed by prescriber 1 (One) Time for 1 dose.  Dispense: 1 each; Refill: 0    3. Need for vaccination against Streptococcus pneumoniae  -     Pneumococcal Conjugate Vaccine 20-Valent All    Hemoptysis and pneumonia is resolved  There does not appear to be additional occult pathology considering her improvement  We reviewed her PFT which is normal  Prevnar 20 today  Recommend rsv vaccine.    Follow Up   Return if symptoms worsen or fail to improve.  Patient was given instructions and counseling regarding her  condition or for health maintenance advice. Please see specific information pulled into the AVS if appropriate.    Electronically signed by Jonah Driscoll MD, 2/2/2024, 06:01 CST

## 2024-03-08 ENCOUNTER — OFFICE VISIT (OUTPATIENT)
Dept: INTERNAL MEDICINE | Facility: CLINIC | Age: 79
End: 2024-03-08
Payer: MEDICARE

## 2024-03-08 VITALS
BODY MASS INDEX: 29.72 KG/M2 | WEIGHT: 141.6 LBS | DIASTOLIC BLOOD PRESSURE: 64 MMHG | SYSTOLIC BLOOD PRESSURE: 128 MMHG | HEART RATE: 61 BPM | HEIGHT: 58 IN | OXYGEN SATURATION: 95 % | TEMPERATURE: 97.8 F

## 2024-03-08 DIAGNOSIS — E78.49 OTHER HYPERLIPIDEMIA: ICD-10-CM

## 2024-03-08 DIAGNOSIS — G89.29 OTHER CHRONIC PAIN: ICD-10-CM

## 2024-03-08 DIAGNOSIS — E05.00 GRAVES DISEASE: ICD-10-CM

## 2024-03-08 DIAGNOSIS — Z63.6 CAREGIVER STRESS: ICD-10-CM

## 2024-03-08 DIAGNOSIS — F51.01 PRIMARY INSOMNIA: ICD-10-CM

## 2024-03-08 DIAGNOSIS — F41.9 ANXIETY: ICD-10-CM

## 2024-03-08 DIAGNOSIS — I48.0 PAROXYSMAL ATRIAL FIBRILLATION: ICD-10-CM

## 2024-03-08 DIAGNOSIS — I10 ESSENTIAL HYPERTENSION: Primary | ICD-10-CM

## 2024-03-08 SDOH — SOCIAL STABILITY - SOCIAL INSECURITY: DEPENDENT RELATIVE NEEDING CARE AT HOME: Z63.6

## 2024-03-08 NOTE — PROGRESS NOTES
"      Chief Complaint  Establish Care    Subjective        Nichole Rinaldi presents to [facility] PRIMARY CARE    HPI    Patient here for the above problems.  See Assessment and Plan for further HPI components.      Review of Systems    Objective   Vital Signs:  /64 (BP Location: Left arm, Patient Position: Sitting, Cuff Size: Adult)   Pulse 61   Temp 97.8 °F (36.6 °C) (Temporal)   Ht 147.3 cm (57.99\")   Wt 64.2 kg (141 lb 9.6 oz)   SpO2 95%   BMI 29.60 kg/m²   Estimated body mass index is 29.6 kg/m² as calculated from the following:    Height as of this encounter: 147.3 cm (57.99\").    Weight as of this encounter: 64.2 kg (141 lb 9.6 oz).      Physical Exam  Vitals and nursing note reviewed.   Constitutional:       Appearance: She is not ill-appearing.   Eyes:      General: No scleral icterus.     Conjunctiva/sclera: Conjunctivae normal.   Cardiovascular:      Rate and Rhythm: Normal rate and regular rhythm.      Heart sounds: Murmur heard.   Pulmonary:      Effort: Pulmonary effort is normal. No respiratory distress.   Skin:     General: Skin is warm.      Coloration: Skin is not pale.   Neurological:      General: No focal deficit present.      Mental Status: She is alert and oriented to person, place, and time.   Psychiatric:         Mood and Affect: Mood normal.         Behavior: Behavior normal.                       Assessment and Plan   Diagnoses and all orders for this visit:    1. Essential hypertension (Primary)  -     CBC & Differential  -     Comprehensive Metabolic Panel  -     Urinalysis With Microscopic - Urine, Clean Catch  -     Lipid Panel  -     TSH Rfx On Abnormal To Free T4    2. Other hyperlipidemia  -     Lipid Panel    3. Graves disease  -     TSH Rfx On Abnormal To Free T4    4. Other chronic pain    5. Anxiety    6. Caregiver stress    7. Paroxysmal atrial fibrillation    Other orders  -     Microscopic Examination -      Patient presents today to establish " care.  I recently started taking care of her , and herself and her daughter have elected to move over to our practice.  Patient previously followed with Dr. Smith's practice.  Patient is here to follow-up on her chronic conditions.  She has no acute issues at the present time other than increased stress from her 's mental and physical health decline.  However she is facing this with a lot of Alexandra, and is taking it in stride.    Patient has hypertension.  BP is at goal.  The patient's BP goal is < 130/80.  Recommend ambulatory BP monitoring after patient has taken medication.  Recommend varying the times of the blood pressure readings.  Patient is currently on metoprolol, amlodipine.  Recommend we continue current regimen.    Continue to monitor.      Patient has paroxysmal atrial fibrillation.  Patient is currently on rate-controlling medications.  The patient is on apixaban for prevention of stroke.  Patient is currently in sinus rhythm.  Patient does follow with cardiology.  Recommend we continue current regimen.    Continue to monitor.    [id number] Score: 7 (3/16/2024  8:36 AM)       Total Risk Score (>5 Very High, 3-5 High, 2 Moderate, 1 Low): 3 (3/16/2024  8:37 AM)     No indication for dose adjustment, but will watch closely.  Patient weight 64 kg (cut off 60 kg).  She is also not quite 82 yo and Cr normal.    Patient has a history of hyperlipidemia.  Patient is on Crestor 10 mg.  Recommend taking this at night if she can remember.    Patient has a history of Graves' disease.  The patient subsequently has developed hypothyroidism and is on 112 mcg of Synthroid.  I recommend continuing this for now.    Patient has chronic pain syndrome and follows with pain management.  Patient is prescribed 10 mg Norco.  Patient has a prescription for Narcan.  Patient also receives intermittent injections.    Patient has a history of anxiety.  Patient takes buspirone and Wellbutrin.  Symptoms are currently  well-controlled.  Patient also takes Lexapro 20 mg.  She uses trazodone 100 mg to help with sleep at night.  This is related to insomnia and anxiety.      Result Review :  The following data was reviewed by: Juan A Lopez MD on 03/08/2024:  CMP          10/16/2023    02:12 11/13/2023    13:01 3/8/2024    14:12   CMP   Glucose 104   76    BUN 9   9    Creatinine 0.69  0.80  0.65    EGFR 89.5      Sodium 139   141    Potassium 4.2   4.6    Chloride 104   104    Calcium 8.8   8.9    Total Protein   6.2    Total Protein 6.2      Albumin 3.8   4.2    Globulin   2.0    Globulin 2.4      Total Bilirubin 0.2   0.3    Alkaline Phosphatase 97   79    AST (SGOT) 18   19    ALT (SGPT) 9   10    Albumin/Globulin Ratio 1.6      BUN/Creatinine Ratio 13.0   13.8    Anion Gap 8.0        CBC          10/6/2023    03:53 10/16/2023    02:12 3/8/2024    14:12   CBC   WBC 7.57  8.28  8.18    RBC 3.28  3.80  3.87    Hemoglobin 10.6  12.3  12.9    Hematocrit 33.1  37.6  38.9    .9  98.9  100.5    MCH 32.3  32.4  33.3    MCHC 32.0  32.7  33.2    RDW 13.2  12.9  12.8    Platelets 204  231  221      CBC w/diff          10/6/2023    03:53 10/16/2023    02:12 3/8/2024    14:12   CBC w/Diff   WBC 7.57  8.28  8.18    RBC 3.28  3.80  3.87    Hemoglobin 10.6  12.3  12.9    Hematocrit 33.1  37.6  38.9    .9  98.9  100.5    MCH 32.3  32.4  33.3    MCHC 32.0  32.7  33.2    RDW 13.2  12.9  12.8    Platelets 204  231  221    Neutrophil Rel % 44.8  51.0  57.6    Immature Granulocyte Rel % 0.3  0.2     Lymphocyte Rel % 43.3  36.4  32.6    Monocyte Rel % 10.2  10.4  8.1    Eosinophil Rel % 1.1  1.6  1.1    Basophil Rel % 0.3  0.4  0.4      Lipid Panel          7/21/2023    05:05 7/27/2023    14:32 3/8/2024    14:12   Lipid Panel   Total Cholesterol 132  129     Total Cholesterol   129    Triglycerides 67  141  87    HDL Cholesterol 58  53  64    VLDL Cholesterol 14  24  17    LDL Cholesterol  60  52  48    LDL/HDL Ratio 1.04  0.90        TSH          7/27/2023    14:32 3/8/2024    14:12   TSH   TSH 1.490  3.100      BMP          10/16/2023    02:12 11/13/2023    13:01 3/8/2024    14:12   BMP   BUN 9   9    Creatinine 0.69  0.80  0.65    Sodium 139   141    Potassium 4.2   4.6    Chloride 104   104    CO2 27.0   24.9    Calcium 8.8   8.9      A1C Last 3 Results          7/21/2023    05:05   HGBA1C Last 3 Results   Hemoglobin A1C 5.30        UA          7/27/2023    14:32 11/2/2023    11:20 3/8/2024    14:12   Urinalysis   Squamous Epithelial Cells, UA 0-2      Specific Gravity, UA <=1.005      Ketones, UA Negative  Negative     Blood, UA Negative   Negative    Leukocytes, UA Moderate (2+)  Trace  Negative    Nitrite, UA Negative   Negative    RBC, UA None Seen   0-2    WBC, UA 13-20      Bacteria, UA 1+   Comment             Results for orders placed during the hospital encounter of 10/04/23    Adult Transthoracic Echo Complete W/ Cont if Necessary Per Protocol    Interpretation Summary    Left ventricular ejection fraction appears to be 61 - 65%.    Left ventricular wall thickness is consistent with mild concentric hypertrophy.    Left ventricular diastolic function is consistent with (grade I) impaired relaxation.    Left atrial volume is moderately increased.    Mild aortic valve stenosis is present.    Estimated right ventricular systolic pressure from tricuspid regurgitation is normal (<35 mmHg).            BMI is >= 25 and <30. (Overweight) The following options were offered after discussion;: exercise counseling/recommendations and nutrition counseling/recommendations     Patient will be billed as a new patient as she was previously seen by Dr. Smith (pediatrician) and Meme Tan (Family Medicine APRN).  I am internal medicine which is a different taxonomy code.  Patient is new to this practice.        Follow Up   Return in about 8 months (around 10/24/2024), or if symptoms worsen or fail to improve, for Medicare  Wellness.  Patient was given instructions and counseling regarding her condition or for health maintenance advice. Please see specific information pulled into the AVS if appropriate.           ARLEN Lopez MD, FACP, FHM      Electronically signed by Juan A Lopez MD, 03/08/24, 5:08 PM CST.

## 2024-03-09 LAB
ALBUMIN SERPL-MCNC: 4.2 G/DL (ref 3.5–5.2)
ALBUMIN/GLOB SERPL: 2.1 G/DL
ALP SERPL-CCNC: 79 U/L (ref 39–117)
ALT SERPL-CCNC: 10 U/L (ref 1–33)
APPEARANCE UR: CLEAR
AST SERPL-CCNC: 19 U/L (ref 1–32)
BACTERIA #/AREA URNS HPF: NORMAL /HPF
BASOPHILS # BLD AUTO: 0.03 10*3/MM3 (ref 0–0.2)
BASOPHILS NFR BLD AUTO: 0.4 % (ref 0–1.5)
BILIRUB SERPL-MCNC: 0.3 MG/DL (ref 0–1.2)
BILIRUB UR QL STRIP: NEGATIVE
BUN SERPL-MCNC: 9 MG/DL (ref 8–23)
BUN/CREAT SERPL: 13.8 (ref 7–25)
CALCIUM SERPL-MCNC: 8.9 MG/DL (ref 8.6–10.5)
CASTS URNS MICRO: NORMAL
CHLORIDE SERPL-SCNC: 104 MMOL/L (ref 98–107)
CHOLEST SERPL-MCNC: 129 MG/DL (ref 0–200)
CO2 SERPL-SCNC: 24.9 MMOL/L (ref 22–29)
COLOR UR: YELLOW
CREAT SERPL-MCNC: 0.65 MG/DL (ref 0.57–1)
EGFRCR SERPLBLD CKD-EPI 2021: 90.8 ML/MIN/1.73
EOSINOPHIL # BLD AUTO: 0.09 10*3/MM3 (ref 0–0.4)
EOSINOPHIL NFR BLD AUTO: 1.1 % (ref 0.3–6.2)
EPI CELLS #/AREA URNS HPF: NORMAL /HPF
ERYTHROCYTE [DISTWIDTH] IN BLOOD BY AUTOMATED COUNT: 12.8 % (ref 12.3–15.4)
GLOBULIN SER CALC-MCNC: 2 GM/DL
GLUCOSE SERPL-MCNC: 76 MG/DL (ref 65–99)
GLUCOSE UR QL STRIP: NEGATIVE
HCT VFR BLD AUTO: 38.9 % (ref 34–46.6)
HDLC SERPL-MCNC: 64 MG/DL (ref 40–60)
HGB BLD-MCNC: 12.9 G/DL (ref 12–15.9)
HGB UR QL STRIP: NEGATIVE
IMM GRANULOCYTES # BLD AUTO: 0.02 10*3/MM3 (ref 0–0.05)
IMM GRANULOCYTES NFR BLD AUTO: 0.2 % (ref 0–0.5)
KETONES UR QL STRIP: NEGATIVE
LDLC SERPL CALC-MCNC: 48 MG/DL (ref 0–100)
LEUKOCYTE ESTERASE UR QL STRIP: NEGATIVE
LYMPHOCYTES # BLD AUTO: 2.67 10*3/MM3 (ref 0.7–3.1)
LYMPHOCYTES NFR BLD AUTO: 32.6 % (ref 19.6–45.3)
MCH RBC QN AUTO: 33.3 PG (ref 26.6–33)
MCHC RBC AUTO-ENTMCNC: 33.2 G/DL (ref 31.5–35.7)
MCV RBC AUTO: 100.5 FL (ref 79–97)
MONOCYTES # BLD AUTO: 0.66 10*3/MM3 (ref 0.1–0.9)
MONOCYTES NFR BLD AUTO: 8.1 % (ref 5–12)
NEUTROPHILS # BLD AUTO: 4.71 10*3/MM3 (ref 1.7–7)
NEUTROPHILS NFR BLD AUTO: 57.6 % (ref 42.7–76)
NITRITE UR QL STRIP: NEGATIVE
NRBC BLD AUTO-RTO: 0 /100 WBC (ref 0–0.2)
PH UR STRIP: 6.5 [PH] (ref 5–8)
PLATELET # BLD AUTO: 221 10*3/MM3 (ref 140–450)
POTASSIUM SERPL-SCNC: 4.6 MMOL/L (ref 3.5–5.2)
PROT SERPL-MCNC: 6.2 G/DL (ref 6–8.5)
PROT UR QL STRIP: NEGATIVE
RBC # BLD AUTO: 3.87 10*6/MM3 (ref 3.77–5.28)
RBC #/AREA URNS HPF: NORMAL /HPF
SODIUM SERPL-SCNC: 141 MMOL/L (ref 136–145)
SP GR UR STRIP: 1.01 (ref 1–1.03)
TRIGL SERPL-MCNC: 87 MG/DL (ref 0–150)
TSH SERPL DL<=0.005 MIU/L-ACNC: 3.1 UIU/ML (ref 0.27–4.2)
UROBILINOGEN UR STRIP-MCNC: NORMAL MG/DL
VLDLC SERPL CALC-MCNC: 17 MG/DL (ref 5–40)
WBC # BLD AUTO: 8.18 10*3/MM3 (ref 3.4–10.8)
WBC #/AREA URNS HPF: NORMAL /HPF

## 2024-03-14 DIAGNOSIS — I10 PRIMARY HYPERTENSION: ICD-10-CM

## 2024-03-14 DIAGNOSIS — F51.01 PRIMARY INSOMNIA: ICD-10-CM

## 2024-03-14 DIAGNOSIS — F41.9 ANXIETY: ICD-10-CM

## 2024-03-14 DIAGNOSIS — E05.00 GRAVES DISEASE: ICD-10-CM

## 2024-03-14 NOTE — TELEPHONE ENCOUNTER
Caller: Nichole Rinaldi    Relationship: Self    Best call back number: [phone]     Requested Prescriptions:   Requested Prescriptions     Pending Prescriptions Disp Refills    buPROPion (WELLBUTRIN) 75 MG tablet 180 tablet 1     Sig: Take 1 tablet by mouth 2 (Two) Times a Day.    metoprolol succinate XL (TOPROL-XL) 50 MG 24 hr tablet 90 tablet 3     Sig: Take 1 tablet by mouth Daily.    apixaban (ELIQUIS) 5 MG tablet tablet 60 tablet 11     Sig: Take 1 tablet by mouth 2 (Two) Times a Day.    levothyroxine (SYNTHROID, LEVOTHROID) 112 MCG tablet 90 tablet 1     Sig: Take 1 tablet by mouth Daily.    amLODIPine (NORVASC) 5 MG tablet 90 tablet 1     Sig: Take 1 tablet by mouth Daily.    traZODone (DESYREL) 100 MG tablet 90 tablet 1     Sig: Take 1 tablet by mouth Every Night.    busPIRone (BUSPAR) 15 MG tablet 360 tablet 3     Sig: Take 1 tablet by mouth 4 (Four) Times a Day.        Pharmacy where request should be sent: [facility]/PHARMACY #[id number] - [address] [phone] PH - [phone] FX     Last office visit with prescribing clinician: 3/8/2024   Last telemedicine visit with prescribing clinician: Visit date not found   Next office visit with prescribing clinician: 10/24/2024     Does the patient have less than a 3 day supply:  [x] Yes  [] No    Would you like a call back once the refill request has been completed: [x] Yes [] No    If the office needs to give you a call back, can they leave a voicemail: [x] Yes [] No    Gerda Chan Rep   03/14/24 15:22 CDT

## 2024-03-16 PROBLEM — Z63.6 CAREGIVER STRESS: Status: ACTIVE | Noted: 2024-03-16

## 2024-03-16 PROBLEM — G92.8 TOXIC METABOLIC ENCEPHALOPATHY: Status: RESOLVED | Noted: 2023-07-21 | Resolved: 2024-03-16

## 2024-03-18 RX ORDER — BUSPIRONE HYDROCHLORIDE 15 MG/1
15 TABLET ORAL 4 TIMES DAILY
Qty: 360 TABLET | Refills: 3 | Status: SHIPPED | OUTPATIENT
Start: 2024-03-18

## 2024-03-18 RX ORDER — METOPROLOL SUCCINATE 50 MG/1
50 TABLET, EXTENDED RELEASE ORAL DAILY
Qty: 90 TABLET | Refills: 3 | Status: SHIPPED | OUTPATIENT
Start: 2024-03-18

## 2024-03-18 RX ORDER — TRAZODONE HYDROCHLORIDE 100 MG/1
100 TABLET ORAL NIGHTLY
Qty: 90 TABLET | Refills: 1 | Status: SHIPPED | OUTPATIENT
Start: 2024-03-18

## 2024-03-18 RX ORDER — BUPROPION HYDROCHLORIDE 75 MG/1
75 TABLET ORAL 2 TIMES DAILY
Qty: 180 TABLET | Refills: 1 | Status: SHIPPED | OUTPATIENT
Start: 2024-03-18

## 2024-03-18 RX ORDER — AMLODIPINE BESYLATE 5 MG/1
5 TABLET ORAL DAILY
Qty: 90 TABLET | Refills: 1 | Status: SHIPPED | OUTPATIENT
Start: 2024-03-18

## 2024-03-18 RX ORDER — LEVOTHYROXINE SODIUM 112 UG/1
112 TABLET ORAL DAILY
Qty: 90 TABLET | Refills: 1 | Status: SHIPPED | OUTPATIENT
Start: 2024-03-18

## 2024-05-14 RX ORDER — OMEPRAZOLE 40 MG/1
40 CAPSULE, DELAYED RELEASE ORAL DAILY
Qty: 90 CAPSULE | Refills: 1 | Status: SHIPPED | OUTPATIENT
Start: 2024-05-14

## 2024-06-13 ENCOUNTER — TELEPHONE (OUTPATIENT)
Dept: INTERNAL MEDICINE | Facility: CLINIC | Age: 79
End: 2024-06-13

## 2024-06-13 NOTE — TELEPHONE ENCOUNTER
Caller: Nichole Rinaldi    Relationship: Self    Best call back number: [phone]     What is the best time to reach you: ANY    Who are you requesting to speak with (clinical staff, provider,  specific staff member): CLINICAL    Do you know the name of the person who called: SELF    What was the call regarding: WANTS TO KNOW IF THERE IS ANY COUPONS FOR ELIQUIS IN THE OFFICE     Is it okay if the provider responds through MyChart: NO CALL BACK

## 2024-06-17 NOTE — TELEPHONE ENCOUNTER
Called pt and information given.  She will stop by for written information for the 2 [facility] pharmacies that we have had other patients deal with and we can give her 2 weeks of samples as well.  She currently has enough medication for 3 weeks.

## 2024-07-05 ENCOUNTER — OFFICE VISIT (OUTPATIENT)
Dept: INTERNAL MEDICINE | Facility: CLINIC | Age: 79
End: 2024-07-05
Payer: MEDICARE

## 2024-07-05 ENCOUNTER — EXTERNAL PBMM DATA (OUTPATIENT)
Dept: PHARMACY | Facility: OTHER | Age: 79
End: 2024-07-05
Payer: MEDICARE

## 2024-07-05 VITALS
SYSTOLIC BLOOD PRESSURE: 110 MMHG | WEIGHT: 142 LBS | HEART RATE: 73 BPM | BODY MASS INDEX: 29.81 KG/M2 | TEMPERATURE: 96.7 F | HEIGHT: 58 IN | OXYGEN SATURATION: 95 % | DIASTOLIC BLOOD PRESSURE: 62 MMHG

## 2024-07-05 DIAGNOSIS — J06.9 UPPER RESPIRATORY TRACT INFECTION, UNSPECIFIED TYPE: Primary | ICD-10-CM

## 2024-07-05 LAB
EXPIRATION DATE: NORMAL
EXPIRATION DATE: NORMAL
FLUAV AG UPPER RESP QL IA.RAPID: NOT DETECTED
FLUBV AG UPPER RESP QL IA.RAPID: NOT DETECTED
INTERNAL CONTROL: NORMAL
INTERNAL CONTROL: NORMAL
Lab: NORMAL
Lab: NORMAL
S PYO AG THROAT QL: NEGATIVE
SARS-COV-2 AG UPPER RESP QL IA.RAPID: NOT DETECTED

## 2024-07-05 PROCEDURE — 87428 SARSCOV & INF VIR A&B AG IA: CPT

## 2024-07-05 PROCEDURE — 3074F SYST BP LT 130 MM HG: CPT

## 2024-07-05 PROCEDURE — 99214 OFFICE O/P EST MOD 30 MIN: CPT

## 2024-07-05 PROCEDURE — 1160F RVW MEDS BY RX/DR IN RCRD: CPT

## 2024-07-05 PROCEDURE — 87880 STREP A ASSAY W/OPTIC: CPT

## 2024-07-05 PROCEDURE — 1159F MED LIST DOCD IN RCRD: CPT

## 2024-07-05 PROCEDURE — 3078F DIAST BP <80 MM HG: CPT

## 2024-07-05 PROCEDURE — 1125F AMNT PAIN NOTED PAIN PRSNT: CPT

## 2024-07-05 PROCEDURE — 96372 THER/PROPH/DIAG INJ SC/IM: CPT

## 2024-07-05 RX ORDER — OXYCODONE HYDROCHLORIDE 15 MG/1
15 TABLET ORAL EVERY 6 HOURS PRN
COMMUNITY
Start: 2024-06-14

## 2024-07-05 RX ORDER — TRIAMCINOLONE ACETONIDE 40 MG/ML
40 INJECTION, SUSPENSION INTRA-ARTICULAR; INTRAMUSCULAR ONCE
Status: COMPLETED | OUTPATIENT
Start: 2024-07-05 | End: 2024-07-05

## 2024-07-05 RX ORDER — METHYLPREDNISOLONE 4 MG/1
TABLET ORAL
Qty: 21 TABLET | Refills: 0 | Status: SHIPPED | OUTPATIENT
Start: 2024-07-05

## 2024-07-05 RX ORDER — AZITHROMYCIN 250 MG/1
250 TABLET, FILM COATED ORAL TAKE AS DIRECTED
Qty: 6 TABLET | Refills: 0 | Status: SHIPPED | OUTPATIENT
Start: 2024-07-05

## 2024-07-05 RX ORDER — BENZONATATE 100 MG/1
100 CAPSULE ORAL 3 TIMES DAILY PRN
Qty: 30 CAPSULE | Refills: 0 | Status: SHIPPED | OUTPATIENT
Start: 2024-07-05

## 2024-07-05 RX ADMIN — TRIAMCINOLONE ACETONIDE 40 MG: 40 INJECTION, SUSPENSION INTRA-ARTICULAR; INTRAMUSCULAR at 16:36

## 2024-07-05 NOTE — PROGRESS NOTES
Subjective     Chief Complaint:  Cough, nasal congestion, headache, fatigue, sore throat    HPI:  Patient presents today with complaints of cough, nasal congestion, headache, fatigue, and sore throat. Please see assessment and plan below.    Past Medical History:   Past Medical History:   Diagnosis Date    Anxiety     Arthrosis     Atrial fibrillation     Vazquez's esophagus 2000    Coronary artery disease     Depression     Fibromyalgia     Graves disease 1970    Heart murmur     Heart valve disease     Hyperlipidemia     Hypertension     Stroke      Past Surgical History:  Past Surgical History:   Procedure Laterality Date    BRONCHOSCOPY Bilateral 10/6/2023    Procedure: BRONCHOSCOPY BIOPSY;  Surgeon: Jonah Driscoll MD;  Location: [facility] ENDOSCOPY;  Service: Pulmonary;  Laterality: Bilateral;  pre op: hemoptysis   post op:scant endobronchial blood,   PCP: Meme Tan APRN    CARDIAC CATHETERIZATION      CARPAL TUNNEL RELEASE Bilateral 2010    ESSURE TUBAL LIGATION Bilateral 1977    EYE SURGERY      HERNIA REPAIR  1992    JOINT REPLACEMENT      REPLACEMENT TOTAL KNEE BILATERAL Bilateral 1998    TENDON REPAIR Right 2019    THYROIDECTOMY  1970       Allergies:  Allergies   Allergen Reactions    Levaquin [Levofloxacin] Other (See Comments)     Spasms    Aripiprazole Unknown - High Severity    Codeine Dizziness     Other reaction(s): dizziness      Morphine Other (See Comments)     Medications:  Prior to Admission medications    Medication Sig Start Date End Date Taking? Authorizing Provider   amLODIPine (NORVASC) 5 MG tablet Take 1 tablet by mouth Daily. 3/18/24  Yes Juan A Lopez MD   apixaban (ELIQUIS) 5 MG tablet tablet Take 1 tablet by mouth 2 (Two) Times a Day. 3/18/24  Yes Juan A Lopez MD   buPROPion (WELLBUTRIN) 75 MG tablet Take 1 tablet by mouth 2 (Two) Times a Day. 3/18/24  Yes Juan A Lopez MD   busPIRone (BUSPAR) 15 MG tablet Take 1 tablet by mouth 4 (Four)  "Times a Day. 3/18/24  Yes Juan A Lopez MD   escitalopram (LEXAPRO) 20 MG tablet Take 1 tablet by mouth Daily. 10/17/23  Yes Meme Tan APRN   HYDROcodone-acetaminophen (NORCO)  MG per tablet Take 1 tablet by mouth Every 4 (Four) Hours As Needed. 6/2/22  Yes Yoel Villalobos MD   levothyroxine (SYNTHROID, LEVOTHROID) 112 MCG tablet Take 1 tablet by mouth Daily. 3/18/24  Yes Juan A Lopez MD   metoprolol succinate XL (TOPROL-XL) 50 MG 24 hr tablet Take 1 tablet by mouth Daily. 3/18/24  Yes Juan A Lopez MD   naloxone (NARCAN) 4 MG/0.1ML nasal spray 1 spray into the nostril(s) as directed by provider As Needed (opiod overdose). CALL 911. SPR CONTENTS OF ONE SPRAYER (0.1ML) INTO ONE NOSTRIL. REPEAT IN 2-3 MIN IF SYMPTOMS OF OPIOID EMERGENCY PERSIST, ALTERNATE NOSTRILS   Yes ProviderYoel MD   nystatin (MYCOSTATIN) 843935 UNIT/GM powder Apply  topically to the appropriate area as directed 4 (Four) Times a Day. 10/17/23  Yes Meme Tan APRN   omeprazole (priLOSEC) 40 MG capsule TAKE 1 CAPSULE BY MOUTH DAILY 5/14/24  Yes Meme Tan APRN   ondansetron ODT (ZOFRAN-ODT) 4 MG disintegrating tablet Place 1 tablet on the tongue Every 8 (Eight) Hours As Needed for Nausea or Vomiting. 7/27/23  Yes Meme Tan APRN   oxyCODONE (ROXICODONE) 15 MG immediate release tablet Take 1 tablet by mouth Every 6 (Six) Hours As Needed. 6/14/24  Yes Yoel Villalobos MD   rosuvastatin (Crestor) 10 MG tablet Take 1 tablet by mouth Daily. 10/12/23  Yes Macrina Escobedo APRN   traZODone (DESYREL) 100 MG tablet Take 1 tablet by mouth Every Night. 3/18/24  Yes Juan A Lopez MD       Objective     Vital Signs: /62 (BP Location: Left arm, Patient Position: Sitting, Cuff Size: Adult)   Pulse 73   Temp 96.7 °F (35.9 °C) (Temporal)   Ht 147.3 cm (57.99\")   Wt 64.4 kg (142 lb)   SpO2 95%   BMI 29.69 kg/m²   Physical Exam  Vitals and nursing note " reviewed.   Constitutional:       General: She is not in acute distress.     Appearance: Normal appearance.   HENT:      Left Ear: Tympanic membrane normal.      Nose: Congestion and rhinorrhea present.      Mouth/Throat:      Pharynx: Posterior oropharyngeal erythema present. No oropharyngeal exudate.   Cardiovascular:      Rate and Rhythm: Normal rate and regular rhythm.      Pulses: Normal pulses.      Heart sounds: Normal heart sounds. No murmur heard.  Pulmonary:      Effort: Pulmonary effort is normal. No respiratory distress.      Breath sounds: Normal breath sounds. No wheezing.   Musculoskeletal:         General: Normal range of motion.   Skin:     General: Skin is warm and dry.      Capillary Refill: Capillary refill takes less than 2 seconds.      Findings: No bruising, erythema or rash.   Neurological:      Mental Status: She is alert and oriented to person, place, and time. Mental status is at baseline.      Motor: No weakness.       Results Reviewed:  Results for orders placed or performed in visit on 07/05/24   POCT SARS-CoV-2 + Flu Antigen SUKHDEV    Specimen: Swab   Result Value Ref Range    SARS Antigen Not Detected Not Detected, Presumptive Negative    Influenza A Antigen SUKHDEV Not Detected Not Detected    Influenza B Antigen SUKHDEV Not Detected Not Detected    Internal Control Passed Passed    Lot Number [id number]     Expiration Date 1,172,025    POC Rapid Strep A    Specimen: Swab   Result Value Ref Range    Rapid Strep A Screen Negative Negative, VALID, INVALID, Not Performed    Internal Control Passed Passed    Lot Number [id number]     Expiration Date 6,012,026        Assessment / Plan     Assessment/Plan:  Diagnoses and all orders for this visit:    1. Upper respiratory tract infection, unspecified type (Primary)  -     POCT SARS-CoV-2 + Flu Antigen SUKHDEV  -     POC Rapid Strep A  -     triamcinolone acetonide (KENALOG-40) injection 40 mg  -     methylPREDNISolone (MEDROL) 4 MG dose pack; Take as  directed on package instructions.  Dispense: 21 tablet; Refill: 0  -     azithromycin (Zithromax Z-Ney) 250 MG tablet; Take 1 tablet by mouth Take As Directed. Take 2 tablets by mouth the first day, then 1 tablet daily for 4 days.  Dispense: 6 tablet; Refill: 0  -     benzonatate (Tessalon Perles) 100 MG capsule; Take 1 capsule by mouth 3 (Three) Times a Day As Needed for Cough.  Dispense: 30 capsule; Refill: 0       Patient presents today with complaints of cough, nasal congestion, headache, fatigue, and sore throat.  Her symptoms started about 3 days ago.  She reports that her cough is dry.  She has not been able to cough up any sputum.  She coughs so hard that it hurts her head.  She complains of bodyaches which are miserable since she has fibromyalgia.  She describes it as feeling like she has electric shocks when she moves in her bed at night.  She has been unable to keep her eyes open long enough to remember to drink water so she feels that she is likely dehydrated.  She did drink 2 ensures yesterday and 1 today.  She complains of shortness of breath but reports that this has been a problem for about 2 months.  She denies fever or chills but she is sweating in the clinic today.    Strep, flu, and COVID swabs were obtained today which were all negative.  We discussed that she does likely have a viral illness.  Will treat with Kenalog 40 mg injection in clinic today.  She will begin taking Medrol Dosepak tomorrow.  Her daughter is present with her today who asked if she might need a low-dose antibiotic.  Discussed the importance of avoiding antibiotic therapy for a viral infection.  However, will give azithromycin to be started if her symptoms worsen or fail to improve over the next few days.      Advised her to begin taking Mucinex if needed.  She asked about other OTC medication.  Advised that she could try taking Coricidin.  Could also use Delsym sparingly but need to avoid using it routinely due to  hypertension and cardiac history.  Discussed that she may benefit from using a nasal spray but she does not like nasal sprays.    If she does call next week with worsening symptoms we would need to obtain chest x-ray.      Return for Next scheduled follow up. unless patient needs to be seen sooner or acute issues arise.    I have discussed the patient results/orders and and plan/recommendation with them at today's visit.      Agustina Ramirez, ABHAY   07/05/2024

## 2024-07-08 ENCOUNTER — TELEPHONE (OUTPATIENT)
Dept: INTERNAL MEDICINE | Facility: CLINIC | Age: 79
End: 2024-07-08

## 2024-07-08 NOTE — TELEPHONE ENCOUNTER
Per Dr. Lopez patient needs to give antibiotics more time to start working.  Pt daughter notified

## 2024-07-08 NOTE — TELEPHONE ENCOUNTER
Caller: Sari Rinaldi    Relationship: Emergency Contact    Best call back number: [phone]     What is the best time to reach you: ANYTIME    Who are you requesting to speak with (clinical staff, provider,  specific staff member): CLINICAL    What was the call regarding: PATIENT WAS SEEN ON FRIDAY AND GIVEN SOME ANTIBIOTICS. SHE STARTED TAKING THEM YESTERDAY AND IS NOW COUGHING AND GETTING SOME FLEAM WITH IT. DAUGHTER IS WONDERING IF SHE NEEDS TO GIVE MORE TIME FOR THE ANTIBIOTIC TO WORK OR BRING PATIENT BACK IN. PLEASE ADVISE.     Is it okay if the provider responds through MyChart: NO

## 2024-07-10 ENCOUNTER — TRANSCRIBE ORDERS (OUTPATIENT)
Dept: ADMINISTRATIVE | Facility: HOSPITAL | Age: 79
End: 2024-07-10
Payer: MEDICARE

## 2024-07-10 ENCOUNTER — OFFICE VISIT (OUTPATIENT)
Dept: CARDIOLOGY | Facility: CLINIC | Age: 79
End: 2024-07-10
Payer: MEDICARE

## 2024-07-10 VITALS
SYSTOLIC BLOOD PRESSURE: 110 MMHG | WEIGHT: 140 LBS | OXYGEN SATURATION: 93 % | BODY MASS INDEX: 29.39 KG/M2 | HEART RATE: 57 BPM | HEIGHT: 58 IN | DIASTOLIC BLOOD PRESSURE: 68 MMHG

## 2024-07-10 DIAGNOSIS — I10 ESSENTIAL HYPERTENSION: ICD-10-CM

## 2024-07-10 DIAGNOSIS — I51.7 LEFT VENTRICULAR HYPERTROPHY: ICD-10-CM

## 2024-07-10 DIAGNOSIS — M47.817 SPONDYLOSIS WITHOUT MYELOPATHY OR RADICULOPATHY, LUMBOSACRAL REGION: ICD-10-CM

## 2024-07-10 DIAGNOSIS — M47.816 SPONDYLOSIS WITHOUT MYELOPATHY OR RADICULOPATHY, LUMBAR REGION: Primary | ICD-10-CM

## 2024-07-10 DIAGNOSIS — M46.1 SACROILIITIS, NOT ELSEWHERE CLASSIFIED: ICD-10-CM

## 2024-07-10 DIAGNOSIS — I47.10 PAROXYSMAL SVT (SUPRAVENTRICULAR TACHYCARDIA): ICD-10-CM

## 2024-07-10 DIAGNOSIS — I10 PRIMARY HYPERTENSION: ICD-10-CM

## 2024-07-10 DIAGNOSIS — I34.0 NON-RHEUMATIC MITRAL REGURGITATION: ICD-10-CM

## 2024-07-10 DIAGNOSIS — E78.49 OTHER HYPERLIPIDEMIA: ICD-10-CM

## 2024-07-10 DIAGNOSIS — I48.0 PAROXYSMAL ATRIAL FIBRILLATION: Primary | ICD-10-CM

## 2024-07-10 DIAGNOSIS — M51.26 OTHER INTERVERTEBRAL DISC DISPLACEMENT, LUMBAR REGION: ICD-10-CM

## 2024-07-10 PROBLEM — R55 SYNCOPE, NEAR: Status: RESOLVED | Noted: 2017-12-13 | Resolved: 2024-07-10

## 2024-07-10 RX ORDER — METOPROLOL SUCCINATE 25 MG/1
25 TABLET, EXTENDED RELEASE ORAL DAILY
Qty: 30 TABLET | Refills: 11 | Status: SHIPPED | OUTPATIENT
Start: 2024-07-10 | End: 2024-07-10 | Stop reason: SDUPTHER

## 2024-07-10 RX ORDER — METOPROLOL SUCCINATE 25 MG/1
25 TABLET, EXTENDED RELEASE ORAL DAILY
Qty: 90 TABLET | Refills: 3 | Status: SHIPPED | OUTPATIENT
Start: 2024-07-10

## 2024-07-10 NOTE — LETTER
July 10, 2024     Juan A Lopez MD  [address] [place] [address]    Patient: Nichole Rinaldi   YOB: 1945   Date of Visit: 7/10/2024       Dear Juan A Lopez MD    Nichole Rinaldi was in my office today. Below is a copy of my note.    If you have questions, please do not hesitate to call me. I look forward to following Nichole along with you.         Sincerely,        Obie Fox MD        CC: No Recipients      Reason for Visit: cardiovascular follow up.    HPI:  Nichole Rinaldi is a 78 y.o. female is here today for 6-month follow-up.  She has been dealing with an upper respiratory infection over the past week.  She started on an antibiotic due to persistent symptoms.  She is concerned about the cost of Eliquis.  She notices some bruising at times.  Her appetite has not been good lately.  She feels fatigued more than usual.      Previous Cardiac Testing and Procedures:  -Nuclear stress (6/24/2017) negative for ischemia, EF 73%  -Echo (11/28/2017) EF 55 to 60%, mild LA enlargement, moderate AI, moderate to severe MR  -Echo (6/18/2018) normal LV size and function, mild LVH, moderate LA enlargement, mild RA enlargement, mild to moderate MR and TR, mild PI, normal RVSP  -Echo (7/21/2023) EF > 70%, mild to moderate sigmoid septal hypertrophy, LVOT gradient 18 mmHg, normal RV size and function, mild LA enlargement, trace to mild MR and TR  -Holter monitor (6/7/2023) rare PACs and PVCs, multiple runs of SVT, symptoms correlated with sinus rhythm, PACs, and SVT  -Echo (10/4/2023) EF 61-65%, mild LVH, grade 1 diastolic dysfunction, moderate LA enlargement, mild aortic stenosis, normal RVSP     Lab data:  -Lipid panel (7/27/2023) total cholesterol 129, HDL 53, LDL 52, triglycerides 141  -BMP (7/27/2023) creatinine 0.75, potassium 4.3, sodium 139  -Lipid panel (3/8/2024) total cholesterol 129, HDL 64, LDL 48, triglycerides 87    Patient Active Problem List   Diagnosis   • Osteoarthritis   •  Vazquez's esophagus   • Graves disease   • Bilateral hip pain   • Bradycardia   • Carpal tunnel syndrome on right   • Chronic pain   • Depression   • Herniated disc   • Lumbar facet arthropathy   • Non-rheumatic mitral regurgitation   • Other fatigue   • Other hyperlipidemia   • Paroxysmal atrial fibrillation   • Trochanteric bursitis of both hips   • Fibromyalgia   • Obesity (BMI 30.0-34.9)   • Anxiety   • Essential hypertension   • Primary insomnia   • Gastroesophageal reflux disease without esophagitis   • Vitamin D deficiency   • Cerebellar stroke   • Paroxysmal SVT (supraventricular tachycardia)   • Hemoptysis   • Acute bronchitis   • Lung nodule   • Kidney mass   • Abnormal CT scan   • Left ventricular hypertrophy   • Caregiver stress       Social History     Tobacco Use   • Smoking status: Never     Passive exposure: Never   • Smokeless tobacco: Never   Vaping Use   • Vaping status: Never Used   Substance Use Topics   • Alcohol use: Never   • Drug use: Never       Family History   Problem Relation Age of Onset   • No Known Problems Mother    • No Known Problems Father    • Colon cancer Neg Hx    • Colon polyps Neg Hx    • Esophageal cancer Neg Hx        The following portions of the patient's history were reviewed and updated as appropriate: allergies, current medications, past family history, past medical history, past social history, past surgical history, and problem list.      Current Outpatient Medications:   •  amLODIPine (NORVASC) 5 MG tablet, Take 1 tablet by mouth Daily., Disp: 90 tablet, Rfl: 1  •  apixaban (ELIQUIS) 5 MG tablet tablet, Take 1 tablet by mouth 2 (Two) Times a Day., Disp: 60 tablet, Rfl: 11  •  azithromycin (Zithromax Z-Ney) 250 MG tablet, Take 1 tablet by mouth Take As Directed. Take 2 tablets by mouth the first day, then 1 tablet daily for 4 days., Disp: 6 tablet, Rfl: 0  •  benzonatate (Tessalon Perles) 100 MG capsule, Take 1 capsule by mouth 3 (Three) Times a Day As Needed for  Cough., Disp: 30 capsule, Rfl: 0  •  buPROPion (WELLBUTRIN) 75 MG tablet, Take 1 tablet by mouth 2 (Two) Times a Day., Disp: 180 tablet, Rfl: 1  •  busPIRone (BUSPAR) 15 MG tablet, Take 1 tablet by mouth 4 (Four) Times a Day., Disp: 360 tablet, Rfl: 3  •  escitalopram (LEXAPRO) 20 MG tablet, Take 1 tablet by mouth Daily., Disp: 30 tablet, Rfl: 0  •  levothyroxine (SYNTHROID, LEVOTHROID) 112 MCG tablet, Take 1 tablet by mouth Daily., Disp: 90 tablet, Rfl: 1  •  methylPREDNISolone (MEDROL) 4 MG dose pack, Take as directed on package instructions., Disp: 21 tablet, Rfl: 0  •  metoprolol succinate XL (TOPROL-XL) 25 MG 24 hr tablet, Take 1 tablet by mouth Daily., Disp: 90 tablet, Rfl: 3  •  oxyCODONE (ROXICODONE) 15 MG immediate release tablet, Take 1 tablet by mouth Every 6 (Six) Hours As Needed., Disp: , Rfl:   •  rosuvastatin (Crestor) 10 MG tablet, Take 1 tablet by mouth Daily., Disp: 90 tablet, Rfl: 1  •  traZODone (DESYREL) 100 MG tablet, Take 1 tablet by mouth Every Night., Disp: 90 tablet, Rfl: 1  •  naloxone (NARCAN) 4 MG/0.1ML nasal spray, 1 spray into the nostril(s) as directed by provider As Needed (opiod overdose). CALL 911. SPR CONTENTS OF ONE SPRAYER (0.1ML) INTO ONE NOSTRIL. REPEAT IN 2-3 MIN IF SYMPTOMS OF OPIOID EMERGENCY PERSIST, ALTERNATE NOSTRILS (Patient not taking: Reported on 7/10/2024), Disp: , Rfl:   •  nystatin (MYCOSTATIN) 941794 UNIT/GM powder, Apply  topically to the appropriate area as directed 4 (Four) Times a Day. (Patient not taking: Reported on 7/10/2024), Disp: 60 g, Rfl: 5    Review of Systems   Constitutional: Positive for decreased appetite and malaise/fatigue. Negative for chills and fever.   HENT:  Positive for congestion.    Cardiovascular:  Negative for chest pain and paroxysmal nocturnal dyspnea.   Respiratory:  Positive for cough and shortness of breath.    Skin:  Negative for rash.   Gastrointestinal:  Negative for abdominal pain and heartburn.   Neurological:  Negative for  "dizziness and numbness.       Objective  /68 (BP Location: Left arm, Patient Position: Sitting, Cuff Size: Adult)   Pulse 57   Ht 147.3 cm (57.99\")   Wt 63.5 kg (140 lb)   SpO2 93%   BMI 29.27 kg/m²   Constitutional:       Appearance: Well-developed.   HENT:      Head: Normocephalic and atraumatic.   Pulmonary:      Effort: Pulmonary effort is normal.      Breath sounds: Normal breath sounds.   Cardiovascular:      Bradycardia present. Regular rhythm.   Edema:     Peripheral edema absent.   Skin:     General: Skin is warm and dry.   Neurological:      Mental Status: Alert and oriented to person, place, and time.         ECG 12 Lead    Date/Time: 7/10/2024 3:02 PM  Performed by: Obie Fox MD    Authorized by: Obie Fox MD  Comparison: compared with previous ECG from 10/16/2023  Similar to previous ECG  Rhythm: sinus bradycardia  Other findings: non-specific ST-T wave changes            ICD-10-CM ICD-9-CM   1. Paroxysmal atrial fibrillation  I48.0 427.31   2. Paroxysmal SVT (supraventricular tachycardia)  I47.10 427.0   3. Non-rheumatic mitral regurgitation  I34.0 424.0   4. Left ventricular hypertrophy  I51.7 429.3   5. Primary hypertension  I10 401.9   6. Essential hypertension  I10 401.9   7. Other hyperlipidemia  E78.49 272.4         Assessment/Plan:  1.  Paroxysmal atrial fibrillation: Sinus bradycardia on EKG.  Decrease metoprolol to 25 mg due to fatigue and bradycardia.  Continue Eliquis.    2.  Paroxysmal SVT: Multiple runs of nonsustained SVT on Holter monitor from 6/7/2023, most of which are short and benign, but some did correlate with symptoms.  She continues to deny any significant palpitations.  Decrease metoprolol as mentioned above due to fatigue and bradycardia.       3.  Mitral regurgitation: Mild MR on echo from 7/21/2023.  Continue to monitor.     4.  Left ventricular hypertrophy:  Mild to moderate sigmoid septal hypertrophy on echo on 7/21/2023,with hyperdynamic LV " function and mild LVOT gradient of 18 mmHg.  Findings are not consistent with HOCM.  Encouraged her to remain well-hydrated.  Encourage good hydration.     5.  Essential hypertension: Blood pressure remains well-controlled.  De-escalate metoprolol to 25 mg as mentioned above.  Continue amlodipine.     6.  Hyperlipidemia: Good control on rosuvastatin based on lipid panel from 7/27/2023.

## 2024-07-10 NOTE — PROGRESS NOTES
Reason for Visit: cardiovascular follow up.    HPI:  Nichole Rinaldi is a 78 y.o. female is here today for 6-month follow-up.  She has been dealing with an upper respiratory infection over the past week.  She started on an antibiotic due to persistent symptoms.  She is concerned about the cost of Eliquis.  She notices some bruising at times.  Her appetite has not been good lately.  She feels fatigued more than usual.      Previous Cardiac Testing and Procedures:  -Nuclear stress (6/24/2017) negative for ischemia, EF 73%  -Echo (11/28/2017) EF 55 to 60%, mild LA enlargement, moderate AI, moderate to severe MR  -Echo (6/18/2018) normal LV size and function, mild LVH, moderate LA enlargement, mild RA enlargement, mild to moderate MR and TR, mild PI, normal RVSP  -Echo (7/21/2023) EF > 70%, mild to moderate sigmoid septal hypertrophy, LVOT gradient 18 mmHg, normal RV size and function, mild LA enlargement, trace to mild MR and TR  -Holter monitor (6/7/2023) rare PACs and PVCs, multiple runs of SVT, symptoms correlated with sinus rhythm, PACs, and SVT  -Echo (10/4/2023) EF 61-65%, mild LVH, grade 1 diastolic dysfunction, moderate LA enlargement, mild aortic stenosis, normal RVSP     Lab data:  -Lipid panel (7/27/2023) total cholesterol 129, HDL 53, LDL 52, triglycerides 141  -BMP (7/27/2023) creatinine 0.75, potassium 4.3, sodium 139  -Lipid panel (3/8/2024) total cholesterol 129, HDL 64, LDL 48, triglycerides 87    Patient Active Problem List   Diagnosis    Osteoarthritis    Vazquez's esophagus    Graves disease    Bilateral hip pain    Bradycardia    Carpal tunnel syndrome on right    Chronic pain    Depression    Herniated disc    Lumbar facet arthropathy    Non-rheumatic mitral regurgitation    Other fatigue    Other hyperlipidemia    Paroxysmal atrial fibrillation    Trochanteric bursitis of both hips    Fibromyalgia    Obesity (BMI 30.0-34.9)    Anxiety    Essential hypertension    Primary insomnia     Gastroesophageal reflux disease without esophagitis    Vitamin D deficiency    Cerebellar stroke    Paroxysmal SVT (supraventricular tachycardia)    Hemoptysis    Acute bronchitis    Lung nodule    Kidney mass    Abnormal CT scan    Left ventricular hypertrophy    Caregiver stress       Social History     Tobacco Use    Smoking status: Never     Passive exposure: Never    Smokeless tobacco: Never   Vaping Use    Vaping status: Never Used   Substance Use Topics    Alcohol use: Never    Drug use: Never       Family History   Problem Relation Age of Onset    No Known Problems Mother     No Known Problems Father     Colon cancer Neg Hx     Colon polyps Neg Hx     Esophageal cancer Neg Hx        The following portions of the patient's history were reviewed and updated as appropriate: allergies, current medications, past family history, past medical history, past social history, past surgical history, and problem list.      Current Outpatient Medications:     amLODIPine (NORVASC) 5 MG tablet, Take 1 tablet by mouth Daily., Disp: 90 tablet, Rfl: 1    apixaban (ELIQUIS) 5 MG tablet tablet, Take 1 tablet by mouth 2 (Two) Times a Day., Disp: 60 tablet, Rfl: 11    azithromycin (Zithromax Z-Ney) 250 MG tablet, Take 1 tablet by mouth Take As Directed. Take 2 tablets by mouth the first day, then 1 tablet daily for 4 days., Disp: 6 tablet, Rfl: 0    benzonatate (Tessalon Perles) 100 MG capsule, Take 1 capsule by mouth 3 (Three) Times a Day As Needed for Cough., Disp: 30 capsule, Rfl: 0    buPROPion (WELLBUTRIN) 75 MG tablet, Take 1 tablet by mouth 2 (Two) Times a Day., Disp: 180 tablet, Rfl: 1    busPIRone (BUSPAR) 15 MG tablet, Take 1 tablet by mouth 4 (Four) Times a Day., Disp: 360 tablet, Rfl: 3    escitalopram (LEXAPRO) 20 MG tablet, Take 1 tablet by mouth Daily., Disp: 30 tablet, Rfl: 0    levothyroxine (SYNTHROID, LEVOTHROID) 112 MCG tablet, Take 1 tablet by mouth Daily., Disp: 90 tablet, Rfl: 1    methylPREDNISolone  "(MEDROL) 4 MG dose pack, Take as directed on package instructions., Disp: 21 tablet, Rfl: 0    metoprolol succinate XL (TOPROL-XL) 25 MG 24 hr tablet, Take 1 tablet by mouth Daily., Disp: 90 tablet, Rfl: 3    oxyCODONE (ROXICODONE) 15 MG immediate release tablet, Take 1 tablet by mouth Every 6 (Six) Hours As Needed., Disp: , Rfl:     rosuvastatin (Crestor) 10 MG tablet, Take 1 tablet by mouth Daily., Disp: 90 tablet, Rfl: 1    traZODone (DESYREL) 100 MG tablet, Take 1 tablet by mouth Every Night., Disp: 90 tablet, Rfl: 1    naloxone (NARCAN) 4 MG/0.1ML nasal spray, 1 spray into the nostril(s) as directed by provider As Needed (opiod overdose). CALL 911. SPR CONTENTS OF ONE SPRAYER (0.1ML) INTO ONE NOSTRIL. REPEAT IN 2-3 MIN IF SYMPTOMS OF OPIOID EMERGENCY PERSIST, ALTERNATE NOSTRILS (Patient not taking: Reported on 7/10/2024), Disp: , Rfl:     nystatin (MYCOSTATIN) 749081 UNIT/GM powder, Apply  topically to the appropriate area as directed 4 (Four) Times a Day. (Patient not taking: Reported on 7/10/2024), Disp: 60 g, Rfl: 5    Review of Systems   Constitutional: Positive for decreased appetite and malaise/fatigue. Negative for chills and fever.   HENT:  Positive for congestion.    Cardiovascular:  Negative for chest pain and paroxysmal nocturnal dyspnea.   Respiratory:  Positive for cough and shortness of breath.    Skin:  Negative for rash.   Gastrointestinal:  Negative for abdominal pain and heartburn.   Neurological:  Negative for dizziness and numbness.       Objective   /68 (BP Location: Left arm, Patient Position: Sitting, Cuff Size: Adult)   Pulse 57   Ht 147.3 cm (57.99\")   Wt 63.5 kg (140 lb)   SpO2 93%   BMI 29.27 kg/m²   Constitutional:       Appearance: Well-developed.   HENT:      Head: Normocephalic and atraumatic.   Pulmonary:      Effort: Pulmonary effort is normal.      Breath sounds: Normal breath sounds.   Cardiovascular:      Bradycardia present. Regular rhythm.   Edema:     Peripheral " edema absent.   Skin:     General: Skin is warm and dry.   Neurological:      Mental Status: Alert and oriented to person, place, and time.         ECG 12 Lead    Date/Time: 7/10/2024 3:02 PM  Performed by: Obie Fox MD    Authorized by: Obie Fox MD  Comparison: compared with previous ECG from 10/16/2023  Similar to previous ECG  Rhythm: sinus bradycardia  Other findings: non-specific ST-T wave changes            ICD-10-CM ICD-9-CM   1. Paroxysmal atrial fibrillation  I48.0 427.31   2. Paroxysmal SVT (supraventricular tachycardia)  I47.10 427.0   3. Non-rheumatic mitral regurgitation  I34.0 424.0   4. Left ventricular hypertrophy  I51.7 429.3   5. Primary hypertension  I10 401.9   6. Essential hypertension  I10 401.9   7. Other hyperlipidemia  E78.49 272.4         Assessment/Plan:  1.  Paroxysmal atrial fibrillation: Sinus bradycardia on EKG.  Decrease metoprolol to 25 mg due to fatigue and bradycardia.  Continue Eliquis.    2.  Paroxysmal SVT: Multiple runs of nonsustained SVT on Holter monitor from 6/7/2023, most of which are short and benign, but some did correlate with symptoms.  She continues to deny any significant palpitations.  Decrease metoprolol as mentioned above due to fatigue and bradycardia.       3.  Mitral regurgitation: Mild MR on echo from 7/21/2023.  Continue to monitor.     4.  Left ventricular hypertrophy:  Mild to moderate sigmoid septal hypertrophy on echo on 7/21/2023,with hyperdynamic LV function and mild LVOT gradient of 18 mmHg.  Findings are not consistent with HOCM.  Encouraged her to remain well-hydrated.  Encourage good hydration.     5.  Essential hypertension: Blood pressure remains well-controlled.  De-escalate metoprolol to 25 mg as mentioned above.  Continue amlodipine.     6.  Hyperlipidemia: Good control on rosuvastatin based on lipid panel from 7/27/2023.

## 2024-07-24 DIAGNOSIS — F41.9 ANXIETY: ICD-10-CM

## 2024-07-25 RX ORDER — BUPROPION HYDROCHLORIDE 75 MG/1
75 TABLET ORAL 2 TIMES DAILY
Qty: 180 TABLET | Refills: 1 | Status: SHIPPED | OUTPATIENT
Start: 2024-07-25

## 2024-07-30 ENCOUNTER — EXTERNAL PBMM DATA (OUTPATIENT)
Dept: PHARMACY | Facility: OTHER | Age: 79
End: 2024-07-30
Payer: MEDICARE

## 2024-08-16 ENCOUNTER — POP HEALTH PHARMACY (OUTPATIENT)
Dept: PHARMACY | Facility: OTHER | Age: 79
End: 2024-08-16
Payer: MEDICARE

## 2024-08-16 NOTE — PROGRESS NOTES
Population [facility] Outreach      Nichole Rinaldi was called today to discuss medication adherence with ROSUVASTATIN CALCIUM ([facility] [employer]UCT[employer]HIBITORS) , as she was identified as having care opportunities.    Program Details    [facility]  Status: Enrolled  Effective Dates: 8/8/2024 - present  Responsible Staff: Liya Trevizo LPN          Opportunities Identified    Adherence- Cholesterol       Adherence and Medication Management    Adherence Questions   Patient Reported X Missed Doses in the Last 7 Days : all  Reasons for Non-Adherence : Other - see comments  List other reason(s) for non-adherence or missed doses: Patient did not find the script at home, she thought she had it.  Adherence Tools Used: Other - see comments  List other adherence tool(s) used : takes care of her 's medications too.  Interested in a 90 day supply? : Yes  Does this require clinical escalation to a pharmacist?: Y         General Medication Management    Type of medication management: targeted medication review  Recipient: beneficiary  Provider: licensed practical nurse  Visit type: initial  Method of contact: by telephone           Medication Therapy Problems     Medication Therapy Recommendations  No medication therapy recommendations to display      Summary    Medication Management Summary    Number of health conditions: 1  Number of medication therapy problems identified: 1  Time spent: 61 - 75 min  Nichole Rinaldi has been taking care of her medications and her husbands medication, they have home health for her . She thought that she had Crestor on hand, when she got her bottles she did not find the medication. She was surprised and concerned. She will call her pharmacy. Chart shows last refill was 5/7/24. She likes 90 days scripts with auto refills.        Liya Trevizo LPN, 08/16/24, 11:21 AM EDT.

## 2024-08-24 ENCOUNTER — APPOINTMENT (OUTPATIENT)
Dept: GENERAL RADIOLOGY | Facility: HOSPITAL | Age: 79
End: 2024-08-24
Payer: MEDICARE

## 2024-08-24 ENCOUNTER — HOSPITAL ENCOUNTER (EMERGENCY)
Facility: HOSPITAL | Age: 79
Discharge: HOME | End: 2024-08-24
Attending: EMERGENCY MEDICINE
Payer: MEDICARE

## 2024-08-24 ENCOUNTER — APPOINTMENT (OUTPATIENT)
Dept: CT IMAGING | Facility: HOSPITAL | Age: 79
End: 2024-08-24
Payer: MEDICARE

## 2024-08-24 VITALS
OXYGEN SATURATION: 94 % | RESPIRATION RATE: 18 BRPM | TEMPERATURE: 98.3 F | HEIGHT: 60 IN | WEIGHT: 142 LBS | HEART RATE: 57 BPM | DIASTOLIC BLOOD PRESSURE: 67 MMHG | BODY MASS INDEX: 27.88 KG/M2 | SYSTOLIC BLOOD PRESSURE: 137 MMHG

## 2024-08-24 DIAGNOSIS — S16.1XXA ACUTE STRAIN OF NECK MUSCLE, INITIAL ENCOUNTER: ICD-10-CM

## 2024-08-24 DIAGNOSIS — S63.501A SPRAIN OF RIGHT WRIST, INITIAL ENCOUNTER: ICD-10-CM

## 2024-08-24 DIAGNOSIS — S70.01XA CONTUSION OF RIGHT HIP, INITIAL ENCOUNTER: ICD-10-CM

## 2024-08-24 DIAGNOSIS — S43.401A SPRAIN OF RIGHT SHOULDER, UNSPECIFIED SHOULDER SPRAIN TYPE, INITIAL ENCOUNTER: ICD-10-CM

## 2024-08-24 DIAGNOSIS — S09.90XA INJURY OF HEAD, INITIAL ENCOUNTER: Primary | ICD-10-CM

## 2024-08-24 PROCEDURE — 99284 EMERGENCY DEPT VISIT MOD MDM: CPT

## 2024-08-24 PROCEDURE — 96374 THER/PROPH/DIAG INJ IV PUSH: CPT

## 2024-08-24 PROCEDURE — 25010000002 HYDROMORPHONE PER 4 MG: Performed by: EMERGENCY MEDICINE

## 2024-08-24 PROCEDURE — 96375 TX/PRO/DX INJ NEW DRUG ADDON: CPT

## 2024-08-24 PROCEDURE — 73560 X-RAY EXAM OF KNEE 1 OR 2: CPT

## 2024-08-24 PROCEDURE — 70450 CT HEAD/BRAIN W/O DYE: CPT

## 2024-08-24 PROCEDURE — 96376 TX/PRO/DX INJ SAME DRUG ADON: CPT

## 2024-08-24 PROCEDURE — 25010000002 ONDANSETRON PER 1 MG: Performed by: EMERGENCY MEDICINE

## 2024-08-24 PROCEDURE — 73110 X-RAY EXAM OF WRIST: CPT

## 2024-08-24 PROCEDURE — 73502 X-RAY EXAM HIP UNI 2-3 VIEWS: CPT

## 2024-08-24 PROCEDURE — 72125 CT NECK SPINE W/O DYE: CPT

## 2024-08-24 PROCEDURE — 73030 X-RAY EXAM OF SHOULDER: CPT

## 2024-08-24 PROCEDURE — 73200 CT UPPER EXTREMITY W/O DYE: CPT

## 2024-08-24 RX ORDER — HYDROMORPHONE HYDROCHLORIDE 1 MG/ML
0.25 INJECTION, SOLUTION INTRAMUSCULAR; INTRAVENOUS; SUBCUTANEOUS ONCE
Status: COMPLETED | OUTPATIENT
Start: 2024-08-24 | End: 2024-08-24

## 2024-08-24 RX ORDER — ONDANSETRON 2 MG/ML
4 INJECTION INTRAMUSCULAR; INTRAVENOUS ONCE
Status: COMPLETED | OUTPATIENT
Start: 2024-08-24 | End: 2024-08-24

## 2024-08-24 RX ADMIN — ONDANSETRON 4 MG: 2 INJECTION INTRAMUSCULAR; INTRAVENOUS at 21:43

## 2024-08-24 RX ADMIN — HYDROMORPHONE HYDROCHLORIDE 0.25 MG: 1 INJECTION, SOLUTION INTRAMUSCULAR; INTRAVENOUS; SUBCUTANEOUS at 21:43

## 2024-08-24 RX ADMIN — HYDROMORPHONE HYDROCHLORIDE 0.25 MG: 1 INJECTION, SOLUTION INTRAMUSCULAR; INTRAVENOUS; SUBCUTANEOUS at 23:31

## 2024-08-25 NOTE — ED PROVIDER NOTES
Subjective   History of Present Illness  Pt presents to the  with report of fall at home - was attempting to adjust a stool at home and it slid causing her to fall and strike her head on floor.  Denies LOC.   No new numb/tingling.  Has had some neck stiffness.  No n/v.   + swelling/bruising to R forehead.  Pt also c/o R shoulder/wrist/hip pain.  States she has had prior surgical repair of shoulder and wrist on that side.  No CP/SOB/abd pain.         Review of Systems   HENT:  Negative for congestion and nosebleeds.    Respiratory:  Negative for shortness of breath.    Cardiovascular:  Negative for chest pain.   Gastrointestinal:  Negative for abdominal pain.   Musculoskeletal:  Positive for neck pain.        + pain to R shoulder/wrist/hip   Skin:         + bruising to R forehead   Neurological:  Positive for headaches. Negative for speech difficulty, weakness and numbness.   All other systems reviewed and are negative.      Past Medical History:   Diagnosis Date    Anxiety     Arthrosis     Atrial fibrillation     Vazquez's esophagus 2000    Coronary artery disease     Depression     Fibromyalgia     Graves disease 1970    Heart murmur     Heart valve disease     Hyperlipidemia     Hypertension     Stroke        Allergies   Allergen Reactions    Levaquin [Levofloxacin] Other (See Comments)     Spasms    Aripiprazole Unknown - High Severity    Codeine Dizziness     Other reaction(s): dizziness      Morphine Other (See Comments)       Past Surgical History:   Procedure Laterality Date    BRONCHOSCOPY Bilateral 10/6/2023    Procedure: BRONCHOSCOPY BIOPSY;  Surgeon: Jonah Driscoll MD;  Location: [facility] ENDOSCOPY;  Service: Pulmonary;  Laterality: Bilateral;  pre op: hemoptysis   post op:scant endobronchial blood,   PCP: Meme Tan APRN    CARDIAC CATHETERIZATION      CARPAL TUNNEL RELEASE Bilateral 2010    ESSURE TUBAL LIGATION Bilateral 1977    EYE SURGERY      HERNIA REPAIR  1992    JOINT  REPLACEMENT      REPLACEMENT TOTAL KNEE BILATERAL Bilateral 1998    TENDON REPAIR Right 2019    THYROIDECTOMY  1970       Family History   Problem Relation Age of Onset    No Known Problems Mother     No Known Problems Father     Colon cancer Neg Hx     Colon polyps Neg Hx     Esophageal cancer Neg Hx        Social History     Socioeconomic History    Marital status:    Tobacco Use    Smoking status: Never     Passive exposure: Never    Smokeless tobacco: Never   Vaping Use    Vaping status: Never Used   Substance and Sexual Activity    Alcohol use: Never    Drug use: Never    Sexual activity: Yes           Objective   Physical Exam  Vitals and nursing note reviewed.   Constitutional:       General: She is not in acute distress.  HENT:      Head:      Comments: + R forehead ST swelling.  + ecchymosis in this region and to periorbital region.  No stepoff/def.       Nose: Nose normal.      Mouth/Throat:      Mouth: Mucous membranes are moist.   Eyes:      Extraocular Movements: Extraocular movements intact.      Conjunctiva/sclera: Conjunctivae normal.      Pupils: Pupils are equal, round, and reactive to light.      Comments: No hyphema/entrapment   Cardiovascular:      Rate and Rhythm: Normal rate and regular rhythm.      Pulses: Normal pulses.      Heart sounds: Normal heart sounds.   Pulmonary:      Effort: Pulmonary effort is normal.      Breath sounds: Normal breath sounds.   Abdominal:      General: Abdomen is flat.      Palpations: Abdomen is soft.   Musculoskeletal:      Cervical back: Normal range of motion and neck supple. No tenderness.      Comments: RUE with ttp to shoulder/wrist region.  Well healed old surgical scars.  No stepoff/def.  NVT intact. Comp soft    RLE with no noted shortening/rotation.  NVT intact.  Mild ttp to lateral hip region - pt states she has fibromyalgia so everywhere is tender normally.   Skin:     General: Skin is warm and dry.      Capillary Refill: Capillary refill takes  less than 2 seconds.      Comments: Ecchymosis R face as above, otherwise unremarkable   Neurological:      General: No focal deficit present.      Mental Status: She is alert and oriented to person, place, and time.      Cranial Nerves: No cranial nerve deficit.      Sensory: No sensory deficit.      Motor: No weakness.      Coordination: Coordination normal.         Procedures           ED Course  ED Course as of 08/26/24 0549   Mon Aug 26, 2024   0008 PEYTONLilliam BLANK attempted to call pt with results and advise pt that she has fracture of right upper extremity and there was no answer. Attempted to call pt tonight and no answer. Will give results to charge nurse to attempt to call in am with results [CW]      ED Course User Index  [CW] Sari Solano APRN      XR Wrist 3+ View Right   Final Result   1. Detail is limited. There does appear to be an acute nondisplaced   fracture of the scaphoid.                       This report was signed and finalized on 8/24/2024 10:13 PM by Dr. Arvind Thapa MD.          XR Shoulder 2+ View Right   Final Result   1. No acute fracture.               This report was signed and finalized on 8/24/2024 10:11 PM by Dr. Arvind Thapa MD.          XR Knee 1 or 2 View Right   Final Result   1. No acute fracture.                   This report was signed and finalized on 8/24/2024 10:14 PM by Dr. Arvind Thapa MD.          XR Hip With or Without Pelvis 2 - 3 View Right   Final Result      CT Head Without Contrast   Final Result   1. RIGHT supraorbital soft tissue injury with hematoma. No foreign body   or fracture.   2. No acute intracranial abnormality.       This report was signed and finalized on 8/24/2024 9:59 PM by Dr. Arvind Thapa MD.          CT Cervical Spine Without Contrast   Final Result   1. Multilevel degenerative change.   2. No acute fracture.       This report was signed and finalized on 8/24/2024 10:01 PM by Dr. Arvind Thapa MD.          CT Upper  Extremity Right Without Contrast    (Results Pending)                                            Medical Decision Making  Pt stable in EC - NAD att.  Given head injury with anticoagulation - CT head obtained - no ICH.  + R frontal hematoma.  No fx.  No evid of spinal injury.  No other fractures noted on imaging.  Given pain control and observed in EC.  Results d/w pt/family - will d/c to home.  Recommend outpt f/u    Problems Addressed:  Acute strain of neck muscle, initial encounter: complicated acute illness or injury  Contusion of right hip, initial encounter: complicated acute illness or injury  Injury of head, initial encounter: complicated acute illness or injury  Sprain of right shoulder, unspecified shoulder sprain type, initial encounter: complicated acute illness or injury  Sprain of right wrist, initial encounter: complicated acute illness or injury    Amount and/or Complexity of Data Reviewed  Radiology: ordered.    Risk  Prescription drug management.        Final diagnoses:   Injury of head, initial encounter   Acute strain of neck muscle, initial encounter   Sprain of right wrist, initial encounter   Sprain of right shoulder, unspecified shoulder sprain type, initial encounter   Contusion of right hip, initial encounter       ED Disposition  ED Disposition       ED Disposition   Discharge    Condition   Stable    Comment   --               Juan A Lopez MD  [address] [place] [address] [address] [place] [address]  [phone]               Medication List      No changes were made to your prescriptions during this visit.            Carson Galindo S, DO  08/24/24 2123       Carson Galindo S, DO  08/24/24 2342       DebstCasron ortega S, DO  08/26/24 2251

## 2024-08-26 ENCOUNTER — POP HEALTH PHARMACY (OUTPATIENT)
Dept: PHARMACY | Facility: OTHER | Age: 79
End: 2024-08-26
Payer: MEDICARE

## 2024-08-26 ENCOUNTER — HOSPITAL ENCOUNTER (EMERGENCY)
Facility: HOSPITAL | Age: 79
Discharge: HOME | End: 2024-08-26
Payer: MEDICARE

## 2024-08-26 ENCOUNTER — APPOINTMENT (OUTPATIENT)
Dept: CT IMAGING | Facility: HOSPITAL | Age: 79
End: 2024-08-26
Payer: MEDICARE

## 2024-08-26 VITALS
HEART RATE: 57 BPM | OXYGEN SATURATION: 94 % | SYSTOLIC BLOOD PRESSURE: 116 MMHG | BODY MASS INDEX: 27.68 KG/M2 | RESPIRATION RATE: 18 BRPM | HEIGHT: 60 IN | WEIGHT: 141 LBS | DIASTOLIC BLOOD PRESSURE: 63 MMHG | TEMPERATURE: 97.5 F

## 2024-08-26 DIAGNOSIS — S62.134A NONDISPLACED FRACTURE OF CAPITATE (OS MAGNUM) BONE, RIGHT WRIST, INITIAL ENCOUNTER FOR CLOSED FRACTURE: Primary | ICD-10-CM

## 2024-08-26 DIAGNOSIS — S70.01XA HEMATOMA OF RIGHT HIP, INITIAL ENCOUNTER: ICD-10-CM

## 2024-08-26 PROCEDURE — 96372 THER/PROPH/DIAG INJ SC/IM: CPT

## 2024-08-26 PROCEDURE — 72192 CT PELVIS W/O DYE: CPT

## 2024-08-26 PROCEDURE — 25010000002 HYDROMORPHONE PER 4 MG: Performed by: NURSE PRACTITIONER

## 2024-08-26 PROCEDURE — 99284 EMERGENCY DEPT VISIT MOD MDM: CPT

## 2024-08-26 PROCEDURE — 63710000001 ONDANSETRON ODT 4 MG TABLET DISPERSIBLE: Performed by: NURSE PRACTITIONER

## 2024-08-26 RX ORDER — ONDANSETRON 4 MG/1
4 TABLET, ORALLY DISINTEGRATING ORAL ONCE
Status: COMPLETED | OUTPATIENT
Start: 2024-08-26 | End: 2024-08-26

## 2024-08-26 RX ORDER — HYDROMORPHONE HYDROCHLORIDE 1 MG/ML
0.5 INJECTION, SOLUTION INTRAMUSCULAR; INTRAVENOUS; SUBCUTANEOUS ONCE
Status: COMPLETED | OUTPATIENT
Start: 2024-08-26 | End: 2024-08-26

## 2024-08-26 RX ADMIN — HYDROMORPHONE HYDROCHLORIDE 0.5 MG: 1 INJECTION, SOLUTION INTRAMUSCULAR; INTRAVENOUS; SUBCUTANEOUS at 12:16

## 2024-08-26 RX ADMIN — ONDANSETRON 4 MG: 4 TABLET, ORALLY DISINTEGRATING ORAL at 12:16

## 2024-08-26 NOTE — DISCHARGE INSTRUCTIONS
You have a nondisplaced fracture of the capitate bone.   There is slight widening of the scapholunate space however no scaphoid fracture identified.  Please call orthopedics for f/u appointment- Dr. Wagner on call.  Also, if you are having worsening pain, please call your pain management doctor for medication adjustments or further help with your pain.    Today the radiologist did not see any fractures of the right hip however there is a 2.5 cm soft tissue hematoma which is likely causing some of this pain.   All of your scans and previous scans will be in your MyChart    I hope you feel better soon!!

## 2024-08-27 NOTE — ED PROVIDER NOTES
Subjective   History of Present Illness  Patient is a 78-year-old female who presents to the ER due to a fall.  Patient states that she was evaluated on Saturday after she sustained a fall.  She states that she was using a stool that had wheels.  She states the stool was too high for her to sit on and she ultimately fell.  She had CT scans and was evaluated.  Patient states that she was called and told that there was a missed read on her CT scan and she actually does have a fracture.  Per review of CT scan patient has a nondisplaced capitate fracture which was missed by stat rad and found by our radiologist.  She was called to come to the emergency department for a splint.  There is mild widening of the scapholunate space noted however no scaphoid fracture identified.  Patient continues to have right hip pain as well and would like this to be reevaluated since she is here to get a splint.  Past medical history significant for anxiety, arthrosis, atrial fibrillation, Vazquez's esophagus, coronary artery disease, depression, fibromyalgia, Graves' disease, heart murmur, heart valve disease, hyperlipidemia, hypertension, stroke        Review of Systems   Constitutional: Negative.  Negative for fever.   HENT: Negative.  Negative for congestion.    Respiratory: Negative.  Negative for cough and shortness of breath.    Cardiovascular: Negative.  Negative for chest pain.   Gastrointestinal: Negative.  Negative for abdominal pain, diarrhea, nausea and vomiting.   Genitourinary: Negative.  Negative for dysuria.   Musculoskeletal:  Negative for back pain and neck pain.        Positive for right hip/pelvis pain  Positive for right wrist/hand pain   Skin:  Positive for wound.   All other systems reviewed and are negative.      Past Medical History:   Diagnosis Date    Anxiety     Arthrosis     Atrial fibrillation     Vazquez's esophagus 2000    Coronary artery disease     Depression     Fibromyalgia     Graves disease 1970     Heart murmur     Heart valve disease     Hyperlipidemia     Hypertension     Stroke        Allergies   Allergen Reactions    Levaquin [Levofloxacin] Other (See Comments)     Spasms    Aripiprazole Unknown - High Severity    Codeine Dizziness     Other reaction(s): dizziness      Morphine Other (See Comments)       Past Surgical History:   Procedure Laterality Date    BRONCHOSCOPY Bilateral 10/6/2023    Procedure: BRONCHOSCOPY BIOPSY;  Surgeon: Jonah Driscoll MD;  Location: [facility] ENDOSCOPY;  Service: Pulmonary;  Laterality: Bilateral;  pre op: hemoptysis   post op:scant endobronchial blood,   PCP: Meme Tan APRN    CARDIAC CATHETERIZATION      CARPAL TUNNEL RELEASE Bilateral 2010    ESSURE TUBAL LIGATION Bilateral 1977    EYE SURGERY      HERNIA REPAIR  1992    JOINT REPLACEMENT      REPLACEMENT TOTAL KNEE BILATERAL Bilateral 1998    TENDON REPAIR Right 2019    THYROIDECTOMY  1970       Family History   Problem Relation Age of Onset    No Known Problems Mother     No Known Problems Father     Colon cancer Neg Hx     Colon polyps Neg Hx     Esophageal cancer Neg Hx        Social History     Socioeconomic History    Marital status:    Tobacco Use    Smoking status: Never     Passive exposure: Never    Smokeless tobacco: Never   Vaping Use    Vaping status: Never Used   Substance and Sexual Activity    Alcohol use: Never    Drug use: Never    Sexual activity: Yes           Objective   Physical Exam  Vitals and nursing note reviewed.   Constitutional:       Appearance: She is well-developed.   HENT:      Head: Normocephalic.      Comments: Patient has bruising noted to the right eye     Right Ear: External ear normal.      Left Ear: External ear normal.      Nose: Nose normal.      Mouth/Throat:      Pharynx: Oropharynx is clear.   Eyes:      Extraocular Movements: Extraocular movements intact.   Cardiovascular:      Rate and Rhythm: Normal rate and regular rhythm.      Pulses: Normal pulses.       Heart sounds: Normal heart sounds.   Pulmonary:      Effort: Pulmonary effort is normal.      Breath sounds: Normal breath sounds.   Abdominal:      General: Bowel sounds are normal.      Palpations: Abdomen is soft.   Musculoskeletal:         General: Tenderness and signs of injury present. No deformity.      Cervical back: Normal range of motion and neck supple.      Comments: Patient has pain to palpation to the right wrist and right hand, pulses palpable bilaterally, she is neurologically neurovascularly intact    Patient has pain to palpation to the right hip, range of motion intact, pulses palpable bilaterally   Skin:     General: Skin is warm and dry.   Neurological:      Mental Status: She is alert and oriented to person, place, and time.   Psychiatric:         Mood and Affect: Mood normal.         Behavior: Behavior normal.         Thought Content: Thought content normal.         Judgment: Judgment normal.         Procedures           ED Course                                             Medical Decision Making  Patient is a 78-year-old female who presents to the ER due to a fall.  Patient states that she was evaluated on Saturday after she sustained a fall.  She states that she was using a stool that had wheels.  She states the stool was too high for her to sit on and she ultimately fell.  She had CT scans and was evaluated.  Patient states that she was called and told that there was a missed read on her CT scan and she actually does have a fracture.  Per review of CT scan patient has a nondisplaced capitate fracture which was missed by stat rad and found by our radiologist.  She was called to come to the emergency department for a splint.  There is mild widening of the scapholunate space noted however no scaphoid fracture identified.  Patient continues to have right hip pain as well and would like this to be reevaluated since she is here to get a splint.  Past medical history significant for anxiety,  arthrosis, atrial fibrillation, Vazquez's esophagus, coronary artery disease, depression, fibromyalgia, Graves' disease, heart murmur, heart valve disease, hyperlipidemia, hypertension, stroke  Differential diagnosis: Bony fracture, contusion, dislocation, and other    CT Pelvis Without Contrast   Final Result         1. There is stranding of the subcutaneous tissues lateral to the right    hip with a small 2.5 cm soft tissue hematoma. No underlying fracture of    the right hip or bony pelvis identified.         This report was signed and finalized on 8/26/2024 12:27 PM by Dr. Jazmin Sotomayor MD.       Patient received a splint in the emergency department.  She was neurovascularly intact both pre and post splinting.  We did a CT scan of the pelvis to further evaluate for her right hip pain.  There is a small 2.5 cm soft tissue hematoma however no fracture identified.  This is likely causing patient's continued pain.  She will need to follow-up with orthopedics regarding her fractures.  Patient will be discharged home shortly in stable condition.    Problems Addressed:  Hematoma of right hip, initial encounter: complicated acute illness or injury  Nondisplaced fracture of capitate (os magnum) bone, right wrist, initial encounter for closed fracture: complicated acute illness or injury    Amount and/or Complexity of Data Reviewed  Radiology: ordered. Decision-making details documented in ED Course.    Risk  Prescription drug management.        Final diagnoses:   Nondisplaced fracture of capitate (os magnum) bone, right wrist, initial encounter for closed fracture   Hematoma of right hip, initial encounter       ED Disposition  ED Disposition       ED Disposition   Discharge    Condition   Good    Comment   --               Theo Wagner MD  [address] [place] [address] [id number] [phone]               Medication List      No changes were made to your prescriptions during this visit.            Laura Balbuena  Yesika, APRN  08/27/24 1036       Laura Balbuena, APRN  08/27/24 1037

## 2024-08-29 ENCOUNTER — OFFICE VISIT (OUTPATIENT)
Dept: INTERNAL MEDICINE | Facility: CLINIC | Age: 79
End: 2024-08-29
Payer: MEDICARE

## 2024-08-29 VITALS
SYSTOLIC BLOOD PRESSURE: 130 MMHG | OXYGEN SATURATION: 96 % | BODY MASS INDEX: 28.47 KG/M2 | TEMPERATURE: 97.3 F | HEIGHT: 60 IN | HEART RATE: 67 BPM | WEIGHT: 145 LBS | DIASTOLIC BLOOD PRESSURE: 70 MMHG

## 2024-08-29 DIAGNOSIS — S62.134D CLOSED NONDISPLACED FRACTURE OF CAPITATE OF RIGHT WRIST WITH ROUTINE HEALING, SUBSEQUENT ENCOUNTER: ICD-10-CM
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
## 2024-08-29 DIAGNOSIS — S73.101A SPRAIN OF RIGHT HIP, INITIAL ENCOUNTER: ICD-10-CM
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
## 2024-08-29 DIAGNOSIS — S00.83XD TRAUMATIC HEMATOMA OF FACE, SUBSEQUENT ENCOUNTER: ICD-10-CM

## 2024-08-29 PROCEDURE — 1125F AMNT PAIN NOTED PAIN PRSNT: CPT

## 2024-08-29 PROCEDURE — 99214 OFFICE O/P EST MOD 30 MIN: CPT

## 2024-08-29 PROCEDURE — 1160F RVW MEDS BY RX/DR IN RCRD: CPT

## 2024-08-29 PROCEDURE — 3078F DIAST BP <80 MM HG: CPT

## 2024-08-29 PROCEDURE — 3075F SYST BP GE 130 - 139MM HG: CPT

## 2024-08-29 PROCEDURE — 1159F MED LIST DOCD IN RCRD: CPT

## 2024-08-29 NOTE — PROGRESS NOTES
Subjective     Chief Complaint:  ED follow-up    HPI:  Patient presents today for ED follow-up. Please see assessment and plan below.    Past Medical History:   Past Medical History:   Diagnosis Date    Anxiety     Arthrosis     Atrial fibrillation     Vazquez's esophagus 2000    Coronary artery disease     Depression     Fibromyalgia     Graves disease 1970    Heart murmur     Heart valve disease     Hyperlipidemia     Hypertension     Stroke      Past Surgical History:  Past Surgical History:   Procedure Laterality Date    BRONCHOSCOPY Bilateral 10/6/2023    Procedure: BRONCHOSCOPY BIOPSY;  Surgeon: Jonah Driscoll MD;  Location: [facility] ENDOSCOPY;  Service: Pulmonary;  Laterality: Bilateral;  pre op: hemoptysis   post op:scant endobronchial blood,   PCP: Meme Tan APRN    CARDIAC CATHETERIZATION      CARPAL TUNNEL RELEASE Bilateral 2010    ESSURE TUBAL LIGATION Bilateral 1977    EYE SURGERY      HERNIA REPAIR  1992    JOINT REPLACEMENT      REPLACEMENT TOTAL KNEE BILATERAL Bilateral 1998    TENDON REPAIR Right 2019    THYROIDECTOMY  1970       Allergies:  Allergies   Allergen Reactions    Levaquin [Levofloxacin] Other (See Comments)     Spasms    Aripiprazole Unknown - High Severity    Codeine Dizziness     Other reaction(s): dizziness      Morphine Other (See Comments)     Medications:  Prior to Admission medications    Medication Sig Start Date End Date Taking? Authorizing Provider   amLODIPine (NORVASC) 5 MG tablet Take 1 tablet by mouth Daily. 3/18/24   Juan A Lopez MD   apixaban (ELIQUIS) 5 MG tablet tablet Take 1 tablet by mouth 2 (Two) Times a Day. 3/18/24   Juan A Lopez MD   azithromycin (Zithromax Z-Ney) 250 MG tablet Take 1 tablet by mouth Take As Directed. Take 2 tablets by mouth the first day, then 1 tablet daily for 4 days. 7/5/24   Agustina Ramirez APRN   benzonatate (Tessalon Perles) 100 MG capsule Take 1 capsule by mouth 3 (Three) Times a Day As Needed for  "Cough. 7/5/24   Agustina Ramirez APRN   buPROPion (WELLBUTRIN) 75 MG tablet TAKE 1 TABLET BY MOUTH TWICE A DAY 7/25/24   Juan A Lopez MD   busPIRone (BUSPAR) 15 MG tablet Take 1 tablet by mouth 4 (Four) Times a Day. 3/18/24   Juan A Lopez MD   escitalopram (LEXAPRO) 20 MG tablet Take 1 tablet by mouth Daily. 10/17/23   Meme Tan APRN   levothyroxine (SYNTHROID, LEVOTHROID) 112 MCG tablet Take 1 tablet by mouth Daily. 3/18/24   Juan A Lopez MD   methylPREDNISolone (MEDROL) 4 MG dose pack Take as directed on package instructions. 7/5/24   Agustina Ramirez APRN   metoprolol succinate XL (TOPROL-XL) 25 MG 24 hr tablet Take 1 tablet by mouth Daily. 7/10/24   Obie Fox MD   naloxone (NARCAN) 4 MG/0.1ML nasal spray 1 spray into the nostril(s) as directed by provider As Needed (opiod overdose). CALL 911. SPR CONTENTS OF ONE SPRAYER (0.1ML) INTO ONE NOSTRIL. REPEAT IN 2-3 MIN IF SYMPTOMS OF OPIOID EMERGENCY PERSIST, ALTERNATE NOSTRILS  Patient not taking: Reported on 7/10/2024    ProviderYoel MD   nystatin (MYCOSTATIN) 359071 UNIT/GM powder Apply  topically to the appropriate area as directed 4 (Four) Times a Day.  Patient not taking: Reported on 7/10/2024 10/17/23   Meme Tan APRN   oxyCODONE (ROXICODONE) 15 MG immediate release tablet Take 1 tablet by mouth Every 6 (Six) Hours As Needed. 6/14/24   Yoel Villalobos MD   rosuvastatin (Crestor) 10 MG tablet Take 1 tablet by mouth Daily. 10/12/23   Macrina Escobedo APRN   traZODone (DESYREL) 100 MG tablet Take 1 tablet by mouth Every Night. 3/18/24   Juan A Lopez MD       Objective     Vital Signs: /70 (BP Location: Left arm, Patient Position: Sitting, Cuff Size: Adult)   Pulse 67   Temp 97.3 °F (36.3 °C) (Temporal)   Ht 152.4 cm (60\")   Wt 65.8 kg (145 lb)   SpO2 96%   BMI 28.32 kg/m²   Physical Exam  Vitals and nursing note reviewed.   Constitutional:       General: She is not in acute " distress.     Appearance: Normal appearance.   HENT:      Head:      Comments: Diffuse facial bruising with hematoma over right eye  Pulmonary:      Effort: Pulmonary effort is normal. No respiratory distress.   Musculoskeletal:         General: Normal range of motion.      Comments: Right wrist in cast, arm in sling   Skin:     General: Skin is warm and dry.      Capillary Refill: Capillary refill takes less than 2 seconds.      Findings: Bruising (right posterior thigh) present. No erythema or rash.   Neurological:      Mental Status: She is alert and oriented to person, place, and time. Mental status is at baseline.      Motor: No weakness.       Results Reviewed:  CT head was obtained which showed no evidence of intracranial hemorrhage.  CT cervical spine showed no acute fracture.  X-ray right hip, right knee, right shoulder, and right wrist showed no acute fracture.  CT of right upper extremity showed acute nondisplaced fracture of the capitate without evidence of scaphoid fracture.    Assessment / Plan     Assessment/Plan:  Diagnoses and all orders for this visit:    1. Fall at home, subsequent encounter (Primary)    2. Sprain of right hip, initial encounter    3. Closed nondisplaced fracture of capitate of right wrist with routine healing, subsequent encounter    4. Traumatic hematoma of face, subsequent encounter      Patient presents today for emergency department follow-up.  She was initially seen at [facility] emergency department on 8/24 after falling at home.  She was attempting to adjust her rollator and it slid which caused her to fall and hit her head on the floor.  She denied loss of consciousness.  She complained of neck stiffness, swelling and bruising to the right forehead, and right shoulder/wrist/hip pain.    CT head was obtained which showed no evidence of intracranial hemorrhage.  CT cervical spine showed no acute fracture.  X-ray right hip, right knee, right shoulder, and right wrist  showed no acute fracture.  CT of right upper extremity showed acute nondisplaced fracture of the capitate without evidence of scaphoid fracture.    Patient was contacted on 8/26 and was advised to return for right wrist splint placement.  At that time the patient continued to have right hip pain and requested reevaluation with further imaging.  CT pelvis was obtained which showed stranding of the subcutaneous fat lateral to the right hip with a small soft tissue hematoma.  No evidence of fracture.  She was advised to follow-up with Dr. Wagner for further treatment of her right wrist fracture.    She was seen at  today.  They put a cast on her wrist and put her arm in a sling.    She has significant bruising on the right side of her face which has extended to the left side.  She has a fairly large hematoma over her right thigh.  Her daughter has pictures that show the progression of the bruising/hematoma on her face.  The hematoma has improved but the bruising has continued to spread.  She did hold her Eliquis for a few days but has since resumed it.    She has a hematoma on her right thigh/buttock but she is experiencing pain in her upper anterior thigh.  Discussed that the hematoma could be causing some compression but the hematoma was small.  I have higher suspicion that she may have sprained her right hip.  She does have pain when she lifts her leg or walks.  I have advised her to apply ice to the site and try to rest is much as possible.  Hopeful that this will improve over the next couple of weeks.    She has been taking Roxicodone 15 mg every 6 hours as needed for pain.  She is also taking Tylenol as needed.    I have no concerns at this time and feel that it would likely just take some time for her to recover.  Patient feels more comfortable with close follow-up so we will see her back in 1 week but I encouraged her to reach out if she develops any concerns before then.    Return in about 1 week (around  9/5/2024). unless patient needs to be seen sooner or acute issues arise.    I have discussed the patient results/orders and and plan/recommendation with them at today's visit.      Agustina Ramirez, APRN   08/29/2024

## 2024-09-05 ENCOUNTER — OFFICE VISIT (OUTPATIENT)
Dept: INTERNAL MEDICINE | Facility: CLINIC | Age: 79
End: 2024-09-05
Payer: MEDICARE

## 2024-09-05 VITALS
HEIGHT: 60 IN | OXYGEN SATURATION: 94 % | WEIGHT: 150 LBS | SYSTOLIC BLOOD PRESSURE: 130 MMHG | TEMPERATURE: 97.7 F | HEART RATE: 77 BPM | DIASTOLIC BLOOD PRESSURE: 58 MMHG | RESPIRATION RATE: 18 BRPM | BODY MASS INDEX: 29.45 KG/M2

## 2024-09-05 DIAGNOSIS — E78.49 OTHER HYPERLIPIDEMIA: ICD-10-CM

## 2024-09-05 DIAGNOSIS — R11.0 NAUSEA: ICD-10-CM

## 2024-09-05 DIAGNOSIS — M25.551 RIGHT HIP PAIN: ICD-10-CM

## 2024-09-05 DIAGNOSIS — I10 PRIMARY HYPERTENSION: Primary | ICD-10-CM

## 2024-09-05 DIAGNOSIS — Z91.81 RISK FOR FALLS: ICD-10-CM

## 2024-09-05 DIAGNOSIS — Z74.09 IMPAIRED MOBILITY: ICD-10-CM

## 2024-09-05 PROCEDURE — 1159F MED LIST DOCD IN RCRD: CPT

## 2024-09-05 PROCEDURE — 1160F RVW MEDS BY RX/DR IN RCRD: CPT

## 2024-09-05 PROCEDURE — 3078F DIAST BP <80 MM HG: CPT

## 2024-09-05 PROCEDURE — 99213 OFFICE O/P EST LOW 20 MIN: CPT

## 2024-09-05 PROCEDURE — 1125F AMNT PAIN NOTED PAIN PRSNT: CPT

## 2024-09-05 PROCEDURE — 3075F SYST BP GE 130 - 139MM HG: CPT

## 2024-09-05 RX ORDER — ONDANSETRON 4 MG/1
4 TABLET, ORALLY DISINTEGRATING ORAL EVERY 8 HOURS PRN
Qty: 30 TABLET | Refills: 0 | Status: SHIPPED | OUTPATIENT
Start: 2024-09-05

## 2024-09-05 RX ORDER — ROSUVASTATIN CALCIUM 10 MG/1
10 TABLET, COATED ORAL DAILY
Qty: 90 TABLET | Refills: 1 | Status: SHIPPED | OUTPATIENT
Start: 2024-09-05

## 2024-09-05 RX ORDER — AMLODIPINE BESYLATE 5 MG/1
5 TABLET ORAL DAILY
Qty: 90 TABLET | Refills: 1 | Status: SHIPPED | OUTPATIENT
Start: 2024-09-05

## 2024-09-05 NOTE — PROGRESS NOTES
Subjective     Chief Complaint:  1 week follow-up on hip pain and facial bruising    HPI:  Patient presents today for 1 week follow-up on hip pain and facial bruising. Please see assessment and plan below.    Past Medical History:   Past Medical History:   Diagnosis Date    Anxiety     Arthrosis     Atrial fibrillation     Vazquez's esophagus 2000    Coronary artery disease     Depression     Fibromyalgia     Graves disease 1970    Heart murmur     Heart valve disease     Hyperlipidemia     Hypertension     Stroke      Past Surgical History:  Past Surgical History:   Procedure Laterality Date    BRONCHOSCOPY Bilateral 10/6/2023    Procedure: BRONCHOSCOPY BIOPSY;  Surgeon: Jonah Driscoll MD;  Location: [facility] ENDOSCOPY;  Service: Pulmonary;  Laterality: Bilateral;  pre op: hemoptysis   post op:scant endobronchial blood,   PCP: Meme Tan APRN    CARDIAC CATHETERIZATION      CARPAL TUNNEL RELEASE Bilateral 2010    ESSURE TUBAL LIGATION Bilateral 1977    EYE SURGERY      HERNIA REPAIR  1992    JOINT REPLACEMENT      REPLACEMENT TOTAL KNEE BILATERAL Bilateral 1998    TENDON REPAIR Right 2019    THYROIDECTOMY  1970       Allergies:  Allergies   Allergen Reactions    Levaquin [Levofloxacin] Other (See Comments)     Spasms    Aripiprazole Unknown - High Severity    Codeine Dizziness     Other reaction(s): dizziness      Morphine Other (See Comments)     Medications:  Prior to Admission medications    Medication Sig Start Date End Date Taking? Authorizing Provider   amLODIPine (NORVASC) 5 MG tablet Take 1 tablet by mouth Daily. 3/18/24  Yes Juan A Lopez MD   apixaban (ELIQUIS) 5 MG tablet tablet Take 1 tablet by mouth 2 (Two) Times a Day. 3/18/24  Yes Juan A Lopez MD   buPROPion (WELLBUTRIN) 75 MG tablet TAKE 1 TABLET BY MOUTH TWICE A DAY 7/25/24  Yes Juan A Lopez MD   busPIRone (BUSPAR) 15 MG tablet Take 1 tablet by mouth 4 (Four) Times a Day. 3/18/24  Yes Juan A Lopez  "MD   escitalopram (LEXAPRO) 20 MG tablet Take 1 tablet by mouth Daily. 10/17/23  Yes Meme Tan APRN   levothyroxine (SYNTHROID, LEVOTHROID) 112 MCG tablet Take 1 tablet by mouth Daily. 3/18/24  Yes Juan A Lopez MD   metoprolol succinate XL (TOPROL-XL) 25 MG 24 hr tablet Take 1 tablet by mouth Daily. 7/10/24  Yes Obie Fox MD   naloxone (NARCAN) 4 MG/0.1ML nasal spray 1 spray into the nostril(s) as directed by provider As Needed (opiod overdose). CALL 911. SPR CONTENTS OF ONE SPRAYER (0.1ML) INTO ONE NOSTRIL. REPEAT IN 2-3 MIN IF SYMPTOMS OF OPIOID EMERGENCY PERSIST, ALTERNATE NOSTRILS   Yes Provider, MD Yoel   nystatin (MYCOSTATIN) 642352 UNIT/GM powder Apply  topically to the appropriate area as directed 4 (Four) Times a Day. 10/17/23  Yes Meme Tan APRN   oxyCODONE (ROXICODONE) 15 MG immediate release tablet Take 1 tablet by mouth Every 6 (Six) Hours As Needed. 6/14/24  Yes Yoel Villalobos MD   rosuvastatin (Crestor) 10 MG tablet Take 1 tablet by mouth Daily. 10/12/23  Yes Macrina Escobedo APRN   traZODone (DESYREL) 100 MG tablet Take 1 tablet by mouth Every Night. 3/18/24  Yes Juan A Lopez MD       Objective     Vital Signs: /58 (BP Location: Left arm, Patient Position: Sitting, Cuff Size: Adult)   Pulse 77   Temp 97.7 °F (36.5 °C) (Infrared)   Resp 18   Ht 152.4 cm (60\")   Wt 68 kg (150 lb)   SpO2 94%   BMI 29.29 kg/m²   Physical Exam  Vitals and nursing note reviewed.   Constitutional:       General: She is not in acute distress.  HENT:      Head:      Comments: Generalized bruising on face, small hematoma above right eye  Cardiovascular:      Rate and Rhythm: Normal rate.   Pulmonary:      Effort: Pulmonary effort is normal. No respiratory distress.   Musculoskeletal:         General: Normal range of motion.   Skin:     General: Skin is warm and dry.      Capillary Refill: Capillary refill takes less than 2 seconds.      Findings: No " bruising, erythema or rash.   Neurological:      Mental Status: She is alert and oriented to person, place, and time. Mental status is at baseline.      Motor: Weakness present.      Gait: Gait abnormal.       Results Reviewed:  No new results available for review.    Assessment / Plan     Assessment/Plan:  Diagnoses and all orders for this visit:    1. Primary hypertension (Primary)  -     amLODIPine (NORVASC) 5 MG tablet; Take 1 tablet by mouth Daily.  Dispense: 90 tablet; Refill: 1    2. Impaired mobility  -     Walker  Walker Folding with Wheels  -     Miscellaneous DME    3. Risk for falls  -     Walker  Walker Folding with Wheels  -     Miscellaneous DME    4. Right hip pain  -     MRI Hip Right Without Contrast; Future    5. Other hyperlipidemia  -     rosuvastatin (Crestor) 10 MG tablet; Take 1 tablet by mouth Daily.  Dispense: 90 tablet; Refill: 1    6. Nausea  -     ondansetron ODT (ZOFRAN-ODT) 4 MG disintegrating tablet; Place 1 tablet on the tongue Every 8 (Eight) Hours As Needed for Nausea or Vomiting.  Dispense: 30 tablet; Refill: 0       Patient presents today for 1 week follow-up.  The bruising on her face is significantly improving.  The hematoma over her right eye is much smaller.  The hematoma is softer as well.      She states that her right hip has become more painful.  It is significantly affecting her mobility.  She is unable to get in and out of her bathtub.  She is having some difficulty with ambulating.  Patient states that she would be unable to tolerate physical therapy and I agree with this.  Would like to proceed with MRI for further evaluation since x-ray and CT of right hip were negative.    She is having difficulty ambulating and remains at high risk for falls.  Feel that she would benefit from a rollator and the patient is agreeable.  Will fax order to [employer].    Patient is requesting a transfer bench sliding shower chair to assist with getting in and out of her  sameera.  I feel that she would greatly benefit from this.  We we will fax the order to [employer].    She has an appointment with Dr. Banda today.  She is requesting radiology reports from her emergency department visit for Dr. Banda review. I printed these for the patient per her request.    Keep next scheduled follow up with Dr. Lopez on 10/24 but may reach out if she develops any concerns before then.    Return for Next scheduled follow up. unless patient needs to be seen sooner or acute issues arise.    I have discussed the patient results/orders and and plan/recommendation with them at today's visit.      Agustina Ramirez, APRN   09/05/2024

## 2024-09-12 DIAGNOSIS — E05.00 GRAVES DISEASE: ICD-10-CM

## 2024-09-12 RX ORDER — LEVOTHYROXINE SODIUM 112 UG/1
112 TABLET ORAL DAILY
Qty: 90 TABLET | Refills: 3 | Status: SHIPPED | OUTPATIENT
Start: 2024-09-12

## 2024-09-16 DIAGNOSIS — F41.9 ANXIETY: ICD-10-CM

## 2024-09-16 RX ORDER — BUSPIRONE HYDROCHLORIDE 15 MG/1
15 TABLET ORAL 4 TIMES DAILY
Qty: 360 TABLET | Refills: 1 | Status: SHIPPED | OUTPATIENT
Start: 2024-09-16

## 2024-09-16 RX ORDER — ESCITALOPRAM OXALATE 20 MG/1
20 TABLET ORAL DAILY
Qty: 90 TABLET | Refills: 2 | Status: SHIPPED | OUTPATIENT
Start: 2024-09-16

## 2024-10-17 DIAGNOSIS — M79.641 RIGHT HAND PAIN: Primary | ICD-10-CM

## 2024-10-18 ENCOUNTER — OFFICE VISIT (OUTPATIENT)
Age: 79
End: 2024-10-18
Payer: COMMERCIAL

## 2024-10-18 VITALS — BODY MASS INDEX: 29.81 KG/M2 | WEIGHT: 142 LBS | HEIGHT: 58 IN

## 2024-10-18 DIAGNOSIS — S62.134D: Primary | ICD-10-CM

## 2024-10-18 PROCEDURE — 99212 OFFICE O/P EST SF 10 MIN: CPT | Performed by: PHYSICIAN ASSISTANT

## 2024-10-18 PROCEDURE — 1123F ACP DISCUSS/DSCN MKR DOCD: CPT | Performed by: PHYSICIAN ASSISTANT

## 2024-10-18 RX ORDER — OXYCODONE HYDROCHLORIDE 15 MG/1
15 TABLET ORAL EVERY 6 HOURS PRN
COMMUNITY
Start: 2024-06-14

## 2024-10-18 RX ORDER — AMLODIPINE BESYLATE 5 MG/1
5 TABLET ORAL DAILY
COMMUNITY

## 2024-10-18 RX ORDER — BUPROPION HYDROCHLORIDE 75 MG/1
75 TABLET ORAL 2 TIMES DAILY
COMMUNITY

## 2024-10-18 RX ORDER — ONDANSETRON 4 MG/1
4 TABLET, ORALLY DISINTEGRATING ORAL EVERY 8 HOURS PRN
COMMUNITY
Start: 2024-09-05

## 2024-10-18 RX ORDER — METOPROLOL SUCCINATE 25 MG/1
25 TABLET, EXTENDED RELEASE ORAL DAILY
COMMUNITY

## 2024-10-18 ASSESSMENT — ENCOUNTER SYMPTOMS: SHORTNESS OF BREATH: 0

## 2024-10-19 DIAGNOSIS — F51.01 PRIMARY INSOMNIA: ICD-10-CM

## 2024-10-21 ENCOUNTER — LAB (OUTPATIENT)
Dept: INTERNAL MEDICINE | Facility: CLINIC | Age: 79
End: 2024-10-21
Payer: MEDICARE

## 2024-10-21 DIAGNOSIS — E05.00 GRAVES DISEASE: ICD-10-CM

## 2024-10-21 DIAGNOSIS — I10 PRIMARY HYPERTENSION: ICD-10-CM

## 2024-10-21 DIAGNOSIS — Z11.59 NEED FOR HEPATITIS C SCREENING TEST: Primary | ICD-10-CM

## 2024-10-21 RX ORDER — TRAZODONE HYDROCHLORIDE 100 MG/1
100 TABLET ORAL
Qty: 90 TABLET | Refills: 1 | Status: SHIPPED | OUTPATIENT
Start: 2024-10-21

## 2024-10-22 LAB
ALBUMIN SERPL-MCNC: 4 G/DL (ref 3.5–5.2)
ALBUMIN/GLOB SERPL: 1.7 G/DL
ALP SERPL-CCNC: 83 U/L (ref 39–117)
ALT SERPL-CCNC: 12 U/L (ref 1–33)
APPEARANCE UR: CLEAR
AST SERPL-CCNC: 22 U/L (ref 1–32)
BACTERIA #/AREA URNS HPF: NORMAL /HPF
BASOPHILS # BLD AUTO: 0.03 10*3/MM3 (ref 0–0.2)
BASOPHILS NFR BLD AUTO: 0.4 % (ref 0–1.5)
BILIRUB SERPL-MCNC: 0.3 MG/DL (ref 0–1.2)
BILIRUB UR QL STRIP: NEGATIVE
BUN SERPL-MCNC: 13 MG/DL (ref 8–23)
BUN/CREAT SERPL: 15.9 (ref 7–25)
CALCIUM SERPL-MCNC: 8.9 MG/DL (ref 8.6–10.5)
CASTS URNS MICRO: NORMAL
CHLORIDE SERPL-SCNC: 101 MMOL/L (ref 98–107)
CO2 SERPL-SCNC: 28.1 MMOL/L (ref 22–29)
COLOR UR: YELLOW
CREAT SERPL-MCNC: 0.82 MG/DL (ref 0.57–1)
EGFRCR SERPLBLD CKD-EPI 2021: 73.3 ML/MIN/1.73
EOSINOPHIL # BLD AUTO: 0.12 10*3/MM3 (ref 0–0.4)
EOSINOPHIL NFR BLD AUTO: 1.5 % (ref 0.3–6.2)
EPI CELLS #/AREA URNS HPF: NORMAL /HPF
ERYTHROCYTE [DISTWIDTH] IN BLOOD BY AUTOMATED COUNT: 11.8 % (ref 12.3–15.4)
GLOBULIN SER CALC-MCNC: 2.4 GM/DL
GLUCOSE SERPL-MCNC: 93 MG/DL (ref 65–99)
GLUCOSE UR QL STRIP: NEGATIVE
HCT VFR BLD AUTO: 40.6 % (ref 34–46.6)
HCV IGG SERPL QL IA: NON REACTIVE
HGB BLD-MCNC: 13.1 G/DL (ref 12–15.9)
HGB UR QL STRIP: NEGATIVE
IMM GRANULOCYTES # BLD AUTO: 0.02 10*3/MM3 (ref 0–0.05)
IMM GRANULOCYTES NFR BLD AUTO: 0.2 % (ref 0–0.5)
KETONES UR QL STRIP: NEGATIVE
LEUKOCYTE ESTERASE UR QL STRIP: NEGATIVE
LYMPHOCYTES # BLD AUTO: 2.33 10*3/MM3 (ref 0.7–3.1)
LYMPHOCYTES NFR BLD AUTO: 28.2 % (ref 19.6–45.3)
MCH RBC QN AUTO: 33.4 PG (ref 26.6–33)
MCHC RBC AUTO-ENTMCNC: 32.3 G/DL (ref 31.5–35.7)
MCV RBC AUTO: 103.6 FL (ref 79–97)
MONOCYTES # BLD AUTO: 0.98 10*3/MM3 (ref 0.1–0.9)
MONOCYTES NFR BLD AUTO: 11.9 % (ref 5–12)
NEUTROPHILS # BLD AUTO: 4.78 10*3/MM3 (ref 1.7–7)
NEUTROPHILS NFR BLD AUTO: 57.8 % (ref 42.7–76)
NITRITE UR QL STRIP: NEGATIVE
NRBC BLD AUTO-RTO: 0 /100 WBC (ref 0–0.2)
PH UR STRIP: 8 [PH] (ref 5–8)
PLATELET # BLD AUTO: 214 10*3/MM3 (ref 140–450)
POTASSIUM SERPL-SCNC: 4.4 MMOL/L (ref 3.5–5.2)
PROT SERPL-MCNC: 6.4 G/DL (ref 6–8.5)
PROT UR QL STRIP: NEGATIVE
RBC # BLD AUTO: 3.92 10*6/MM3 (ref 3.77–5.28)
RBC #/AREA URNS HPF: NORMAL /HPF
SODIUM SERPL-SCNC: 139 MMOL/L (ref 136–145)
SP GR UR STRIP: 1.01 (ref 1–1.03)
T4 FREE SERPL-MCNC: 1.55 NG/DL (ref 0.93–1.7)
TSH SERPL DL<=0.005 MIU/L-ACNC: 8.82 UIU/ML (ref 0.27–4.2)
UROBILINOGEN UR STRIP-MCNC: NORMAL MG/DL
WBC # BLD AUTO: 8.26 10*3/MM3 (ref 3.4–10.8)
WBC #/AREA URNS HPF: NORMAL /HPF

## 2024-11-04 ENCOUNTER — POP HEALTH PHARMACY (OUTPATIENT)
Dept: PHARMACY | Facility: OTHER | Age: 79
End: 2024-11-04
Payer: MEDICARE

## 2024-11-06 ENCOUNTER — OFFICE VISIT (OUTPATIENT)
Dept: INTERNAL MEDICINE | Facility: CLINIC | Age: 79
End: 2024-11-06
Payer: MEDICARE

## 2024-11-06 VITALS
TEMPERATURE: 97.8 F | SYSTOLIC BLOOD PRESSURE: 124 MMHG | HEIGHT: 60 IN | OXYGEN SATURATION: 95 % | BODY MASS INDEX: 31.41 KG/M2 | HEART RATE: 65 BPM | DIASTOLIC BLOOD PRESSURE: 68 MMHG | WEIGHT: 160 LBS

## 2024-11-06 DIAGNOSIS — Z12.31 SCREENING MAMMOGRAM, ENCOUNTER FOR: ICD-10-CM

## 2024-11-06 DIAGNOSIS — F41.9 ANXIETY: ICD-10-CM

## 2024-11-06 DIAGNOSIS — Z23 FLU VACCINE NEED: ICD-10-CM

## 2024-11-06 DIAGNOSIS — E03.9 HYPOTHYROIDISM, UNSPECIFIED TYPE: ICD-10-CM

## 2024-11-06 DIAGNOSIS — E66.811 OBESITY (BMI 30.0-34.9): ICD-10-CM

## 2024-11-06 DIAGNOSIS — E05.00 GRAVES DISEASE: ICD-10-CM

## 2024-11-06 DIAGNOSIS — Z78.0 POST-MENOPAUSAL: ICD-10-CM

## 2024-11-06 DIAGNOSIS — Z23 NEED FOR COVID-19 VACCINE: ICD-10-CM

## 2024-11-06 DIAGNOSIS — M25.571 RIGHT ANKLE PAIN, UNSPECIFIED CHRONICITY: Primary | ICD-10-CM

## 2024-11-06 DIAGNOSIS — K21.9 GASTROESOPHAGEAL REFLUX DISEASE WITHOUT ESOPHAGITIS: ICD-10-CM

## 2024-11-06 DIAGNOSIS — I10 ESSENTIAL HYPERTENSION: ICD-10-CM

## 2024-11-06 RX ORDER — BUPROPION HYDROCHLORIDE 75 MG/1
75 TABLET ORAL 2 TIMES DAILY
Qty: 180 TABLET | Refills: 3 | Status: SHIPPED | OUTPATIENT
Start: 2024-11-06

## 2024-11-06 NOTE — PROGRESS NOTES
Subjective   The ABCs of the Annual Wellness Visit  Medicare Wellness Visit      Nichole Rinaldi is a 79 y.o. patient who presents for a Medicare Wellness Visit.    The following portions of the patient's history were reviewed and   updated as appropriate: allergies, current medications, past family history, past medical history, past social history, past surgical history, and problem list.    Compared to one year ago, the patient's physical   health is the same.  Compared to one year ago, the patient's mental   health is the same.    Recent Hospitalizations:  She was not admitted to the hospital during the last year.     Current Medical Providers:  Patient Care Team:  Juan A Lopez MD as PCP - General (Internal Medicine)  Jonah Driscoll MD as Consulting Physician (Pulmonary Disease)    Outpatient Medications Prior to Visit   Medication Sig Dispense Refill    amLODIPine (NORVASC) 5 MG tablet Take 1 tablet by mouth Daily. 90 tablet 1    apixaban (ELIQUIS) 5 MG tablet tablet Take 1 tablet by mouth 2 (Two) Times a Day. 180 tablet 2    busPIRone (BUSPAR) 15 MG tablet Take 1 tablet by mouth 4 (Four) Times a Day. 360 tablet 1    escitalopram (LEXAPRO) 20 MG tablet Take 1 tablet by mouth Daily. 90 tablet 2    levothyroxine (SYNTHROID, LEVOTHROID) 112 MCG tablet TAKE 1 TABLET BY MOUTH EVERY DAY 90 tablet 3    metoprolol succinate XL (TOPROL-XL) 25 MG 24 hr tablet Take 1 tablet by mouth Daily. 90 tablet 3    naloxone (NARCAN) 4 MG/0.1ML nasal spray Administer 1 spray into the nostril(s) as directed by provider As Needed (opiod overdose). CALL 911. SPR CONTENTS OF ONE SPRAYER (0.1ML) INTO ONE NOSTRIL. REPEAT IN 2-3 MIN IF SYMPTOMS OF OPIOID EMERGENCY PERSIST, ALTERNATE NOSTRILS      nystatin (MYCOSTATIN) 961754 UNIT/GM powder Apply  topically to the appropriate area as directed 4 (Four) Times a Day. 60 g 5    ondansetron ODT (ZOFRAN-ODT) 4 MG disintegrating tablet Place 1 tablet on the tongue Every 8 (Eight)  Hours As Needed for Nausea or Vomiting. 30 tablet 0    oxyCODONE (ROXICODONE) 15 MG immediate release tablet Take 1 tablet by mouth Every 6 (Six) Hours As Needed.      rosuvastatin (Crestor) 10 MG tablet Take 1 tablet by mouth Daily. 90 tablet 1    traZODone (DESYREL) 100 MG tablet TAKE 1 TABLET BY MOUTH EVERY DAY AT NIGHT 90 tablet 1    buPROPion (WELLBUTRIN) 75 MG tablet TAKE 1 TABLET BY MOUTH TWICE A  tablet 1     No facility-administered medications prior to visit.     Opioid medication/s are on active medication list.  and I have evaluated her active treatment plan and pain score trends (see table).  Vitals:    11/06/24 1507   PainSc: 0-No pain     I have reviewed the chart for potential of high risk medication and harmful drug interactions in the elderly.        Aspirin is not on active medication list.  Aspirin use is not indicated based on review of current medical condition/s. Risk of harm outweighs potential benefits.  .    Patient Active Problem List   Diagnosis    Osteoarthritis    Vazquez's esophagus    Graves disease    Bilateral hip pain    Bradycardia    Carpal tunnel syndrome on right    Chronic pain    Depression    Herniated disc    Lumbar facet arthropathy    Non-rheumatic mitral regurgitation    Other fatigue    Other hyperlipidemia    Paroxysmal atrial fibrillation    Trochanteric bursitis of both hips    Fibromyalgia    Obesity (BMI 30.0-34.9)    Anxiety    Essential hypertension    Primary insomnia    Gastroesophageal reflux disease without esophagitis    Vitamin D deficiency    Cerebellar stroke    Paroxysmal SVT (supraventricular tachycardia)    Hemoptysis    Acute bronchitis    Lung nodule    Kidney mass    Abnormal CT scan    Left ventricular hypertrophy    Caregiver stress     Advance Care Planning Advance Directive is not on file.  ACP discussion was held with the patient during this visit. Patient does not have an advance directive, information provided.            Objective  "  Vitals:    24 1507   BP: 124/68   BP Location: Left arm   Patient Position: Sitting   Cuff Size: Adult   Pulse: 65   Temp: 97.8 °F (36.6 °C)   TempSrc: Temporal   SpO2: 95%   Weight: 72.6 kg (160 lb)   Height: 152.4 cm (60\")   PainSc: 0-No pain       Estimated body mass index is 31.25 kg/m² as calculated from the following:    Height as of this encounter: 152.4 cm (60\").    Weight as of this encounter: 72.6 kg (160 lb).            Does the patient have evidence of cognitive impairment? No                                                                                               Health  Risk Assessment    Smoking Status:  Social History     Tobacco Use   Smoking Status Never    Passive exposure: Never   Smokeless Tobacco Never     Alcohol Consumption:  Social History     Substance and Sexual Activity   Alcohol Use Never       Fall Risk Screen  STEADI Fall Risk Assessment was completed, and patient is at HIGH risk for falls. Assessment completed on:2024    Depression Screenin/6/2024     3:16 PM   PHQ-2/PHQ-9 Depression Screening   Little interest or pleasure in doing things Several days   Feeling down, depressed, or hopeless Several days   Trouble falling or staying asleep, or sleeping too much Several days   Feeling tired or having little energy Several days   Poor appetite or overeating Several days   Feeling bad about yourself - or that you are a failure or have let yourself or your family down Not at all   Trouble concentrating on things, such as reading the newspaper or watching television Several days   Moving or speaking so slowly that other people could have noticed? Or the opposite - being so fidgety or restless that you have been moving around a lot more than usual. Not at all   Thoughts that you would be better off dead or hurting yourself in some way Not at all   Patient Health Questionnaire-9 Score 6   How difficult have these problems made it for you to do your work, take care " of things at home, or get along with other people? Somewhat difficult     Health Habits and Functional and Cognitive Screenin/6/2024     3:18 PM   Functional & Cognitive Status   Do you have difficulty preparing food and eating? No   Do you have difficulty bathing yourself, getting dressed or grooming yourself? No   Do you have difficulty using the toilet? No   Do you have difficulty moving around from place to place? Yes   Do you have trouble with steps or getting out of a bed or a chair? Yes   Current Diet Limited Junk Food   Dental Exam Not up to date   Eye Exam Up to date   Exercise (times per week) 4 times per week   Current Exercises Include Walking;House Cleaning   Do you need help using the phone?  No   Do you need help to go to places out of walking distance? Yes   Do you need help shopping? Yes   Do you need help preparing meals?  No   Do you need help with housework?  Yes   Do you need help with laundry? Yes   Do you need help taking your medications? No   Do you need help managing money? No   Do you ever drive or ride in a car without wearing a seat belt? No   Have you felt unusual stress, anger or loneliness in the last month? No   Who do you live with? Other   If you need help, do you have trouble finding someone available to you? No   Have you been bothered in the last four weeks by sexual problems? No   Do you have difficulty concentrating, remembering or making decisions? Yes           Age-appropriate Screening Schedule:  Refer to the list below for future screening recommendations based on patient's age, sex and/or medical conditions. Orders for these recommended tests are listed in the plan section. The patient has been provided with a written plan.    Health Maintenance List  Health Maintenance   Topic Date Due    DXA SCAN  Never done    TDAP/TD VACCINES (1 - Tdap) Never done    ZOSTER VACCINE (1 of 2) Never done    MAMMOGRAM  2018    RSV Vaccine - Adults (1 - 1-dose 75+ series)  Never done    LIPID PANEL  03/08/2025    BMI FOLLOWUP  03/08/2025    COLORECTAL CANCER SCREENING  06/13/2025    ANNUAL WELLNESS VISIT  11/06/2025    HEPATITIS C SCREENING  Completed    COVID-19 Vaccine  Completed    INFLUENZA VACCINE  Completed    Pneumococcal Vaccine 65+  Completed                                                                                                                                                 CMS Preventative Services Quick Reference  Risk Factors Identified During Encounter  Immunizations Discussed/Encouraged: Tdap, Shingrix, and RSV (Respiratory Syncytial Virus)  Dental Screening Recommended  Vision Screening Recommended    The above risks/problems have been discussed with the patient.  Pertinent information has been shared with the patient in the After Visit Summary.  An After Visit Summary and PPPS were made available to the patient.    Follow Up:   Next Medicare Wellness visit to be scheduled in 1 year.        Diagnoses and all orders for this visit:    1. Right ankle pain, unspecified chronicity (Primary)  -     Ambulatory Referral to Orthopedic Surgery    2. Flu vaccine need  -     Fluzone High-Dose 65+yrs    3. Need for COVID-19 vaccine  -     COVID-19 (Pfizer) 12yrs+ (COMIRNATY)    4. Anxiety  -     buPROPion (WELLBUTRIN) 75 MG tablet; Take 1 tablet by mouth 2 (Two) Times a Day.  Dispense: 180 tablet; Refill: 3    5. Post-menopausal  -     DEXA Bone Density Axial; Future    6. Screening mammogram, encounter for  -     Mammo Screening Digital Tomosynthesis Bilateral With CAD; Future    7. Hypothyroidism, unspecified type  -     TSH Rfx On Abnormal To Free T4; Future    8. Essential hypertension    9. Graves disease    10. Obesity (BMI 30.0-34.9)    11. Gastroesophageal reflux disease without esophagitis        Recommend at least annual dental and vision screening.  Recommend annual influenza vaccination  Recommend a varied diet and appropriate portion sizes.   CDC  recommendations for physical activity:  At least 150 minutes a week (for example, 30 minutes a day, 5 days a week) of moderate-intensity activity such as brisk walking. Or can consider 75 minutes a week of vigorous-intensity activity such as hiking, jogging, or running.  At least 2 days a week of activities that strengthen muscles.  Plus activities to improve balance.    TSH a little elevated.  Free T4 normal.  Recheck in 3 months. History of graves disease s/p surgery and radioiodine ablation.      Health Maintenance Due   Topic Date Due    DXA SCAN  Never done    TDAP/TD VACCINES (1 - Tdap) Never done    ZOSTER VACCINE (1 of 2) Never done    MAMMOGRAM  06/20/2018    RSV Vaccine - Adults (1 - 1-dose 75+ series) Never done     Patient  just passed away.  She was falling asleep during the visit at time.      Patient having falls.    Ankle has been bothering her.  She would like referral to [facility].  Will refer.  Frequent pain.     Result Review :  The following data was reviewed by: Juan A Lopez MD on 11/06/2024:  CMP          11/13/2023    13:01 3/8/2024    14:12 10/21/2024    08:53   CMP   Glucose  76  93    BUN  9  13    Creatinine 0.80  0.65  0.82    Sodium  141  139    Potassium  4.6  4.4    Chloride  104  101    Calcium  8.9  8.9    Total Protein  6.2  6.4    Albumin  4.2  4.0    Globulin  2.0  2.4    Total Bilirubin  0.3  0.3    Alkaline Phosphatase  79  83    AST (SGOT)  19  22    ALT (SGPT)  10  12    BUN/Creatinine Ratio  13.8  15.9      CBC w/diff          3/8/2024    14:12 10/21/2024    08:53   CBC w/Diff   WBC 8.18  8.26    RBC 3.87  3.92    Hemoglobin 12.9  13.1    Hematocrit 38.9  40.6    .5  103.6    MCH 33.3  33.4    MCHC 33.2  32.3    RDW 12.8  11.8    Platelets 221  214    Neutrophil Rel % 57.6  57.8    Lymphocyte Rel % 32.6  28.2    Monocyte Rel % 8.1  11.9    Eosinophil Rel % 1.1  1.5    Basophil Rel % 0.4  0.4      Lipid Panel          3/8/2024    14:12   Lipid Panel   Total  Cholesterol 129    Triglycerides 87    HDL Cholesterol 64    VLDL Cholesterol 17    LDL Cholesterol  48      TSH          3/8/2024    14:12 10/21/2024    08:53   TSH   TSH 3.100  8.820        UA          3/8/2024    14:12 10/21/2024    08:53   Urinalysis   Blood, UA Negative  Negative    Leukocytes, UA Negative  Negative    Nitrite, UA Negative  Negative    RBC, UA 0-2  Comment    Bacteria, UA Comment  Comment           Follow Up:   Return in about 6 months (around 5/6/2025), or if symptoms worsen or fail to improve, for follow up for above problems. Longitudinal care..     An After Visit Summary and PPPS were made available to the patient.                   ARLEN Lopez MD, FACP, FH      Electronically signed by Juan A Lopez MD, 11/06/24, 3:16 PM CST.

## 2024-11-12 ENCOUNTER — TELEPHONE (OUTPATIENT)
Dept: INTERNAL MEDICINE | Facility: CLINIC | Age: 79
End: 2024-11-12

## 2024-11-12 NOTE — TELEPHONE ENCOUNTER
Caller: Sari Rinaldi    Relationship: Emergency Contact    Best call back number: [phone]     What is the best time to reach you: ANY    Who are you requesting to speak with (clinical staff, provider,  specific staff member): CLINICAL    Do you know the name of the person who called: DAUGHTER    What was the call regarding: WAS NEEDING TO CHECK ON SAMPLED OF ELIQUIS. HAD DISCUSSED AT LAST APPT AND FORGOT TO TAKE THEM.     Is it okay if the provider responds through MyChart: YES OR CALL BACK

## 2024-11-20 ENCOUNTER — EXTERNAL PBMM DATA (OUTPATIENT)
Dept: PHARMACY | Facility: OTHER | Age: 79
End: 2024-11-20
Payer: MEDICARE

## 2024-11-26 ENCOUNTER — HOSPITAL ENCOUNTER (OUTPATIENT)
Dept: MAMMOGRAPHY | Facility: HOSPITAL | Age: 79
Discharge: HOME | End: 2024-11-26
Payer: MEDICARE

## 2024-11-26 ENCOUNTER — HOSPITAL ENCOUNTER (OUTPATIENT)
Dept: BONE DENSITY | Facility: HOSPITAL | Age: 79
Discharge: HOME | End: 2024-11-26
Payer: MEDICARE

## 2024-11-26 DIAGNOSIS — Z78.0 POST-MENOPAUSAL: ICD-10-CM

## 2024-11-26 DIAGNOSIS — Z12.31 SCREENING MAMMOGRAM, ENCOUNTER FOR: ICD-10-CM

## 2024-11-26 PROCEDURE — 77067 SCR MAMMO BI INCL CAD: CPT

## 2024-11-26 PROCEDURE — 77080 DXA BONE DENSITY AXIAL: CPT

## 2024-11-26 PROCEDURE — 77063 BREAST TOMOSYNTHESIS BI: CPT

## 2024-12-04 DIAGNOSIS — M81.0 AGE-RELATED OSTEOPOROSIS WITHOUT CURRENT PATHOLOGICAL FRACTURE: Primary | ICD-10-CM

## 2024-12-04 RX ORDER — ALENDRONATE SODIUM 70 MG/1
70 TABLET ORAL
Qty: 12 TABLET | Refills: 3 | Status: SHIPPED | OUTPATIENT
Start: 2024-12-04

## 2024-12-05 DIAGNOSIS — F41.9 ANXIETY: ICD-10-CM

## 2024-12-05 DIAGNOSIS — B37.2 SKIN YEAST INFECTION: ICD-10-CM

## 2024-12-05 DIAGNOSIS — F51.01 PRIMARY INSOMNIA: ICD-10-CM

## 2024-12-05 DIAGNOSIS — E78.49 OTHER HYPERLIPIDEMIA: ICD-10-CM

## 2024-12-05 DIAGNOSIS — E05.00 GRAVES DISEASE: ICD-10-CM

## 2024-12-05 DIAGNOSIS — R11.0 NAUSEA: ICD-10-CM

## 2024-12-05 DIAGNOSIS — I10 PRIMARY HYPERTENSION: ICD-10-CM

## 2024-12-05 RX ORDER — NYSTATIN 100000 [USP'U]/G
POWDER TOPICAL 4 TIMES DAILY
Qty: 60 G | Refills: 5 | Status: CANCELLED | OUTPATIENT
Start: 2024-12-05

## 2024-12-06 RX ORDER — ESCITALOPRAM OXALATE 20 MG/1
20 TABLET ORAL DAILY
Qty: 90 TABLET | Refills: 2 | OUTPATIENT
Start: 2024-12-06

## 2024-12-06 RX ORDER — AMLODIPINE BESYLATE 5 MG/1
5 TABLET ORAL DAILY
Qty: 90 TABLET | Refills: 1 | OUTPATIENT
Start: 2024-12-06

## 2024-12-06 RX ORDER — ONDANSETRON 4 MG/1
4 TABLET, ORALLY DISINTEGRATING ORAL EVERY 8 HOURS PRN
Qty: 90 TABLET | Refills: 1 | Status: SHIPPED | OUTPATIENT
Start: 2024-12-06

## 2024-12-06 RX ORDER — LEVOTHYROXINE SODIUM 112 UG/1
112 TABLET ORAL DAILY
Qty: 90 TABLET | Refills: 3 | OUTPATIENT
Start: 2024-12-06

## 2024-12-06 RX ORDER — ROSUVASTATIN CALCIUM 10 MG/1
10 TABLET, COATED ORAL DAILY
Qty: 90 TABLET | Refills: 1 | OUTPATIENT
Start: 2024-12-06

## 2024-12-06 RX ORDER — NYSTATIN 100000 [USP'U]/G
POWDER TOPICAL 3 TIMES DAILY
Qty: 60 G | Refills: 3 | Status: SHIPPED | OUTPATIENT
Start: 2024-12-06

## 2024-12-06 RX ORDER — TRAZODONE HYDROCHLORIDE 100 MG/1
100 TABLET ORAL
Qty: 90 TABLET | Refills: 1 | OUTPATIENT
Start: 2024-12-06

## 2024-12-06 RX ORDER — BUPROPION HYDROCHLORIDE 75 MG/1
75 TABLET ORAL 2 TIMES DAILY
Qty: 180 TABLET | Refills: 3 | OUTPATIENT
Start: 2024-12-06

## 2024-12-09 ENCOUNTER — POP HEALTH PHARMACY (OUTPATIENT)
Dept: PHARMACY | Facility: OTHER | Age: 79
End: 2024-12-09
Payer: MEDICARE

## 2025-01-08 ENCOUNTER — OFFICE VISIT (OUTPATIENT)
Dept: CARDIOLOGY | Facility: CLINIC | Age: 80
End: 2025-01-08
Payer: MEDICARE

## 2025-01-08 VITALS
OXYGEN SATURATION: 95 % | DIASTOLIC BLOOD PRESSURE: 80 MMHG | HEART RATE: 75 BPM | WEIGHT: 159 LBS | SYSTOLIC BLOOD PRESSURE: 128 MMHG | HEIGHT: 60 IN | BODY MASS INDEX: 31.22 KG/M2

## 2025-01-08 DIAGNOSIS — Z01.818 PREOPERATIVE EVALUATION TO RULE OUT SURGICAL CONTRAINDICATION: ICD-10-CM

## 2025-01-08 DIAGNOSIS — I10 ESSENTIAL HYPERTENSION: ICD-10-CM

## 2025-01-08 DIAGNOSIS — I51.7 LEFT VENTRICULAR HYPERTROPHY: ICD-10-CM

## 2025-01-08 DIAGNOSIS — E78.49 OTHER HYPERLIPIDEMIA: ICD-10-CM

## 2025-01-08 DIAGNOSIS — I47.10 PAROXYSMAL SVT (SUPRAVENTRICULAR TACHYCARDIA): ICD-10-CM

## 2025-01-08 DIAGNOSIS — I48.0 PAROXYSMAL ATRIAL FIBRILLATION: Primary | ICD-10-CM

## 2025-01-08 DIAGNOSIS — I34.0 NON-RHEUMATIC MITRAL REGURGITATION: ICD-10-CM

## 2025-01-08 RX ORDER — CYCLOBENZAPRINE HCL 5 MG
5 TABLET ORAL 3 TIMES DAILY PRN
COMMUNITY

## 2025-01-08 NOTE — PROGRESS NOTES
Reason for Visit: cardiovascular follow up.    HPI:  Nichole Rinaldi is a 79 y.o. female is here today for 6 month follow-up.  She is doing well from a cardiac standpoint.  She lost her  in November and has had a hard time recovering from this.  She has been active and busy with family events.  She is having issues with her ankles and reports she may require a surgery at some point.  She denies any chest pain, palpitations, dizziness, syncope, PND, or orthopnea.  She is able to do all her activities around the house.      Previous Cardiac Testing and Procedures:  -Nuclear stress (6/24/2017) negative for ischemia, EF 73%  -Echo (11/28/2017) EF 55 to 60%, mild LA enlargement, moderate AI, moderate to severe MR  -Echo (6/18/2018) normal LV size and function, mild LVH, moderate LA enlargement, mild RA enlargement, mild to moderate MR and TR, mild PI, normal RVSP  -Echo (7/21/2023) EF > 70%, mild to moderate sigmoid septal hypertrophy, LVOT gradient 18 mmHg, normal RV size and function, mild LA enlargement, trace to mild MR and TR  -Holter monitor (6/7/2023) rare PACs and PVCs, multiple runs of SVT, symptoms correlated with sinus rhythm, PACs, and SVT  -Echo (10/4/2023) EF 61-65%, mild LVH, grade 1 diastolic dysfunction, moderate LA enlargement, mild aortic stenosis, normal RVSP     Lab data:  -Lipid panel (7/27/2023) total cholesterol 129, HDL 53, LDL 52, triglycerides 141  -BMP (7/27/2023) creatinine 0.75, potassium 4.3, sodium 139  -Lipid panel (3/8/2024) total cholesterol 129, HDL 64, LDL 48, triglycerides 87    Patient Active Problem List   Diagnosis    Osteoarthritis    Vazquez's esophagus    Graves disease    Bilateral hip pain    Bradycardia    Carpal tunnel syndrome on right    Chronic pain    Depression    Herniated disc    Lumbar facet arthropathy    Non-rheumatic mitral regurgitation    Other fatigue    Other hyperlipidemia    Paroxysmal atrial fibrillation    Trochanteric bursitis of both hips     Fibromyalgia    Obesity (BMI 30.0-34.9)    Anxiety    Essential hypertension    Primary insomnia    Gastroesophageal reflux disease without esophagitis    Vitamin D deficiency    Cerebellar stroke    Paroxysmal SVT (supraventricular tachycardia)    Hemoptysis    Acute bronchitis    Lung nodule    Kidney mass    Abnormal CT scan    Left ventricular hypertrophy    Caregiver stress       Social History     Tobacco Use    Smoking status: Never     Passive exposure: Never    Smokeless tobacco: Never   Vaping Use    Vaping status: Never Used   Substance Use Topics    Alcohol use: Never    Drug use: Never       Family History   Problem Relation Age of Onset    Depression Mother     Hyperlipidemia Mother     Hypertension Mother     Thyroid disease Mother     Anxiety disorder Father     Arthritis Father     Stroke Father     Heart disease Brother     Colon cancer Neg Hx     Colon polyps Neg Hx     Esophageal cancer Neg Hx     Breast cancer Neg Hx        The following portions of the patient's history were reviewed and updated as appropriate: allergies, current medications, past family history, past medical history, past social history, past surgical history, and problem list.      Current Outpatient Medications:     alendronate (Fosamax) 70 MG tablet, Take 1 tablet by mouth Every 7 (Seven) Days., Disp: 12 tablet, Rfl: 3    amLODIPine (NORVASC) 5 MG tablet, Take 1 tablet by mouth Daily., Disp: 90 tablet, Rfl: 1    apixaban (ELIQUIS) 5 MG tablet tablet, Take 1 tablet by mouth 2 (Two) Times a Day., Disp: 180 tablet, Rfl: 2    buPROPion (WELLBUTRIN) 75 MG tablet, Take 1 tablet by mouth 2 (Two) Times a Day., Disp: 180 tablet, Rfl: 3    busPIRone (BUSPAR) 15 MG tablet, Take 1 tablet by mouth 4 (Four) Times a Day., Disp: 360 tablet, Rfl: 1    cyclobenzaprine (FLEXERIL) 5 MG tablet, Take 1 tablet by mouth 3 (Three) Times a Day As Needed for Muscle Spasms., Disp: , Rfl:     escitalopram (LEXAPRO) 20 MG tablet, Take 1 tablet by mouth  "Daily., Disp: 90 tablet, Rfl: 2    levothyroxine (SYNTHROID, LEVOTHROID) 112 MCG tablet, TAKE 1 TABLET BY MOUTH EVERY DAY, Disp: 90 tablet, Rfl: 3    metoprolol succinate XL (TOPROL-XL) 25 MG 24 hr tablet, Take 1 tablet by mouth Daily., Disp: 90 tablet, Rfl: 3    naloxone (NARCAN) 4 MG/0.1ML nasal spray, Administer 1 spray into the nostril(s) as directed by provider As Needed (opiod overdose). CALL 911. SPR CONTENTS OF ONE SPRAYER (0.1ML) INTO ONE NOSTRIL. REPEAT IN 2-3 MIN IF SYMPTOMS OF OPIOID EMERGENCY PERSIST, ALTERNATE NOSTRILS, Disp: , Rfl:     nystatin (MYCOSTATIN) 696909 UNIT/GM powder, Apply  topically to the appropriate area as directed 3 (Three) Times a Day., Disp: 60 g, Rfl: 3    ondansetron ODT (ZOFRAN-ODT) 4 MG disintegrating tablet, Place 1 tablet on the tongue Every 8 (Eight) Hours As Needed for Nausea or Vomiting., Disp: 90 tablet, Rfl: 1    oxyCODONE (ROXICODONE) 15 MG immediate release tablet, Take 1 tablet by mouth Every 6 (Six) Hours As Needed., Disp: , Rfl:     rosuvastatin (Crestor) 10 MG tablet, Take 1 tablet by mouth Daily., Disp: 90 tablet, Rfl: 1    traZODone (DESYREL) 100 MG tablet, TAKE 1 TABLET BY MOUTH EVERY DAY AT NIGHT, Disp: 90 tablet, Rfl: 1    Review of Systems   Constitutional: Negative for chills and fever.   Cardiovascular:  Negative for chest pain and paroxysmal nocturnal dyspnea.   Respiratory:  Negative for cough and shortness of breath.    Skin:  Negative for rash.   Musculoskeletal:  Positive for joint pain.   Gastrointestinal:  Negative for abdominal pain and heartburn.   Neurological:  Positive for paresthesias. Negative for dizziness and numbness.   Psychiatric/Behavioral:  Positive for depression.        Objective   /80 (BP Location: Left arm, Patient Position: Sitting, Cuff Size: Adult)   Pulse 75   Ht 152.4 cm (60\")   Wt 72.1 kg (159 lb)   SpO2 95%   BMI 31.05 kg/m²   Constitutional:       Appearance: Well-developed.   HENT:      Head: Normocephalic and " atraumatic.   Pulmonary:      Effort: Pulmonary effort is normal.      Breath sounds: Normal breath sounds.   Cardiovascular:      Normal rate. Regular rhythm.   Edema:     Peripheral edema absent.   Skin:     General: Skin is warm and dry.   Neurological:      Mental Status: Alert and oriented to person, place, and time.       Procedures      ICD-10-CM ICD-9-CM   1. Paroxysmal atrial fibrillation  I48.0 427.31   2. Paroxysmal SVT (supraventricular tachycardia)  I47.10 427.0   3. Non-rheumatic mitral regurgitation  I34.0 424.0   4. Left ventricular hypertrophy  I51.7 429.3   5. Essential hypertension  I10 401.9   6. Other hyperlipidemia  E78.49 272.4   7. Preoperative evaluation to rule out surgical contraindication  Z01.818 V72.83         Assessment/Plan:  1.  Paroxysmal atrial fibrillation: Regular rhythm on exam.  No significant symptoms.  Continue metoprolol and Eliquis.     2.  Paroxysmal SVT: Multiple runs of nonsustained SVT on Holter monitor on 6/7/2023.  She is currently asymptomatic.  Continue low-dose metoprolol..       3.  Mitral regurgitation: Mild MR on echo on 7/21/2023.  Routine surveillance.     4.  Left ventricular hypertrophy:  Mild to moderate sigmoid septal hypertrophy with hyperdynamic LV function and LVOT gradient 18 mmHg on echo on 7/21/2023.  She remains hydrated.     5.  Essential hypertension: Well-controlled today.  Continue amlodipine and metoprolol.     6.  Hyperlipidemia: Lipid panel on 3/8/2024 showed good control.  Continue rosuvastatin.    7.  Preoperative evaluation: Patient is an intermediate risk surgical candidate from a cardiac standpoint.  Functional capacity is approximately 4 METS based on her activity level at home and she has no current cardiac symptoms.  No further cardiac testing would alter her surgical risk and is therefore not indicated.  Eliquis can be held for 48 to 72 hours prior to surgery and resume as soon as safe afterwards.

## 2025-02-28 ENCOUNTER — HOSPITAL ENCOUNTER (OUTPATIENT)
Dept: MRI IMAGING | Facility: HOSPITAL | Age: 80
Discharge: HOME | End: 2025-02-28
Payer: MEDICARE

## 2025-02-28 DIAGNOSIS — M51.26 OTHER INTERVERTEBRAL DISC DISPLACEMENT, LUMBAR REGION: ICD-10-CM

## 2025-02-28 DIAGNOSIS — M46.1 SACROILIITIS, NOT ELSEWHERE CLASSIFIED: ICD-10-CM

## 2025-02-28 DIAGNOSIS — M47.816 SPONDYLOSIS WITHOUT MYELOPATHY OR RADICULOPATHY, LUMBAR REGION: ICD-10-CM

## 2025-02-28 DIAGNOSIS — M47.817 SPONDYLOSIS WITHOUT MYELOPATHY OR RADICULOPATHY, LUMBOSACRAL REGION: ICD-10-CM

## 2025-02-28 PROCEDURE — 72148 MRI LUMBAR SPINE W/O DYE: CPT

## 2025-03-03 ENCOUNTER — OFFICE VISIT (OUTPATIENT)
Age: 80
End: 2025-03-03

## 2025-03-03 VITALS — HEIGHT: 58 IN | BODY MASS INDEX: 33.58 KG/M2 | WEIGHT: 160 LBS

## 2025-03-03 DIAGNOSIS — M19.071 OSTEOARTHRITIS OF SUBTALAR JOINT, RIGHT: ICD-10-CM

## 2025-03-03 DIAGNOSIS — M19.071 PRIMARY OSTEOARTHRITIS OF RIGHT ANKLE: Primary | ICD-10-CM

## 2025-03-03 RX ORDER — TRIAMCINOLONE ACETONIDE 40 MG/ML
40 INJECTION, SUSPENSION INTRA-ARTICULAR; INTRAMUSCULAR ONCE
Status: COMPLETED | OUTPATIENT
Start: 2025-03-03 | End: 2025-03-03

## 2025-03-03 RX ORDER — BUPIVACAINE HYDROCHLORIDE 5 MG/ML
1 INJECTION, SOLUTION PERINEURAL ONCE
Status: COMPLETED | OUTPATIENT
Start: 2025-03-03 | End: 2025-03-03

## 2025-03-03 RX ORDER — DEXAMETHASONE SODIUM PHOSPHATE 4 MG/ML
4 INJECTION, SOLUTION INTRAMUSCULAR; INTRAVENOUS ONCE
Status: COMPLETED | OUTPATIENT
Start: 2025-03-03 | End: 2025-03-03

## 2025-03-03 RX ADMIN — BUPIVACAINE HYDROCHLORIDE 5 MG: 5 INJECTION, SOLUTION PERINEURAL at 13:57

## 2025-03-03 RX ADMIN — TRIAMCINOLONE ACETONIDE 40 MG: 40 INJECTION, SUSPENSION INTRA-ARTICULAR; INTRAMUSCULAR at 13:56

## 2025-03-03 RX ADMIN — DEXAMETHASONE SODIUM PHOSPHATE 4 MG: 4 INJECTION, SOLUTION INTRAMUSCULAR; INTRAVENOUS at 13:57

## 2025-03-03 ASSESSMENT — ENCOUNTER SYMPTOMS
DIARRHEA: 0
VOMITING: 0
COLOR CHANGE: 0
BLOOD IN STOOL: 0
CONSTIPATION: 0
COUGH: 0
SHORTNESS OF BREATH: 0
NAUSEA: 0

## 2025-03-03 NOTE — PROGRESS NOTES
[facility]  [facility]  [address]  [facility] [id number] [phone]     Patient: Ely Lr   YOB: 1945   Date: 3/3/2025   Visit Type:      History of Present Illness  Chief Complaint   Patient presents with    Foot Pain     Right ankle and foot       This is a 79 y.o. female presents today complaining of right ankle and hindfoot pain.  She denies any trauma or injury.  She relates stiffness grinding type pain.  Worse with prolonged standing and walking.  She rates the pain a 9 out of 10.  Describes a dull, sharp, grinding.  Worse with standing and walking.  Describes it as constant associated swelling and radiating pain.  Better with rest.  She is tried lidocaine patches with minimal relief.  Onset began about 2 to 3 years ago and continues to worsen.    Past Medical History:   Diagnosis Date    Anxiety     Arthritis     Bilateral hip pain 12/29/2015    Chest pain     Coronary artery disease involving native coronary artery of native heart 6/13/2017    Depression     Fibromyalgia     Hyperlipidemia     Hypertension     Hypertrophic obstructive cardiomyopathy (HOCM) (HCC)     hypothyroid     S/P coronary artery stent placement 6/13/2017    Severe mitral regurgitation 11/18/2015    Moderate-Severe MR 11/28/17.      TIA (transient ischemic attack)       Past Surgical History:   Procedure Laterality Date    CARPAL TUNNEL RELEASE      CORONARY ANGIOPLASTY WITH STENT PLACEMENT  stent x 2    nov 20, 2015    EYE SURGERY      EYE SURGERY Right 09/09/2017    HERNIA REPAIR      HIATAL HERNIA REPAIR      KNEE SURGERY      bilateral    THYROIDECTOMY      TUBAL LIGATION        Social History     Socioeconomic History    Marital status:      Spouse name: None    Number of children: None    Years of education: None    Highest education level: None   Tobacco Use    Smoking status: Never    Smokeless tobacco: Never   Vaping

## 2025-03-03 NOTE — PROGRESS NOTES
Chief Complaint    Chief Complaint   Patient presents with    Foot Pain     Right ankle and foot        Body Part: Ankle and foot right    When did the symptoms begin/Date of Onset? 2-3 years ago, has gradually worked up.  Within last year, has gotten worse.    Where did the injury happen? Other: NA    If over 55, have you morelos an Osteoporosis Screening in the last 2 years? Yes    Injury Details    Previous similar problems or complaints? No    Severity of Pain: 9    Character of Pain: Dull and Sharp, grinding    What makes your symptoms worse? Standing    How long does the pain last? Constant    Associated Symptoms: Swelling and Radiating Pain    What makes your symptoms better? Rest    Previous Treatment for the Problem: Medications - lidocaine patch    Any special diagnostic tests or studies done? X-Rays; Where? [facility]    Similar complaints on opposite side? Yes    Review of Systems    Review of Systems   Constitutional:  Negative for appetite change, chills, fatigue and fever.   Respiratory:  Negative for cough and shortness of breath.    Cardiovascular:  Negative for chest pain, palpitations and leg swelling.   Gastrointestinal:  Negative for blood in stool, constipation, diarrhea, nausea and vomiting.   Musculoskeletal:  Negative for arthralgias, gait problem and joint swelling.   Skin:  Negative for color change and wound.   Neurological:  Negative for weakness.

## 2025-03-07 DIAGNOSIS — E78.49 OTHER HYPERLIPIDEMIA: ICD-10-CM

## 2025-03-07 RX ORDER — ROSUVASTATIN CALCIUM 10 MG/1
10 TABLET, COATED ORAL DAILY
Qty: 90 TABLET | Refills: 2 | Status: SHIPPED | OUTPATIENT
Start: 2025-03-07

## 2025-03-10 DIAGNOSIS — M47.816 LUMBAR FACET ARTHROPATHY: Primary | ICD-10-CM

## 2025-03-10 RX ORDER — LIDOCAINE 50 MG/G
1 PATCH TOPICAL EVERY 24 HOURS
Qty: 30 EACH | Refills: 1 | Status: SHIPPED | OUTPATIENT
Start: 2025-03-10

## 2025-03-12 DIAGNOSIS — F41.9 ANXIETY: ICD-10-CM

## 2025-03-12 RX ORDER — BUSPIRONE HYDROCHLORIDE 15 MG/1
15 TABLET ORAL 4 TIMES DAILY PRN
Qty: 360 TABLET | Refills: 1 | Status: SHIPPED | OUTPATIENT
Start: 2025-03-12

## 2025-03-14 DIAGNOSIS — I10 PRIMARY HYPERTENSION: ICD-10-CM

## 2025-03-14 RX ORDER — AMLODIPINE BESYLATE 5 MG/1
5 TABLET ORAL DAILY
Qty: 90 TABLET | Refills: 3 | Status: SHIPPED | OUTPATIENT
Start: 2025-03-14

## 2025-04-17 DIAGNOSIS — R11.0 NAUSEA: ICD-10-CM

## 2025-04-17 RX ORDER — ONDANSETRON 4 MG/1
TABLET, ORALLY DISINTEGRATING ORAL
Qty: 90 TABLET | Refills: 1 | Status: SHIPPED | OUTPATIENT
Start: 2025-04-17

## 2025-06-08 DIAGNOSIS — I10 PRIMARY HYPERTENSION: ICD-10-CM

## 2025-06-09 RX ORDER — METOPROLOL SUCCINATE 50 MG/1
50 TABLET, EXTENDED RELEASE ORAL DAILY
Qty: 90 TABLET | Refills: 3 | OUTPATIENT
Start: 2025-06-09

## 2025-07-01 ENCOUNTER — OFFICE VISIT (OUTPATIENT)
Dept: INTERNAL MEDICINE | Facility: CLINIC | Age: 80
End: 2025-07-01
Payer: MEDICARE

## 2025-07-01 VITALS
HEART RATE: 75 BPM | BODY MASS INDEX: 32.2 KG/M2 | OXYGEN SATURATION: 96 % | SYSTOLIC BLOOD PRESSURE: 138 MMHG | DIASTOLIC BLOOD PRESSURE: 72 MMHG | HEIGHT: 60 IN | WEIGHT: 164 LBS | TEMPERATURE: 97.2 F

## 2025-07-01 DIAGNOSIS — R53.83 FATIGUE, UNSPECIFIED TYPE: Primary | ICD-10-CM

## 2025-07-01 DIAGNOSIS — H04.129 DRY EYE: ICD-10-CM

## 2025-07-01 DIAGNOSIS — D64.9 ANEMIA, UNSPECIFIED TYPE: ICD-10-CM

## 2025-07-01 DIAGNOSIS — E03.9 HYPOTHYROIDISM, UNSPECIFIED TYPE: ICD-10-CM

## 2025-07-01 DIAGNOSIS — I10 ESSENTIAL HYPERTENSION: ICD-10-CM

## 2025-07-01 DIAGNOSIS — J06.9 ACUTE URI: ICD-10-CM

## 2025-07-01 RX ORDER — AZITHROMYCIN 250 MG/1
TABLET, FILM COATED ORAL
Qty: 6 TABLET | Refills: 0 | Status: SHIPPED | OUTPATIENT
Start: 2025-07-01

## 2025-07-01 RX ORDER — PREDNISONE 20 MG/1
20 TABLET ORAL DAILY
Qty: 5 TABLET | Refills: 0 | Status: SHIPPED | OUTPATIENT
Start: 2025-07-01

## 2025-07-01 RX ORDER — TOPIRAMATE 50 MG/1
50 TABLET, FILM COATED ORAL DAILY
COMMUNITY

## 2025-07-01 NOTE — PROGRESS NOTES
"      Chief Complaint  Sinus Problem (Onset two weeks ago, headaches affecting eyes and ears./Severe fatigue)    Subjective        Nichole Rinaldi presents to [facility]    HPI    Patient here for the above problems.  See Assessment and Plan for further HPI components.      Review of Systems    Objective   Vital Signs:  /72 (BP Location: Left arm, Patient Position: Sitting, Cuff Size: Adult)   Pulse 75   Temp 97.2 °F (36.2 °C) (Temporal)   Ht 152.4 cm (60\")   Wt 74.4 kg (164 lb)   SpO2 96%   BMI 32.03 kg/m²   Estimated body mass index is 32.03 kg/m² as calculated from the following:    Height as of this encounter: 152.4 cm (60\").    Weight as of this encounter: 74.4 kg (164 lb).      Physical Exam  Vitals and nursing note reviewed.   Constitutional:       Appearance: She is obese. She is not ill-appearing.   HENT:      Right Ear: Tympanic membrane and ear canal normal.      Left Ear: Tympanic membrane and ear canal normal.      Mouth/Throat:      Pharynx: Posterior oropharyngeal erythema and postnasal drip present. No oropharyngeal exudate.   Eyes:      General: No scleral icterus.        Right eye: No discharge or hordeolum.         Left eye: No discharge or hordeolum.      Conjunctiva/sclera: Conjunctivae normal.      Comments: Appears to be dry     Pulmonary:      Effort: Pulmonary effort is normal. No respiratory distress.   Skin:     General: Skin is warm.      Coloration: Skin is not pale.   Neurological:      General: No focal deficit present.      Mental Status: She is alert and oriented to person, place, and time.   Psychiatric:         Mood and Affect: Mood normal.         Behavior: Behavior normal.                     Assessment and Plan   Diagnoses and all orders for this visit:    1. Fatigue, unspecified type (Primary)  -     Ferritin  -     Vitamin B12    2. Anemia, unspecified type  -     CBC w AUTO Differential  -     Ferritin  -     Iron Profile w/o " Ferritin  -     Vitamin B12    3. Hypothyroidism, unspecified type  -     TSH Rfx On Abnormal To Free T4    4. Essential hypertension  -     Comprehensive metabolic panel    5. Acute URI  -     azithromycin (Zithromax Z-Ney) 250 MG tablet; Take 2 tablets by mouth on day 1, then 1 tablet daily on days 2-5  Dispense: 6 tablet; Refill: 0  -     predniSONE (DELTASONE) 20 MG tablet; Take 1 tablet by mouth Daily.  Dispense: 5 tablet; Refill: 0    6. Dry eye        Patient more fatigued recently.  They are trying viamins and supplements without any improvement thus far.  Will check labs for anemia, hypothyroidism, and fatrigue as above.      Sleep issues present.  Try sleep 3.    Patient BP is overall stable today.  Continue amlodipine 5 mg, metoprolol 25 mg.    Dry eye in hte setting of recent viral illness.  Try warm/moist heat.  Preservative free eye drops.    Acute URI present for > 2 weeks.  Ear fullness (normal examination) and sinus drainage and eye pressure.  Patient has cobblestoning and erythema posterior pharynx.  Treatment as above.      Result Review :           BMI is >= 30 and <35. (Class 1 Obesity). The following options were offered after discussion;: none (medical contraindication)                  Follow Up   Return in about 1 week (around 7/8/2025), or if symptoms worsen or fail to improve, for follow up for above problems. Longitudinal care..  Patient was given instructions and counseling regarding her condition or for health maintenance advice. Please see specific information pulled into the AVS if appropriate.       ARLEN Lopez MD, FACP, [facility]      Electronically signed by Juan A Lopez MD, 07/01/25, 5:21 PM CDT.

## 2025-07-02 LAB
ALBUMIN SERPL-MCNC: 4 G/DL (ref 3.5–5.2)
ALBUMIN/GLOB SERPL: 1.4 G/DL
ALP SERPL-CCNC: 91 U/L (ref 39–117)
ALT SERPL-CCNC: 10 U/L (ref 1–33)
AST SERPL-CCNC: 21 U/L (ref 1–32)
BASOPHILS # BLD AUTO: 0.02 10*3/MM3 (ref 0–0.2)
BASOPHILS NFR BLD AUTO: 0.2 % (ref 0–1.5)
BILIRUB SERPL-MCNC: 0.3 MG/DL (ref 0–1.2)
BUN SERPL-MCNC: 12 MG/DL (ref 8–23)
BUN/CREAT SERPL: 14.5 (ref 7–25)
CALCIUM SERPL-MCNC: 9.6 MG/DL (ref 8.6–10.5)
CHLORIDE SERPL-SCNC: 103 MMOL/L (ref 98–107)
CO2 SERPL-SCNC: 25.4 MMOL/L (ref 22–29)
CREAT SERPL-MCNC: 0.83 MG/DL (ref 0.57–1)
EGFRCR SERPLBLD CKD-EPI 2021: 72.3 ML/MIN/1.73
EOSINOPHIL # BLD AUTO: 0.11 10*3/MM3 (ref 0–0.4)
EOSINOPHIL NFR BLD AUTO: 1.2 % (ref 0.3–6.2)
ERYTHROCYTE [DISTWIDTH] IN BLOOD BY AUTOMATED COUNT: 12.2 % (ref 12.3–15.4)
FERRITIN SERPL-MCNC: 112 NG/ML (ref 13–150)
GLOBULIN SER CALC-MCNC: 2.9 GM/DL
GLUCOSE SERPL-MCNC: 114 MG/DL (ref 65–99)
HCT VFR BLD AUTO: 40.4 % (ref 34–46.6)
HGB BLD-MCNC: 13.9 G/DL (ref 12–15.9)
IMM GRANULOCYTES # BLD AUTO: 0.03 10*3/MM3 (ref 0–0.05)
IMM GRANULOCYTES NFR BLD AUTO: 0.3 % (ref 0–0.5)
IRON SATN MFR SERPL: 20 % (ref 20–50)
IRON SERPL-MCNC: 76 MCG/DL (ref 37–145)
LYMPHOCYTES # BLD AUTO: 2.21 10*3/MM3 (ref 0.7–3.1)
LYMPHOCYTES NFR BLD AUTO: 23.7 % (ref 19.6–45.3)
MCH RBC QN AUTO: 33.8 PG (ref 26.6–33)
MCHC RBC AUTO-ENTMCNC: 34.4 G/DL (ref 31.5–35.7)
MCV RBC AUTO: 98.3 FL (ref 79–97)
MONOCYTES # BLD AUTO: 1.02 10*3/MM3 (ref 0.1–0.9)
MONOCYTES NFR BLD AUTO: 10.9 % (ref 5–12)
NEUTROPHILS # BLD AUTO: 5.93 10*3/MM3 (ref 1.7–7)
NEUTROPHILS NFR BLD AUTO: 63.7 % (ref 42.7–76)
NRBC BLD AUTO-RTO: 0 /100 WBC (ref 0–0.2)
PLATELET # BLD AUTO: 208 10*3/MM3 (ref 140–450)
POTASSIUM SERPL-SCNC: 4.8 MMOL/L (ref 3.5–5.2)
PROT SERPL-MCNC: 6.9 G/DL (ref 6–8.5)
RBC # BLD AUTO: 4.11 10*6/MM3 (ref 3.77–5.28)
SODIUM SERPL-SCNC: 139 MMOL/L (ref 136–145)
TIBC SERPL-MCNC: 374 MCG/DL
TSH SERPL DL<=0.005 MIU/L-ACNC: 3.03 UIU/ML (ref 0.27–4.2)
UIBC SERPL-MCNC: 298 MCG/DL (ref 112–346)
VIT B12 SERPL-MCNC: 504 PG/ML (ref 211–946)
WBC # BLD AUTO: 9.32 10*3/MM3 (ref 3.4–10.8)

## 2025-07-08 ENCOUNTER — TELEPHONE (OUTPATIENT)
Dept: INTERNAL MEDICINE | Facility: CLINIC | Age: 80
End: 2025-07-08

## 2025-07-08 ENCOUNTER — OFFICE VISIT (OUTPATIENT)
Dept: INTERNAL MEDICINE | Facility: CLINIC | Age: 80
End: 2025-07-08
Payer: MEDICARE

## 2025-07-08 VITALS
SYSTOLIC BLOOD PRESSURE: 126 MMHG | HEIGHT: 60 IN | OXYGEN SATURATION: 97 % | BODY MASS INDEX: 32.39 KG/M2 | DIASTOLIC BLOOD PRESSURE: 74 MMHG | HEART RATE: 64 BPM | WEIGHT: 165 LBS | TEMPERATURE: 97.8 F

## 2025-07-08 DIAGNOSIS — B37.2 SKIN YEAST INFECTION: ICD-10-CM

## 2025-07-08 DIAGNOSIS — F41.9 ANXIETY: ICD-10-CM

## 2025-07-08 DIAGNOSIS — I48.0 PAROXYSMAL ATRIAL FIBRILLATION: Primary | ICD-10-CM

## 2025-07-08 DIAGNOSIS — M25.552 BILATERAL HIP PAIN: ICD-10-CM

## 2025-07-08 DIAGNOSIS — R53.83 OTHER FATIGUE: ICD-10-CM

## 2025-07-08 DIAGNOSIS — E66.811 OBESITY (BMI 30.0-34.9): ICD-10-CM

## 2025-07-08 DIAGNOSIS — G89.29 OTHER CHRONIC PAIN: ICD-10-CM

## 2025-07-08 DIAGNOSIS — I10 ESSENTIAL HYPERTENSION: ICD-10-CM

## 2025-07-08 DIAGNOSIS — F32.A DEPRESSION, UNSPECIFIED DEPRESSION TYPE: ICD-10-CM

## 2025-07-08 DIAGNOSIS — F51.01 PRIMARY INSOMNIA: ICD-10-CM

## 2025-07-08 DIAGNOSIS — M25.551 BILATERAL HIP PAIN: ICD-10-CM

## 2025-07-08 RX ORDER — TRAZODONE HYDROCHLORIDE 100 MG/1
100 TABLET ORAL
Qty: 90 TABLET | Refills: 1 | Status: SHIPPED | OUTPATIENT
Start: 2025-07-08

## 2025-07-08 RX ORDER — NYSTATIN 100000 [USP'U]/G
POWDER TOPICAL 3 TIMES DAILY
Qty: 60 G | Refills: 3 | Status: SHIPPED | OUTPATIENT
Start: 2025-07-08

## 2025-07-09 ENCOUNTER — OFFICE VISIT (OUTPATIENT)
Dept: CARDIOLOGY | Facility: CLINIC | Age: 80
End: 2025-07-09
Payer: MEDICARE

## 2025-07-09 VITALS
SYSTOLIC BLOOD PRESSURE: 112 MMHG | DIASTOLIC BLOOD PRESSURE: 58 MMHG | BODY MASS INDEX: 32.59 KG/M2 | HEART RATE: 67 BPM | HEIGHT: 60 IN | WEIGHT: 166 LBS | OXYGEN SATURATION: 95 %

## 2025-07-09 DIAGNOSIS — I51.7 LEFT VENTRICULAR HYPERTROPHY: ICD-10-CM

## 2025-07-09 DIAGNOSIS — I10 ESSENTIAL HYPERTENSION: ICD-10-CM

## 2025-07-09 DIAGNOSIS — I34.0 NON-RHEUMATIC MITRAL REGURGITATION: ICD-10-CM

## 2025-07-09 DIAGNOSIS — I48.0 PAROXYSMAL ATRIAL FIBRILLATION: Primary | ICD-10-CM

## 2025-07-09 DIAGNOSIS — R07.9 CHEST PAIN, UNSPECIFIED TYPE: ICD-10-CM

## 2025-07-09 DIAGNOSIS — I47.10 PAROXYSMAL SVT (SUPRAVENTRICULAR TACHYCARDIA): ICD-10-CM

## 2025-07-09 DIAGNOSIS — E78.49 OTHER HYPERLIPIDEMIA: ICD-10-CM

## 2025-07-09 RX ORDER — VIT C/ZINC CITRATE/ELDERBERRY 45 MG-50MG
TABLET,CHEWABLE ORAL
COMMUNITY

## 2025-07-09 NOTE — PROGRESS NOTES
"  Reason for Visit: cardiovascular follow up.    HPI:  Nichole Rinaldi is a 79 y.o. female is here today for 6-month follow-up.  She follows in cardiology clinic due to paroxysmal atrial fibrillation and SVT.  She also has mild mitral regurgitation and left ventricular hypertrophy.  She has taken nitroglycerin twice over the past couple of months.  The pain is felt close to the skin and she describes it as \"stabs and aches\".  She did not tolerate the nuclear stress test she had back in 2017 and reports it made her feel very bad and nearly passed out.  She drinks a lot of fluids and stays hydrated.  Sometimes she breaths heavy and reports hyperventilating.      Previous Cardiac Testing and Procedures:  -Nuclear stress (6/24/2017) negative for ischemia, EF 73%  -Echo (11/28/2017) EF 55 to 60%, mild LA enlargement, moderate AI, moderate to severe MR  -Echo (6/18/2018) normal LV size and function, mild LVH, moderate LA enlargement, mild RA enlargement, mild to moderate MR and TR, mild PI, normal RVSP  -Echo (7/21/2023) EF > 70%, mild to moderate sigmoid septal hypertrophy, LVOT gradient 18 mmHg, normal RV size and function, mild LA enlargement, trace to mild MR and TR  -Holter monitor (6/7/2023) rare PACs and PVCs, multiple runs of SVT, symptoms correlated with sinus rhythm, PACs, and SVT  -Echo (10/4/2023) EF 61-65%, mild LVH, grade 1 diastolic dysfunction, moderate LA enlargement, mild aortic stenosis, normal RVSP     Lab data:  -Lipid panel (7/27/2023) total cholesterol 129, HDL 53, LDL 52, triglycerides 141  -BMP (7/27/2023) creatinine 0.75, potassium 4.3, sodium 139  -Lipid panel (3/8/2024) total cholesterol 129, HDL 64, LDL 48, triglycerides 87  -BMP (7/1/2025) creatinine 0.83, potassium 4.8, sodium 139    Patient Active Problem List   Diagnosis    Osteoarthritis    Vzaquez's esophagus    Graves disease    Bilateral hip pain    Bradycardia    Carpal tunnel syndrome on right    Chronic pain    Depression    " Herniated disc    Lumbar facet arthropathy    Non-rheumatic mitral regurgitation    Other fatigue    Other hyperlipidemia    Paroxysmal atrial fibrillation    Trochanteric bursitis of both hips    Fibromyalgia    Obesity (BMI 30.0-34.9)    Anxiety    Essential hypertension    Primary insomnia    Gastroesophageal reflux disease without esophagitis    Vitamin D deficiency    Cerebellar stroke    Paroxysmal SVT (supraventricular tachycardia)    Hemoptysis    Acute bronchitis    Lung nodule    Kidney mass    Abnormal CT scan    Left ventricular hypertrophy    Caregiver stress       Social History     Tobacco Use    Smoking status: Never     Passive exposure: Never    Smokeless tobacco: Never   Vaping Use    Vaping status: Never Used   Substance Use Topics    Alcohol use: Never    Drug use: Never       Family History   Problem Relation Age of Onset    Depression Mother     Hyperlipidemia Mother     Hypertension Mother     Thyroid disease Mother     Anxiety disorder Father     Arthritis Father     Stroke Father     Heart disease Brother     Colon cancer Neg Hx     Colon polyps Neg Hx     Esophageal cancer Neg Hx     Breast cancer Neg Hx        The following portions of the patient's history were reviewed and updated as appropriate: allergies, current medications, past family history, past medical history, past social history, past surgical history, and problem list.      Current Outpatient Medications:     alendronate (Fosamax) 70 MG tablet, Take 1 tablet by mouth Every 7 (Seven) Days., Disp: 12 tablet, Rfl: 3    amLODIPine (NORVASC) 5 MG tablet, TAKE 1 TABLET BY MOUTH EVERY DAY, Disp: 90 tablet, Rfl: 3    apixaban (ELIQUIS) 5 MG tablet tablet, Take 1 tablet by mouth 2 (Two) Times a Day., Disp: 180 tablet, Rfl: 2    buPROPion (WELLBUTRIN) 75 MG tablet, Take 1 tablet by mouth 2 (Two) Times a Day., Disp: 180 tablet, Rfl: 3    busPIRone (BUSPAR) 15 MG tablet, Take 1 tablet by mouth 4 (Four) Times a Day As Needed (anxiety).,  Disp: 360 tablet, Rfl: 1    Cholecalciferol 50 MCG (2000 UT) capsule, Take 1 capsule by mouth Daily., Disp: , Rfl:     escitalopram (LEXAPRO) 20 MG tablet, Take 1 tablet by mouth Daily., Disp: 90 tablet, Rfl: 2    levothyroxine (SYNTHROID, LEVOTHROID) 112 MCG tablet, TAKE 1 TABLET BY MOUTH EVERY DAY, Disp: 90 tablet, Rfl: 3    lidocaine (LIDODERM) 5 %, Place 1 patch on the skin as directed by provider Daily. Remove & Discard patch within 12 hours or as directed by MD, Disp: 30 each, Rfl: 1    Melatonin-Theanine (Sleep+Calm) 3-50 MG chewable tablet, Chew., Disp: , Rfl:     metoprolol succinate XL (TOPROL-XL) 25 MG 24 hr tablet, Take 1 tablet by mouth Daily., Disp: 90 tablet, Rfl: 3    nystatin (MYCOSTATIN) 151522 UNIT/GM powder, Apply  topically to the appropriate area as directed 3 (Three) Times a Day., Disp: 60 g, Rfl: 3    ondansetron ODT (ZOFRAN-ODT) 4 MG disintegrating tablet, PLACE 1 TABLET ON THE TONGUE EVERY 8 HOURS AS NEEDED FOR NAUSEA AND VOMITING, Disp: 90 tablet, Rfl: 1    oxyCODONE (ROXICODONE) 15 MG immediate release tablet, Take 1 tablet by mouth Every 6 (Six) Hours As Needed., Disp: , Rfl:     rosuvastatin (CRESTOR) 10 MG tablet, TAKE 1 TABLET BY MOUTH EVERY DAY, Disp: 90 tablet, Rfl: 2    traZODone (DESYREL) 100 MG tablet, Take 1 tablet by mouth every night at bedtime., Disp: 90 tablet, Rfl: 1    cyclobenzaprine (FLEXERIL) 5 MG tablet, Take 1 tablet by mouth 3 (Three) Times a Day As Needed for Muscle Spasms. (Patient not taking: Reported on 7/9/2025), Disp: , Rfl:     naloxone (NARCAN) 4 MG/0.1ML nasal spray, Administer 1 spray into the nostril(s) as directed by provider As Needed (opiod overdose). CALL 911. SPR CONTENTS OF ONE SPRAYER (0.1ML) INTO ONE NOSTRIL. REPEAT IN 2-3 MIN IF SYMPTOMS OF OPIOID EMERGENCY PERSIST, ALTERNATE NOSTRILS (Patient not taking: Reported on 7/9/2025), Disp: , Rfl:     Review of Systems   Constitutional: Negative for chills and fever.   Cardiovascular:  Positive for chest  "pain. Negative for paroxysmal nocturnal dyspnea.   Respiratory:  Positive for shortness of breath. Negative for cough.    Skin:  Negative for rash.   Gastrointestinal:  Negative for abdominal pain and heartburn.   Neurological:  Negative for dizziness and numbness.   Psychiatric/Behavioral:  The patient is nervous/anxious.        Objective   /58 (BP Location: Left arm, Patient Position: Sitting, Cuff Size: Adult)   Pulse 67   Ht 152.4 cm (60\")   Wt 75.3 kg (166 lb)   SpO2 95%   BMI 32.42 kg/m²   Constitutional:       Appearance: Well-developed.   HENT:      Head: Normocephalic and atraumatic.   Pulmonary:      Effort: Pulmonary effort is normal.      Breath sounds: Normal breath sounds.   Cardiovascular:      Normal rate. Regular rhythm.      Murmurs: There is a grade 2/6 holosystolic murmur.   Edema:     Peripheral edema absent.   Skin:     General: Skin is warm and dry.   Neurological:      Mental Status: Alert and oriented to person, place, and time.         ECG 12 Lead    Date/Time: 7/9/2025 2:17 PM  Performed by: Obie Fox MD    Authorized by: Obie Fox MD  Comparison: compared with previous ECG from 7/10/2024  Similar to previous ECG  Rhythm: sinus rhythm  Rate: normal  Q waves: V1    Other findings: non-specific ST-T wave changes            ICD-10-CM ICD-9-CM   1. Paroxysmal atrial fibrillation  I48.0 427.31   2. Paroxysmal SVT (supraventricular tachycardia)  I47.10 427.0   3. Non-rheumatic mitral regurgitation  I34.0 424.0   4. Left ventricular hypertrophy  I51.7 429.3   5. Essential hypertension  I10 401.9   6. Other hyperlipidemia  E78.49 272.4   7. Chest pain, unspecified type  R07.9 786.50         Assessment/Plan:  1.  Paroxysmal atrial fibrillation: Normal sinus rhythm on EKG today.  Continue metoprolol and Eliquis.     2.  Paroxysmal SVT: Multiple runs of nonsustained SVT on Holter monitor on 6/7/2023.  No current palpitations.  Continue metoprolol..       3.  Mitral " regurgitation: Mild MR on echo on 7/21/2023.  Continue to monitor.     4.  Left ventricular hypertrophy:  Mild to moderate sigmoid septal hypertrophy with hyperdynamic LV function and LVOT gradient of 18 mmHg on echo from 7/21/2023.  Continue metoprolol along with good hydration.     5.  Essential hypertension: Blood pressure is within normal limits on amlodipine and metoprolol.     6.  Hyperlipidemia: Continue rosuvastatin.    7.  Chest pain: Somewhat atypical characteristics.  We discussed stress test and she elected to proceed with a dobutamine stress echo.

## 2025-07-11 NOTE — PROGRESS NOTES
"      Chief Complaint  Hypertension (6 month follow up )    Subjective        Nichole Rinaldi presents to [facility]    HPI    Patient here for the above problems.  See Assessment and Plan for further HPI components.      Review of Systems   Constitutional:  Positive for fatigue.   HENT:  Negative for trouble swallowing.    Respiratory:  Positive for shortness of breath.    Cardiovascular:  Positive for palpitations. Negative for chest pain.   Gastrointestinal:  Positive for nausea. Negative for constipation.   Endocrine: Negative for cold intolerance and heat intolerance.   Genitourinary:  Negative for menstrual problem.   Neurological:  Positive for headaches. Negative for dizziness.   Psychiatric/Behavioral:  Negative for confusion.        Objective   Vital Signs:  /74 (BP Location: Left arm, Patient Position: Sitting, Cuff Size: Adult)   Pulse 64   Temp 97.8 °F (36.6 °C) (Temporal)   Ht 152.4 cm (60\")   Wt 74.8 kg (165 lb)   SpO2 97%   BMI 32.22 kg/m²   Estimated body mass index is 32.22 kg/m² as calculated from the following:    Height as of this encounter: 152.4 cm (60\").    Weight as of this encounter: 74.8 kg (165 lb).      Physical Exam  Vitals and nursing note reviewed.   Constitutional:       Appearance: She is obese. She is not ill-appearing.   Eyes:      General: No scleral icterus.     Conjunctiva/sclera: Conjunctivae normal.   Cardiovascular:      Rate and Rhythm: Normal rate and regular rhythm.   Pulmonary:      Effort: Pulmonary effort is normal. No respiratory distress.   Skin:     General: Skin is warm.      Coloration: Skin is not pale.   Neurological:      General: No focal deficit present.      Mental Status: She is alert and oriented to person, place, and time.   Psychiatric:         Mood and Affect: Mood normal.         Behavior: Behavior normal.                     Assessment and Plan   Diagnoses and all orders for this visit:    1. Paroxysmal atrial " fibrillation (Primary)    2. Primary insomnia  -     traZODone (DESYREL) 100 MG tablet; Take 1 tablet by mouth every night at bedtime.  Dispense: 90 tablet; Refill: 1    3. Skin yeast infection  -     nystatin (MYCOSTATIN) 467156 UNIT/GM powder; Apply  topically to the appropriate area as directed 3 (Three) Times a Day.  Dispense: 60 g; Refill: 3    4. Essential hypertension    5. Obesity (BMI 30.0-34.9)    6. Depression, unspecified depression type    7. Anxiety    8. Bilateral hip pain    9. Other chronic pain    10. Other fatigue      Patient has fatigue.  Her and her daughter going to start a few supplements.  We reviewed these supplements they are likely safe.  They are aware we do not know for sure about interactions of many supplements.    Patient has sleep issues.  Continue trazodone and sleep 3.    Depresion and anxiety appear to be stable.  After losing her  she has appeared to return to baseline.  Continue current medications.   Continue escitalopram 20 mg.  Continue buspirone 15 mg PRN.  Continue wellbutrin 75 mg BID.    Patient has a history of yeast infection continue nystatin powder.      Patient has a BMI > 30.  Obesity increases risks of diseases such as diabetes, coronary artery disease, hypertension, sleep apnea, hyperlipidemia, arthritis, and stroke.  Recommend focusing on portion control and making healthy diet choices.  Avoid fast food.  Avoid calorie beverages including sweet tea, juice, soft drinks, and alcohol.  Recommend increasing your activity and starting a regular exercise program. Can consider utilizing calorie counting apps such as [employer].      Patient has chronic hip pain and chronic pain in her back.  Patient follows with pain management and received injections.  Recommend continuing this.    Patient has history of PAF.  Patient on apixaban 5 mg BID for CVA prevention.  Continue metoprolol 25 mg daily.      Patient has hypertension.  BP is at goal.  The patient's BP  goal is < 130/80.  Recommend ambulatory BP monitoring after patient has taken medication.  Recommend varying the times of the blood pressure readings.  Patient is currently on amlodipine 5 mg, metoprolol 25 mg daily.  Recommend we continue current regimen.    Continue to monitor.          Result Review :  The following data was reviewed by: Juan A Lopez MD on 07/08/2025:   Latest Reference Range & Units 07/01/25 15:29   Sodium 136 - 145 mmol/L 139   Potassium 3.5 - 5.2 mmol/L 4.8   Chloride 98 - 107 mmol/L 103   CO2 22.0 - 29.0 mmol/L 25.4   BUN 8.0 - 23.0 mg/dL 12.0   Creatinine 0.57 - 1.00 mg/dL 0.83   BUN/Creatinine Ratio 7.0 - 25.0  14.5   EGFR Result >60.0 mL/min/1.73 72.3   Glucose 65 - 99 mg/dL 114 (H)   Calcium 8.6 - 10.5 mg/dL 9.6   Alkaline Phosphatase 39 - 117 U/L 91   Total Protein 6.0 - 8.5 g/dL 6.9   Albumin 3.5 - 5.2 g/dL 4.0   A/G Ratio g/dL 1.4   AST (SGOT) 1 - 32 U/L 21   ALT (SGPT) 1 - 33 U/L 10   Total Bilirubin 0.0 - 1.2 mg/dL 0.3   TSH Baseline 0.270 - 4.200 uIU/mL 3.030   Iron 37 - 145 mcg/dL 76   Ferritin 13.00 - 150.00 ng/mL 112.00   Iron Saturation 20 - 50 % 20   TIBC mcg/dL 374   UIBC 112 - 346 mcg/dL 298   Vitamin B-12 211 - 946 pg/mL 504   Globulin gm/dL 2.9   WBC 3.40 - 10.80 10*3/mm3 9.32   RBC 3.77 - 5.28 10*6/mm3 4.11   Hemoglobin 12.0 - 15.9 g/dL 13.9   Hematocrit 34.0 - 46.6 % 40.4   Platelets 140 - 450 10*3/mm3 208   RDW 12.3 - 15.4 % 12.2 (L)   MCV 79.0 - 97.0 fL 98.3 (H)   MCH 26.6 - 33.0 pg 33.8 (H)   MCHC 31.5 - 35.7 g/dL 34.4   Neutrophil Rel % 42.7 - 76.0 % 63.7   Lymphocyte Rel % 19.6 - 45.3 % 23.7   Monocyte Rel % 5.0 - 12.0 % 10.9   Eosinophil Rel % 0.3 - 6.2 % 1.2   Basophil Rel % 0.0 - 1.5 % 0.2   Immature Granulocyte Rel % 0.0 - 0.5 % 0.3   Neutrophils Absolute 1.70 - 7.00 10*3/mm3 5.93   Lymphocytes Absolute 0.70 - 3.10 10*3/mm3 2.21   Monocytes Absolute 0.10 - 0.90 10*3/mm3 1.02 (H)   Eosinophils Absolute 0.00 - 0.40 10*3/mm3 0.11   Basophils Absolute 0.00 -  0.20 10*3/mm3 0.02   Immature Grans, Absolute 0.00 - 0.05 10*3/mm3 0.03   nRBC 0.0 - 0.2 /100 WBC 0.0   (H): Data is abnormally high  (L): Data is abnormally low         BMI is >= 30 and <35. (Class 1 Obesity). The following options were offered after discussion;: exercise counseling/recommendations and nutrition counseling/recommendations                  Follow Up   Return in about 3 months (around 10/8/2025), or if symptoms worsen or fail to improve, for follow up for above problems. [facility] care., Medicare Wellness - Labs prior to visit.  Patient was given instructions and counseling regarding her condition or for health maintenance advice. Please see specific information pulled into the AVS if appropriate.       ARLEN Lopez MD, FACP, [facility]      Electronically signed by Juan A Lopez MD, 07/10/25, 7:34 PM CDT.          Answers submitted by the patient for this visit:  Chronic Condition Follow-up (Submitted on 7/8/2025)  Chief Complaint: PCP follow-up  anxiety: Yes  depression: Yes  hypertension: Yes  thyroid disorder: Yes  dry mouth: No  weight loss: No  weight gain: No  blurred vision: No  insomnia: Yes  hair loss: No  hoarse voice: No  globus sensation: No  neck mass: No  Medication compliance: all of the time  Side effects: fatigue, headaches, joint pain, nausea  Treatment barriers: no complaince problems  Exercise: intermittently  Sleep quality: fair

## 2025-08-25 ENCOUNTER — OFFICE VISIT (OUTPATIENT)
Age: 80
End: 2025-08-25
Payer: MEDICARE

## 2025-08-25 DIAGNOSIS — M19.071 PRIMARY OSTEOARTHRITIS OF RIGHT ANKLE: Primary | ICD-10-CM

## 2025-08-25 PROCEDURE — 20605 DRAIN/INJ JOINT/BURSA W/O US: CPT | Performed by: NURSE PRACTITIONER

## 2025-08-25 PROCEDURE — 1123F ACP DISCUSS/DSCN MKR DOCD: CPT | Performed by: NURSE PRACTITIONER

## 2025-08-25 PROCEDURE — 99213 OFFICE O/P EST LOW 20 MIN: CPT | Performed by: NURSE PRACTITIONER

## 2025-08-25 RX ORDER — DEXAMETHASONE SODIUM PHOSPHATE 4 MG/ML
4 INJECTION, SOLUTION INTRAMUSCULAR; INTRAVENOUS ONCE
Status: COMPLETED | OUTPATIENT
Start: 2025-08-25 | End: 2025-08-25

## 2025-08-25 RX ORDER — ROPIVACAINE HYDROCHLORIDE 5 MG/ML
1 INJECTION, SOLUTION EPIDURAL; INFILTRATION; PERINEURAL ONCE
Status: COMPLETED | OUTPATIENT
Start: 2025-08-25 | End: 2025-08-25

## 2025-08-25 RX ORDER — TRIAMCINOLONE ACETONIDE 40 MG/ML
20 INJECTION, SUSPENSION INTRA-ARTICULAR; INTRAMUSCULAR ONCE
Status: COMPLETED | OUTPATIENT
Start: 2025-08-25 | End: 2025-08-25

## 2025-08-25 RX ADMIN — DEXAMETHASONE SODIUM PHOSPHATE 4 MG: 4 INJECTION, SOLUTION INTRAMUSCULAR; INTRAVENOUS at 15:22

## 2025-08-25 RX ADMIN — TRIAMCINOLONE ACETONIDE 20 MG: 40 INJECTION, SUSPENSION INTRA-ARTICULAR; INTRAMUSCULAR at 15:21

## 2025-08-25 RX ADMIN — ROPIVACAINE HYDROCHLORIDE 1 ML: 5 INJECTION, SOLUTION EPIDURAL; INFILTRATION; PERINEURAL at 15:22

## 2025-08-25 ASSESSMENT — ENCOUNTER SYMPTOMS
COLOR CHANGE: 0
COUGH: 0
DIARRHEA: 0
SHORTNESS OF BREATH: 0
CONSTIPATION: 0
VOMITING: 0
NAUSEA: 0
BLOOD IN STOOL: 0

## (undated) DEVICE — SINGLE USE BIOPSY VALVE MAJ-210: Brand: SINGLE USE BIOPSY VALVE (STERILE)

## (undated) DEVICE — TRAP,MUCUS SPECIMEN, 80CC: Brand: MEDLINE

## (undated) DEVICE — THE CHANNEL CLEANING BRUSH IS A NYLON FLEXI BRUSH ATTACHED TO A FLEXIBLE PLASTIC SHEATH DESIGNED TO SAFELY REMOVE DEBRIS FROM FLEXIBLE ENDOSCOPES.

## (undated) DEVICE — SINGLE USE SUCTION VALVE MAJ-209: Brand: SINGLE USE SUCTION VALVE (STERILE)

## (undated) DEVICE — SENSR O2 OXIMAX FNGR A/ 18IN NONSTR

## (undated) DEVICE — CUFF,BP,DISP,1 TUBE,ADULT,HP: Brand: MEDLINE